# Patient Record
Sex: MALE | Race: BLACK OR AFRICAN AMERICAN | NOT HISPANIC OR LATINO
[De-identification: names, ages, dates, MRNs, and addresses within clinical notes are randomized per-mention and may not be internally consistent; named-entity substitution may affect disease eponyms.]

---

## 2015-11-09 RX ORDER — CINACALCET 30 MG/1
1 TABLET, FILM COATED ORAL
Qty: 0 | Refills: 0 | COMMUNITY
Start: 2015-11-09

## 2015-11-09 RX ORDER — ATORVASTATIN CALCIUM 80 MG/1
1 TABLET, FILM COATED ORAL
Qty: 0 | Refills: 0 | COMMUNITY
Start: 2015-11-09

## 2015-11-09 RX ORDER — SEVELAMER CARBONATE 2400 MG/1
2 POWDER, FOR SUSPENSION ORAL
Qty: 0 | Refills: 0 | COMMUNITY
Start: 2015-11-09

## 2015-11-11 RX ORDER — ASPIRIN/CALCIUM CARB/MAGNESIUM 324 MG
1 TABLET ORAL
Qty: 0 | Refills: 0 | DISCHARGE
Start: 2015-11-11

## 2017-01-04 ENCOUNTER — APPOINTMENT (OUTPATIENT)
Dept: UROLOGY | Facility: CLINIC | Age: 67
End: 2017-01-04

## 2017-01-04 VITALS
HEIGHT: 70 IN | HEART RATE: 99 BPM | WEIGHT: 136 LBS | RESPIRATION RATE: 17 BRPM | SYSTOLIC BLOOD PRESSURE: 177 MMHG | DIASTOLIC BLOOD PRESSURE: 89 MMHG | TEMPERATURE: 97.8 F | BODY MASS INDEX: 19.47 KG/M2

## 2017-01-04 DIAGNOSIS — R33.9 RETENTION OF URINE, UNSPECIFIED: ICD-10-CM

## 2017-01-25 ENCOUNTER — APPOINTMENT (OUTPATIENT)
Dept: UROLOGY | Facility: CLINIC | Age: 67
End: 2017-01-25

## 2017-01-25 ENCOUNTER — OUTPATIENT (OUTPATIENT)
Dept: OUTPATIENT SERVICES | Facility: HOSPITAL | Age: 67
LOS: 1 days | End: 2017-01-25
Payer: MEDICARE

## 2017-01-25 DIAGNOSIS — R35.0 FREQUENCY OF MICTURITION: ICD-10-CM

## 2017-01-25 DIAGNOSIS — N15.1 RENAL AND PERINEPHRIC ABSCESS: Chronic | ICD-10-CM

## 2017-01-25 PROCEDURE — 51797 INTRAABDOMINAL PRESSURE TEST: CPT

## 2017-01-25 PROCEDURE — 51784 ANAL/URINARY MUSCLE STUDY: CPT

## 2017-01-25 PROCEDURE — 51728 CYSTOMETROGRAM W/VP: CPT

## 2017-01-25 PROCEDURE — 51741 ELECTRO-UROFLOWMETRY FIRST: CPT

## 2017-01-26 ENCOUNTER — INPATIENT (INPATIENT)
Facility: HOSPITAL | Age: 67
LOS: 4 days | Discharge: NURSING, REHAB OR LONG-TERM CARE | End: 2017-01-31
Attending: HOSPITALIST | Admitting: HOSPITALIST
Payer: MEDICARE

## 2017-01-26 VITALS
SYSTOLIC BLOOD PRESSURE: 95 MMHG | HEART RATE: 102 BPM | DIASTOLIC BLOOD PRESSURE: 64 MMHG | RESPIRATION RATE: 16 BRPM | TEMPERATURE: 98 F | OXYGEN SATURATION: 99 %

## 2017-01-26 DIAGNOSIS — N15.1 RENAL AND PERINEPHRIC ABSCESS: Chronic | ICD-10-CM

## 2017-01-26 LAB
ALBUMIN SERPL ELPH-MCNC: 3.5 G/DL — SIGNIFICANT CHANGE UP (ref 3.3–5)
ALP SERPL-CCNC: 87 U/L — SIGNIFICANT CHANGE UP (ref 40–120)
ALT FLD-CCNC: 12 U/L — SIGNIFICANT CHANGE UP (ref 4–41)
APPEARANCE UR: SIGNIFICANT CHANGE UP
AST SERPL-CCNC: 16 U/L — SIGNIFICANT CHANGE UP (ref 4–40)
BACTERIA # UR AUTO: SIGNIFICANT CHANGE UP
BASE EXCESS BLDV CALC-SCNC: -2 MMOL/L — SIGNIFICANT CHANGE UP
BASOPHILS # BLD AUTO: 0.03 K/UL — SIGNIFICANT CHANGE UP (ref 0–0.2)
BASOPHILS NFR BLD AUTO: 0.2 % — SIGNIFICANT CHANGE UP (ref 0–2)
BILIRUB SERPL-MCNC: 0.3 MG/DL — SIGNIFICANT CHANGE UP (ref 0.2–1.2)
BILIRUB UR-MCNC: NEGATIVE — SIGNIFICANT CHANGE UP
BLOOD GAS VENOUS - CREATININE: 10.5 MG/DL — HIGH (ref 0.5–1.3)
BLOOD UR QL VISUAL: HIGH
BUN SERPL-MCNC: 74 MG/DL — HIGH (ref 7–23)
CALCIUM SERPL-MCNC: 10.6 MG/DL — HIGH (ref 8.4–10.5)
CHLORIDE BLDV-SCNC: 99 MMOL/L — SIGNIFICANT CHANGE UP (ref 96–108)
CHLORIDE SERPL-SCNC: 97 MMOL/L — LOW (ref 98–107)
CK MB BLD-MCNC: 2.43 NG/ML — SIGNIFICANT CHANGE UP (ref 1–6.6)
CK SERPL-CCNC: 48 U/L — SIGNIFICANT CHANGE UP (ref 30–200)
CO2 SERPL-SCNC: 22 MMOL/L — SIGNIFICANT CHANGE UP (ref 22–31)
COLOR SPEC: YELLOW — SIGNIFICANT CHANGE UP
CREAT SERPL-MCNC: 9.35 MG/DL — HIGH (ref 0.5–1.3)
EOSINOPHIL # BLD AUTO: 0.1 K/UL — SIGNIFICANT CHANGE UP (ref 0–0.5)
EOSINOPHIL NFR BLD AUTO: 0.7 % — SIGNIFICANT CHANGE UP (ref 0–6)
GAS PNL BLDV: 136 MMOL/L — SIGNIFICANT CHANGE UP (ref 136–146)
GLUCOSE BLDV-MCNC: 192 — HIGH (ref 70–99)
GLUCOSE SERPL-MCNC: 197 MG/DL — HIGH (ref 70–99)
GLUCOSE UR-MCNC: NEGATIVE — SIGNIFICANT CHANGE UP
HCO3 BLDV-SCNC: 22 MMOL/L — SIGNIFICANT CHANGE UP (ref 20–27)
HCT VFR BLD CALC: 25 % — LOW (ref 39–50)
HCT VFR BLDV CALC: 25 % — LOW (ref 39–51)
HGB BLD-MCNC: 7.9 G/DL — LOW (ref 13–17)
HGB BLDV-MCNC: 8 G/DL — LOW (ref 13–17)
IMM GRANULOCYTES NFR BLD AUTO: 0.2 % — SIGNIFICANT CHANGE UP (ref 0–1.5)
KETONES UR-MCNC: NEGATIVE — SIGNIFICANT CHANGE UP
LACTATE BLDV-MCNC: 3 MMOL/L — HIGH (ref 0.5–2)
LEUKOCYTE ESTERASE UR-ACNC: HIGH
LYMPHOCYTES # BLD AUTO: 1.74 K/UL — SIGNIFICANT CHANGE UP (ref 1–3.3)
LYMPHOCYTES # BLD AUTO: 12.8 % — LOW (ref 13–44)
MCHC RBC-ENTMCNC: 28.6 PG — SIGNIFICANT CHANGE UP (ref 27–34)
MCHC RBC-ENTMCNC: 31.6 % — LOW (ref 32–36)
MCV RBC AUTO: 90.6 FL — SIGNIFICANT CHANGE UP (ref 80–100)
MONOCYTES # BLD AUTO: 0.59 K/UL — SIGNIFICANT CHANGE UP (ref 0–0.9)
MONOCYTES NFR BLD AUTO: 4.3 % — SIGNIFICANT CHANGE UP (ref 2–14)
NEUTROPHILS # BLD AUTO: 11.15 K/UL — HIGH (ref 1.8–7.4)
NEUTROPHILS NFR BLD AUTO: 81.8 % — HIGH (ref 43–77)
NITRITE UR-MCNC: NEGATIVE — SIGNIFICANT CHANGE UP
PCO2 BLDV: 50 MMHG — SIGNIFICANT CHANGE UP (ref 41–51)
PH BLDV: 7.3 PH — LOW (ref 7.32–7.43)
PH UR: 7 — SIGNIFICANT CHANGE UP (ref 4.6–8)
PLATELET # BLD AUTO: 202 K/UL — SIGNIFICANT CHANGE UP (ref 150–400)
PMV BLD: 11.8 FL — SIGNIFICANT CHANGE UP (ref 7–13)
PO2 BLDV: 24 MMHG — LOW (ref 35–40)
POTASSIUM BLDV-SCNC: 4.2 MMOL/L — SIGNIFICANT CHANGE UP (ref 3.4–4.5)
POTASSIUM SERPL-MCNC: 4.3 MMOL/L — SIGNIFICANT CHANGE UP (ref 3.5–5.3)
POTASSIUM SERPL-SCNC: 4.3 MMOL/L — SIGNIFICANT CHANGE UP (ref 3.5–5.3)
PROT SERPL-MCNC: 6.1 G/DL — SIGNIFICANT CHANGE UP (ref 6–8.3)
PROT UR-MCNC: 150 — HIGH
RBC # BLD: 2.76 M/UL — LOW (ref 4.2–5.8)
RBC # FLD: 14.8 % — HIGH (ref 10.3–14.5)
RBC CASTS # UR COMP ASSIST: SIGNIFICANT CHANGE UP (ref 0–?)
SAO2 % BLDV: 30.1 % — LOW (ref 60–85)
SODIUM SERPL-SCNC: 137 MMOL/L — SIGNIFICANT CHANGE UP (ref 135–145)
SP GR SPEC: 1.01 — SIGNIFICANT CHANGE UP (ref 1–1.03)
TROPONIN T SERPL-MCNC: < 0.06 NG/ML — SIGNIFICANT CHANGE UP (ref 0–0.06)
UROBILINOGEN FLD QL: NORMAL E.U. — SIGNIFICANT CHANGE UP (ref 0.1–0.2)
WBC # BLD: 13.64 K/UL — HIGH (ref 3.8–10.5)
WBC # FLD AUTO: 13.64 K/UL — HIGH (ref 3.8–10.5)
WBC UR QL: >50 — HIGH (ref 0–?)

## 2017-01-26 PROCEDURE — 71010: CPT | Mod: 26

## 2017-01-26 RX ORDER — SODIUM CHLORIDE 9 MG/ML
1000 INJECTION INTRAMUSCULAR; INTRAVENOUS; SUBCUTANEOUS ONCE
Qty: 0 | Refills: 0 | Status: COMPLETED | OUTPATIENT
Start: 2017-01-26 | End: 2017-01-26

## 2017-01-26 RX ORDER — VANCOMYCIN HCL 1 G
1000 VIAL (EA) INTRAVENOUS ONCE
Qty: 0 | Refills: 0 | Status: COMPLETED | OUTPATIENT
Start: 2017-01-26 | End: 2017-01-26

## 2017-01-26 RX ORDER — PIPERACILLIN AND TAZOBACTAM 4; .5 G/20ML; G/20ML
3.38 INJECTION, POWDER, LYOPHILIZED, FOR SOLUTION INTRAVENOUS ONCE
Qty: 0 | Refills: 0 | Status: COMPLETED | OUTPATIENT
Start: 2017-01-26 | End: 2017-01-26

## 2017-01-26 RX ADMIN — SODIUM CHLORIDE 1000 MILLILITER(S): 9 INJECTION INTRAMUSCULAR; INTRAVENOUS; SUBCUTANEOUS at 22:20

## 2017-01-26 RX ADMIN — PIPERACILLIN AND TAZOBACTAM 200 GRAM(S): 4; .5 INJECTION, POWDER, LYOPHILIZED, FOR SOLUTION INTRAVENOUS at 22:54

## 2017-01-26 RX ADMIN — Medication 250 MILLIGRAM(S): at 23:58

## 2017-01-26 NOTE — ED ADULT TRIAGE NOTE - CHIEF COMPLAINT QUOTE
Pt from [place] for episode of hypotension during dialysis.  Pt was unable to be dialyzed.  Pt A&Ox3, reports some generalized abdominal pain. Pt from [place] for episode of hypotension during dialysis.  Pt was unable to be dialyzed.  Pt A&Ox3, reports some generalized abdominal pain.  Pt is FULL CODE. Pt from [place] for episode of hypotension during dialysis.  Pt was unable to be dialyzed.  Pt A&Ox3, reports some generalized abdominal pain.  Pt is FULL CODE.    Addendum:  pt now endorsing dizziness.  BP now.  Pt upgraded. Pt from [place] for episode of hypotension during dialysis.  Pt was unable to be dialyzed.  Pt A&Ox3, reports some generalized abdominal pain.  Pt is FULL CODE.    Addendum:  pt now endorsing dizziness.  BP now.  Pt upgraded, Charge RN notified. Pt from [place] for episode of hypotension during dialysis.  Pt was unable to be dialyzed.  Pt A&Ox3, reports some generalized abdominal pain.  Pt is FULL CODE.    Addendum:  pt now endorsing dizziness.  BP low.  Pt upgraded, Charge RN notified.

## 2017-01-26 NOTE — ED PROVIDER NOTE - PROGRESS NOTE DETAILS
JAGUAR Sneed: Pt signed out to me. Pt initially had improved at sign out. When re-evaluated bp 86/55. Pt had 1.5L IVF plus abtx on stay. He states he feels much better then on arrival. Bedside US with few scattered b lines. Clear lungs. Will c/s micu, 500ml ns, consider pressors. Pt rejected by MICU. BP now 111/55. Will admit to floors.

## 2017-01-26 NOTE — ED ADULT NURSE NOTE - CHIEF COMPLAINT QUOTE
Pt from [place] for episode of hypotension during dialysis.  Pt was unable to be dialyzed.  Pt A&Ox3, reports some generalized abdominal pain.  Pt is FULL CODE.    Addendum:  pt now endorsing dizziness.  BP low.  Pt upgraded, Charge RN notified.

## 2017-01-26 NOTE — ED PROVIDER NOTE - MEDICAL DECISION MAKING DETAILS
66M with hypotension. Concern for sepsis. Do not suspect blood loss, hypovolemia, cardiogenic shock, or obstructive shock. Plan: fluids, labs, vbg, cultures, ua, abx, ekg, admit. 66M with hypotension. Concern for sepsis. Do not suspect blood loss, hypovolemia, cardiogenic shock, or obstructive shock. Plan: fluids, labs, vbg, cultures, ua, abx, ekg, admit.  67 y/o male referred from dialysis for hypotension (predialysis). Basic labs, cardiac enzymes, Abs, cxr, EKG, resuscitate with fluids if needed then pressors. R/o sepsis

## 2017-01-26 NOTE — ED PROVIDER NOTE - PHYSICAL EXAMINATION
Attending Note: 65 y/o male pressents to the ED from dialysis hypotensive feeling weak. PMH ESRD on HD (Tues/Thurs/Sat), MRSA bacteremia on vibramycin for possible vegetation seen on MG, CAD s/p stents, HLD, neurogenic bladder (catheterized twice daily). Sent pre dialysis for hypotension of 70/48. Feels sick, has vague abdominal pain since yesterday and cough with white sputum x 1 month. Pt denies fevers/chills, c/p, sob, vomiting, diarrhea, melena, blood in stool. Nephrologist: Dr Huff [phone]

## 2017-01-26 NOTE — ED PROVIDER NOTE - ATTENDING CONTRIBUTION TO CARE
Attending note: I have discussed with the resident the interview, history, examination, assessment, physical examination and the proposed medical plan. I agree with the conclusions and assumptions and proposed medical plan. I have personally interviewed and examined the patient. I have modified the EMR where indicated and reviewed the past EMR when available.

## 2017-01-26 NOTE — ED PROVIDER NOTE - OBJECTIVE STATEMENT
66M h/o ESRD on HD (Tues/Thurs/Sat), MRSA bacteremia on vibramycin for possible vegetation seen on MG, CAD s/p stents, HLD, neurogenic bladder (catheterized twice daily) sent from dialysis (did not complete HD) for hypotension of 70/48. Pt states he feels sick, has vague abdominal pain since yesterday and cough with white sputum x 1 month. Pt denies fevers/chills, cp, sob, vomiting, diarrhea, melena, blood in stool.  Nephrologist: Dr Huff [phone]

## 2017-01-27 DIAGNOSIS — R11.10 VOMITING, UNSPECIFIED: ICD-10-CM

## 2017-01-27 DIAGNOSIS — I38 ENDOCARDITIS, VALVE UNSPECIFIED: ICD-10-CM

## 2017-01-27 DIAGNOSIS — J98.4 OTHER DISORDERS OF LUNG: ICD-10-CM

## 2017-01-27 DIAGNOSIS — N18.6 END STAGE RENAL DISEASE: ICD-10-CM

## 2017-01-27 DIAGNOSIS — Z95.5 PRESENCE OF CORONARY ANGIOPLASTY IMPLANT AND GRAFT: Chronic | ICD-10-CM

## 2017-01-27 DIAGNOSIS — R10.9 UNSPECIFIED ABDOMINAL PAIN: ICD-10-CM

## 2017-01-27 DIAGNOSIS — I25.10 ATHEROSCLEROTIC HEART DISEASE OF NATIVE CORONARY ARTERY WITHOUT ANGINA PECTORIS: ICD-10-CM

## 2017-01-27 DIAGNOSIS — N31.9 NEUROMUSCULAR DYSFUNCTION OF BLADDER, UNSPECIFIED: ICD-10-CM

## 2017-01-27 DIAGNOSIS — E78.5 HYPERLIPIDEMIA, UNSPECIFIED: ICD-10-CM

## 2017-01-27 DIAGNOSIS — Z41.8 ENCOUNTER FOR OTHER PROCEDURES FOR PURPOSES OTHER THAN REMEDYING HEALTH STATE: ICD-10-CM

## 2017-01-27 DIAGNOSIS — A41.9 SEPSIS, UNSPECIFIED ORGANISM: ICD-10-CM

## 2017-01-27 DIAGNOSIS — I95.9 HYPOTENSION, UNSPECIFIED: ICD-10-CM

## 2017-01-27 LAB
ALBUMIN SERPL ELPH-MCNC: 3.6 G/DL — SIGNIFICANT CHANGE UP (ref 3.3–5)
ALP SERPL-CCNC: 83 U/L — SIGNIFICANT CHANGE UP (ref 40–120)
ALT FLD-CCNC: 9 U/L — SIGNIFICANT CHANGE UP (ref 4–41)
APTT BLD: 38.1 SEC — HIGH (ref 27.5–37.4)
AST SERPL-CCNC: 15 U/L — SIGNIFICANT CHANGE UP (ref 4–40)
BASE EXCESS BLDV CALC-SCNC: -2 MMOL/L — SIGNIFICANT CHANGE UP
BILIRUB SERPL-MCNC: 0.3 MG/DL — SIGNIFICANT CHANGE UP (ref 0.2–1.2)
BLD GP AB SCN SERPL QL: NEGATIVE — SIGNIFICANT CHANGE UP
BLOOD GAS VENOUS - CREATININE: 10.4 MG/DL — HIGH (ref 0.5–1.3)
BUN SERPL-MCNC: 48 MG/DL — HIGH (ref 7–23)
CALCIUM SERPL-MCNC: 9.9 MG/DL — SIGNIFICANT CHANGE UP (ref 8.4–10.5)
CHLORIDE BLDV-SCNC: 101 MMOL/L — SIGNIFICANT CHANGE UP (ref 96–108)
CHLORIDE SERPL-SCNC: 98 MMOL/L — SIGNIFICANT CHANGE UP (ref 98–107)
CO2 SERPL-SCNC: 21 MMOL/L — LOW (ref 22–31)
CREAT SERPL-MCNC: 7.07 MG/DL — HIGH (ref 0.5–1.3)
GAS PNL BLDV: 137 MMOL/L — SIGNIFICANT CHANGE UP (ref 136–146)
GLUCOSE BLDV-MCNC: 142 — HIGH (ref 70–99)
GLUCOSE SERPL-MCNC: 105 MG/DL — HIGH (ref 70–99)
HBV SURFACE AG SER-ACNC: NEGATIVE — SIGNIFICANT CHANGE UP
HCO3 BLDV-SCNC: 22 MMOL/L — SIGNIFICANT CHANGE UP (ref 20–27)
HCT VFR BLD CALC: 19.4 % — CRITICAL LOW (ref 39–50)
HCT VFR BLDV CALC: 22.9 % — LOW (ref 39–51)
HGB BLD-MCNC: 6.1 G/DL — CRITICAL LOW (ref 13–17)
HGB BLDV-MCNC: 7.3 G/DL — LOW (ref 13–17)
LACTATE BLDV-MCNC: 2.8 MMOL/L — HIGH (ref 0.5–2)
MAGNESIUM SERPL-MCNC: 2.2 MG/DL — SIGNIFICANT CHANGE UP (ref 1.6–2.6)
MCHC RBC-ENTMCNC: 28.6 PG — SIGNIFICANT CHANGE UP (ref 27–34)
MCHC RBC-ENTMCNC: 31.4 % — LOW (ref 32–36)
MCV RBC AUTO: 91.1 FL — SIGNIFICANT CHANGE UP (ref 80–100)
PCO2 BLDV: 56 MMHG — HIGH (ref 41–51)
PH BLDV: 7.26 PH — LOW (ref 7.32–7.43)
PHOSPHATE SERPL-MCNC: 4.1 MG/DL — SIGNIFICANT CHANGE UP (ref 2.5–4.5)
PLATELET # BLD AUTO: 122 K/UL — LOW (ref 150–400)
PMV BLD: 11.1 FL — SIGNIFICANT CHANGE UP (ref 7–13)
PO2 BLDV: < 24 MMHG — LOW (ref 35–40)
POTASSIUM BLDV-SCNC: 4.4 MMOL/L — SIGNIFICANT CHANGE UP (ref 3.4–4.5)
POTASSIUM SERPL-MCNC: 3.7 MMOL/L — SIGNIFICANT CHANGE UP (ref 3.5–5.3)
POTASSIUM SERPL-SCNC: 3.7 MMOL/L — SIGNIFICANT CHANGE UP (ref 3.5–5.3)
PROT SERPL-MCNC: 6 G/DL — SIGNIFICANT CHANGE UP (ref 6–8.3)
RBC # BLD: 2.13 M/UL — LOW (ref 4.2–5.8)
RBC # FLD: 15.4 % — HIGH (ref 10.3–14.5)
RH IG SCN BLD-IMP: POSITIVE — SIGNIFICANT CHANGE UP
RH IG SCN BLD-IMP: POSITIVE — SIGNIFICANT CHANGE UP
SAO2 % BLDV: 22.6 % — LOW (ref 60–85)
SODIUM SERPL-SCNC: 138 MMOL/L — SIGNIFICANT CHANGE UP (ref 135–145)
SPECIMEN SOURCE: SIGNIFICANT CHANGE UP
SPECIMEN SOURCE: SIGNIFICANT CHANGE UP
VANCOMYCIN FLD-MCNC: 13.2 UG/ML — SIGNIFICANT CHANGE UP
WBC # BLD: 10.79 K/UL — HIGH (ref 3.8–10.5)
WBC # FLD AUTO: 10.79 K/UL — HIGH (ref 3.8–10.5)

## 2017-01-27 PROCEDURE — 99223 1ST HOSP IP/OBS HIGH 75: CPT | Mod: GC

## 2017-01-27 PROCEDURE — 74177 CT ABD & PELVIS W/CONTRAST: CPT | Mod: 26

## 2017-01-27 RX ORDER — SODIUM CHLORIDE 9 MG/ML
500 INJECTION INTRAMUSCULAR; INTRAVENOUS; SUBCUTANEOUS ONCE
Qty: 0 | Refills: 0 | Status: COMPLETED | OUTPATIENT
Start: 2017-01-27 | End: 2017-01-27

## 2017-01-27 RX ORDER — ATORVASTATIN CALCIUM 80 MG/1
40 TABLET, FILM COATED ORAL AT BEDTIME
Qty: 0 | Refills: 0 | Status: DISCONTINUED | OUTPATIENT
Start: 2017-01-27 | End: 2017-01-31

## 2017-01-27 RX ORDER — SEVELAMER CARBONATE 2400 MG/1
800 POWDER, FOR SUSPENSION ORAL
Qty: 0 | Refills: 0 | Status: DISCONTINUED | OUTPATIENT
Start: 2017-01-27 | End: 2017-01-31

## 2017-01-27 RX ORDER — SENNA PLUS 8.6 MG/1
2 TABLET ORAL AT BEDTIME
Qty: 0 | Refills: 0 | Status: DISCONTINUED | OUTPATIENT
Start: 2017-01-27 | End: 2017-01-31

## 2017-01-27 RX ORDER — ACETAMINOPHEN 500 MG
325 TABLET ORAL EVERY 4 HOURS
Qty: 0 | Refills: 0 | Status: DISCONTINUED | OUTPATIENT
Start: 2017-01-27 | End: 2017-01-31

## 2017-01-27 RX ORDER — TAMSULOSIN HYDROCHLORIDE 0.4 MG/1
0.4 CAPSULE ORAL AT BEDTIME
Qty: 0 | Refills: 0 | Status: DISCONTINUED | OUTPATIENT
Start: 2017-01-27 | End: 2017-01-31

## 2017-01-27 RX ORDER — ONDANSETRON 8 MG/1
4 TABLET, FILM COATED ORAL EVERY 6 HOURS
Qty: 0 | Refills: 0 | Status: DISCONTINUED | OUTPATIENT
Start: 2017-01-27 | End: 2017-01-31

## 2017-01-27 RX ORDER — FOLIC ACID 0.8 MG
1 TABLET ORAL DAILY
Qty: 0 | Refills: 0 | Status: DISCONTINUED | OUTPATIENT
Start: 2017-01-27 | End: 2017-01-31

## 2017-01-27 RX ORDER — CINACALCET 30 MG/1
90 TABLET, FILM COATED ORAL DAILY
Qty: 0 | Refills: 0 | Status: DISCONTINUED | OUTPATIENT
Start: 2017-01-27 | End: 2017-01-31

## 2017-01-27 RX ORDER — MIDODRINE HYDROCHLORIDE 2.5 MG/1
20 TABLET ORAL ONCE
Qty: 0 | Refills: 0 | Status: DISCONTINUED | OUTPATIENT
Start: 2017-01-27 | End: 2017-01-27

## 2017-01-27 RX ORDER — ASPIRIN/CALCIUM CARB/MAGNESIUM 324 MG
81 TABLET ORAL DAILY
Qty: 0 | Refills: 0 | Status: DISCONTINUED | OUTPATIENT
Start: 2017-01-27 | End: 2017-01-31

## 2017-01-27 RX ORDER — MIDODRINE HYDROCHLORIDE 2.5 MG/1
10 TABLET ORAL
Qty: 0 | Refills: 0 | Status: DISCONTINUED | OUTPATIENT
Start: 2017-01-27 | End: 2017-01-31

## 2017-01-27 RX ORDER — CLOPIDOGREL BISULFATE 75 MG/1
75 TABLET, FILM COATED ORAL DAILY
Qty: 0 | Refills: 0 | Status: DISCONTINUED | OUTPATIENT
Start: 2017-01-27 | End: 2017-01-31

## 2017-01-27 RX ORDER — POLYETHYLENE GLYCOL 3350 17 G/17G
17 POWDER, FOR SOLUTION ORAL DAILY
Qty: 0 | Refills: 0 | Status: DISCONTINUED | OUTPATIENT
Start: 2017-01-27 | End: 2017-01-29

## 2017-01-27 RX ORDER — HEPARIN SODIUM 5000 [USP'U]/ML
5000 INJECTION INTRAVENOUS; SUBCUTANEOUS EVERY 8 HOURS
Qty: 0 | Refills: 0 | Status: DISCONTINUED | OUTPATIENT
Start: 2017-01-27 | End: 2017-01-28

## 2017-01-27 RX ORDER — LACTOBACILLUS ACIDOPHILUS 100MM CELL
1 CAPSULE ORAL DAILY
Qty: 0 | Refills: 0 | Status: DISCONTINUED | OUTPATIENT
Start: 2017-01-27 | End: 2017-01-31

## 2017-01-27 RX ORDER — DOCUSATE SODIUM 100 MG
100 CAPSULE ORAL THREE TIMES A DAY
Qty: 0 | Refills: 0 | Status: DISCONTINUED | OUTPATIENT
Start: 2017-01-27 | End: 2017-01-31

## 2017-01-27 RX ORDER — ONDANSETRON 8 MG/1
1 TABLET, FILM COATED ORAL
Qty: 0 | Refills: 0 | COMMUNITY

## 2017-01-27 RX ORDER — IMIPENEM AND CILASTATIN 250; 250 MG/100ML; MG/100ML
250 INJECTION, POWDER, FOR SOLUTION INTRAVENOUS EVERY 12 HOURS
Qty: 0 | Refills: 0 | Status: DISCONTINUED | OUTPATIENT
Start: 2017-01-27 | End: 2017-01-31

## 2017-01-27 RX ADMIN — MIDODRINE HYDROCHLORIDE 10 MILLIGRAM(S): 2.5 TABLET ORAL at 22:31

## 2017-01-27 RX ADMIN — TAMSULOSIN HYDROCHLORIDE 0.4 MILLIGRAM(S): 0.4 CAPSULE ORAL at 22:35

## 2017-01-27 RX ADMIN — SODIUM CHLORIDE 500 MILLILITER(S): 9 INJECTION INTRAMUSCULAR; INTRAVENOUS; SUBCUTANEOUS at 02:22

## 2017-01-27 RX ADMIN — SODIUM CHLORIDE 500 MILLILITER(S): 9 INJECTION INTRAMUSCULAR; INTRAVENOUS; SUBCUTANEOUS at 03:55

## 2017-01-27 RX ADMIN — ATORVASTATIN CALCIUM 40 MILLIGRAM(S): 80 TABLET, FILM COATED ORAL at 22:31

## 2017-01-27 RX ADMIN — IMIPENEM AND CILASTATIN 100 MILLIGRAM(S): 250; 250 INJECTION, POWDER, FOR SOLUTION INTRAVENOUS at 20:04

## 2017-01-27 RX ADMIN — HEPARIN SODIUM 5000 UNIT(S): 5000 INJECTION INTRAVENOUS; SUBCUTANEOUS at 22:31

## 2017-01-27 RX ADMIN — SEVELAMER CARBONATE 800 MILLIGRAM(S): 2400 POWDER, FOR SUSPENSION ORAL at 20:03

## 2017-01-27 NOTE — H&P ADULT. - NEUROLOGICAL COMMENTS
per pt he is wheel chair bound and some motor and sensory function is somewhat improving from before

## 2017-01-27 NOTE — H&P ADULT. - PROBLEM SELECTOR PLAN 3
HD (Tues/Thurs/Sat)  Missed dialysis yesterday, for HD today s/p stenting   continue Aspirin and Plavix

## 2017-01-27 NOTE — H&P ADULT. - HISTORY OF PRESENT ILLNESS
65 yo M w/PMH of ESRD on HD (Tues/Thurs/Sat), MRSA bacteremia on vibramycin for possible vegetation seen on MG, CAD s/p stents, HLD, neurogenic bladder (catherized 2x a day), spinal abscesses (on Ancef for past 9 months), presents to the ED for hypotension of (70/48) and vomiting. Patient recent hospitalized 1/11 for sepsis, discharged [facility] where he was doing well. Yesterday after he was brought to dialysis, he was found to be hypotensive and then began to feel dizzy. He then began to feel nauseous and threw up several times. The vomit was non-bilious and non-bloody.  He also complained of abdominal pain, centered around his umbilicus. Currently 4/10 but was 10/10 when the pain was at its worst. The pain is described as pressure-like and began yesterday. It has improved with abx and fluids in the ED and improves when he coughs. 67 yo M w/PMH of ESRD on HD (Tues/Thurs/Sat), MRSA bacteremia on vibramycin for possible vegetation seen on MG, CAD s/p stents, HLD, neurogenic bladder (catherized 2x a day), spinal abscesses (on Ancef for past 9 months), presents to the ED for hypotension of (70/48) and vomiting. Patient recent hospitalized 1/11 for sepsis, discharged to [facility] where he was doing well. Yesterday after he was brought to dialysis, he was found to be hypotensive and then began to feel dizzy. He lost consciousness for a brief time (about a minute) and was told that he was shaking while he was unconscious. He was a little disoriented when he recovered consciousness but quickly recovered. He denied any tongue biting or incontinence while he was passed out. He then began to feel nauseous and threw up several times. The vomit was non-bilious and non-bloody.    He also complained of abdominal pain, centered around his umbilicus. Currently 4/10 but was 10/10 when the pain was at its worst. The pain is described as pressure-like and began yesterday. It has improved with abx and fluids in the ED and improves when he coughs. 65 yo M w/PMH of ESRD on HD (Tues/Thurs/Sat), CAD s/p stents, HLD, neurogenic bladder (catherized 2x a day), spinal abscesses (on Ancef for past 9 months), recent admission for MRSA bacteremia, now presents to the ED for hypotension of (64/39) and vomiting. Patient recent hospitalized 1/11 for MRSA bacteremia treated with vibramycin for possible vegetation seen on MG, (Dr. Abelino Galan recommended that patient would need LIFE LONG antibiotic therapy). Patient was discharged to [facility] where he had been doing well. Yesterday after he was brought to dialysis, he was found to be hypotensive and then began to feel dizzy and became unresponsive and has NBNB vomiting x 1, but came around with NS bolus and BP improved to 136/67.    He also complained of abdominal pain, centered around his umbilicus. Currently 4/10 but was 10/10 when the pain when he came in. The pain is described as pressure-like and began yesterday. It has improved with abx and fluids in the ED and improves when he coughs. 67 yo M w/PMH of ESRD on HD (Tues/Thurs/Sat), CAD s/p stents, HLD, neurogenic bladder (catherized 2x a day) 2/2 paraplegia due to spinal abscess (has been on Ancef since), recent admission for MRSA bacteremia, now presents to the ED for hypotension of (64/39) and vomiting. Patient recent hospitalized 1/11 for MRSA bacteremia treated with vibramycin for possible vegetation seen on MG, (Dr. Abelino Galan recommended that patient would need LIFE LONG antibiotic therapy). Patient was discharged to [facility] where he had been doing well. Yesterday after he was brought to dialysis, he was found to be hypotensive and then began to feel dizzy and became unresponsive and has NBNB vomiting x 1, but came around with NS bolus and BP improved to 136/67.    He also complained of abdominal pain, centered around his umbilicus. Currently 4/10 but was 10/10 when the pain when he came in. The pain is described as pressure-like and began yesterday. It has improved with abx and fluids in the ED and improves when he coughs. 65 yo M w/PMH of ESRD on HD (Tues/Thurs/Sat), CAD s/p stents, HLD, hyperparathyroidism, neurogenic bladder (caths 2x a day) 2/2 paraplegia due to spinal abscess (has been on Ancef since), recent admission for MSSA bacteremia, now presents to the ED for hypotension of (64/39) and vomiting. Patient recent hospitalized 1/11 for pneumonia. Previously hospitalized 11/15 with MSSA bacteremia treated with vibramycin for possible vegetation seen on MG, (Dr. Abelino Galan recommended that patient would need LIFE LONG antibiotic therapy). After recent admission for pneumonia patient was discharged to [facility] where he had been doing well. Yesterday after he was brought to dialysis, he was found to be hypotensive and then began to feel dizzy and became unresponsive and has NBNB vomiting x 1, but came around with NS bolus and BP improved to 136/67.    He also complained of abdominal pain, centered around his umbilicus. Currently 4/10 but was 10/10 when the pain when he came in. The pain is described as pressure-like and began yesterday. It has improved with abx and fluids in the ED and improves when he coughs. 67 yo M w/PMH of ESRD on HD (Tues/Thurs/Sat), CAD s/p stents, HLD, hyperparathyroidism, neurogenic bladder (caths 2x a day)  paraplegia secondary to spinal infection in the past, currently on vibramycin and ampicillin per [place] records, recent admission for MSSA bacteremia, now presents to the ED for hypotension of (64/39) vomiting an unresponsiveness. Patient recent hospitalized 1/11 for pneumonia. Previously hospitalized 11/15 with MSSA bacteremia treated with vibramycin for possible vegetation seen on MG, (Dr. Abelino Galan recommended that patient would need LIFE LONG antibiotic therapy). After recent admission for pneumonia patient [facility] had [facility] doing well. Yesterday after he was brought to dialysis, he was found to be hypotensive and then began to feel dizzy and became unresponsive and has NBNB vomiting x 1, but became responsive after receiving  NS bolus and BP improved to 136/67.    He also complained of abdominal pain, centered around his umbilicus. Currently 4/10 but was 10/10 when the pain when he came in. The pain is described as pressure-like and began yesterday. It has improved with abx and fluids in the ED. 65 yo M w/PMH of ESRD on HD (Tues/Thurs/Sat), CAD s/p stents, HLD, hyperparathyroidism, neurogenic bladder (caths 2x a day)  paraplegia secondary to spinal infection in the past, wheel chair bound, currently on vibramycin and ampicillin per [place] records, recent admission for MSSA bacteremia, now presents to the ED for hypotension of (64/39) vomiting an unresponsiveness. Patient recent hospitalized 1/11 for pneumonia. Previously hospitalized 11/15 with MSSA bacteremia treated with vibramycin for possible vegetation seen on MG, (Dr. Abelino Galan recommended that patient would need LIFE LONG antibiotic therapy). After recent admission for pneumonia patient was discharged to [place] [facility] where he had been doing well. Yesterday after he was brought to dialysis, he was found to be hypotensive and then began to feel dizzy and became unresponsive and has NBNB vomiting x 1, but became responsive after receiving  NS bolus and BP improved to 136/67.    He also complained of abdominal pain, centered around his umbilicus. Currently 4/10 but was 10/10 when the pain when he came in. The pain is described as pressure-like and began yesterday. It has improved with abx and fluids in the ED.

## 2017-01-27 NOTE — H&P ADULT. - PROBLEM SELECTOR PLAN 2
Resolved  Continue Zofran prn HD (Tues/Thurs/Sat), renal consult  Missed dialysis yesterday, for HD today

## 2017-01-27 NOTE — H&P ADULT. - NEUROLOGICAL
negative Alert & oriented; no sensory, motor or coordination deficits, normal reflexes details… detailed exam

## 2017-01-27 NOTE — H&P ADULT. - PROBLEM SELECTOR PLAN 7
ID consulted for abx recs ID consulted   -start Vancomycin - dose based on level 15-20  -start Imipenem q12h   -MRI T-spine CT Ab/Pelvis - showed constipation and possible stercoral colitis  Tylenol prn pain, bowel regimen

## 2017-01-27 NOTE — PATIENT PROFILE ADULT. - VISION (WITH CORRECTIVE LENSES IF THE PATIENT USUALLY WEARS THEM):
Normal vision: sees adequately in most situations; can see medication labels, newsprint glasses for reading/Partially impaired: cannot see medication labels or newsprint, but can see obstacles in path, and the surrounding layout; can count fingers at arm's length

## 2017-01-27 NOTE — H&P ADULT. - PMH
CAD in native artery    Cavitary lung disease    ESRD (end stage renal disease)    Hypertension CAD in native artery    Cavitary lung disease    ESRD (end stage renal disease)    HLD (hyperlipidemia)    Hypertension    Neurogenic bladder    Spinal abscess CAD in native artery    Cavitary lung disease    ESRD (end stage renal disease)    HLD (hyperlipidemia)    Hypertension    Neurogenic bladder    NSTEMI (non-ST elevated myocardial infarction)    Spinal abscess

## 2017-01-27 NOTE — H&P ADULT. - FAMILY HISTORY
Mother  Still living? Unknown  Family history of breast cancer, Age at diagnosis: Age Unknown     Sibling  Still living? Unknown  Family history of breast cancer, Age at diagnosis: Age Unknown

## 2017-01-27 NOTE — H&P ADULT. - PROBLEM SELECTOR PLAN 8
CT Ab/Pelvis - showed constipation and possible stercoral colitis  Tylenol prn pain improve score 4 (h/o paraplegia sec to spinal infection, age >60, and immobilization) will place on dvt ppx.

## 2017-01-27 NOTE — H&P ADULT. - ASSESSMENT
65 yo M w/PMH of ESRD on HD (Tues/Thurs/Sat), CAD s/p stents, HLD, neurogenic bladder (catherized 2x a day), spinal abscesses (on Ancef for past 9 months), recent admission MRSA bacteremia on vibramycin for possible vegetation seen on MG, now presents to the ED for hypotension of (70/48) and vomiting. 67 yo M w/PMH of ESRD on HD (Tues/Thurs/Sat), CAD s/p stents, HLD, neurogenic bladder (catherized 2x a day), spinal abscesses (on Ancef for past 9 months), recent admission for MRSA bacteremia (treated with vibramycin, ongoing), now presents to the ED for hypotension of (64/39) and vomiting. 65 yo M w/PMH of ESRD on HD (Tues/Thurs/Sat), CAD s/p stents, HLD, neurogenic bladder (catherized 2x a day), spinal abscesses (on Ancef), MSSA bacteremia (treated with vibramycin, ongoing), now presents to the ED for hypotension of (64/39) and vomiting. 67 yo M w/PMH of ESRD on HD (Tues/Thurs/Sat), CAD s/p stents, HLD, hyperparathyroidism, neurogenic bladder (caths 2x a day)  paraplegia secondary to spinal infection in the past, currently on vibramycin and ampicillin per [place] records, recent admission for MSSA bacteremia, ? endocarditis, now presents to the ED for hypotension, vomiting and ams possible sepsis.

## 2017-01-27 NOTE — H&P ADULT. - PROBLEM SELECTOR PLAN 6
continue statin ID consulted   -start Vancomycin - dose based on level 15-20  -start Imipenem q12h   -MRI T-spine

## 2017-01-27 NOTE — H&P ADULT. - PROBLEM SELECTOR PLAN 1
Improved s/p fluid bolus and Midodrine Possible sepsis, BP Improved s/p fluid bolus will evaluate for a infectious eitology as well given recent spinal infection, MSSA bacteremia and ? endocarditits, ID consulted, cont vanco per level, to keep trough bet 15-20, Imipenem, MRI of T spine, possible MG per ID recs.

## 2017-01-27 NOTE — H&P ADULT. - ATTENDING COMMENTS
67 yo M w/PMH of ESRD on HD (Tues/Thurs/Sat), CAD s/p stents, HLD, hyperparathyroidism, neurogenic bladder (caths 2x a day)  paraplegia secondary to spinal infection in the past, currently on vibramycin and ampicillin per [place] records, recent admission for MSSA bacteremia, now presents to the ED for hypotension of (64/39) vomiting an unresponsiveness possible sepsis.  Exam: as above  Plan: ID consulted already, cont vanco, imipenem, MRI of T spine, possible MG, cultures, cont dialysis per schedule, renal consulted already, cont home meds for cad, hld, dvt ppx with hsq. Plan discussed with HS. 67 yo M w/PMH of ESRD on HD (Tues/Thurs/Sat), CAD s/p stents, HLD, hyperparathyroidism, neurogenic bladder (caths 2x a day)  paraplegia secondary to spinal infection in the past,wheel chair bound, currently on vibramycin and ampicillin per [place] records, recent admission for MSSA bacteremia, now presents to the ED for hypotension of (64/39) vomiting an unresponsiveness possible sepsis.  Exam: as above  Plan: ID consulted already, cont vanco, imipenem, MRI of T spine, possible MG, cultures, cont dialysis per schedule, renal consulted already, cont home meds for cad, hld, dvt ppx with hsq. Bowel regimen. Plan discussed with HS.

## 2017-01-27 NOTE — H&P ADULT. - CARDIOVASCULAR
negative Regular rate & rhythm, normal S1, S2; no murmurs, gallops or rubs; no S3, S4 details… detailed exam

## 2017-01-28 LAB
BASOPHILS # BLD AUTO: 0.04 K/UL — SIGNIFICANT CHANGE UP (ref 0–0.2)
BASOPHILS NFR BLD AUTO: 0.4 % — SIGNIFICANT CHANGE UP (ref 0–2)
EOSINOPHIL # BLD AUTO: 0.29 K/UL — SIGNIFICANT CHANGE UP (ref 0–0.5)
EOSINOPHIL NFR BLD AUTO: 3 % — SIGNIFICANT CHANGE UP (ref 0–6)
HAPTOGLOB SERPL-MCNC: 154 MG/DL — SIGNIFICANT CHANGE UP (ref 34–200)
HBV CORE AB SER-ACNC: NONREACTIVE — SIGNIFICANT CHANGE UP
HBV SURFACE AB SER-ACNC: 20 MLU/ML — SIGNIFICANT CHANGE UP
HCT VFR BLD CALC: 20.8 % — CRITICAL LOW (ref 39–50)
HCT VFR BLD CALC: 20.8 % — CRITICAL LOW (ref 39–50)
HCT VFR BLD CALC: 22.2 % — LOW (ref 39–50)
HCT VFR BLD CALC: 22.8 % — LOW (ref 39–50)
HCV AB S/CO SERPL IA: 0.18 S/CO — SIGNIFICANT CHANGE UP
HCV AB SERPL-IMP: SIGNIFICANT CHANGE UP
HGB BLD-MCNC: 6.8 G/DL — CRITICAL LOW (ref 13–17)
HGB BLD-MCNC: 6.8 G/DL — CRITICAL LOW (ref 13–17)
HGB BLD-MCNC: 7.5 G/DL — LOW (ref 13–17)
HGB BLD-MCNC: 7.6 G/DL — LOW (ref 13–17)
IMM GRANULOCYTES NFR BLD AUTO: 0.1 % — SIGNIFICANT CHANGE UP (ref 0–1.5)
LDH SERPL L TO P-CCNC: 128 U/L — LOW (ref 135–225)
LYMPHOCYTES # BLD AUTO: 1.49 K/UL — SIGNIFICANT CHANGE UP (ref 1–3.3)
LYMPHOCYTES # BLD AUTO: 15.5 % — SIGNIFICANT CHANGE UP (ref 13–44)
MCHC RBC-ENTMCNC: 29.4 PG — SIGNIFICANT CHANGE UP (ref 27–34)
MCHC RBC-ENTMCNC: 29.4 PG — SIGNIFICANT CHANGE UP (ref 27–34)
MCHC RBC-ENTMCNC: 29.6 PG — SIGNIFICANT CHANGE UP (ref 27–34)
MCHC RBC-ENTMCNC: 30.1 PG — SIGNIFICANT CHANGE UP (ref 27–34)
MCHC RBC-ENTMCNC: 32.7 % — SIGNIFICANT CHANGE UP (ref 32–36)
MCHC RBC-ENTMCNC: 32.7 % — SIGNIFICANT CHANGE UP (ref 32–36)
MCHC RBC-ENTMCNC: 33.3 % — SIGNIFICANT CHANGE UP (ref 32–36)
MCHC RBC-ENTMCNC: 33.8 % — SIGNIFICANT CHANGE UP (ref 32–36)
MCV RBC AUTO: 88.7 FL — SIGNIFICANT CHANGE UP (ref 80–100)
MCV RBC AUTO: 89.2 FL — SIGNIFICANT CHANGE UP (ref 80–100)
MCV RBC AUTO: 90 FL — SIGNIFICANT CHANGE UP (ref 80–100)
MCV RBC AUTO: 90 FL — SIGNIFICANT CHANGE UP (ref 80–100)
MONOCYTES # BLD AUTO: 0.73 K/UL — SIGNIFICANT CHANGE UP (ref 0–0.9)
MONOCYTES NFR BLD AUTO: 7.6 % — SIGNIFICANT CHANGE UP (ref 2–14)
NEUTROPHILS # BLD AUTO: 7.05 K/UL — SIGNIFICANT CHANGE UP (ref 1.8–7.4)
NEUTROPHILS NFR BLD AUTO: 73.4 % — SIGNIFICANT CHANGE UP (ref 43–77)
OB PNL STL: NEGATIVE — SIGNIFICANT CHANGE UP
PLATELET # BLD AUTO: 110 K/UL — LOW (ref 150–400)
PLATELET # BLD AUTO: 111 K/UL — LOW (ref 150–400)
PLATELET # BLD AUTO: 116 K/UL — LOW (ref 150–400)
PLATELET # BLD AUTO: 116 K/UL — LOW (ref 150–400)
PMV BLD: 11 FL — SIGNIFICANT CHANGE UP (ref 7–13)
PMV BLD: 11.2 FL — SIGNIFICANT CHANGE UP (ref 7–13)
PMV BLD: 11.3 FL — SIGNIFICANT CHANGE UP (ref 7–13)
PMV BLD: 11.3 FL — SIGNIFICANT CHANGE UP (ref 7–13)
RBC # BLD: 2.31 M/UL — LOW (ref 4.2–5.8)
RBC # BLD: 2.31 M/UL — LOW (ref 4.2–5.8)
RBC # BLD: 2.49 M/UL — LOW (ref 4.2–5.8)
RBC # BLD: 2.57 M/UL — LOW (ref 4.2–5.8)
RBC # FLD: 15.2 % — HIGH (ref 10.3–14.5)
RBC # FLD: 15.2 % — HIGH (ref 10.3–14.5)
RBC # FLD: 15.3 % — HIGH (ref 10.3–14.5)
RBC # FLD: 15.7 % — HIGH (ref 10.3–14.5)
RETICS #: 53.8 10X3/UL — SIGNIFICANT CHANGE UP (ref 17–73)
RETICS/RBC NFR: 2.4 % — SIGNIFICANT CHANGE UP (ref 0.5–2.5)
SPECIMEN SOURCE: SIGNIFICANT CHANGE UP
WBC # BLD: 9.1 K/UL — SIGNIFICANT CHANGE UP (ref 3.8–10.5)
WBC # BLD: 9.34 K/UL — SIGNIFICANT CHANGE UP (ref 3.8–10.5)
WBC # BLD: 9.34 K/UL — SIGNIFICANT CHANGE UP (ref 3.8–10.5)
WBC # BLD: 9.85 K/UL — SIGNIFICANT CHANGE UP (ref 3.8–10.5)
WBC # FLD AUTO: 9.1 K/UL — SIGNIFICANT CHANGE UP (ref 3.8–10.5)
WBC # FLD AUTO: 9.34 K/UL — SIGNIFICANT CHANGE UP (ref 3.8–10.5)
WBC # FLD AUTO: 9.34 K/UL — SIGNIFICANT CHANGE UP (ref 3.8–10.5)
WBC # FLD AUTO: 9.85 K/UL — SIGNIFICANT CHANGE UP (ref 3.8–10.5)

## 2017-01-28 PROCEDURE — 99233 SBSQ HOSP IP/OBS HIGH 50: CPT | Mod: GC

## 2017-01-28 PROCEDURE — 99232 SBSQ HOSP IP/OBS MODERATE 35: CPT | Mod: GC

## 2017-01-28 RX ORDER — VANCOMYCIN HCL 1 G
1000 VIAL (EA) INTRAVENOUS ONCE
Qty: 0 | Refills: 0 | Status: COMPLETED | OUTPATIENT
Start: 2017-01-28 | End: 2017-01-28

## 2017-01-28 RX ORDER — SIMETHICONE 80 MG/1
80 TABLET, CHEWABLE ORAL ONCE
Qty: 0 | Refills: 0 | Status: COMPLETED | OUTPATIENT
Start: 2017-01-28 | End: 2017-01-28

## 2017-01-28 RX ORDER — VANCOMYCIN HCL 1 G
1000 VIAL (EA) INTRAVENOUS ONCE
Qty: 0 | Refills: 0 | Status: DISCONTINUED | OUTPATIENT
Start: 2017-01-28 | End: 2017-01-28

## 2017-01-28 RX ADMIN — Medication 81 MILLIGRAM(S): at 10:55

## 2017-01-28 RX ADMIN — SEVELAMER CARBONATE 800 MILLIGRAM(S): 2400 POWDER, FOR SUSPENSION ORAL at 10:53

## 2017-01-28 RX ADMIN — SIMETHICONE 80 MILLIGRAM(S): 80 TABLET, CHEWABLE ORAL at 15:06

## 2017-01-28 RX ADMIN — SEVELAMER CARBONATE 800 MILLIGRAM(S): 2400 POWDER, FOR SUSPENSION ORAL at 18:00

## 2017-01-28 RX ADMIN — ONDANSETRON 4 MILLIGRAM(S): 8 TABLET, FILM COATED ORAL at 13:57

## 2017-01-28 RX ADMIN — CINACALCET 90 MILLIGRAM(S): 30 TABLET, FILM COATED ORAL at 10:53

## 2017-01-28 RX ADMIN — Medication 1 MILLIGRAM(S): at 10:54

## 2017-01-28 RX ADMIN — CLOPIDOGREL BISULFATE 75 MILLIGRAM(S): 75 TABLET, FILM COATED ORAL at 10:54

## 2017-01-28 RX ADMIN — IMIPENEM AND CILASTATIN 100 MILLIGRAM(S): 250; 250 INJECTION, POWDER, FOR SOLUTION INTRAVENOUS at 10:52

## 2017-01-28 RX ADMIN — Medication 1 TABLET(S): at 10:54

## 2017-01-28 RX ADMIN — Medication 250 MILLIGRAM(S): at 02:23

## 2017-01-29 LAB
ERYTHROCYTE [SEDIMENTATION RATE] IN BLOOD: 21 MM/HR — HIGH (ref 1–15)
FERRITIN SERPL-MCNC: 1164 NG/ML — HIGH (ref 30–400)
FOLATE SERPL-MCNC: > 20 NG/ML — HIGH (ref 4.7–20)
HCT VFR BLD CALC: 22.4 % — LOW (ref 39–50)
HGB BLD-MCNC: 7.3 G/DL — LOW (ref 13–17)
IRON SATN MFR SERPL: 133 UG/DL — LOW (ref 155–535)
IRON SATN MFR SERPL: 29 UG/DL — LOW (ref 45–165)
MCHC RBC-ENTMCNC: 29.7 PG — SIGNIFICANT CHANGE UP (ref 27–34)
MCHC RBC-ENTMCNC: 32.6 % — SIGNIFICANT CHANGE UP (ref 32–36)
MCV RBC AUTO: 91.1 FL — SIGNIFICANT CHANGE UP (ref 80–100)
OB PNL STL: NEGATIVE — SIGNIFICANT CHANGE UP
PLATELET # BLD AUTO: 111 K/UL — LOW (ref 150–400)
PMV BLD: 11 FL — SIGNIFICANT CHANGE UP (ref 7–13)
RBC # BLD: 2.46 M/UL — LOW (ref 4.2–5.8)
RBC # FLD: 15.9 % — HIGH (ref 10.3–14.5)
UIBC SERPL-MCNC: 104 UG/DL — LOW (ref 110–370)
VANCOMYCIN FLD-MCNC: 24.5 UG/ML — SIGNIFICANT CHANGE UP
VIT B12 SERPL-MCNC: 1560 PG/ML — HIGH (ref 200–900)
WBC # BLD: 8.64 K/UL — SIGNIFICANT CHANGE UP (ref 3.8–10.5)
WBC # FLD AUTO: 8.64 K/UL — SIGNIFICANT CHANGE UP (ref 3.8–10.5)

## 2017-01-29 PROCEDURE — 99233 SBSQ HOSP IP/OBS HIGH 50: CPT

## 2017-01-29 PROCEDURE — 99222 1ST HOSP IP/OBS MODERATE 55: CPT | Mod: GC

## 2017-01-29 RX ORDER — POLYETHYLENE GLYCOL 3350 17 G/17G
34 POWDER, FOR SOLUTION ORAL DAILY
Qty: 0 | Refills: 0 | Status: DISCONTINUED | OUTPATIENT
Start: 2017-01-29 | End: 2017-01-31

## 2017-01-29 RX ORDER — SIMETHICONE 80 MG/1
80 TABLET, CHEWABLE ORAL THREE TIMES A DAY
Qty: 0 | Refills: 0 | Status: DISCONTINUED | OUTPATIENT
Start: 2017-01-29 | End: 2017-01-31

## 2017-01-29 RX ADMIN — Medication 1 MILLIGRAM(S): at 13:02

## 2017-01-29 RX ADMIN — Medication 81 MILLIGRAM(S): at 13:02

## 2017-01-29 RX ADMIN — TAMSULOSIN HYDROCHLORIDE 0.4 MILLIGRAM(S): 0.4 CAPSULE ORAL at 21:53

## 2017-01-29 RX ADMIN — SEVELAMER CARBONATE 800 MILLIGRAM(S): 2400 POWDER, FOR SUSPENSION ORAL at 09:40

## 2017-01-29 RX ADMIN — Medication 5 MILLIGRAM(S): at 13:02

## 2017-01-29 RX ADMIN — IMIPENEM AND CILASTATIN 100 MILLIGRAM(S): 250; 250 INJECTION, POWDER, FOR SOLUTION INTRAVENOUS at 00:08

## 2017-01-29 RX ADMIN — CLOPIDOGREL BISULFATE 75 MILLIGRAM(S): 75 TABLET, FILM COATED ORAL at 13:02

## 2017-01-29 RX ADMIN — CINACALCET 90 MILLIGRAM(S): 30 TABLET, FILM COATED ORAL at 13:02

## 2017-01-29 RX ADMIN — SIMETHICONE 80 MILLIGRAM(S): 80 TABLET, CHEWABLE ORAL at 11:48

## 2017-01-29 RX ADMIN — IMIPENEM AND CILASTATIN 100 MILLIGRAM(S): 250; 250 INJECTION, POWDER, FOR SOLUTION INTRAVENOUS at 09:41

## 2017-01-29 RX ADMIN — SIMETHICONE 80 MILLIGRAM(S): 80 TABLET, CHEWABLE ORAL at 18:24

## 2017-01-29 RX ADMIN — ATORVASTATIN CALCIUM 40 MILLIGRAM(S): 80 TABLET, FILM COATED ORAL at 00:09

## 2017-01-29 RX ADMIN — IMIPENEM AND CILASTATIN 100 MILLIGRAM(S): 250; 250 INJECTION, POWDER, FOR SOLUTION INTRAVENOUS at 21:52

## 2017-01-29 RX ADMIN — TAMSULOSIN HYDROCHLORIDE 0.4 MILLIGRAM(S): 0.4 CAPSULE ORAL at 00:09

## 2017-01-29 RX ADMIN — ATORVASTATIN CALCIUM 40 MILLIGRAM(S): 80 TABLET, FILM COATED ORAL at 21:52

## 2017-01-29 RX ADMIN — Medication 100 MILLIGRAM(S): at 21:52

## 2017-01-29 RX ADMIN — SENNA PLUS 2 TABLET(S): 8.6 TABLET ORAL at 21:52

## 2017-01-29 RX ADMIN — Medication 1 TABLET(S): at 13:02

## 2017-01-29 RX ADMIN — SEVELAMER CARBONATE 800 MILLIGRAM(S): 2400 POWDER, FOR SUSPENSION ORAL at 13:02

## 2017-01-29 RX ADMIN — ONDANSETRON 4 MILLIGRAM(S): 8 TABLET, FILM COATED ORAL at 19:53

## 2017-01-29 RX ADMIN — SEVELAMER CARBONATE 800 MILLIGRAM(S): 2400 POWDER, FOR SUSPENSION ORAL at 17:52

## 2017-01-30 ENCOUNTER — TRANSCRIPTION ENCOUNTER (OUTPATIENT)
Age: 67
End: 2017-01-30

## 2017-01-30 DIAGNOSIS — N31.9 NEUROMUSCULAR DYSFUNCTION OF BLADDER, UNSPECIFIED: ICD-10-CM

## 2017-01-30 LAB
-  AMIKACIN: SIGNIFICANT CHANGE UP
-  AMPICILLIN/SULBACTAM: SIGNIFICANT CHANGE UP
-  AMPICILLIN: SIGNIFICANT CHANGE UP
-  AZTREONAM: SIGNIFICANT CHANGE UP
-  CEFAZOLIN: SIGNIFICANT CHANGE UP
-  CEFEPIME: SIGNIFICANT CHANGE UP
-  CEFOXITIN: SIGNIFICANT CHANGE UP
-  CEFTAZIDIME: SIGNIFICANT CHANGE UP
-  CEFTRIAXONE: SIGNIFICANT CHANGE UP
-  CIPROFLOXACIN: SIGNIFICANT CHANGE UP
-  ERTAPENEM: SIGNIFICANT CHANGE UP
-  GENTAMICIN: SIGNIFICANT CHANGE UP
-  LEVOFLOXACIN: SIGNIFICANT CHANGE UP
-  MEROPENEM: SIGNIFICANT CHANGE UP
-  NITROFURANTOIN: SIGNIFICANT CHANGE UP
-  PIPERACILLIN/TAZOBACTAM: SIGNIFICANT CHANGE UP
-  TOBRAMYCIN: SIGNIFICANT CHANGE UP
-  TRIMETHOPRIM/SULFAMETHOXAZOLE: SIGNIFICANT CHANGE UP
ALBUMIN SERPL ELPH-MCNC: 3.3 G/DL — SIGNIFICANT CHANGE UP (ref 3.3–5)
ALP SERPL-CCNC: 78 U/L — SIGNIFICANT CHANGE UP (ref 40–120)
ALT FLD-CCNC: 13 U/L — SIGNIFICANT CHANGE UP (ref 4–41)
AST SERPL-CCNC: 22 U/L — SIGNIFICANT CHANGE UP (ref 4–40)
BACTERIA UR CULT: SIGNIFICANT CHANGE UP
BILIRUB SERPL-MCNC: 0.7 MG/DL — SIGNIFICANT CHANGE UP (ref 0.2–1.2)
BUN SERPL-MCNC: 49 MG/DL — HIGH (ref 7–23)
CALCIUM SERPL-MCNC: 9.8 MG/DL — SIGNIFICANT CHANGE UP (ref 8.4–10.5)
CHLORIDE SERPL-SCNC: 104 MMOL/L — SIGNIFICANT CHANGE UP (ref 98–107)
CO2 SERPL-SCNC: 20 MMOL/L — LOW (ref 22–31)
CREAT SERPL-MCNC: 9.67 MG/DL — HIGH (ref 0.5–1.3)
GLUCOSE SERPL-MCNC: 71 MG/DL — SIGNIFICANT CHANGE UP (ref 70–99)
HCT VFR BLD CALC: 25.1 % — LOW (ref 39–50)
HGB BLD-MCNC: 7.8 G/DL — LOW (ref 13–17)
MAGNESIUM SERPL-MCNC: 2.3 MG/DL — SIGNIFICANT CHANGE UP (ref 1.6–2.6)
MCHC RBC-ENTMCNC: 28.7 PG — SIGNIFICANT CHANGE UP (ref 27–34)
MCHC RBC-ENTMCNC: 31.1 % — LOW (ref 32–36)
MCV RBC AUTO: 92.3 FL — SIGNIFICANT CHANGE UP (ref 80–100)
METHOD TYPE: SIGNIFICANT CHANGE UP
ORGANISM # SPEC MICROSCOPIC CNT: SIGNIFICANT CHANGE UP
ORGANISM # SPEC MICROSCOPIC CNT: SIGNIFICANT CHANGE UP
PHOSPHATE SERPL-MCNC: 4.3 MG/DL — SIGNIFICANT CHANGE UP (ref 2.5–4.5)
PLATELET # BLD AUTO: 116 K/UL — LOW (ref 150–400)
PMV BLD: 11 FL — SIGNIFICANT CHANGE UP (ref 7–13)
POTASSIUM SERPL-MCNC: 4.3 MMOL/L — SIGNIFICANT CHANGE UP (ref 3.5–5.3)
POTASSIUM SERPL-SCNC: 4.3 MMOL/L — SIGNIFICANT CHANGE UP (ref 3.5–5.3)
PROT SERPL-MCNC: 6 G/DL — SIGNIFICANT CHANGE UP (ref 6–8.3)
RBC # BLD: 2.72 M/UL — LOW (ref 4.2–5.8)
RBC # FLD: 15.5 % — HIGH (ref 10.3–14.5)
SODIUM SERPL-SCNC: 145 MMOL/L — SIGNIFICANT CHANGE UP (ref 135–145)
VANCOMYCIN FLD-MCNC: 22.3 UG/ML — SIGNIFICANT CHANGE UP
WBC # BLD: 8.64 K/UL — SIGNIFICANT CHANGE UP (ref 3.8–10.5)
WBC # FLD AUTO: 8.64 K/UL — SIGNIFICANT CHANGE UP (ref 3.8–10.5)

## 2017-01-30 PROCEDURE — 99233 SBSQ HOSP IP/OBS HIGH 50: CPT | Mod: GC

## 2017-01-30 PROCEDURE — 99232 SBSQ HOSP IP/OBS MODERATE 35: CPT

## 2017-01-30 RX ORDER — PANTOPRAZOLE SODIUM 20 MG/1
40 TABLET, DELAYED RELEASE ORAL
Qty: 0 | Refills: 0 | Status: DISCONTINUED | OUTPATIENT
Start: 2017-01-30 | End: 2017-01-31

## 2017-01-30 RX ORDER — ONDANSETRON 8 MG/1
4 TABLET, FILM COATED ORAL
Qty: 0 | Refills: 0 | COMMUNITY

## 2017-01-30 RX ADMIN — IMIPENEM AND CILASTATIN 100 MILLIGRAM(S): 250; 250 INJECTION, POWDER, FOR SOLUTION INTRAVENOUS at 09:00

## 2017-01-30 RX ADMIN — CLOPIDOGREL BISULFATE 75 MILLIGRAM(S): 75 TABLET, FILM COATED ORAL at 12:56

## 2017-01-30 RX ADMIN — Medication 1 TABLET(S): at 12:55

## 2017-01-30 RX ADMIN — SEVELAMER CARBONATE 800 MILLIGRAM(S): 2400 POWDER, FOR SUSPENSION ORAL at 12:55

## 2017-01-30 RX ADMIN — Medication 81 MILLIGRAM(S): at 12:55

## 2017-01-30 RX ADMIN — SEVELAMER CARBONATE 800 MILLIGRAM(S): 2400 POWDER, FOR SUSPENSION ORAL at 17:55

## 2017-01-30 RX ADMIN — ATORVASTATIN CALCIUM 40 MILLIGRAM(S): 80 TABLET, FILM COATED ORAL at 21:24

## 2017-01-30 RX ADMIN — Medication 1 MILLIGRAM(S): at 12:55

## 2017-01-30 RX ADMIN — Medication 325 MILLIGRAM(S): at 09:29

## 2017-01-30 RX ADMIN — CINACALCET 90 MILLIGRAM(S): 30 TABLET, FILM COATED ORAL at 12:55

## 2017-01-30 RX ADMIN — Medication 100 MILLIGRAM(S): at 12:55

## 2017-01-30 RX ADMIN — SEVELAMER CARBONATE 800 MILLIGRAM(S): 2400 POWDER, FOR SUSPENSION ORAL at 08:59

## 2017-01-30 RX ADMIN — TAMSULOSIN HYDROCHLORIDE 0.4 MILLIGRAM(S): 0.4 CAPSULE ORAL at 21:24

## 2017-01-30 RX ADMIN — Medication 100 MILLIGRAM(S): at 05:05

## 2017-01-30 RX ADMIN — IMIPENEM AND CILASTATIN 100 MILLIGRAM(S): 250; 250 INJECTION, POWDER, FOR SOLUTION INTRAVENOUS at 21:24

## 2017-01-30 RX ADMIN — Medication 325 MILLIGRAM(S): at 10:00

## 2017-01-30 RX ADMIN — Medication 5 MILLIGRAM(S): at 12:56

## 2017-01-30 NOTE — DISCHARGE NOTE ADULT - CARE PLAN
Principal Discharge DX:	Hypotension  Goal:	resolution  Instructions for follow-up, activity and diet:	Your episode of low blood pressure resolved with fluids in the hospital and blood pressure remained stable.  Secondary Diagnosis:	Vomiting  Secondary Diagnosis:	Anemia  Instructions for follow-up, activity and diet:	Due to chronic disease. You received 2 units of blood in the hospital with improvement of Hg blood level. Follow up with your primary care doctor.  Secondary Diagnosis:	ESRD (end stage renal disease)  Instructions for follow-up, activity and diet:	Continue dialysis and home meds.  Secondary Diagnosis:	Hypertension  Instructions for follow-up, activity and diet:	Continue home meds.  Secondary Diagnosis:	HLD (hyperlipidemia)  Instructions for follow-up, activity and diet:	Continue home meds.  Secondary Diagnosis:	Endocarditis  Instructions for follow-up, activity and diet:	MG done 1/31 showed _____________. Principal Discharge DX:	Hypotension  Goal:	resolution  Instructions for follow-up, activity and diet:	Your episode of low blood pressure resolved with fluids in the hospital and blood pressure remained stable.  Secondary Diagnosis:	Vomiting  Secondary Diagnosis:	Anemia  Instructions for follow-up, activity and diet:	Due to chronic disease. You received 2 units of blood in the hospital with improvement of Hg blood level. Follow up with your primary care doctor.  Secondary Diagnosis:	ESRD (end stage renal disease)  Instructions for follow-up, activity and diet:	Continue dialysis and home meds.  Secondary Diagnosis:	HLD (hyperlipidemia)  Instructions for follow-up, activity and diet:	Continue home meds.  Secondary Diagnosis:	Endocarditis  Instructions for follow-up, activity and diet:	MG done 1/31 showed _____________.  Secondary Diagnosis:	Spinal abscess  Instructions for follow-up, activity and diet:	MRI of thorasic spine was done on 1/31/17, which showed resolution of abscess. Follow up with your primary care doctor. Principal Discharge DX:	Hypotension  Goal:	resolution  Instructions for follow-up, activity and diet:	Your episode of low blood pressure resolved with fluids and blood trasfusions in the hospital and blood pressure remained stable.    Follow up with your primary care doctor within a week of leaving the hospital.  Secondary Diagnosis:	Vomiting  Instructions for follow-up, activity and diet:	Vomiting resolved while in the hospital. Take Zofran as needed for nausea and vomiting.  Secondary Diagnosis:	Anemia  Instructions for follow-up, activity and diet:	Due to chronic disease (ESRD). You received 2 units of blood in the hospital with improvement of Hg blood level. Follow up with your primary care doctor.  Secondary Diagnosis:	ESRD (end stage renal disease)  Instructions for follow-up, activity and diet:	Continue dialysis and home meds.  Secondary Diagnosis:	HLD (hyperlipidemia)  Instructions for follow-up, activity and diet:	Continue home meds.  Secondary Diagnosis:	Endocarditis  Instructions for follow-up, activity and diet:	MG done 1/31 showed no vegetations on heart valve making endocarditis unlikely. You were seen by the Infectious Disease specialists here who recommended that you no longer need to take any antibiotics at home.  Secondary Diagnosis:	Spinal abscess  Instructions for follow-up, activity and diet:	MRI of thorasic spine was done on 1/31/17, which showed resolution of abscess. Continue rehab exercises. Follow up with your neurologist and primary care doctor. Principal Discharge DX:	Hypotension  Goal:	resolution  Instructions for follow-up, activity and diet:	Your episode of low blood pressure resolved with fluids and blood trasfusions in the hospital and blood pressure remained stable.    Follow up with your primary care doctor within a week of leaving the hospital.  Secondary Diagnosis:	Vomiting  Instructions for follow-up, activity and diet:	Vomiting resolved while in the hospital. Take Zofran as needed for nausea and vomiting.  Secondary Diagnosis:	Anemia  Instructions for follow-up, activity and diet:	Due to chronic disease (ESRD). You received 2 units of blood in the hospital with improvement of your hemoglobin blood level. No sources of bleeding were identified, and your blood counts remained stable throughout your hospitalization. Please follow up with your primary care doctor and your nephrologist for a follow up of your blood counts and management of your kidney disease that is likely contributing to your anemia.  Secondary Diagnosis:	ESRD (end stage renal disease)  Instructions for follow-up, activity and diet:	Continue dialysis and home meds.  Please follow up with your nephrologist, Dr. Huff, after discharge from the hospital and to resume your dialysis schedule on [id number].  Secondary Diagnosis:	HLD (hyperlipidemia)  Instructions for follow-up, activity and diet:	Continue home meds and maintain a low cholesterol diet.  Secondary Diagnosis:	Endocarditis  Instructions for follow-up, activity and diet:	A transesophageal echocardiogram (MG) was done 1/31 showed no vegetations on heart valve making endocarditis unlikely. You were seen by the Infectious Disease specialists here who recommended that you no longer need to take any antibiotics at home. Please follow up with your primary care doctor after discharge to monitor you for evidence of further infections.  If you begin having fevers, chills, or weight loss, please seek medical attention.  Secondary Diagnosis:	Spinal abscess  Instructions for follow-up, activity and diet:	MRI of thoracic spine was done on 1/31/17, which showed resolution of your previous abscess. Continue rehab exercises. Follow up with your neurologist and primary care doctor. Principal Discharge DX:	Hypotension  Goal:	resolution  Instructions for follow-up, activity and diet:	Your episode of low blood pressure resolved with fluids and blood trasfusions in the hospital and blood pressure remained stable.    Follow up with your primary care doctor within a week of leaving the hospital.  Secondary Diagnosis:	Vomiting  Instructions for follow-up, activity and diet:	Vomiting resolved while in the hospital. Take Zofran as needed for nausea and vomiting.  Secondary Diagnosis:	Anemia  Instructions for follow-up, activity and diet:	Due to chronic disease (ESRD). You received 2 units of blood in the hospital with improvement of your hemoglobin blood level. No sources of bleeding were identified, and your blood counts remained stable throughout your hospitalization. Please follow up with your primary care doctor and your nephrologist for a follow up of your blood counts and management of your kidney disease that is likely contributing to your anemia.  Secondary Diagnosis:	ESRD (end stage renal disease)  Instructions for follow-up, activity and diet:	Continue dialysis and home meds.  Please follow up with your nephrologist, Dr. Huff, after discharge from the hospital and to resume your dialysis schedule on [id number].  Secondary Diagnosis:	HLD (hyperlipidemia)  Instructions for follow-up, activity and diet:	Continue home meds and maintain a low cholesterol diet.  Secondary Diagnosis:	Endocarditis  Instructions for follow-up, activity and diet:	A transesophageal echocardiogram (MG) was done 1/31 showed no vegetations on heart valve making endocarditis unlikely. You were seen by the Infectious Disease specialists here who recommended that you no longer need to take any antibiotics at home. Please follow up with your primary care doctor after discharge to monitor you for evidence of further infections.  If you begin having fevers, chills, or weight loss, please seek medical attention.  Secondary Diagnosis:	Spinal abscess  Instructions for follow-up, activity and diet:	MRI of thoracic spine was done on 1/31/17, which showed resolution of your previous abscess. Continue rehab exercises. Follow up with your neurologist and primary care doctor. Principal Discharge DX:	Hypotension  Goal:	resolution  Instructions for follow-up, activity and diet:	Your episode of low blood pressure resolved with fluids and blood trasfusions in the hospital and blood pressure remained stable.    Follow up with your primary care doctor within a week of leaving the hospital.  Secondary Diagnosis:	Vomiting  Instructions for follow-up, activity and diet:	Vomiting resolved while in the hospital. Take Zofran as needed for nausea and vomiting.  Secondary Diagnosis:	Anemia  Instructions for follow-up, activity and diet:	Due to chronic disease (ESRD). You received 2 units of blood in the hospital with improvement of your hemoglobin blood level. No sources of bleeding were identified, and your blood counts remained stable throughout your hospitalization. Please follow up with your primary care doctor and your nephrologist for a follow up of your blood counts and management of your kidney disease that is likely contributing to your anemia.  Secondary Diagnosis:	ESRD (end stage renal disease)  Instructions for follow-up, activity and diet:	Continue dialysis and home meds.  Please follow up with your nephrologist, Dr. Huff, after discharge from the hospital and to resume your dialysis schedule on [facility]jj[facility].  Secondary Diagnosis:	HLD (hyperlipidemia)  Instructions for follow-up, activity and diet:	Continue home meds and maintain a low cholesterol diet.  Secondary Diagnosis:	Endocarditis  Instructions for follow-up, activity and diet:	A transesophageal echocardiogram (MG) was done 1/31 showed no vegetations on heart valve making endocarditis unlikely. You were seen by the Infectious Disease specialists here who recommended that you no longer need to take any antibiotics at home. Please follow up with your primary care doctor after discharge to monitor you for evidence of further infections.  If you begin having fevers, chills, or weight loss, please seek medical attention.  Secondary Diagnosis:	Spinal abscess  Instructions for follow-up, activity and diet:	MRI of thoracic spine was done on 1/31/17, which showed resolution of your previous abscess. Continue rehab exercises. Follow up with your neurologist and primary care doctor. Principal Discharge DX:	Hypotension  Goal:	resolution  Instructions for follow-up, activity and diet:	Your episode of low blood pressure resolved with fluids and blood trasfusions in the hospital and blood pressure remained stable.    Follow up with your primary care doctor within a week of leaving the hospital.  Secondary Diagnosis:	Vomiting  Instructions for follow-up, activity and diet:	Vomiting resolved while in the hospital. Take Zofran as needed for nausea and vomiting.  Secondary Diagnosis:	Anemia  Instructions for follow-up, activity and diet:	Due to chronic disease (ESRD). You received 2 units of blood in the hospital with improvement of your hemoglobin blood level. No sources of bleeding were identified, and your blood counts remained stable throughout your hospitalization. Please follow up with your primary care doctor and your nephrologist for a follow up of your blood counts and management of your kidney disease that is likely contributing to your anemia.  Secondary Diagnosis:	ESRD (end stage renal disease)  Instructions for follow-up, activity and diet:	Continue dialysis and home meds.  Please follow up with your nephrologist, Dr. Huff, after discharge from the hospital and to resume your dialysis schedule on [id number]-Xprrft[facility].  Secondary Diagnosis:	HLD (hyperlipidemia)  Instructions for follow-up, activity and diet:	Continue home meds and maintain a low cholesterol diet.  Secondary Diagnosis:	Endocarditis  Instructions for follow-up, activity and diet:	A transesophageal echocardiogram (MG) was done 1/31 showed no vegetations on heart valve making endocarditis unlikely. You were seen by the Infectious Disease specialists here who recommended that you no longer need to take any antibiotics at home. Please follow up with your primary care doctor after discharge to monitor you for evidence of further infections.  If you begin having fevers, chills, or weight loss, please seek medical attention.  Secondary Diagnosis:	Spinal abscess  Instructions for follow-up, activity and diet:	MRI of thoracic spine was done on 1/31/17, which showed resolution of your previous abscess. Continue rehab exercises. Follow up with your neurologist and primary care doctor.

## 2017-01-30 NOTE — DISCHARGE NOTE ADULT - PROVIDER TOKENS
TOKEN:'[id number]',FREE:[LAST:[[facility]],FIRST:[[facility]],PHONE:[(   )    -],FAX:[(   )    -]] FREE:[LAST:[[facility]],FIRST:[[facility]],PHONE:[(   )    -],FAX:[(   )    -]],TOKEN:'[id number]'

## 2017-01-30 NOTE — DISCHARGE NOTE ADULT - MEDICATION SUMMARY - MEDICATIONS TO TAKE
I will START or STAY ON the medications listed below when I get home from the hospital:    aspirin 81 mg oral tablet  -- 1 tab(s) by mouth once a day  -- Indication: For S/P primary angioplasty with coronary stent    Tylenol 325 mg oral tablet  -- 2 tab(s) by mouth every 6 hours, As Needed for pain  -- Indication: For Pain as needed     tamsulosin 0.4 mg oral capsule  -- 1 cap(s) by mouth once a day  -- Indication: For Neurogenic bladder    ondansetron 4 mg oral tablet  -- 1 tab(s) by mouth every 6 hours, As needed, Nausea and/or Vomiting  -- Indication: For Nausea     atorvastatin 40 mg oral tablet  -- 1 tab(s) by mouth once a day (at bedtime)  -- Indication: For HLD (hyperlipidemia)    clopidogrel 75 mg oral tablet  -- 1 tab(s) by mouth once a day  -- Indication: For S/P primary angioplasty with coronary stent    midodrine 2.5 mg oral tablet  -- 1 tab(s) by mouth 3 times a week before hemodialysis, Tuesday, Thursday, Saturday  -- Indication: For Hypotension    Sensipar 90 mg oral tablet  -- 1 tab(s) by mouth once a day  -- Indication: For ESRD (end stage renal disease)    Renvela 800 mg oral tablet  -- 2 tab(s) by mouth 3 times a day (with meals)  -- Indication: For ESRD (end stage renal disease)    Acidophilus oral tablet  -- 1 tab(s) by mouth once a day  -- Indication: For Home supplement    Tussin DM 10 mg-100 mg/5 mL oral liquid  -- 10 milliliter(s) by mouth every 8 hours  -- Indication: For Home med     folic acid 1 mg oral tablet  -- 1 tab(s) by mouth once a day  -- Indication: For ESRD (end stage renal disease)

## 2017-01-30 NOTE — DISCHARGE NOTE ADULT - CARE PROVIDER_API CALL
Aldo Wheeler), Internal Medicine; Nephrology  [address] [place] [address]  Phone: [phone]  Fax: [phone]    [facility], [facility]  Phone: (   )    -  Fax: (   )    - [facility]  Phone: (   )    -  Fax: (   )    -    Heath Huff), Internal Medicine; Nephrology  [address] [address]  Phone: [phone]  Fax: [phone]

## 2017-01-30 NOTE — DISCHARGE NOTE ADULT - ADDITIONAL INSTRUCTIONS
improved 1) Please follow up with your primary care doctor at [facility] after discharge  2) Please follow up with your nephrologist, Dr. Huff, after discharge from the hospital

## 2017-01-30 NOTE — DISCHARGE NOTE ADULT - SECONDARY DIAGNOSIS.
Vomiting Anemia ESRD (end stage renal disease) Hypertension HLD (hyperlipidemia) Endocarditis Spinal abscess

## 2017-01-30 NOTE — DISCHARGE NOTE ADULT - HOSPITAL COURSE
67 yo M w/PMH of ESRD on HD (Tues/Thurs/Sat), CAD s/p stents, HLD, hyperparathyroidism, neurogenic bladder (caths 2x a day)  paraplegia secondary to spinal infection in the past, wheel chair bound, currently on vibramycin and ampicillin per [place] records, recent admission for MSSA bacteremia, now presents to the ED for hypotension of (64/39) vomiting an unresponsiveness. Patient recent hospitalized 1/11 for pneumonia. Previously hospitalized 11/15 with MSSA bacteremia treated with vibramycin for possible vegetation seen on MG, (Dr. Abelino Galan recommended that patient would need LIFE LONG antibiotic therapy). After recent admission for pneumonia patient was discharged to [place] [facility]hab where he had been doing well. Yesterday after he was brought to dialysis, he was found to be hypotensive and then began to feel dizzy and became unresponsive and has NBNB vomiting x 1, but became responsive after receiving  NS bolus and BP improved to 136/67.    He also complained of abdominal pain, centered around his umbilicus. Currently 4/10 but was 10/10 when the pain when he came in. The pain is described as pressure-like and began yesterday. It has improved with abx and fluids in the ED. 65 yo M w/PMH of ESRD on HD (Tues/Thurs/Sat), CAD s/p stents, HLD, hyperparathyroidism, neurogenic bladder (caths 2x a day)  paraplegia secondary to spinal infection in the past, wheel chair bound, currently on vibramycin and ampicillin per [place] records, recent admission for MSSA bacteremia, now presents to the ED for hypotension of (64/39) vomiting an unresponsiveness. Patient recent hospitalized 1/11 for pneumonia. Previously hospitalized 11/15 with MSSA bacteremia treated with vibramycin for possible vegetation seen on MG, (Dr. Abelino Galan recommended that patient would need LIFE LONG antibiotic therapy). After recent admission for pneumonia patient was discharged to [facility] where he had been doing well. Yesterday after he was brought to dialysis, he was found to be hypotensive and then began to feel dizzy and became unresponsive and has NBNB vomiting x 1, but became responsive after receiving  NS bolus and BP improved to 136/67.    He also complained of abdominal pain, centered around his umbilicus. Currently 4/10 but was 10/10 when the pain when he came in. The pain is described as pressure-like and began yesterday. It has improved with abx and fluids in the ED.  After fluids pt found to be anemic to Hg 6.1, received 2 units of pRBCs, Hg improved to 7.6 and remained stable. No signs of bleeding found. Fecal occult blood was negative on 2 separate testings. Due to concern for sepsis ID recommended course of Imipenem and Vancomycin. Patient was afebrile. Patient received HD on 1/27,1/28 and 1/31????.  MG showed _________. MRI spine showed ___________. Given no clear sign of infection antibiotics were discontinued. Due to patient's stable Hg and clinical status he was cleared for discharge to return to rehab. 65 yo M w/PMH of ESRD on HD (Tues/Thurs/Sat), CAD s/p stents, HLD, hyperparathyroidism, neurogenic bladder (caths 2x a day)  paraplegia secondary to spinal infection in the past, wheel chair bound, currently on vibramycin and ampicillin per [place] records, recent admission for MSSA bacteremia, now presents to the ED for hypotension of (64/39) vomiting an unresponsiveness. Patient recent hospitalized 1/11 for pneumonia. Previously hospitalized 11/15 with MSSA bacteremia treated with vibramycin for possible vegetation seen on MG, (Dr. Ableino Galan recommended that patient would need LIFE LONG antibiotic therapy). After recent admission for pneumonia patient was discharged to [facility] where he had been doing well. After he was brought to dialysis, he was found to be hypotensive and then began to feel dizzy and became unresponsive and has NBNB vomiting x 1, but became responsive after receiving  NS bolus and BP improved to 136/67.    He also complained of abdominal pain, centered around his umbilicus. Currently 4/10 but was 10/10 when the pain when he came in. The pain is described as pressure-like and began yesterday. It has improved with abx and fluids in the ED.  After fluids pt found to be anemic to Hg 6.1, received 2 units of pRBCs, Hg improved to 7.6 and remained stable. No signs of acute bleeding found. Fecal occult blood was negative on 2 separate testings. Due to concern for sepsis ID was consulted and recommended course of Imipenem and Vancomycin. Patient was afebrile. Patient received HD on 1/27,1/28 and 1/31.  MG showed _________. MRI thorasic spine showed resolution of abscess. Given no clear sign of infection antibiotics were discontinued. Due to patient's stable Hg and clinical status he was cleared for discharge to return to rehab. 65 yo M w/PMH of ESRD on HD (Tues/Thurs/Sat), CAD s/p stents, HLD, hyperparathyroidism, neurogenic bladder (caths 2x a day)  paraplegia secondary to spinal infection in the past, wheel chair bound, currently on vibramycin and ampicillin per [place] records, recent admission for MSSA bacteremia, now presents to the ED for hypotension of (64/39) vomiting an unresponsiveness. Patient recent hospitalized 1/11 for pneumonia. Previously hospitalized 11/15 with MSSA bacteremia treated with vibramycin for possible vegetation seen on MG, (Dr. Abelino Galan recommended that patient would need LIFE LONG antibiotic therapy). After recent admission for pneumonia patient was discharged to [facility] where he had been doing well. After he was brought to dialysis, he was found to be hypotensive and then began to feel dizzy and became unresponsive and has NBNB vomiting x 1, but became responsive after receiving  NS bolus and BP improved to 136/67.    He also complained of abdominal pain, centered around his umbilicus. Currently 4/10 but was 10/10 when the pain when he came in. The pain is described as pressure-like and began yesterday. It has improved with abx and fluids in the ED.  After fluids pt found to be anemic to Hg 6.1, received 2 units of pRBCs, Hg improved to 7.6 and remained stable. No signs of acute bleeding found. Fecal occult blood was negative on 2 separate testings. Due to concern for sepsis ID was consulted and recommended course of Imipenem and Vancomycin. Patient was afebrile. Patient received HD on 1/27,1/28 and 1/31.  MG showed no signs of vegetations. MRI thorasic spine showed resolution of abscess. Given no clear sign of infection antibiotics were discontinued. ID recommended that no prophylactic antibiotics were needed as there was no sign of endocarditis on MG. Due to patient's stable Hg and clinical status he was cleared for discharge to return to rehab. 67 yo M w/PMH of ESRD on HD (Tues/Thurs/Sat), CAD s/p stents, HLD, hyperparathyroidism, neurogenic bladder (caths 2x a day)  paraplegia secondary to spinal infection in the past, wheel chair bound, currently on vibramycin and ampicillin per [place] records, recent admission for MSSA bacteremia, now presents to the ED for hypotension of (64/39) vomiting an unresponsiveness. Patient recent hospitalized 1/11 for pneumonia. Previously hospitalized 11/15 with MSSA bacteremia treated with vibramycin for possible vegetation seen on MG, (Dr. Abelino Galan recommended that patient would need LIFE LONG antibiotic therapy). After recent admission for pneumonia patient was discharged to [place] rehab where he had been doing well. After he was brought to dialysis, he was found to be hypotensive and then began to feel dizzy and became unresponsive and has NBNB vomiting x 1, but became responsive after receiving  NS bolus and BP improved to 136/67.    He also complained of abdominal pain, centered around his umbilicus. Currently 4/10 but was 10/10 when the pain when he came in. The pain is described as pressure-like and began yesterday. It has improved with abx and fluids in the ED.  After fluids pt found to be anemic to Hg 6.1, received 2 units of pRBCs, Hg improved to 7.6 and remained stable. No signs of acute bleeding found. Fecal occult blood was negative on 2 separate testings. Due to concern for sepsis ID was consulted and recommended course of Imipenem and Vancomycin. Patient was afebrile. Patient received HD on 1/27,1/28 and 1/31.  Given previous hx of possible endocarditis on Nov 2015, MG was repeated, but showed no signs of vegetations. MRI thoracic spine showed resolution of abscess. Given no clear sign of infection, antibiotics were discontinued per ID. ID recommended that no prophylactic antibiotics were needed as there was no sign of endocarditis on MG and negative blood cultures during admission. Due to patient's stable Hg and clinical status he was cleared for discharge to return to rehab. Plan was discussed with family, agreeable with plan, to resume care with PMD at [place] and resume regular T-Th-Sat HD schedule. 65 yo M w/PMH of ESRD on HD (Tues/Thurs/Sat), CAD s/p stents, HLD, hyperparathyroidism, neurogenic bladder (caths 2x a day)  paraplegia secondary to spinal infection in the past, wheel chair bound, currently on vibramycin and ampicillin per [place] records, recent admission for MSSA bacteremia, now presents to the ED for hypotension of (64/39) vomiting an unresponsiveness. Patient recent hospitalized 1/11 for pneumonia. Previously hospitalized 11/15 with MSSA bacteremia treated with vibramycin for possible vegetation seen on MG, (Dr. Abelino Galan recommended that patient would need LIFE LONG antibiotic therapy). After recent admission for pneumonia patient was discharged to [place] [facility]hab where he had been doing well. After he was brought to dialysis, he was found to be hypotensive and then began to feel dizzy and became unresponsive and has NBNB vomiting x 1, but became responsive after receiving  NS bolus and BP improved to 136/67.    He also complained of abdominal pain, centered around his umbilicus,  10/10 when the pain when he came in, improved after. The pain is described as pressure-like and began yesterday. It has improved with abx and fluids in the ED.  After fluids pt found to be anemic to Hg 6.1, received 2 units of pRBCs, Hg improved to 7.6 and remained stable. No signs of acute bleeding found. Fecal occult blood was negative on 2 separate testings. Due to concern for sepsis ID was consulted and recommended course of Imipenem and Vancomycin. Patient was afebrile. Patient received HD on 1/27,1/28 and 1/31.  Given previous hx of possible endocarditis on Nov 2015, MG was repeated, but showed no signs of vegetations. MRI thoracic spine showed resolution of abscess. Given no clear sign of infection, antibiotics were discontinued per ID. ID recommended that no prophylactic antibiotics were needed as there was no sign of endocarditis on MG and negative blood cultures during admission. Due to patient's stable Hg and clinical status he was cleared for discharge to return to rehab. Plan was discussed with family, agreeable with plan, to resume care with PMD at [place] and resume regular T-Th-Sat HD schedule.

## 2017-01-30 NOTE — DISCHARGE NOTE ADULT - CARE PROVIDERS DIRECT ADDRESSES
,DirectAddress_Unknown,DirectAddress_Unknown,[email or web address]

## 2017-01-30 NOTE — DISCHARGE NOTE ADULT - PLAN OF CARE
resolution Your episode of low blood pressure resolved with fluids in the hospital and blood pressure remained stable. Due to chronic disease. You received 2 units of blood in the hospital with improvement of Hg blood level. Follow up with your primary care doctor. Continue dialysis and home meds. Continue home meds. MG done 1/31 showed _____________. MRI of thorasic spine was done on 1/31/17, which showed resolution of abscess. Follow up with your primary care doctor. Vomiting resolved while in the hospital. Take Zofran as needed for nausea and vomiting. Your episode of low blood pressure resolved with fluids and blood trasfusions in the hospital and blood pressure remained stable.    Follow up with your primary care doctor within a week of leaving the hospital. Due to chronic disease (ESRD). You received 2 units of blood in the hospital with improvement of Hg blood level. Follow up with your primary care doctor. MG done 1/31 showed no vegetations on heart valve making endocarditis unlikely. You were seen by the Infectious Disease specialists here who recommended that you no longer need to take any antibiotics at home. MRI of thorasic spine was done on 1/31/17, which showed resolution of abscess. Continue rehab exercises. Follow up with your neurologist and primary care doctor. Continue dialysis and home meds.  Please follow up with your nephrologist, Dr. Huff, after discharge from the hospital and to resume your dialysis schedule on [id number]. Continue home meds and maintain a low cholesterol diet. Due to chronic disease (ESRD). You received 2 units of blood in the hospital with improvement of your hemoglobin blood level. No sources of bleeding were identified, and your blood counts remained stable throughout your hospitalization. Please follow up with your primary care doctor and your nephrologist for a follow up of your blood counts and management of your kidney disease that is likely contributing to your anemia. A transesophageal echocardiogram (MG) was done 1/31 showed no vegetations on heart valve making endocarditis unlikely. You were seen by the Infectious Disease specialists here who recommended that you no longer need to take any antibiotics at home. Please follow up with your primary care doctor after discharge to monitor you for evidence of further infections.  If you begin having fevers, chills, or weight loss, please seek medical attention. MRI of thoracic spine was done on 1/31/17, which showed resolution of your previous abscess. Continue rehab exercises. Follow up with your neurologist and primary care doctor.

## 2017-01-30 NOTE — DISCHARGE NOTE ADULT - PATIENT PORTAL LINK FT
“You can access the FollowHealth Patient Portal, offered by [facility], by registering with the following website: [email or web address]”

## 2017-01-31 VITALS
DIASTOLIC BLOOD PRESSURE: 90 MMHG | HEART RATE: 87 BPM | TEMPERATURE: 98 F | RESPIRATION RATE: 18 BRPM | SYSTOLIC BLOOD PRESSURE: 146 MMHG | OXYGEN SATURATION: 100 %

## 2017-01-31 LAB
BACTERIA BLD CULT: SIGNIFICANT CHANGE UP
BACTERIA BLD CULT: SIGNIFICANT CHANGE UP
BUN SERPL-MCNC: 58 MG/DL — HIGH (ref 7–23)
CALCIUM SERPL-MCNC: 10 MG/DL — SIGNIFICANT CHANGE UP (ref 8.4–10.5)
CHLORIDE SERPL-SCNC: 99 MMOL/L — SIGNIFICANT CHANGE UP (ref 98–107)
CO2 SERPL-SCNC: 17 MMOL/L — LOW (ref 22–31)
CREAT SERPL-MCNC: 10.86 MG/DL — HIGH (ref 0.5–1.3)
GLUCOSE SERPL-MCNC: 74 MG/DL — SIGNIFICANT CHANGE UP (ref 70–99)
HCT VFR BLD CALC: 29.7 % — LOW (ref 39–50)
HGB BLD-MCNC: 9.4 G/DL — LOW (ref 13–17)
MAGNESIUM SERPL-MCNC: 2.3 MG/DL — SIGNIFICANT CHANGE UP (ref 1.6–2.6)
MCHC RBC-ENTMCNC: 29.1 PG — SIGNIFICANT CHANGE UP (ref 27–34)
MCHC RBC-ENTMCNC: 31.6 % — LOW (ref 32–36)
MCV RBC AUTO: 92 FL — SIGNIFICANT CHANGE UP (ref 80–100)
PHOSPHATE SERPL-MCNC: 4.2 MG/DL — SIGNIFICANT CHANGE UP (ref 2.5–4.5)
PLATELET # BLD AUTO: 123 K/UL — LOW (ref 150–400)
PMV BLD: 10.4 FL — SIGNIFICANT CHANGE UP (ref 7–13)
POTASSIUM SERPL-MCNC: 4.2 MMOL/L — SIGNIFICANT CHANGE UP (ref 3.5–5.3)
POTASSIUM SERPL-SCNC: 4.2 MMOL/L — SIGNIFICANT CHANGE UP (ref 3.5–5.3)
RBC # BLD: 3.23 M/UL — LOW (ref 4.2–5.8)
RBC # FLD: 15 % — HIGH (ref 10.3–14.5)
SODIUM SERPL-SCNC: 142 MMOL/L — SIGNIFICANT CHANGE UP (ref 135–145)
WBC # BLD: 7.6 K/UL — SIGNIFICANT CHANGE UP (ref 3.8–10.5)
WBC # FLD AUTO: 7.6 K/UL — SIGNIFICANT CHANGE UP (ref 3.8–10.5)

## 2017-01-31 PROCEDURE — 72146 MRI CHEST SPINE W/O DYE: CPT | Mod: 26

## 2017-01-31 PROCEDURE — 93320 DOPPLER ECHO COMPLETE: CPT | Mod: 26,GC

## 2017-01-31 PROCEDURE — 93312 ECHO TRANSESOPHAGEAL: CPT | Mod: 26

## 2017-01-31 PROCEDURE — 99232 SBSQ HOSP IP/OBS MODERATE 35: CPT

## 2017-01-31 PROCEDURE — 99239 HOSP IP/OBS DSCHRG MGMT >30: CPT

## 2017-01-31 PROCEDURE — 93325 DOPPLER ECHO COLOR FLOW MAPG: CPT | Mod: 26,GC

## 2017-01-31 PROCEDURE — 76376 3D RENDER W/INTRP POSTPROCES: CPT | Mod: 26

## 2017-01-31 RX ORDER — ERYTHROPOIETIN 10000 [IU]/ML
10000 INJECTION, SOLUTION INTRAVENOUS; SUBCUTANEOUS
Qty: 0 | Refills: 0 | Status: DISCONTINUED | OUTPATIENT
Start: 2017-01-31 | End: 2017-01-31

## 2017-01-31 RX ORDER — ONDANSETRON 8 MG/1
1 TABLET, FILM COATED ORAL
Qty: 0 | Refills: 0 | COMMUNITY
Start: 2017-01-31

## 2017-01-31 RX ADMIN — Medication 1 MILLIGRAM(S): at 19:52

## 2017-01-31 RX ADMIN — CINACALCET 90 MILLIGRAM(S): 30 TABLET, FILM COATED ORAL at 19:51

## 2017-01-31 RX ADMIN — Medication 81 MILLIGRAM(S): at 19:52

## 2017-01-31 RX ADMIN — ERYTHROPOIETIN 10000 UNIT(S): 10000 INJECTION, SOLUTION INTRAVENOUS; SUBCUTANEOUS at 17:01

## 2017-01-31 RX ADMIN — CLOPIDOGREL BISULFATE 75 MILLIGRAM(S): 75 TABLET, FILM COATED ORAL at 19:52

## 2017-01-31 RX ADMIN — Medication 1 TABLET(S): at 19:52

## 2017-01-31 RX ADMIN — PANTOPRAZOLE SODIUM 40 MILLIGRAM(S): 20 TABLET, DELAYED RELEASE ORAL at 06:55

## 2017-01-31 RX ADMIN — IMIPENEM AND CILASTATIN 100 MILLIGRAM(S): 250; 250 INJECTION, POWDER, FOR SOLUTION INTRAVENOUS at 09:56

## 2017-01-31 RX ADMIN — SEVELAMER CARBONATE 800 MILLIGRAM(S): 2400 POWDER, FOR SUSPENSION ORAL at 19:51

## 2017-03-22 ENCOUNTER — APPOINTMENT (OUTPATIENT)
Dept: UROLOGY | Facility: CLINIC | Age: 67
End: 2017-03-22

## 2017-09-13 PROBLEM — I21.4 NON-ST ELEVATION (NSTEMI) MYOCARDIAL INFARCTION: Chronic | Status: ACTIVE | Noted: 2017-01-27

## 2017-09-13 PROBLEM — M46.20 OSTEOMYELITIS OF VERTEBRA, SITE UNSPECIFIED: Chronic | Status: ACTIVE | Noted: 2017-01-27

## 2017-09-13 PROBLEM — N31.9 NEUROMUSCULAR DYSFUNCTION OF BLADDER, UNSPECIFIED: Chronic | Status: ACTIVE | Noted: 2017-01-27

## 2017-09-13 PROBLEM — E78.5 HYPERLIPIDEMIA, UNSPECIFIED: Chronic | Status: ACTIVE | Noted: 2017-01-27

## 2017-09-29 ENCOUNTER — OUTPATIENT (OUTPATIENT)
Dept: OUTPATIENT SERVICES | Facility: HOSPITAL | Age: 67
LOS: 1 days | Discharge: HOME | End: 2017-09-29

## 2017-09-29 VITALS
DIASTOLIC BLOOD PRESSURE: 76 MMHG | RESPIRATION RATE: 16 BRPM | WEIGHT: 138.01 LBS | HEIGHT: 66 IN | HEART RATE: 92 BPM | OXYGEN SATURATION: 99 % | SYSTOLIC BLOOD PRESSURE: 140 MMHG | TEMPERATURE: 98 F

## 2017-09-29 VITALS
TEMPERATURE: 97 F | DIASTOLIC BLOOD PRESSURE: 73 MMHG | OXYGEN SATURATION: 96 % | RESPIRATION RATE: 16 BRPM | SYSTOLIC BLOOD PRESSURE: 124 MMHG | HEART RATE: 84 BPM

## 2017-09-29 DIAGNOSIS — N15.1 RENAL AND PERINEPHRIC ABSCESS: Chronic | ICD-10-CM

## 2017-09-29 DIAGNOSIS — Z95.5 PRESENCE OF CORONARY ANGIOPLASTY IMPLANT AND GRAFT: Chronic | ICD-10-CM

## 2017-09-29 DIAGNOSIS — N18.6 END STAGE RENAL DISEASE: ICD-10-CM

## 2017-09-29 LAB — POTASSIUM BLDV-SCNC: 4.3 MMOL/L — SIGNIFICANT CHANGE UP (ref 3.4–4.5)

## 2017-09-29 RX ORDER — SODIUM CHLORIDE 9 MG/ML
1000 INJECTION INTRAMUSCULAR; INTRAVENOUS; SUBCUTANEOUS
Qty: 0 | Refills: 0 | Status: DISCONTINUED | OUTPATIENT
Start: 2017-09-29 | End: 2017-10-14

## 2017-09-29 NOTE — ASU DISCHARGE PLAN (ADULT/PEDIATRIC). - NOTIFY
Numbness, tingling/Swelling that continues/Numbness, color, or temperature change to extremity/Pain not relieved by Medications/Bleeding that does not stop/Fever greater than 101 Unable to Urinate/Numbness, tingling/Inability to Tolerate Liquids or Foods/Persistent Nausea and Vomiting/Swelling that continues/Bleeding that does not stop/Numbness, color, or temperature change to extremity/Pain not relieved by Medications/Fever greater than 101

## 2017-09-29 NOTE — H&P PST ADULT - PMH
CAD in native artery    Cavitary lung disease    ESRD (end stage renal disease)    HLD (hyperlipidemia)    Hypertension    Neurogenic bladder    NSTEMI (non-ST elevated myocardial infarction)    Spinal abscess

## 2017-09-29 NOTE — ASU DISCHARGE PLAN (ADULT/PEDIATRIC). - MEDICATION SUMMARY - MEDICATIONS TO TAKE
I will START or STAY ON the medications listed below when I get home from the hospital:    aspirin 81 mg oral tablet  -- 1 tab(s) by mouth once a day  -- Indication: For Cardiac ischemia prophylaxis    Tylenol 325 mg oral tablet  -- 2 tab(s) by mouth every 6 hours, As Needed for pain  -- Indication: For Pain    tamsulosin 0.4 mg oral capsule  -- 1 cap(s) by mouth once a day  -- Indication: For BPH    ondansetron 4 mg oral tablet  -- 1 tab(s) by mouth every 6 hours, As needed, Nausea and/or Vomiting  -- Indication: For Antiemetic    atorvastatin 40 mg oral tablet  -- 1 tab(s) by mouth once a day (at bedtime)  -- Indication: For Hyperlipidemia    clopidogrel 75 mg oral tablet  -- 1 tab(s) by mouth once a day  -- Indication: For Antiplatelet    Sensipar 90 mg oral tablet  -- 1 tab(s) by mouth once a day  -- Indication: For home medication    midodrine 2.5 mg oral tablet  -- 1 tab(s) by mouth 3 times a week before hemodialysis, Tuesday, Thursday, Saturday  -- Indication: For Blood pressure support prior to HD    Renvela 800 mg oral tablet  -- 2 tab(s) by mouth 3 times a day (with meals)  -- Indication: For Phosphate binder; ESRD    Acidophilus oral tablet  -- 1 tab(s) by mouth once a day  -- Indication: For Nutritional supplement    Tussin DM 10 mg-100 mg/5 mL oral liquid  -- 10 milliliter(s) by mouth every 8 hours  -- Indication: For Antitussive    folic acid 1 mg oral tablet  -- 1 tab(s) by mouth once a day  -- Indication: For Nutritional support

## 2017-09-29 NOTE — BRIEF OPERATIVE NOTE - PROCEDURE
<<-----Click on this checkbox to enter Procedure Arteriovenous anastomosis  09/29/2017    Active  [id number]

## 2017-09-29 NOTE — BRIEF OPERATIVE NOTE - OPERATION/FINDINGS
Brachiobasilic AVF with 4mm anastomosis. 3mm balloon angioplasty of basilic vein. Clips placed at anastomosis. Left brachiobasilic AVF with 4mm anastomosis. 3mm balloon angioplasty of basilic vein. Clips placed at anastomosis.

## 2017-09-30 ENCOUNTER — TRANSCRIPTION ENCOUNTER (OUTPATIENT)
Age: 67
End: 2017-09-30

## 2017-12-07 NOTE — ASU PATIENT PROFILE, ADULT - ABILITY TO HEAR (WITH HEARING AID OR HEARING APPLIANCE IF NORMALLY USED):
Mildly to Moderately Impaired: difficulty hearing in some environments or speaker may need to increase volume or speak distinctly/reading  glass

## 2017-12-08 ENCOUNTER — TRANSCRIPTION ENCOUNTER (OUTPATIENT)
Age: 67
End: 2017-12-08

## 2017-12-08 ENCOUNTER — OUTPATIENT (OUTPATIENT)
Dept: OUTPATIENT SERVICES | Facility: HOSPITAL | Age: 67
LOS: 1 days | Discharge: HOME | End: 2017-12-08

## 2017-12-08 VITALS
OXYGEN SATURATION: 92 % | DIASTOLIC BLOOD PRESSURE: 68 MMHG | WEIGHT: 143.08 LBS | HEIGHT: 66 IN | SYSTOLIC BLOOD PRESSURE: 127 MMHG | TEMPERATURE: 97 F | RESPIRATION RATE: 16 BRPM

## 2017-12-08 VITALS — OXYGEN SATURATION: 98 % | HEART RATE: 95 BPM | TEMPERATURE: 98 F | RESPIRATION RATE: 16 BRPM

## 2017-12-08 DIAGNOSIS — Z95.5 PRESENCE OF CORONARY ANGIOPLASTY IMPLANT AND GRAFT: Chronic | ICD-10-CM

## 2017-12-08 DIAGNOSIS — N15.1 RENAL AND PERINEPHRIC ABSCESS: Chronic | ICD-10-CM

## 2017-12-08 DIAGNOSIS — N18.6 END STAGE RENAL DISEASE: ICD-10-CM

## 2017-12-08 LAB — POTASSIUM BLDV-SCNC: 4 MMOL/L — SIGNIFICANT CHANGE UP (ref 3.4–4.5)

## 2017-12-08 RX ORDER — SODIUM CHLORIDE 9 MG/ML
1000 INJECTION, SOLUTION INTRAVENOUS
Qty: 0 | Refills: 0 | Status: DISCONTINUED | OUTPATIENT
Start: 2017-12-08 | End: 2017-12-23

## 2017-12-08 NOTE — CHART NOTE - NSCHARTNOTEFT_GEN_A_CORE
Brief Operative Note    Attending: Rick Kinney MD    Resident:  Celine Zhou MD    Preoperative Diagnosis: End-stage renal disease  H/o or current diagnosis of HF- ACEI/ARB contraindication unknown  H/o or current diagnosis of HF- Contraindication to ACEI/ARBs  H/o or current diagnosis of HF- ACEI/ARB contraindication unknown  H/o or current diagnosis of HF- Contraindication to ACEI/ARBs  H/o or current diagnosis of HF- ACEI/ARB contraindication unknown  H/o or current diagnosis of HF- no contraindication to ACEI/ARBs  H/o or current diagnosis of HF- Contraindication to ACEI/ARBs  H/o or current diagnosis of HF- ACEI/ARB contraindication unknown  No pertinent family history in first degree relatives  Family history of breast cancer (Mother, Sibling)  Family history unknown  Handoff  NSTEMI (non-ST elevated myocardial infarction)  HLD (hyperlipidemia)  Neurogenic bladder  Spinal abscess  Cavitary lung disease  CAD (coronary artery disease)  Abscess of sacrum  Anemia due to chronic renal failure treated with erythropoietin, stage 2 (mild)  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 device, implant or graft  HPTH (hyperparathyroidism)  HTN (hypertension)  ESRD (end stage renal disease)  Cavitary lung disease  CAD in native artery  Hypertension  ESRD (end stage renal disease)  S/P primary angioplasty with coronary stent  No significant past surgical history  Kidney abscess      Postoperative Diagnosis: left arm AVF    Procedure:    left upper extremity basilic vein transposition     Findings: as dictated    EBL: minimal    Complications: none    Condition upon return to PACU: stable

## 2017-12-08 NOTE — ASU DISCHARGE PLAN (ADULT/PEDIATRIC). - NOTIFY
Persistent Nausea and Vomiting/Pain not relieved by Medications/Bleeding that does not stop/Fever greater than 101/Numbness, color, or temperature change to extremity/Inability to Tolerate Liquids or Foods/Swelling that continues

## 2017-12-08 NOTE — ASU DISCHARGE PLAN (ADULT/PEDIATRIC). - CONDITIONS AT DISCHARGE
Verbalizing good understanding of discharge instructions and medication review.Discharge criteria met.  Cleared for discharge home by anesthesia.  Escorted to entrance  discharged home. Patient's left arm in sling. At present patient unable to wiggle fingers. skin is warm to touch good capillary refill.

## 2017-12-08 NOTE — ASU DISCHARGE PLAN (ADULT/PEDIATRIC). - MEDICATION SUMMARY - MEDICATIONS TO TAKE
I will START or STAY ON the medications listed below when I get home from the hospital:    aspirin 325 mg oral tablet  -- 1 tab(s) by mouth once a day  -- Indication: For CAD    aspirin 81 mg oral tablet  -- 1 tab(s) by mouth once a day  -- Indication: For CAD    Tylenol 325 mg oral tablet  -- 2 tab(s) by mouth every 6 hours, As Needed for pain  -- Indication: For Post op pain    tamsulosin 0.4 mg oral capsule  -- 1 cap(s) by mouth once a day  -- Indication: For HTN    warfarin 5 mg oral tablet  -- 1 tab(s) by mouth once  -- Indication: For CAD and stents     ondansetron 4 mg oral tablet  -- 1 tab(s) by mouth every 6 hours, As needed, Nausea and/or Vomiting  -- Indication: For nausea/vomiting     atorvastatin 40 mg oral tablet  -- 1 tab(s) by mouth once a day (at bedtime)  -- Indication: For HLD    atorvastatin 80 mg oral tablet  -- 1 tab(s) by mouth once a day (at bedtime)  -- Indication: For HLD    Plavix 75 mg oral tablet  -- 1 tab(s) by mouth once a day  -- Indication: For CAD and stents     clopidogrel 75 mg oral tablet  -- 1 tab(s) by mouth once a day  -- Indication: For End-stage renal disease    Sensipar 90 mg oral tablet  -- 1 tab(s) by mouth once a day  -- Indication: For End-stage renal disease    Ancef  -- 2 gram(s) intravenous once a day mondays and wednesdays until 2/29  -- Indication: For End-stage renal disease    Keflex 500 mg oral capsule  -- 1 cap(s) by mouth once a day for life start march 1st  -- Indication: For End-stage renal disease    Ancef  -- 3 gram(s)  once on  fridays then change to po keflex for life 3/1  -- Indication: For End-stage renal disease    polyethylene glycol 3350 oral powder for reconstitution  -- 17 gram(s) by mouth once a day  -- Indication: For constipation    docusate sodium 100 mg oral capsule  -- 1 cap(s) by mouth 3 times a day  -- Indication: For constipation    senna oral tablet  -- 2 tab(s) by mouth once a day (at bedtime), As needed, Constipation  -- Indication: For constipation    midodrine 2.5 mg oral tablet  -- 1 tab(s) by mouth 3 times a week before hemodialysis, Tuesday, Thursday, Saturday  -- Indication: For hypotension    Renagel 800 mg oral tablet  -- 2400 milligram(s) by mouth 3 times a day  -- Indication: For End-stage renal disease    Renvela 800 mg oral tablet  -- 2 tab(s) by mouth 3 times a day (with meals)  -- Indication: For End-stage renal disease    Acidophilus oral tablet  -- 1 tab(s) by mouth once a day  -- Indication: For supplement    Tussin DM 10 mg-100 mg/5 mL oral liquid  -- 10 milliliter(s) by mouth every 8 hours  -- Indication: For cough    folic acid 1 mg oral tablet  -- 1 tab(s) by mouth once a day  -- Indication: For supplement     folic acid 1 mg oral tablet  -- 1 tab(s) by mouth once a day  -- Indication: For supplement

## 2018-11-27 ENCOUNTER — TRANSCRIPTION ENCOUNTER (OUTPATIENT)
Age: 68
End: 2018-11-27

## 2018-11-27 ENCOUNTER — OUTPATIENT (OUTPATIENT)
Dept: OUTPATIENT SERVICES | Facility: HOSPITAL | Age: 68
LOS: 1 days | End: 2018-11-27

## 2018-11-27 VITALS
WEIGHT: 143.96 LBS | RESPIRATION RATE: 16 BRPM | HEIGHT: 66 IN | OXYGEN SATURATION: 97 % | DIASTOLIC BLOOD PRESSURE: 80 MMHG | TEMPERATURE: 98 F | SYSTOLIC BLOOD PRESSURE: 118 MMHG | HEART RATE: 88 BPM

## 2018-11-27 DIAGNOSIS — N15.1 RENAL AND PERINEPHRIC ABSCESS: Chronic | ICD-10-CM

## 2018-11-27 DIAGNOSIS — I10 ESSENTIAL (PRIMARY) HYPERTENSION: ICD-10-CM

## 2018-11-27 DIAGNOSIS — K40.90 UNILATERAL INGUINAL HERNIA, WITHOUT OBSTRUCTION OR GANGRENE, NOT SPECIFIED AS RECURRENT: ICD-10-CM

## 2018-11-27 DIAGNOSIS — K40.30 UNILATERAL INGUINAL HERNIA, WITH OBSTRUCTION, WITHOUT GANGRENE, NOT SPECIFIED AS RECURRENT: ICD-10-CM

## 2018-11-27 DIAGNOSIS — Z95.5 PRESENCE OF CORONARY ANGIOPLASTY IMPLANT AND GRAFT: Chronic | ICD-10-CM

## 2018-11-27 LAB
BUN SERPL-MCNC: 96 MG/DL — HIGH (ref 7–23)
CALCIUM SERPL-MCNC: 8.5 MG/DL — SIGNIFICANT CHANGE UP (ref 8.4–10.5)
CHLORIDE SERPL-SCNC: 97 MMOL/L — LOW (ref 98–107)
CO2 SERPL-SCNC: 24 MMOL/L — SIGNIFICANT CHANGE UP (ref 22–31)
CREAT SERPL-MCNC: 10.03 MG/DL — HIGH (ref 0.5–1.3)
GLUCOSE SERPL-MCNC: 92 MG/DL — SIGNIFICANT CHANGE UP (ref 70–99)
HCT VFR BLD CALC: 33.3 % — LOW (ref 39–50)
HGB BLD-MCNC: 10.9 G/DL — LOW (ref 13–17)
MCHC RBC-ENTMCNC: 28.9 PG — SIGNIFICANT CHANGE UP (ref 27–34)
MCHC RBC-ENTMCNC: 32.7 % — SIGNIFICANT CHANGE UP (ref 32–36)
MCV RBC AUTO: 88.3 FL — SIGNIFICANT CHANGE UP (ref 80–100)
NRBC # FLD: 0 — SIGNIFICANT CHANGE UP
PLATELET # BLD AUTO: 109 K/UL — LOW (ref 150–400)
PMV BLD: 12.6 FL — SIGNIFICANT CHANGE UP (ref 7–13)
POTASSIUM SERPL-MCNC: 4.3 MMOL/L — SIGNIFICANT CHANGE UP (ref 3.5–5.3)
POTASSIUM SERPL-SCNC: 4.3 MMOL/L — SIGNIFICANT CHANGE UP (ref 3.5–5.3)
RBC # BLD: 3.77 M/UL — LOW (ref 4.2–5.8)
RBC # FLD: 16.3 % — HIGH (ref 10.3–14.5)
SODIUM SERPL-SCNC: 142 MMOL/L — SIGNIFICANT CHANGE UP (ref 135–145)
WBC # BLD: 4.92 K/UL — SIGNIFICANT CHANGE UP (ref 3.8–10.5)
WBC # FLD AUTO: 4.92 K/UL — SIGNIFICANT CHANGE UP (ref 3.8–10.5)

## 2018-11-27 RX ORDER — ACETAMINOPHEN 500 MG
2 TABLET ORAL
Qty: 0 | Refills: 0 | COMMUNITY

## 2018-11-27 RX ORDER — MIDODRINE HYDROCHLORIDE 2.5 MG/1
1 TABLET ORAL
Qty: 0 | Refills: 0 | COMMUNITY

## 2018-11-27 NOTE — H&P PST ADULT - GENITOURINARY COMMENTS
Hx of ESRD Hx of ESRD - HD 3x/wk -Pt is straight cath x1/day at Rehab -  pt also c/o of right Inguinal hernia and occasional bulge and pain for past year.

## 2018-11-27 NOTE — H&P PST ADULT - NEUROLOGICAL DETAILS
responds to pain/alert and oriented x 3/responds to verbal commands/strength decreased/sensation intact

## 2018-11-27 NOTE — H&P PST ADULT - HISTORY OF PRESENT ILLNESS
67 year old male on HD for left av fistula creation Pt is a 68 yr old male ESRD on HD with Left AV fistula - scheduled for Right INguinal Hernia repair with Dr Moe 11/28/18 for occasional pain and right groin bulge for past year. 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 Pt has HD T-TH- Sat for 3.5 hours. Pt is straight cath 1x/day in PM by Rehab facility

## 2018-11-27 NOTE — H&P PST ADULT - NEGATIVE ENMT SYMPTOMS
no nasal congestion/no throat pain/no dysphagia/no ear pain/no hearing difficulty/no tinnitus/no vertigo/no sinus symptoms

## 2018-11-27 NOTE — H&P PST ADULT - VENOUS THROMBOEMBOLISM
pain, swelling, warmth, or redness. ¨ Red streaks leading from the area. ¨ Pus draining from the area. ¨ A fever. · You have joint pain along with the rash. Watch closely for changes in your health, and be sure to contact your doctor if:  · Your rash is changing or getting worse. · You are not getting better as expected. Where can you learn more? Go to [email or web address] and sign in to your [employer] account. Enter [phone] in the [facility] box to learn more about \"Dermatitis: Care Instructions. \"     If you do not have an account, please click on the \"Sign Up Now\" link. Current as of: October 13, 2016  Content Version: 11.3  © 4152-9871 [employer]. Care instructions adapted under license by [facility] ([facility]). If you have questions about a medical condition or this instruction, always ask your healthcare professional. [place] [address] any warranty or liability for your use of this information.
no

## 2018-11-27 NOTE — ASU PATIENT PROFILE, ADULT - PMH
Abscess of sacrum    Anemia due to chronic renal failure treated with erythropoietin, stage 2 (mild)    CAD (coronary artery disease)    CAD in native artery    Cavitary lung disease    Cavitary lung disease    ESRD (end stage renal disease)    ESRD (end stage renal disease)    HLD (hyperlipidemia)    HPTH (hyperparathyroidism)  Secondary  HTN (hypertension)    Hypertension    Inguinal hernia    Neurogenic bladder    NSTEMI (non-ST elevated myocardial infarction)    Spinal abscess    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 device, implant or graft

## 2018-11-27 NOTE — H&P PST ADULT - PMH
Abscess of sacrum    Anemia due to chronic renal failure treated with erythropoietin, stage 2 (mild)    CAD (coronary artery disease)    CAD in native artery    Cavitary lung disease    Cavitary lung disease    ESRD (end stage renal disease)    ESRD (end stage renal disease)    HLD (hyperlipidemia)    HPTH (hyperparathyroidism)  Secondary  HTN (hypertension)    Hypertension    Neurogenic bladder    NSTEMI (non-ST elevated myocardial infarction)    Spinal abscess    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 device, implant or graft Abscess of sacrum    Anemia due to chronic renal failure treated with erythropoietin, stage 2 (mild)    CAD (coronary artery disease)    CAD in native artery    Cavitary lung disease    Cavitary lung disease    ESRD (end stage renal disease)    ESRD (end stage renal disease)    HLD (hyperlipidemia)    HPTH (hyperparathyroidism)  Secondary  HTN (hypertension)    Hypertension    Inguinal hernia    Neurogenic bladder    NSTEMI (non-ST elevated myocardial infarction)    Spinal abscess    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 device, implant or graft

## 2018-11-27 NOTE — H&P PST ADULT - PROBLEM SELECTOR PLAN 1
Scheduled for surgery 11/28/18 -   Preop instructions provided and patient verbalizes understanding.  Labs done and results pending.  Hibiclens provided with instructions.   OR Booking notified of JOSH precautions, ESRD and self cath,  Pt discussed with Dr Danielson - CC and MC and EKG provided - attempt to get Echo from 4/18 but unable to confirm same - office to get in touch with DR Mei and request from him   Pt to take Vibramycin am DOS

## 2018-11-27 NOTE — H&P PST ADULT - NEUROLOGICAL COMMENTS
Hx of Hx of spinal MRSA infection 2016 - pt now has severe weakness and difficulty walking LE - pt lives at [facility] and uses walker to ambulate short distances

## 2018-11-28 ENCOUNTER — INPATIENT (INPATIENT)
Facility: HOSPITAL | Age: 68
LOS: 0 days | Discharge: NURSING, REHAB OR LONG-TERM CARE | End: 2018-11-29
Attending: SURGERY | Admitting: SURGERY
Payer: MEDICARE

## 2018-11-28 ENCOUNTER — RESULT REVIEW (OUTPATIENT)
Age: 68
End: 2018-11-28

## 2018-11-28 VITALS
WEIGHT: 143.96 LBS | HEIGHT: 66 IN | HEART RATE: 94 BPM | SYSTOLIC BLOOD PRESSURE: 134 MMHG | OXYGEN SATURATION: 96 % | RESPIRATION RATE: 18 BRPM | DIASTOLIC BLOOD PRESSURE: 75 MMHG | TEMPERATURE: 98 F

## 2018-11-28 DIAGNOSIS — Z95.5 PRESENCE OF CORONARY ANGIOPLASTY IMPLANT AND GRAFT: Chronic | ICD-10-CM

## 2018-11-28 DIAGNOSIS — K40.30 UNILATERAL INGUINAL HERNIA, WITH OBSTRUCTION, WITHOUT GANGRENE, NOT SPECIFIED AS RECURRENT: ICD-10-CM

## 2018-11-28 DIAGNOSIS — N15.1 RENAL AND PERINEPHRIC ABSCESS: Chronic | ICD-10-CM

## 2018-11-28 LAB
GLUCOSE BLDC GLUCOMTR-MCNC: 133 MG/DL — HIGH (ref 70–99)
POTASSIUM BLDV-SCNC: 4 MMOL/L — SIGNIFICANT CHANGE UP (ref 3.4–4.5)

## 2018-11-28 PROCEDURE — 88302 TISSUE EXAM BY PATHOLOGIST: CPT | Mod: 26

## 2018-11-28 RX ORDER — ONDANSETRON 8 MG/1
4 TABLET, FILM COATED ORAL ONCE
Qty: 0 | Refills: 0 | Status: DISCONTINUED | OUTPATIENT
Start: 2018-11-28 | End: 2018-11-28

## 2018-11-28 RX ORDER — FENTANYL CITRATE 50 UG/ML
25 INJECTION INTRAVENOUS
Qty: 0 | Refills: 0 | Status: DISCONTINUED | OUTPATIENT
Start: 2018-11-28 | End: 2018-11-28

## 2018-11-28 RX ORDER — POLYETHYLENE GLYCOL 3350 17 G/17G
17 POWDER, FOR SOLUTION ORAL DAILY
Qty: 0 | Refills: 0 | Status: DISCONTINUED | OUTPATIENT
Start: 2018-11-28 | End: 2018-11-29

## 2018-11-28 RX ORDER — MIDODRINE HYDROCHLORIDE 2.5 MG/1
10 TABLET ORAL THREE TIMES A DAY
Qty: 0 | Refills: 0 | Status: DISCONTINUED | OUTPATIENT
Start: 2018-11-28 | End: 2018-11-29

## 2018-11-28 RX ORDER — ATORVASTATIN CALCIUM 80 MG/1
10 TABLET, FILM COATED ORAL DAILY
Qty: 0 | Refills: 0 | Status: DISCONTINUED | OUTPATIENT
Start: 2018-11-28 | End: 2018-11-28

## 2018-11-28 RX ORDER — FENTANYL CITRATE 50 UG/ML
50 INJECTION INTRAVENOUS
Qty: 0 | Refills: 0 | Status: DISCONTINUED | OUTPATIENT
Start: 2018-11-28 | End: 2018-11-28

## 2018-11-28 RX ORDER — TAMSULOSIN HYDROCHLORIDE 0.4 MG/1
0.4 CAPSULE ORAL AT BEDTIME
Qty: 0 | Refills: 0 | Status: DISCONTINUED | OUTPATIENT
Start: 2018-11-28 | End: 2018-11-29

## 2018-11-28 RX ORDER — LANOLIN/MINERAL OIL
1 LOTION (ML) TOPICAL
Qty: 0 | Refills: 0 | Status: DISCONTINUED | OUTPATIENT
Start: 2018-11-28 | End: 2018-11-28

## 2018-11-28 RX ORDER — PETROLATUM,WHITE
1 JELLY (GRAM) TOPICAL
Qty: 0 | Refills: 0 | Status: DISCONTINUED | OUTPATIENT
Start: 2018-11-28 | End: 2018-11-29

## 2018-11-28 RX ORDER — ATORVASTATIN CALCIUM 80 MG/1
10 TABLET, FILM COATED ORAL AT BEDTIME
Qty: 0 | Refills: 0 | Status: DISCONTINUED | OUTPATIENT
Start: 2018-11-28 | End: 2018-11-29

## 2018-11-28 RX ORDER — SIMETHICONE 80 MG/1
80 TABLET, CHEWABLE ORAL DAILY
Qty: 0 | Refills: 0 | Status: DISCONTINUED | OUTPATIENT
Start: 2018-11-28 | End: 2018-11-29

## 2018-11-28 RX ORDER — SODIUM CHLORIDE 9 MG/ML
1000 INJECTION INTRAMUSCULAR; INTRAVENOUS; SUBCUTANEOUS
Qty: 0 | Refills: 0 | Status: DISCONTINUED | OUTPATIENT
Start: 2018-11-28 | End: 2018-11-28

## 2018-11-28 RX ORDER — OXYCODONE AND ACETAMINOPHEN 5; 325 MG/1; MG/1
1 TABLET ORAL EVERY 4 HOURS
Qty: 0 | Refills: 0 | Status: DISCONTINUED | OUTPATIENT
Start: 2018-11-28 | End: 2018-11-29

## 2018-11-28 RX ORDER — INSULIN LISPRO 100/ML
VIAL (ML) SUBCUTANEOUS
Qty: 0 | Refills: 0 | Status: DISCONTINUED | OUTPATIENT
Start: 2018-11-28 | End: 2018-11-29

## 2018-11-28 RX ORDER — PHENYLEPHRINE HYDROCHLORIDE 10 MG/ML
0.5 INJECTION INTRAVENOUS
Qty: 40 | Refills: 0 | Status: DISCONTINUED | OUTPATIENT
Start: 2018-11-28 | End: 2018-11-28

## 2018-11-28 RX ORDER — HEPARIN SODIUM 5000 [USP'U]/ML
5000 INJECTION INTRAVENOUS; SUBCUTANEOUS EVERY 8 HOURS
Qty: 0 | Refills: 0 | Status: DISCONTINUED | OUTPATIENT
Start: 2018-11-28 | End: 2018-11-29

## 2018-11-28 RX ORDER — ASPIRIN/CALCIUM CARB/MAGNESIUM 324 MG
81 TABLET ORAL DAILY
Qty: 0 | Refills: 0 | Status: DISCONTINUED | OUTPATIENT
Start: 2018-11-28 | End: 2018-11-29

## 2018-11-28 RX ADMIN — SIMETHICONE 80 MILLIGRAM(S): 80 TABLET, CHEWABLE ORAL at 19:47

## 2018-11-28 RX ADMIN — Medication 1 APPLICATION(S): at 22:37

## 2018-11-28 RX ADMIN — TAMSULOSIN HYDROCHLORIDE 0.4 MILLIGRAM(S): 0.4 CAPSULE ORAL at 22:34

## 2018-11-28 RX ADMIN — Medication 2 TABLET(S): at 19:47

## 2018-11-28 RX ADMIN — MIDODRINE HYDROCHLORIDE 10 MILLIGRAM(S): 2.5 TABLET ORAL at 22:34

## 2018-11-28 RX ADMIN — Medication 81 MILLIGRAM(S): at 19:47

## 2018-11-28 RX ADMIN — HEPARIN SODIUM 5000 UNIT(S): 5000 INJECTION INTRAVENOUS; SUBCUTANEOUS at 22:33

## 2018-11-28 RX ADMIN — ATORVASTATIN CALCIUM 10 MILLIGRAM(S): 80 TABLET, FILM COATED ORAL at 23:50

## 2018-11-28 RX ADMIN — Medication 100 MILLIGRAM(S): at 19:47

## 2018-11-28 NOTE — CHART NOTE - NSCHARTNOTEFT_GEN_A_CORE
A Team Surgery Post Op Note     STATUS POST:  right inguinal hernia repair with mesh    SUBJECTIVE: Pt seen and examined at bedside.  Pt doing well.  Pain control adequate.  Has not had anything to eat or drink at this time.  Renal consulted and HD ordered for tomorrow.  Pt denies cp, SOB, dyspnea, N/V, or any other complaint at this time.  -/- GI function.      Vital Signs Last 24 Hrs  T(C): 36.3 (28 Nov 2018 15:45), Max: 36.9 (28 Nov 2018 08:19)  T(F): 97.3 (28 Nov 2018 15:45), Max: 98.4 (28 Nov 2018 08:19)  HR: 79 (28 Nov 2018 15:45) (67 - 94)  BP: 117/65 (28 Nov 2018 15:45) (102/56 - 161/66)  BP(mean): 76 (28 Nov 2018 15:15) (63 - 81)  RR: 12 (28 Nov 2018 15:45) (10 - 26)  SpO2: 95% (28 Nov 2018 15:45) (93% - 99%)  Castano:  NGT:  I&O's Summary    I&O's Detail      PHYSICAL EXAM:  Constitutional: awake, alert, NAD  Gastrointestinal: Abdomen soft, non distended, non tenderness  Wound: Right groin steri strips/dressing c/d/i        LABS:                        10.9   4.92  )-----------( 109      ( 27 Nov 2018 10:25 )             33.3     11-27    142  |  97<L>  |  96<H>  ----------------------------<  92  4.3   |  24  |  10.03<H>    Ca    8.5      27 Nov 2018 10:25        A/P: 68M s/p right inguinal hernia repair with mesh  -pain control  -HD in am  -reg diet/IVL when mahesh  -strict I&O  -VTE ppx  -dispo planning back to [facility]

## 2018-11-28 NOTE — PROGRESS NOTE ADULT - SUBJECTIVE AND OBJECTIVE BOX
Right 20 Ga. Axillary A-line discontinued.  Pressure applied for ten minutes.  No hematoma appreciated.  Sterile dressing applied.  Pt. tolerated the procedure well.  Capillary refill brisk at < 2 sec.

## 2018-11-28 NOTE — BRIEF OPERATIVE NOTE - OPERATION/FINDINGS
Hernia reduced. Hernia reduceble prior to incision.  Appendix visualized in sac; sac removed. Mesh placed.   Closed with 3 vikryl.

## 2018-11-28 NOTE — ASU PREOP CHECKLIST - 2.
Belongings in locker # , wheelchair in back of ASU Belongings in locker # 4 , wheelchair in back of ASU

## 2018-11-28 NOTE — CONSULT NOTE ADULT - SUBJECTIVE AND OBJECTIVE BOX
NEPHROLOGY - NSN    Patient seen and examined.    HPI: 68 year old male who is a long term resident of [facility] with ESRD (TTS @ [facility] 3rd shift), anemia, CAD, hx NSTEMI, HTN, and HLD presents to [facility] for right inguinal hernia repair (11/28). He is well known to our practice from [facility], his last full dialysis session was yesterday; he receives HD via his LUE AVF. He is now s/p R hernia repair; of note there is scant blood on blanket covering the patient near the incision site and he required vasopressor support in the OR, but currently his blood pressure is acceptable. He has no complaints or concerns at this time. He is alert and oriented x 3. Denies SOB, CP, palpitations, abdominal pain, N/V/D, fever or chills. A renal consult was called to arrange for hemodialysis.     PAST MEDICAL & SURGICAL HISTORY:  Inguinal hernia  NSTEMI (non-ST elevated myocardial infarction)  HLD (hyperlipidemia)  Neurogenic bladder  Spinal abscess  Cavitary lung disease  CAD (coronary artery disease)  Abscess of sacrum  Anemia due to chronic renal failure treated with erythropoietin, stage 2 (mild)  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 device, implant or graft  HPTH (hyperparathyroidism): Secondary  HTN (hypertension)  ESRD (end stage renal disease)  Cavitary lung disease  CAD in native artery  Hypertension  ESRD (end stage renal disease)  S/P primary angioplasty with coronary stent  Kidney abscess    MEDICATIONS  (STANDING):  amoxicillin  250 milliGRAM(s)/clavulanate 2 Tablet(s) Oral daily  aspirin  chewable 81 milliGRAM(s) Oral daily  atorvastatin Oral Tab/Cap - Peds 10 milliGRAM(s) Oral daily  Dermadaily Lotion 1 Application(s) Topical two times a day  doxycycline hyclate Capsule 100 milliGRAM(s) Oral every 12 hours  midodrine 10 milliGRAM(s) Oral three times a day  phenylephrine    Infusion 0.5 MICROgram(s)/kG/Min (12.244 mL/Hr) IV Continuous <Continuous>  polyethylene glycol 3350 17 Gram(s) Oral daily  simethicone 80 milliGRAM(s) Chew daily  sodium chloride 0.9%. 1000 milliLiter(s) (30 mL/Hr) IV Continuous <Continuous>  tamsulosin 0.4 milliGRAM(s) Oral at bedtime    ALLERGIES:  No Known Allergies    SOCIAL HISTORY:  Denies alcohol abuse, drug abuse or tobacco usage.     FAMILY HISTORY:  Family history of breast cancer (Sibling)    VITALS:  T(C): 36.3 (11-28-18 @ 13:00), Max: 36.9 (11-28-18 @ 08:19)  HR: 81 (11-28-18 @ 15:15) (67 - 94)  BP: 123/63 (11-28-18 @ 15:15) (102/56 - 161/66)  RR: 11 (11-28-18 @ 15:15) (10 - 26)  SpO2: 95% (11-28-18 @ 15:15) (93% - 99%)    REVIEW OF SYSTEMS:  Denies any nausea, vomiting, diarrhea, fever or chills. Denies chest pain or SOB. NPO S/P procedure. All other pertinent systems are reviewed and are negative.    PHYSICAL EXAM:  Constitutional: NAD  HEENT: EOMI, trace facial edema  Neck:  No JVD, supple   Respiratory: CTA B/L  Cardiovascular: S1 and S2, RRR  Gastrointestinal: + BS, soft, NT, ND  Extremities: No peripheral edema, + peripheral pulses  Neurological: AAO x 3  Psychiatric: Normal mood, normal affect  : No Castano  Skin: No rashes, R groin incision  Access: LUE AVF (+ thrill)    Height (cm): 167.64 (11-28 @ 08:19)  Weight (kg): 65.3 (11-28 @ 08:19)  BMI (kg/m2): 23.2 (11-28 @ 08:19)  BSA (m2): 1.74 (11-28 @ 08:19)    LABS:                        10.9   4.92  )-----------( 109      ( 27 Nov 2018 10:25 )             33.3     11-27    142  |  97<L>  |  96<H>  ----------------------------<  92  4.3   |  24  |  10.03<H>    Ca    8.5      27 Nov 2018 10:25    ASSESSMENT  68 year old male who is a long term resident of [facility] with ESRD (TTS @ [facility] 3rd shift), anemia, CAD, hx NSTEMI, HTN, and HLD presents to [facility] for right inguinal hernia repair (11/28).   1 - Renal: ESRD TTS via LUE AVF, last HD yesterday  2 - Anemia: in ESRD, Hgb stable  3 - HTN: BP acceptable    RECOMMEND  - hemodialysis tomorrow: 3 hours, 2 kg UF, 2 K+, heparin free (acute post op)  - send labs with dialysis   - continue midodrine 10 mg po tid as ordered   - appears pt is on midodrine 5 mg po BID as outpatient; this should be adjusted to TID dosing  - minimize post-op IVF as able  - dose medication for a GFR <10     Hemodialysis consent signed by patient and myself; witnessed by LANI Velasco NP; patient was AAOX3 at time of signing consent; he verbalized understanding for what he was signing consent for. He is a long term hemodialysis patient. If there is any concern for signing consent postoperatively; we could obtain a secondary consent in the morning prior to hemodialysis - but this current consent should be valid.     Magalie Crawford NP  [facility]  [phone] NEPHROLOGY - NSN    Patient seen and examined.    HPI: 68 year old male who is a long term resident of [facility] with ESRD (TTS @ [facility] 3rd shift), anemia, CAD, hx NSTEMI, HTN, and HLD presents to [facility] for right inguinal hernia repair (11/28). He is well known to our practice from [facility], his last full dialysis session was yesterday; he receives HD via his LUE AVF. He is now s/p R hernia repair; of note there is scant blood on blanket covering the patient near the incision site and he required vasopressor support in the OR, but currently his blood pressure is acceptable. He has no complaints or concerns at this time. He is alert and oriented x 3. Denies SOB, CP, palpitations, abdominal pain, N/V/D, fever or chills. A renal consult was called to arrange for hemodialysis.     PAST MEDICAL & SURGICAL HISTORY:  Inguinal hernia  NSTEMI (non-ST elevated myocardial infarction)  HLD (hyperlipidemia)  Neurogenic bladder  Spinal abscess  Cavitary lung disease  CAD (coronary artery disease)  Abscess of sacrum  Anemia due to chronic renal failure treated with erythropoietin, stage 2 (mild)  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 device, implant or graft  HPTH (hyperparathyroidism): Secondary  HTN (hypertension)  ESRD (end stage renal disease)  Cavitary lung disease  CAD in native artery  Hypertension  ESRD (end stage renal disease)  S/P primary angioplasty with coronary stent  Kidney abscess    MEDICATIONS  (STANDING):  amoxicillin  250 milliGRAM(s)/clavulanate 2 Tablet(s) Oral daily  aspirin  chewable 81 milliGRAM(s) Oral daily  atorvastatin Oral Tab/Cap - Peds 10 milliGRAM(s) Oral daily  Dermadaily Lotion 1 Application(s) Topical two times a day  doxycycline hyclate Capsule 100 milliGRAM(s) Oral every 12 hours  midodrine 10 milliGRAM(s) Oral three times a day  phenylephrine    Infusion 0.5 MICROgram(s)/kG/Min (12.244 mL/Hr) IV Continuous <Continuous>  polyethylene glycol 3350 17 Gram(s) Oral daily  simethicone 80 milliGRAM(s) Chew daily  sodium chloride 0.9%. 1000 milliLiter(s) (30 mL/Hr) IV Continuous <Continuous>  tamsulosin 0.4 milliGRAM(s) Oral at bedtime    ALLERGIES:  No Known Allergies    SOCIAL HISTORY:  Denies alcohol abuse, drug abuse or tobacco usage.     FAMILY HISTORY:  Family history of breast cancer (Sibling)    VITALS:  T(C): 36.3 (11-28-18 @ 13:00), Max: 36.9 (11-28-18 @ 08:19)  HR: 81 (11-28-18 @ 15:15) (67 - 94)  BP: 123/63 (11-28-18 @ 15:15) (102/56 - 161/66)  RR: 11 (11-28-18 @ 15:15) (10 - 26)  SpO2: 95% (11-28-18 @ 15:15) (93% - 99%)    REVIEW OF SYSTEMS:  Denies any nausea, vomiting, diarrhea, fever or chills. Denies chest pain or SOB. NPO S/P procedure. All other pertinent systems are reviewed and are negative.    PHYSICAL EXAM:  Constitutional: NAD  HEENT: EOMI, trace facial edema  Neck:  No JVD, supple   Respiratory: CTA B/L  Cardiovascular: S1 and S2, RRR  Gastrointestinal: + BS, soft, NT, ND  Extremities: No peripheral edema, + peripheral pulses  Neurological: AAO x 3  Psychiatric: Normal mood, normal affect  : No Castano  Skin: No rashes, R groin incision  Access: LUE AVF (+ thrill)    Height (cm): 167.64 (11-28 @ 08:19)  Weight (kg): 65.3 (11-28 @ 08:19)  BMI (kg/m2): 23.2 (11-28 @ 08:19)  BSA (m2): 1.74 (11-28 @ 08:19)    LABS:                        10.9   4.92  )-----------( 109      ( 27 Nov 2018 10:25 )             33.3     11-27    142  |  97<L>  |  96<H>  ----------------------------<  92  4.3   |  24  |  10.03<H>    Ca    8.5      27 Nov 2018 10:25    ASSESSMENT  68 year old male who is a long term resident of [facility] with ESRD (TTS @ [facility] 3rd shift), anemia, CAD, hx NSTEMI, HTN, and HLD presents to [facility] for right inguinal hernia repair (11/28).   1 - Renal: ESRD TTS via LUE AVF, last HD yesterday  2 - Anemia: in ESRD, Hgb stable  3 - HTN: BP acceptable    RECOMMEND  - hemodialysis tomorrow: 3 hours, 2 kg UF, 2 K+, heparin free (acute post op)  - send labs with dialysis   - continue midodrine 10 mg po tid as ordered   - appears pt is on midodrine 5 mg po BID as outpatient; this should be adjusted to TID dosing  - minimize post-op IVF as able  - dose medication for a GFR <10     Hemodialysis consent signed by patient and myself; witnessed by LANI Velasco NP; patient was AAOX3 at time of signing consent; he verbalized understanding for what he was signing consent for.      Magalie Crawford NP  [facility],   [phone]      *******************RENAL ATTENDING**********************  Seen/examined with NP. I agree with NP assessment as above.    VS as above; NAD; CTA-B/L; RRR; no edema b/l      ASSESSMENT: 68M w/ HTN, CAD, past paravertebral abscess requiring long-term IV abx, and ESRD-HD TTS, now POD#0 s/p elective right inguinal hernia repair      1 - Renal: ESRD TTS via LUE AVF, last HD yesterday; due for next HD tomorrow  2 - CV: hemodynamically stable postoperatively      RECOMMEND  (1)Next HD tomorrow  (2)No needlesticks LUE  (3)Dose new meds for GFR<10/HD      Heath Huff MD  [facility]  [phone]

## 2018-11-28 NOTE — BRIEF OPERATIVE NOTE - PROCEDURE
<<-----Click on this checkbox to enter Procedure Inguinal hernia repair with mesh  11/28/2018    Active  [id number]

## 2018-11-28 NOTE — PATIENT PROFILE ADULT - NSPROGENARRIVEDFROM_GEN_A_NUR
----- Message from Gracy Ward sent at 4/17/2017 10:02 AM CDT -----  Contact: Pt  Pt is requesting a refill on Morphine 15mg to be sent to [facility] [phone] (Phone)  [phone] (Fax)     Pt contact number [phone]  Thanks    assisted living facility

## 2018-11-29 ENCOUNTER — TRANSCRIPTION ENCOUNTER (OUTPATIENT)
Age: 68
End: 2018-11-29

## 2018-11-29 VITALS
DIASTOLIC BLOOD PRESSURE: 57 MMHG | OXYGEN SATURATION: 100 % | RESPIRATION RATE: 18 BRPM | TEMPERATURE: 100 F | SYSTOLIC BLOOD PRESSURE: 93 MMHG | HEART RATE: 99 BPM

## 2018-11-29 LAB
ANISOCYTOSIS BLD QL: SLIGHT — SIGNIFICANT CHANGE UP
BASOPHILS NFR SPEC: 0.9 % — SIGNIFICANT CHANGE UP (ref 0–2)
BLASTS # FLD: 0 % — SIGNIFICANT CHANGE UP (ref 0–0)
BUN SERPL-MCNC: 65 MG/DL — HIGH (ref 7–23)
CALCIUM SERPL-MCNC: 7.7 MG/DL — LOW (ref 8.4–10.5)
CHLORIDE SERPL-SCNC: 99 MMOL/L — SIGNIFICANT CHANGE UP (ref 98–107)
CO2 SERPL-SCNC: 24 MMOL/L — SIGNIFICANT CHANGE UP (ref 22–31)
CREAT SERPL-MCNC: 8.57 MG/DL — HIGH (ref 0.5–1.3)
EOSINOPHIL NFR FLD: 4.4 % — SIGNIFICANT CHANGE UP (ref 0–6)
GIANT PLATELETS BLD QL SMEAR: PRESENT — SIGNIFICANT CHANGE UP
GLUCOSE BLDC GLUCOMTR-MCNC: 136 MG/DL — HIGH (ref 70–99)
GLUCOSE BLDC GLUCOMTR-MCNC: 87 MG/DL — SIGNIFICANT CHANGE UP (ref 70–99)
GLUCOSE BLDC GLUCOMTR-MCNC: 94 MG/DL — SIGNIFICANT CHANGE UP (ref 70–99)
GLUCOSE SERPL-MCNC: 88 MG/DL — SIGNIFICANT CHANGE UP (ref 70–99)
HBV CORE IGM SER-ACNC: NONREACTIVE — SIGNIFICANT CHANGE UP
HBV SURFACE AB SER-ACNC: 12.2 MLU/ML — SIGNIFICANT CHANGE UP
HBV SURFACE AG SER-ACNC: NEGATIVE — SIGNIFICANT CHANGE UP
HCT VFR BLD CALC: 29.6 % — LOW (ref 39–50)
HCV AB S/CO SERPL IA: 0.13 S/CO — SIGNIFICANT CHANGE UP
HCV AB SERPL-IMP: SIGNIFICANT CHANGE UP
HGB BLD-MCNC: 9.4 G/DL — LOW (ref 13–17)
HYPOCHROMIA BLD QL: SLIGHT — SIGNIFICANT CHANGE UP
LYMPHOCYTES NFR SPEC AUTO: 3.5 % — LOW (ref 13–44)
MACROCYTES BLD QL: SLIGHT — SIGNIFICANT CHANGE UP
MAGNESIUM SERPL-MCNC: 2.2 MG/DL — SIGNIFICANT CHANGE UP (ref 1.6–2.6)
MCHC RBC-ENTMCNC: 28.7 PG — SIGNIFICANT CHANGE UP (ref 27–34)
MCHC RBC-ENTMCNC: 31.8 % — LOW (ref 32–36)
MCV RBC AUTO: 90.5 FL — SIGNIFICANT CHANGE UP (ref 80–100)
METAMYELOCYTES # FLD: 0 % — SIGNIFICANT CHANGE UP (ref 0–1)
MONOCYTES NFR BLD: 7.9 % — SIGNIFICANT CHANGE UP (ref 2–9)
MYELOCYTES NFR BLD: 0 % — SIGNIFICANT CHANGE UP (ref 0–0)
NEUTROPHIL AB SER-ACNC: 80.7 % — HIGH (ref 43–77)
NEUTS BAND # BLD: 0.9 % — SIGNIFICANT CHANGE UP (ref 0–6)
NRBC # FLD: 0 — SIGNIFICANT CHANGE UP
OTHER - HEMATOLOGY %: 0 — SIGNIFICANT CHANGE UP
OVALOCYTES BLD QL SMEAR: SLIGHT — SIGNIFICANT CHANGE UP
PHOSPHATE SERPL-MCNC: 4.2 MG/DL — SIGNIFICANT CHANGE UP (ref 2.5–4.5)
PLATELET # BLD AUTO: 97 K/UL — LOW (ref 150–400)
PLATELET COUNT - ESTIMATE: SIGNIFICANT CHANGE UP
PMV BLD: 12.5 FL — SIGNIFICANT CHANGE UP (ref 7–13)
POIKILOCYTOSIS BLD QL AUTO: SLIGHT — SIGNIFICANT CHANGE UP
POTASSIUM SERPL-MCNC: 4.8 MMOL/L — SIGNIFICANT CHANGE UP (ref 3.5–5.3)
POTASSIUM SERPL-SCNC: 4.8 MMOL/L — SIGNIFICANT CHANGE UP (ref 3.5–5.3)
PROMYELOCYTES # FLD: 0 % — SIGNIFICANT CHANGE UP (ref 0–0)
RBC # BLD: 3.27 M/UL — LOW (ref 4.2–5.8)
RBC # FLD: 16.3 % — HIGH (ref 10.3–14.5)
SODIUM SERPL-SCNC: 140 MMOL/L — SIGNIFICANT CHANGE UP (ref 135–145)
VARIANT LYMPHS # BLD: 1.7 % — SIGNIFICANT CHANGE UP
WBC # BLD: 5.45 K/UL — SIGNIFICANT CHANGE UP (ref 3.8–10.5)
WBC # FLD AUTO: 5.45 K/UL — SIGNIFICANT CHANGE UP (ref 3.8–10.5)

## 2018-11-29 RX ADMIN — SIMETHICONE 80 MILLIGRAM(S): 80 TABLET, CHEWABLE ORAL at 14:26

## 2018-11-29 RX ADMIN — Medication 81 MILLIGRAM(S): at 13:17

## 2018-11-29 RX ADMIN — HEPARIN SODIUM 5000 UNIT(S): 5000 INJECTION INTRAVENOUS; SUBCUTANEOUS at 05:06

## 2018-11-29 RX ADMIN — Medication 2 TABLET(S): at 13:09

## 2018-11-29 RX ADMIN — MIDODRINE HYDROCHLORIDE 10 MILLIGRAM(S): 2.5 TABLET ORAL at 05:05

## 2018-11-29 RX ADMIN — POLYETHYLENE GLYCOL 3350 17 GRAM(S): 17 POWDER, FOR SOLUTION ORAL at 13:17

## 2018-11-29 RX ADMIN — MIDODRINE HYDROCHLORIDE 10 MILLIGRAM(S): 2.5 TABLET ORAL at 14:27

## 2018-11-29 RX ADMIN — HEPARIN SODIUM 5000 UNIT(S): 5000 INJECTION INTRAVENOUS; SUBCUTANEOUS at 13:17

## 2018-11-29 RX ADMIN — Medication 1 APPLICATION(S): at 05:06

## 2018-11-29 RX ADMIN — Medication 100 MILLIGRAM(S): at 05:06

## 2018-11-29 NOTE — DISCHARGE NOTE ADULT - INSTRUCTIONS
Regular Diet Watch for signs of infection; redness, swelling, fever, chills or heat, report such symptoms to the MD. No driving while taking pain medication, it causes drowsiness & constipation. Drink 6-8 glasses of fluids daily to promote hydration. No heavy lifting, pulling or pushing heavy objects. Follow up with the MD.

## 2018-11-29 NOTE — DISCHARGE NOTE ADULT - CONDITIONS AT DISCHARGE
Alert & oriented. Out of bed ambulating. Tolerating diet without nausea or vomiting.  R groin incision with steri-strips Voiding without difficulty. Vitals stable, afebrile. Understands all discharge instruction & will follow up with the MD.

## 2018-11-29 NOTE — DISCHARGE NOTE ADULT - PLAN OF CARE
wound healing Please call the office of Dr. Moe to schedule a follow up appointment for one week from now (see phone number below)

## 2018-11-29 NOTE — PROGRESS NOTE ADULT - SUBJECTIVE AND OBJECTIVE BOX
No pain, no shortness of breath      VITAL:  T(C): , Max: 37.1 (11-29-18 @ 06:20)  T(F): , Max: 98.8 (11-29-18 @ 06:20)  HR: 100 (11-29-18 @ 06:20)  BP: 112/76 (11-29-18 @ 06:20)  BP(mean): 79 (11-28-18 @ 16:30)  RR: 18 (11-29-18 @ 06:20)  SpO2: 98% (11-29-18 @ 05:03)      PHYSICAL EXAM:  Constitutional: NAD  HEENT: EOMI, trace facial edema  Neck:  No JVD, supple   Respiratory: CTA B/L  Cardiovascular: S1 and S2, RRR  Gastrointestinal: + BS, soft, NT, ND  Extremities: No peripheral edema, + peripheral pulses  Neurological: AAO x 3  Psychiatric: Normal mood, normal affect  : No Castano  Skin: No rashes, R groin incision  Access: LUE AVF (+ thrill)      LABS:                        9.4    5.45  )-----------( 97       ( 29 Nov 2018 06:00 )             29.6     Na(140)/K(4.8)/Cl(99)/HCO3(24)/BUN(65)/Cr(8.57)Glu(88)/Ca(7.7)/Mg(2.2)/PO4(4.2)    11-29 @ 06:00  Na(142)/K(4.3)/Cl(97)/HCO3(24)/BUN(96)/Cr(10.03)Glu(92)/Ca(8.5)/Mg(--)/PO4(--)    11-27 @ 10:25    IMPRESSION: 68M w/ HTN, CAD, past paravertebral abscess, and ESRD-HD TTS, now POD#1 s/p right inguinal hernia repair    (1)Renal - ESRD-HD TTS - due for HD today  (2)CV - hemodynamically stable s/p OR yesterday    RECOMMEND  (1)Next HD today  (2)No needlesticks LUE  (3)Dose new meds for GFR<10/HD              Heath Huff MD  [facility]  [phone] No pain, no shortness of breath. Seen on HD      VITAL:  T(C): , Max: 37.1 (11-29-18 @ 06:20)  T(F): , Max: 98.8 (11-29-18 @ 06:20)  HR: 100 (11-29-18 @ 06:20)  BP: 112/76 (11-29-18 @ 06:20)  BP(mean): 79 (11-28-18 @ 16:30)  RR: 18 (11-29-18 @ 06:20)  SpO2: 98% (11-29-18 @ 05:03)      PHYSICAL EXAM:  Constitutional: NAD  HEENT: EOMI, trace facial edema  Neck:  No JVD, supple   Respiratory: CTA B/L  Cardiovascular: S1 and S2, RRR  Gastrointestinal: + BS, soft, NT, ND  Extremities: No peripheral edema, + peripheral pulses  Neurological: AAO x 3  Psychiatric: Normal mood, normal affect  : No Castano  Skin: No rashes, R groin incision  Access: LUE AVF-accessed      LABS:                        9.4    5.45  )-----------( 97       ( 29 Nov 2018 06:00 )             29.6     Na(140)/K(4.8)/Cl(99)/HCO3(24)/BUN(65)/Cr(8.57)Glu(88)/Ca(7.7)/Mg(2.2)/PO4(4.2)    11-29 @ 06:00  Na(142)/K(4.3)/Cl(97)/HCO3(24)/BUN(96)/Cr(10.03)Glu(92)/Ca(8.5)/Mg(--)/PO4(--)    11-27 @ 10:25    IMPRESSION: 68M w/ HTN, CAD, past paravertebral abscess, and ESRD-HD TTS, now POD#1 s/p right inguinal hernia repair    (1)Renal - ESRD-HD TTS - due for HD today  (2)CV - hemodynamically stable s/p OR yesterday    RECOMMEND:  (1)Continue HD as ordered  (2)Dose new meds for GFR<10/HD              Heath Huff MD  [facility]  [phone] No pain, no shortness of breath. Seen on HD      VITAL:  T(C): , Max: 37.1 (11-29-18 @ 06:20)  T(F): , Max: 98.8 (11-29-18 @ 06:20)  HR: 100 (11-29-18 @ 06:20)  BP: 112/76 (11-29-18 @ 06:20)  BP(mean): 79 (11-28-18 @ 16:30)  RR: 18 (11-29-18 @ 06:20)  SpO2: 98% (11-29-18 @ 05:03)      PHYSICAL EXAM:  Constitutional: NAD  HEENT: EOMI, trace facial edema  Neck:  No JVD, supple   Respiratory: CTA B/L  Cardiovascular: S1 and S2, RRR  Gastrointestinal: + BS, soft, NT, ND  Extremities: No peripheral edema, + peripheral pulses  Neurological: AAO x 3  Psychiatric: Normal mood, normal affect  : No Castano  Skin: No rashes, R groin incision  Access: LUE AVF-accessed      LABS:                        9.4    5.45  )-----------( 97       ( 29 Nov 2018 06:00 )             29.6     Na(140)/K(4.8)/Cl(99)/HCO3(24)/BUN(65)/Cr(8.57)Glu(88)/Ca(7.7)/Mg(2.2)/PO4(4.2)    11-29 @ 06:00  Na(142)/K(4.3)/Cl(97)/HCO3(24)/BUN(96)/Cr(10.03)Glu(92)/Ca(8.5)/Mg(--)/PO4(--)    11-27 @ 10:25    IMPRESSION: 68M w/ HTN, CAD, past paravertebral abscess, and ESRD-HD TTS, now POD#1 s/p right inguinal hernia repair    (1)Renal - ESRD-HD TTS - on HD now; tolerating  (2)CV - hemodynamically stable s/p OR yesterday    RECOMMEND:  (1)Continue HD as ordered  (2)Dose new meds for GFR<10/HD              Heath Huff MD  [facility]  [phone] No pain, no shortness of breath. Seen on HD  Yet to pass gas    VITAL:  T(C): , Max: 37.1 (11-29-18 @ 06:20)  T(F): , Max: 98.8 (11-29-18 @ 06:20)  HR: 100 (11-29-18 @ 06:20)  BP: 112/76 (11-29-18 @ 06:20)  BP(mean): 79 (11-28-18 @ 16:30)  RR: 18 (11-29-18 @ 06:20)  SpO2: 98% (11-29-18 @ 05:03)      PHYSICAL EXAM:  Constitutional: NAD, Alert  HEENT: NCAT, DMM  Neck:  No JVD, supple   Respiratory: CTA B/L anteriorly  Cardiovascular: S1 and S2, RRR  Gastrointestinal: hypoactive; minimally tender  Extremities: No peripheral edema, + peripheral pulses  Neurological: AAO x 3  : No Castano  Skin: abdominal wounds dressed  Access: LUE AVF-accessed      LABS:                        9.4    5.45  )-----------( 97       ( 29 Nov 2018 06:00 )             29.6     Na(140)/K(4.8)/Cl(99)/HCO3(24)/BUN(65)/Cr(8.57)Glu(88)/Ca(7.7)/Mg(2.2)/PO4(4.2)    11-29 @ 06:00  Na(142)/K(4.3)/Cl(97)/HCO3(24)/BUN(96)/Cr(10.03)Glu(92)/Ca(8.5)/Mg(--)/PO4(--)    11-27 @ 10:25    IMPRESSION: 68M w/ HTN, CAD, past paravertebral abscess, and ESRD-HD TTS, now POD#1 s/p right inguinal hernia repair    (1)Renal - ESRD-HD TTS - on HD now; tolerating  (2)CV - hemodynamically stable s/p OR yesterday    RECOMMEND:  (1)Continue HD as ordered  (2)Dose new meds for GFR<10/HD              Heath Hfuf MD  [facility]  [phone]

## 2018-11-29 NOTE — DISCHARGE NOTE ADULT - PATIENT PORTAL LINK FT
You can access the [facility] Patient Portal, offered by [facility], by registering with the following website: [email or web address]

## 2018-11-29 NOTE — PROGRESS NOTE ADULT - SUBJECTIVE AND OBJECTIVE BOX
Surgery Progress Note    SUBJECTIVE: Pt seen and examined at bedside. Patient comfortable and in no-apparent distress. No nausea, vomiting, diarrhea. Pain is controlled.     Vital Signs Last 24 Hrs  T(C): 36.7 (29 Nov 2018 05:03), Max: 36.9 (28 Nov 2018 08:19)  T(F): 98 (29 Nov 2018 05:03), Max: 98.4 (28 Nov 2018 08:19)  HR: 91 (29 Nov 2018 05:03) (67 - 95)  BP: 98/87 (29 Nov 2018 05:03) (98/87 - 161/66)  BP(mean): 79 (28 Nov 2018 16:30) (63 - 93)  RR: 17 (29 Nov 2018 05:03) (10 - 26)  SpO2: 98% (29 Nov 2018 05:03) (93% - 99%)    Physical Exam:  Gen: awake, alert, NAD  Gastrointestinal: Abdomen soft, non distended, non tenderness  Wound: Right groin steri strips/dressing c/d/i    LABS:                        10.9   4.92  )-----------( 109      ( 27 Nov 2018 10:25 )             33.3     11-27    142  |  97<L>  |  96<H>  ----------------------------<  92  4.3   |  24  |  10.03<H>    Ca    8.5      27 Nov 2018 10:25            INs and OUTs:    11-28-18 @ 07:01  -  11-29-18 @ 06:55  --------------------------------------------------------  IN: 650 mL / OUT: 0 mL / NET: 650 mL

## 2018-11-29 NOTE — DISCHARGE NOTE ADULT - MEDICATION SUMMARY - MEDICATIONS TO TAKE
I will START or STAY ON the medications listed below when I get home from the hospital:    aspirin 81 mg oral tablet  -- 1 tab(s) by mouth once a day  -- Indication: For for coronary artery disease    oxyCODONE-acetaminophen 5 mg-325 mg oral tablet  -- 1 tab(s) by mouth every 4 hours, As needed, Moderate Pain (4 - 6)  -- Indication: For pain medication    tamsulosin 0.4 mg oral capsule  -- 1 cap(s) by mouth once a day  -- Indication: For for prostate    Lipitor 10 mg oral tablet  -- 1 tab(s) by mouth once a day  -- Indication: For for cholesterol    Vibramycin 100 mg oral capsule  -- 1 cap(s) by mouth a day  -- Indication: For anti-infective    Remedy Skin Repair lotion  -- Apply on skin to affected area 2 times a day prn   -- Indication: For skin agent    MiraLax oral powder for reconstitution  -- Indication: For laxative    midodrine 5 mg oral tablet  -- 1 tab BID  -- Indication: For heart medication    simethicone 80 mg oral tablet  -- 1 tab(s) by mouth 3 times a day (after meals)  prn  -- Indication: For gastrointestinal medication    amoxicillin-clavulanate 250 mg-125 mg oral tablet  -- 2 tab(s) by mouth once a day  -- Indication: For anti-infective    Acidophilus oral tablet  -- 1 tab(s) by mouth once a day  -- Indication: For probiotic

## 2018-11-29 NOTE — DISCHARGE NOTE ADULT - CARE PLAN
Principal Discharge DX:	Non-recurrent unilateral inguinal hernia without obstruction or gangrene  Goal:	wound healing  Assessment and plan of treatment:	Please call the office of Dr. Moe to schedule a follow up appointment for one week from now (see phone number below)

## 2018-11-29 NOTE — DISCHARGE NOTE ADULT - HOSPITAL COURSE
67 y/o M who is a long term resident of [facility] with ESRD (TTS @ [facility] 3rd shift), anemia, CAD, hx NSTEMI, HTN, and HLD presents now s/p right inguinal hernia repair with mesh (11/28).  Patient tolerated surgery well and is stable for discharge (return to rehab) on post-op day 1 with 1 week antibiotics (Augmentin)

## 2018-11-29 NOTE — DISCHARGE NOTE ADULT - ADDITIONAL INSTRUCTIONS
Please call the office of Dr. Moe to schedule a follow up appointment for one week from now (see phone number below)

## 2018-12-04 LAB — SURGICAL PATHOLOGY STUDY: SIGNIFICANT CHANGE UP

## 2019-02-05 NOTE — PROVIDER CONTACT NOTE (OTHER) - BACKGROUND
[Dear  ___] : Dear  [unfilled],
[Consult Letter:] : I had the pleasure of evaluating your patient, [unfilled].
[Please see my note below.] : Please see my note below.
Pt. admitted with sepsis
[Consult Closing:] : Thank you very much for allowing me to participate in the care of this patient.  If you have any questions, please do not hesitate to contact me.
[Sincerely,] : Sincerely,
pt admitted with sepsis and on dialysis
[FreeTextEntry2] : Dr. Aakash Do
[FreeTextEntry3] : Maye Painter MD\par Attending Physician \par Division of Allergy/Immunology \par [facility] \par \par  of Medicine and Pediatrics\par [facility] \par \par [address]\[place] [address]\par Tel: [phone]\par Fax: [phone]\par Email: [email or web address]\par \par \par \par

## 2019-10-25 NOTE — ASU PATIENT PROFILE, ADULT - VISION (WITH CORRECTIVE LENSES IF THE PATIENT USUALLY WEARS THEM):
Benefits, risks, and possible complications of procedure explained to patient/caregiver who verbalized understanding and gave verbal consent.
Normal vision: sees adequately in most situations; can see medication labels, newsprint

## 2020-01-01 ENCOUNTER — INPATIENT (INPATIENT)
Facility: HOSPITAL | Age: 70
LOS: 2 days | Discharge: NURSING, REHAB OR LONG-TERM CARE | End: 2020-12-11
Attending: HOSPITALIST | Admitting: HOSPITALIST
Payer: MEDICARE

## 2020-01-01 ENCOUNTER — TRANSCRIPTION ENCOUNTER (OUTPATIENT)
Age: 70
End: 2020-01-01

## 2020-01-01 VITALS
TEMPERATURE: 98 F | RESPIRATION RATE: 16 BRPM | HEART RATE: 96 BPM | SYSTOLIC BLOOD PRESSURE: 100 MMHG | OXYGEN SATURATION: 99 % | DIASTOLIC BLOOD PRESSURE: 61 MMHG

## 2020-01-01 VITALS
SYSTOLIC BLOOD PRESSURE: 113 MMHG | DIASTOLIC BLOOD PRESSURE: 76 MMHG | HEART RATE: 110 BPM | TEMPERATURE: 99 F | HEIGHT: 66 IN | OXYGEN SATURATION: 100 % | RESPIRATION RATE: 18 BRPM

## 2020-01-01 DIAGNOSIS — Z29.9 ENCOUNTER FOR PROPHYLACTIC MEASURES, UNSPECIFIED: ICD-10-CM

## 2020-01-01 DIAGNOSIS — N18.6 END STAGE RENAL DISEASE: ICD-10-CM

## 2020-01-01 DIAGNOSIS — N39.0 URINARY TRACT INFECTION, SITE NOT SPECIFIED: ICD-10-CM

## 2020-01-01 DIAGNOSIS — A41.9 SEPSIS, UNSPECIFIED ORGANISM: ICD-10-CM

## 2020-01-01 DIAGNOSIS — Z95.5 PRESENCE OF CORONARY ANGIOPLASTY IMPLANT AND GRAFT: Chronic | ICD-10-CM

## 2020-01-01 DIAGNOSIS — I25.10 ATHEROSCLEROTIC HEART DISEASE OF NATIVE CORONARY ARTERY WITHOUT ANGINA PECTORIS: ICD-10-CM

## 2020-01-01 DIAGNOSIS — M46.28 OSTEOMYELITIS OF VERTEBRA, SACRAL AND SACROCOCCYGEAL REGION: ICD-10-CM

## 2020-01-01 DIAGNOSIS — Z02.9 ENCOUNTER FOR ADMINISTRATIVE EXAMINATIONS, UNSPECIFIED: ICD-10-CM

## 2020-01-01 DIAGNOSIS — E78.5 HYPERLIPIDEMIA, UNSPECIFIED: ICD-10-CM

## 2020-01-01 DIAGNOSIS — N31.9 NEUROMUSCULAR DYSFUNCTION OF BLADDER, UNSPECIFIED: ICD-10-CM

## 2020-01-01 DIAGNOSIS — N15.1 RENAL AND PERINEPHRIC ABSCESS: Chronic | ICD-10-CM

## 2020-01-01 LAB
% ALBUMIN: 50 % — SIGNIFICANT CHANGE UP
% ALPHA 1: 3.6 % — SIGNIFICANT CHANGE UP
% ALPHA 2: 13.1 % — SIGNIFICANT CHANGE UP
% BETA: 12.5 % — SIGNIFICANT CHANGE UP
% GAMMA: 20.8 % — SIGNIFICANT CHANGE UP
24R-OH-CALCIDIOL SERPL-MCNC: 53.2 NG/ML — SIGNIFICANT CHANGE UP (ref 30–80)
ALBUMIN SERPL ELPH-MCNC: 3.2 G/DL — SIGNIFICANT CHANGE UP
ALBUMIN SERPL ELPH-MCNC: 3.5 G/DL — SIGNIFICANT CHANGE UP (ref 3.3–5)
ALBUMIN SERPL ELPH-MCNC: 4.2 G/DL — SIGNIFICANT CHANGE UP (ref 3.3–5)
ALBUMIN/GLOB SERPL ELPH: 1 RATIO — SIGNIFICANT CHANGE UP
ALP SERPL-CCNC: 151 U/L — HIGH (ref 40–120)
ALP SERPL-CCNC: 168 U/L — HIGH (ref 40–120)
ALPHA1 GLOB SERPL ELPH-MCNC: 0.2 G/DL — SIGNIFICANT CHANGE UP
ALPHA2 GLOB SERPL ELPH-MCNC: 0.8 G/DL — SIGNIFICANT CHANGE UP
ALT FLD-CCNC: 34 U/L — SIGNIFICANT CHANGE UP (ref 4–41)
ALT FLD-CCNC: 37 U/L — SIGNIFICANT CHANGE UP (ref 4–41)
ANION GAP SERPL CALC-SCNC: 15 MMOL/L — HIGH (ref 7–14)
ANION GAP SERPL CALC-SCNC: 15 MMOL/L — HIGH (ref 7–14)
ANION GAP SERPL CALC-SCNC: 17 MMOL/L — HIGH (ref 7–14)
ANION GAP SERPL CALC-SCNC: 19 MMOL/L — HIGH (ref 7–14)
APPEARANCE UR: ABNORMAL
APTT BLD: 38.9 SEC — HIGH (ref 27–36.3)
AST SERPL-CCNC: 32 U/L — SIGNIFICANT CHANGE UP (ref 4–40)
AST SERPL-CCNC: 36 U/L — SIGNIFICANT CHANGE UP (ref 4–40)
B PERT DNA SPEC QL NAA+PROBE: SIGNIFICANT CHANGE UP
B-GLOBULIN SERPL ELPH-MCNC: 0.8 G/DL — SIGNIFICANT CHANGE UP
BASOPHILS # BLD AUTO: 0.04 K/UL — SIGNIFICANT CHANGE UP (ref 0–0.2)
BASOPHILS # BLD AUTO: 0.04 K/UL — SIGNIFICANT CHANGE UP (ref 0–0.2)
BASOPHILS NFR BLD AUTO: 0.6 % — SIGNIFICANT CHANGE UP (ref 0–2)
BASOPHILS NFR BLD AUTO: 1 % — SIGNIFICANT CHANGE UP (ref 0–2)
BILIRUB SERPL-MCNC: 0.8 MG/DL — SIGNIFICANT CHANGE UP (ref 0.2–1.2)
BILIRUB SERPL-MCNC: 0.9 MG/DL — SIGNIFICANT CHANGE UP (ref 0.2–1.2)
BILIRUB UR-MCNC: NEGATIVE — SIGNIFICANT CHANGE UP
BLOOD GAS VENOUS COMPREHENSIVE RESULT: SIGNIFICANT CHANGE UP
BUN SERPL-MCNC: 60 MG/DL — HIGH (ref 7–23)
BUN SERPL-MCNC: 64 MG/DL — HIGH (ref 7–23)
BUN SERPL-MCNC: 86 MG/DL — HIGH (ref 7–23)
BUN SERPL-MCNC: 95 MG/DL — HIGH (ref 7–23)
C PNEUM DNA SPEC QL NAA+PROBE: SIGNIFICANT CHANGE UP
CALCIUM SERPL-MCNC: 10.2 MG/DL — SIGNIFICANT CHANGE UP (ref 8.4–10.5)
CALCIUM SERPL-MCNC: 11.2 MG/DL — HIGH (ref 8.4–10.5)
CALCIUM SERPL-MCNC: 12.6 MG/DL — HIGH (ref 8.4–10.5)
CALCIUM SERPL-MCNC: 9.3 MG/DL — SIGNIFICANT CHANGE UP (ref 8.4–10.5)
CHLORIDE SERPL-SCNC: 96 MMOL/L — LOW (ref 98–107)
CHLORIDE SERPL-SCNC: 96 MMOL/L — LOW (ref 98–107)
CHLORIDE SERPL-SCNC: 97 MMOL/L — LOW (ref 98–107)
CHLORIDE SERPL-SCNC: 99 MMOL/L — SIGNIFICANT CHANGE UP (ref 98–107)
CO2 SERPL-SCNC: 25 MMOL/L — SIGNIFICANT CHANGE UP (ref 22–31)
CO2 SERPL-SCNC: 27 MMOL/L — SIGNIFICANT CHANGE UP (ref 22–31)
CO2 SERPL-SCNC: 29 MMOL/L — SIGNIFICANT CHANGE UP (ref 22–31)
CO2 SERPL-SCNC: 30 MMOL/L — SIGNIFICANT CHANGE UP (ref 22–31)
COLOR SPEC: ABNORMAL
CREAT SERPL-MCNC: 6.13 MG/DL — HIGH (ref 0.5–1.3)
CREAT SERPL-MCNC: 6.23 MG/DL — HIGH (ref 0.5–1.3)
CREAT SERPL-MCNC: 7.18 MG/DL — HIGH (ref 0.5–1.3)
CREAT SERPL-MCNC: 8.16 MG/DL — HIGH (ref 0.5–1.3)
CULTURE RESULTS: SIGNIFICANT CHANGE UP
DIFF PNL FLD: ABNORMAL
EOSINOPHIL # BLD AUTO: 0.02 K/UL — SIGNIFICANT CHANGE UP (ref 0–0.5)
EOSINOPHIL # BLD AUTO: 0.16 K/UL — SIGNIFICANT CHANGE UP (ref 0–0.5)
EOSINOPHIL NFR BLD AUTO: 0.3 % — SIGNIFICANT CHANGE UP (ref 0–6)
EOSINOPHIL NFR BLD AUTO: 4.1 % — SIGNIFICANT CHANGE UP (ref 0–6)
FLUAV H1 2009 PAND RNA SPEC QL NAA+PROBE: SIGNIFICANT CHANGE UP
FLUAV H1 RNA SPEC QL NAA+PROBE: SIGNIFICANT CHANGE UP
FLUAV H3 RNA SPEC QL NAA+PROBE: SIGNIFICANT CHANGE UP
FLUAV SUBTYP SPEC NAA+PROBE: SIGNIFICANT CHANGE UP
FLUBV RNA SPEC QL NAA+PROBE: SIGNIFICANT CHANGE UP
GAMMA GLOBULIN: 1.3 G/DL — SIGNIFICANT CHANGE UP
GLUCOSE SERPL-MCNC: 121 MG/DL — HIGH (ref 70–99)
GLUCOSE SERPL-MCNC: 82 MG/DL — SIGNIFICANT CHANGE UP (ref 70–99)
GLUCOSE SERPL-MCNC: 83 MG/DL — SIGNIFICANT CHANGE UP (ref 70–99)
GLUCOSE SERPL-MCNC: 85 MG/DL — SIGNIFICANT CHANGE UP (ref 70–99)
GLUCOSE UR QL: NEGATIVE — SIGNIFICANT CHANGE UP
HADV DNA SPEC QL NAA+PROBE: SIGNIFICANT CHANGE UP
HBV CORE AB SER-ACNC: SIGNIFICANT CHANGE UP
HBV SURFACE AB SER-ACNC: 33.1 MIU/ML — SIGNIFICANT CHANGE UP
HBV SURFACE AG SER-ACNC: SIGNIFICANT CHANGE UP
HCOV PNL SPEC NAA+PROBE: SIGNIFICANT CHANGE UP
HCT VFR BLD CALC: 35.6 % — LOW (ref 39–50)
HCT VFR BLD CALC: 38.1 % — LOW (ref 39–50)
HCT VFR BLD CALC: 38.7 % — LOW (ref 39–50)
HCT VFR BLD CALC: 44.5 % — SIGNIFICANT CHANGE UP (ref 39–50)
HGB BLD-MCNC: 11.1 G/DL — LOW (ref 13–17)
HGB BLD-MCNC: 11.8 G/DL — LOW (ref 13–17)
HGB BLD-MCNC: 12.3 G/DL — LOW (ref 13–17)
HGB BLD-MCNC: 14.1 G/DL — SIGNIFICANT CHANGE UP (ref 13–17)
HMPV RNA SPEC QL NAA+PROBE: SIGNIFICANT CHANGE UP
HPIV1 RNA SPEC QL NAA+PROBE: SIGNIFICANT CHANGE UP
HPIV2 RNA SPEC QL NAA+PROBE: SIGNIFICANT CHANGE UP
HPIV3 RNA SPEC QL NAA+PROBE: SIGNIFICANT CHANGE UP
HPIV4 RNA SPEC QL NAA+PROBE: SIGNIFICANT CHANGE UP
IANC: 2.62 K/UL — SIGNIFICANT CHANGE UP (ref 1.5–8.5)
IANC: 4.93 K/UL — SIGNIFICANT CHANGE UP (ref 1.5–8.5)
IMM GRANULOCYTES NFR BLD AUTO: 0.3 % — SIGNIFICANT CHANGE UP (ref 0–1.5)
IMM GRANULOCYTES NFR BLD AUTO: 0.3 % — SIGNIFICANT CHANGE UP (ref 0–1.5)
INR BLD: 1.38 RATIO — HIGH (ref 0.88–1.17)
INTERPRETATION SERPL IFE-IMP: SIGNIFICANT CHANGE UP
KETONES UR-MCNC: NEGATIVE — SIGNIFICANT CHANGE UP
LEUKOCYTE ESTERASE UR-ACNC: ABNORMAL
LYMPHOCYTES # BLD AUTO: 0.73 K/UL — LOW (ref 1–3.3)
LYMPHOCYTES # BLD AUTO: 1.21 K/UL — SIGNIFICANT CHANGE UP (ref 1–3.3)
LYMPHOCYTES # BLD AUTO: 17.6 % — SIGNIFICANT CHANGE UP (ref 13–44)
LYMPHOCYTES # BLD AUTO: 18.9 % — SIGNIFICANT CHANGE UP (ref 13–44)
MAGNESIUM SERPL-MCNC: 2.2 MG/DL — SIGNIFICANT CHANGE UP (ref 1.6–2.6)
MAGNESIUM SERPL-MCNC: 2.3 MG/DL — SIGNIFICANT CHANGE UP (ref 1.6–2.6)
MAGNESIUM SERPL-MCNC: 2.4 MG/DL — SIGNIFICANT CHANGE UP (ref 1.6–2.6)
MCHC RBC-ENTMCNC: 27.5 PG — SIGNIFICANT CHANGE UP (ref 27–34)
MCHC RBC-ENTMCNC: 27.9 PG — SIGNIFICANT CHANGE UP (ref 27–34)
MCHC RBC-ENTMCNC: 27.9 PG — SIGNIFICANT CHANGE UP (ref 27–34)
MCHC RBC-ENTMCNC: 28.3 PG — SIGNIFICANT CHANGE UP (ref 27–34)
MCHC RBC-ENTMCNC: 31 GM/DL — LOW (ref 32–36)
MCHC RBC-ENTMCNC: 31.2 GM/DL — LOW (ref 32–36)
MCHC RBC-ENTMCNC: 31.7 GM/DL — LOW (ref 32–36)
MCHC RBC-ENTMCNC: 31.8 GM/DL — LOW (ref 32–36)
MCV RBC AUTO: 87.8 FL — SIGNIFICANT CHANGE UP (ref 80–100)
MCV RBC AUTO: 88.1 FL — SIGNIFICANT CHANGE UP (ref 80–100)
MCV RBC AUTO: 89.4 FL — SIGNIFICANT CHANGE UP (ref 80–100)
MCV RBC AUTO: 90.1 FL — SIGNIFICANT CHANGE UP (ref 80–100)
MONOCYTES # BLD AUTO: 0.3 K/UL — SIGNIFICANT CHANGE UP (ref 0–0.9)
MONOCYTES # BLD AUTO: 0.67 K/UL — SIGNIFICANT CHANGE UP (ref 0–0.9)
MONOCYTES NFR BLD AUTO: 7.8 % — SIGNIFICANT CHANGE UP (ref 2–14)
MONOCYTES NFR BLD AUTO: 9.7 % — SIGNIFICANT CHANGE UP (ref 2–14)
MRSA PCR RESULT.: DETECTED
NEUTROPHILS # BLD AUTO: 2.62 K/UL — SIGNIFICANT CHANGE UP (ref 1.8–7.4)
NEUTROPHILS # BLD AUTO: 4.93 K/UL — SIGNIFICANT CHANGE UP (ref 1.8–7.4)
NEUTROPHILS NFR BLD AUTO: 67.9 % — SIGNIFICANT CHANGE UP (ref 43–77)
NEUTROPHILS NFR BLD AUTO: 71.5 % — SIGNIFICANT CHANGE UP (ref 43–77)
NITRITE UR-MCNC: NEGATIVE — SIGNIFICANT CHANGE UP
NRBC # BLD: 0 /100 WBCS — SIGNIFICANT CHANGE UP
NRBC # FLD: 0 K/UL — SIGNIFICANT CHANGE UP
PH UR: 8 — SIGNIFICANT CHANGE UP (ref 5–8)
PHOSPHATE SERPL-MCNC: 4.4 MG/DL — SIGNIFICANT CHANGE UP (ref 2.5–4.5)
PHOSPHATE SERPL-MCNC: 5.5 MG/DL — HIGH (ref 2.5–4.5)
PHOSPHATE SERPL-MCNC: 5.9 MG/DL — HIGH (ref 2.5–4.5)
PLATELET # BLD AUTO: 104 K/UL — LOW (ref 150–400)
PLATELET # BLD AUTO: 154 K/UL — SIGNIFICANT CHANGE UP (ref 150–400)
PLATELET # BLD AUTO: 89 K/UL — LOW (ref 150–400)
PLATELET # BLD AUTO: 91 K/UL — LOW (ref 150–400)
POTASSIUM SERPL-MCNC: 4 MMOL/L — SIGNIFICANT CHANGE UP (ref 3.5–5.3)
POTASSIUM SERPL-MCNC: 4.5 MMOL/L — SIGNIFICANT CHANGE UP (ref 3.5–5.3)
POTASSIUM SERPL-MCNC: 4.5 MMOL/L — SIGNIFICANT CHANGE UP (ref 3.5–5.3)
POTASSIUM SERPL-MCNC: 5.7 MMOL/L — HIGH (ref 3.5–5.3)
POTASSIUM SERPL-SCNC: 4 MMOL/L — SIGNIFICANT CHANGE UP (ref 3.5–5.3)
POTASSIUM SERPL-SCNC: 4.5 MMOL/L — SIGNIFICANT CHANGE UP (ref 3.5–5.3)
POTASSIUM SERPL-SCNC: 4.5 MMOL/L — SIGNIFICANT CHANGE UP (ref 3.5–5.3)
POTASSIUM SERPL-SCNC: 5.7 MMOL/L — HIGH (ref 3.5–5.3)
PROT PATTERN SERPL ELPH-IMP: SIGNIFICANT CHANGE UP
PROT PATTERN SERPL ELPH-IMP: SIGNIFICANT CHANGE UP
PROT SERPL-MCNC: 6.3 G/DL — SIGNIFICANT CHANGE UP
PROT SERPL-MCNC: 6.3 G/DL — SIGNIFICANT CHANGE UP (ref 6–8.3)
PROT SERPL-MCNC: 6.8 G/DL — SIGNIFICANT CHANGE UP (ref 6–8.3)
PROT SERPL-MCNC: 7.9 G/DL — SIGNIFICANT CHANGE UP (ref 6–8.3)
PROT UR-MCNC: ABNORMAL
PROTHROM AB SERPL-ACNC: 15.6 SEC — HIGH (ref 9.8–13.1)
PTH RELATED PROT SERPL-MCNC: <2 PMOL/L — SIGNIFICANT CHANGE UP
PTH RELATED PROT SERPL-MCNC: <2 PMOL/L — SIGNIFICANT CHANGE UP
PTH-INTACT FLD-MCNC: 515 PG/ML — HIGH (ref 15–65)
RAPID RVP RESULT: SIGNIFICANT CHANGE UP
RBC # BLD: 4.04 M/UL — LOW (ref 4.2–5.8)
RBC # BLD: 4.23 M/UL — SIGNIFICANT CHANGE UP (ref 4.2–5.8)
RBC # BLD: 4.41 M/UL — SIGNIFICANT CHANGE UP (ref 4.2–5.8)
RBC # BLD: 4.98 M/UL — SIGNIFICANT CHANGE UP (ref 4.2–5.8)
RBC # FLD: 16.6 % — HIGH (ref 10.3–14.5)
RBC # FLD: 17 % — HIGH (ref 10.3–14.5)
RBC # FLD: 17 % — HIGH (ref 10.3–14.5)
RBC # FLD: 17.2 % — HIGH (ref 10.3–14.5)
RSV RNA SPEC QL NAA+PROBE: SIGNIFICANT CHANGE UP
RV+EV RNA SPEC QL NAA+PROBE: SIGNIFICANT CHANGE UP
S AUREUS DNA NOSE QL NAA+PROBE: DETECTED
SARS-COV-2 RNA SPEC QL NAA+PROBE: SIGNIFICANT CHANGE UP
SODIUM SERPL-SCNC: 139 MMOL/L — SIGNIFICANT CHANGE UP (ref 135–145)
SODIUM SERPL-SCNC: 140 MMOL/L — SIGNIFICANT CHANGE UP (ref 135–145)
SODIUM SERPL-SCNC: 141 MMOL/L — SIGNIFICANT CHANGE UP (ref 135–145)
SODIUM SERPL-SCNC: 145 MMOL/L — SIGNIFICANT CHANGE UP (ref 135–145)
SP GR SPEC: 1.02 — SIGNIFICANT CHANGE UP (ref 1.01–1.02)
SPECIMEN SOURCE: SIGNIFICANT CHANGE UP
UROBILINOGEN FLD QL: SIGNIFICANT CHANGE UP
VIT D25+D1,25 OH+D1,25 PNL SERPL-MCNC: 22.5 PG/ML — SIGNIFICANT CHANGE UP (ref 19.9–79.3)
WBC # BLD: 3.52 K/UL — LOW (ref 3.8–10.5)
WBC # BLD: 3.86 K/UL — SIGNIFICANT CHANGE UP (ref 3.8–10.5)
WBC # BLD: 4.21 K/UL — SIGNIFICANT CHANGE UP (ref 3.8–10.5)
WBC # BLD: 6.89 K/UL — SIGNIFICANT CHANGE UP (ref 3.8–10.5)
WBC # FLD AUTO: 3.52 K/UL — LOW (ref 3.8–10.5)
WBC # FLD AUTO: 3.86 K/UL — SIGNIFICANT CHANGE UP (ref 3.8–10.5)
WBC # FLD AUTO: 4.21 K/UL — SIGNIFICANT CHANGE UP (ref 3.8–10.5)
WBC # FLD AUTO: 6.89 K/UL — SIGNIFICANT CHANGE UP (ref 3.8–10.5)

## 2020-01-01 PROCEDURE — 93010 ELECTROCARDIOGRAM REPORT: CPT

## 2020-01-01 PROCEDURE — 86334 IMMUNOFIX E-PHORESIS SERUM: CPT | Mod: 26

## 2020-01-01 PROCEDURE — 71046 X-RAY EXAM CHEST 2 VIEWS: CPT | Mod: 26

## 2020-01-01 PROCEDURE — 99239 HOSP IP/OBS DSCHRG MGMT >30: CPT

## 2020-01-01 PROCEDURE — 99291 CRITICAL CARE FIRST HOUR: CPT

## 2020-01-01 PROCEDURE — 99223 1ST HOSP IP/OBS HIGH 75: CPT | Mod: AI,GC

## 2020-01-01 PROCEDURE — 99233 SBSQ HOSP IP/OBS HIGH 50: CPT | Mod: GC

## 2020-01-01 RX ORDER — CITALOPRAM 10 MG/1
20 TABLET, FILM COATED ORAL DAILY
Refills: 0 | Status: DISCONTINUED | OUTPATIENT
Start: 2020-01-01 | End: 2020-01-01

## 2020-01-01 RX ORDER — PETROLATUM,WHITE
1 JELLY (GRAM) TOPICAL
Refills: 0 | Status: DISCONTINUED | OUTPATIENT
Start: 2020-01-01 | End: 2020-01-01

## 2020-01-01 RX ORDER — MUPIROCIN 20 MG/G
1 OINTMENT TOPICAL
Refills: 0 | Status: DISCONTINUED | OUTPATIENT
Start: 2020-01-01 | End: 2020-01-01

## 2020-01-01 RX ORDER — LANOLIN/MINERAL OIL
1 LOTION (ML) TOPICAL
Refills: 0 | Status: DISCONTINUED | OUTPATIENT
Start: 2020-01-01 | End: 2020-01-01

## 2020-01-01 RX ORDER — CINACALCET 30 MG/1
1 TABLET, FILM COATED ORAL
Qty: 0 | Refills: 0 | DISCHARGE
Start: 2020-01-01

## 2020-01-01 RX ORDER — MIDODRINE HYDROCHLORIDE 2.5 MG/1
2 TABLET ORAL
Qty: 0 | Refills: 0 | DISCHARGE

## 2020-01-01 RX ORDER — SODIUM CHLORIDE 9 MG/ML
500 INJECTION INTRAMUSCULAR; INTRAVENOUS; SUBCUTANEOUS ONCE
Refills: 0 | Status: DISCONTINUED | OUTPATIENT
Start: 2020-01-01 | End: 2020-01-01

## 2020-01-01 RX ORDER — SENNA PLUS 8.6 MG/1
2 TABLET ORAL AT BEDTIME
Refills: 0 | Status: DISCONTINUED | OUTPATIENT
Start: 2020-01-01 | End: 2020-01-01

## 2020-01-01 RX ORDER — ASPIRIN/CALCIUM CARB/MAGNESIUM 324 MG
81 TABLET ORAL DAILY
Refills: 0 | Status: DISCONTINUED | OUTPATIENT
Start: 2020-01-01 | End: 2020-01-01

## 2020-01-01 RX ORDER — CHLORHEXIDINE GLUCONATE 213 G/1000ML
1 SOLUTION TOPICAL DAILY
Refills: 0 | Status: DISCONTINUED | OUTPATIENT
Start: 2020-01-01 | End: 2020-01-01

## 2020-01-01 RX ORDER — MUPIROCIN 20 MG/G
1 OINTMENT TOPICAL
Qty: 0 | Refills: 0 | DISCHARGE
Start: 2020-01-01

## 2020-01-01 RX ORDER — ACETAMINOPHEN 500 MG
975 TABLET ORAL ONCE
Refills: 0 | Status: COMPLETED | OUTPATIENT
Start: 2020-01-01 | End: 2020-01-01

## 2020-01-01 RX ORDER — HEPARIN SODIUM 5000 [USP'U]/ML
5000 INJECTION INTRAVENOUS; SUBCUTANEOUS EVERY 8 HOURS
Refills: 0 | Status: DISCONTINUED | OUTPATIENT
Start: 2020-01-01 | End: 2020-01-01

## 2020-01-01 RX ORDER — PIPERACILLIN AND TAZOBACTAM 4; .5 G/20ML; G/20ML
3.38 INJECTION, POWDER, LYOPHILIZED, FOR SOLUTION INTRAVENOUS ONCE
Refills: 0 | Status: COMPLETED | OUTPATIENT
Start: 2020-01-01 | End: 2020-01-01

## 2020-01-01 RX ORDER — SIMETHICONE 80 MG/1
1 TABLET, CHEWABLE ORAL
Qty: 0 | Refills: 0 | DISCHARGE

## 2020-01-01 RX ORDER — CINACALCET 30 MG/1
30 TABLET, FILM COATED ORAL
Refills: 0 | Status: DISCONTINUED | OUTPATIENT
Start: 2020-01-01 | End: 2020-01-01

## 2020-01-01 RX ORDER — PANTOPRAZOLE SODIUM 20 MG/1
40 TABLET, DELAYED RELEASE ORAL
Refills: 0 | Status: DISCONTINUED | OUTPATIENT
Start: 2020-01-01 | End: 2020-01-01

## 2020-01-01 RX ORDER — CEFTRIAXONE 500 MG/1
1000 INJECTION, POWDER, FOR SOLUTION INTRAMUSCULAR; INTRAVENOUS EVERY 24 HOURS
Refills: 0 | Status: ACTIVE | OUTPATIENT
Start: 2020-01-01 | End: 2020-01-01

## 2020-01-01 RX ORDER — ATORVASTATIN CALCIUM 80 MG/1
10 TABLET, FILM COATED ORAL AT BEDTIME
Refills: 0 | Status: DISCONTINUED | OUTPATIENT
Start: 2020-01-01 | End: 2020-01-01

## 2020-01-01 RX ORDER — LACTOBACILLUS ACIDOPHILUS 100MM CELL
1 CAPSULE ORAL
Refills: 0 | Status: DISCONTINUED | OUTPATIENT
Start: 2020-01-01 | End: 2020-01-01

## 2020-01-01 RX ORDER — MIDODRINE HYDROCHLORIDE 2.5 MG/1
10 TABLET ORAL THREE TIMES A DAY
Refills: 0 | Status: DISCONTINUED | OUTPATIENT
Start: 2020-01-01 | End: 2020-01-01

## 2020-01-01 RX ORDER — VANCOMYCIN HCL 1 G
1000 VIAL (EA) INTRAVENOUS ONCE
Refills: 0 | Status: COMPLETED | OUTPATIENT
Start: 2020-01-01 | End: 2020-01-01

## 2020-01-01 RX ORDER — SODIUM CHLORIDE 9 MG/ML
1000 INJECTION INTRAMUSCULAR; INTRAVENOUS; SUBCUTANEOUS ONCE
Refills: 0 | Status: COMPLETED | OUTPATIENT
Start: 2020-01-01 | End: 2020-01-01

## 2020-01-01 RX ORDER — POLYETHYLENE GLYCOL 3350 17 G/17G
17 POWDER, FOR SOLUTION ORAL DAILY
Refills: 0 | Status: DISCONTINUED | OUTPATIENT
Start: 2020-01-01 | End: 2020-01-01

## 2020-01-01 RX ORDER — MUPIROCIN 20 MG/G
1 OINTMENT TOPICAL
Qty: 0 | Refills: 0 | DISCHARGE
Start: 2020-01-01 | End: 2020-01-01

## 2020-01-01 RX ORDER — TAMSULOSIN HYDROCHLORIDE 0.4 MG/1
0.4 CAPSULE ORAL AT BEDTIME
Refills: 0 | Status: DISCONTINUED | OUTPATIENT
Start: 2020-01-01 | End: 2020-01-01

## 2020-01-01 RX ADMIN — MUPIROCIN 1 APPLICATION(S): 20 OINTMENT TOPICAL at 17:57

## 2020-01-01 RX ADMIN — Medication 1 TABLET(S): at 17:57

## 2020-01-01 RX ADMIN — Medication 1 APPLICATION(S): at 05:27

## 2020-01-01 RX ADMIN — CHLORHEXIDINE GLUCONATE 1 APPLICATION(S): 213 SOLUTION TOPICAL at 12:10

## 2020-01-01 RX ADMIN — TAMSULOSIN HYDROCHLORIDE 0.4 MILLIGRAM(S): 0.4 CAPSULE ORAL at 21:18

## 2020-01-01 RX ADMIN — HEPARIN SODIUM 5000 UNIT(S): 5000 INJECTION INTRAVENOUS; SUBCUTANEOUS at 06:34

## 2020-01-01 RX ADMIN — MUPIROCIN 1 APPLICATION(S): 20 OINTMENT TOPICAL at 05:27

## 2020-01-01 RX ADMIN — Medication 1 APPLICATION(S): at 22:40

## 2020-01-01 RX ADMIN — Medication 50 MILLIGRAM(S): at 17:05

## 2020-01-01 RX ADMIN — CINACALCET 30 MILLIGRAM(S): 30 TABLET, FILM COATED ORAL at 18:02

## 2020-01-01 RX ADMIN — CINACALCET 30 MILLIGRAM(S): 30 TABLET, FILM COATED ORAL at 05:26

## 2020-01-01 RX ADMIN — SENNA PLUS 2 TABLET(S): 8.6 TABLET ORAL at 23:49

## 2020-01-01 RX ADMIN — CITALOPRAM 20 MILLIGRAM(S): 10 TABLET, FILM COATED ORAL at 12:04

## 2020-01-01 RX ADMIN — Medication 1 TABLET(S): at 06:33

## 2020-01-01 RX ADMIN — CEFTRIAXONE 100 MILLIGRAM(S): 500 INJECTION, POWDER, FOR SOLUTION INTRAMUSCULAR; INTRAVENOUS at 22:40

## 2020-01-01 RX ADMIN — Medication 1 TABLET(S): at 05:25

## 2020-01-01 RX ADMIN — Medication 975 MILLIGRAM(S): at 09:03

## 2020-01-01 RX ADMIN — Medication 1 APPLICATION(S): at 12:02

## 2020-01-01 RX ADMIN — MIDODRINE HYDROCHLORIDE 10 MILLIGRAM(S): 2.5 TABLET ORAL at 06:33

## 2020-01-01 RX ADMIN — HEPARIN SODIUM 5000 UNIT(S): 5000 INJECTION INTRAVENOUS; SUBCUTANEOUS at 21:41

## 2020-01-01 RX ADMIN — HEPARIN SODIUM 5000 UNIT(S): 5000 INJECTION INTRAVENOUS; SUBCUTANEOUS at 05:26

## 2020-01-01 RX ADMIN — CINACALCET 30 MILLIGRAM(S): 30 TABLET, FILM COATED ORAL at 05:25

## 2020-01-01 RX ADMIN — Medication 1 APPLICATION(S): at 17:56

## 2020-01-01 RX ADMIN — CHLORHEXIDINE GLUCONATE 1 APPLICATION(S): 213 SOLUTION TOPICAL at 11:56

## 2020-01-01 RX ADMIN — SODIUM CHLORIDE 1000 MILLILITER(S): 9 INJECTION INTRAMUSCULAR; INTRAVENOUS; SUBCUTANEOUS at 13:55

## 2020-01-01 RX ADMIN — Medication 50 MILLIGRAM(S): at 17:56

## 2020-01-01 RX ADMIN — PIPERACILLIN AND TAZOBACTAM 3.38 GRAM(S): 4; .5 INJECTION, POWDER, LYOPHILIZED, FOR SOLUTION INTRAVENOUS at 10:00

## 2020-01-01 RX ADMIN — PANTOPRAZOLE SODIUM 40 MILLIGRAM(S): 20 TABLET, DELAYED RELEASE ORAL at 05:27

## 2020-01-01 RX ADMIN — MIDODRINE HYDROCHLORIDE 10 MILLIGRAM(S): 2.5 TABLET ORAL at 05:27

## 2020-01-01 RX ADMIN — Medication 250 MILLIGRAM(S): at 10:07

## 2020-01-01 RX ADMIN — MIDODRINE HYDROCHLORIDE 10 MILLIGRAM(S): 2.5 TABLET ORAL at 12:15

## 2020-01-01 RX ADMIN — SENNA PLUS 2 TABLET(S): 8.6 TABLET ORAL at 21:18

## 2020-01-01 RX ADMIN — MIDODRINE HYDROCHLORIDE 10 MILLIGRAM(S): 2.5 TABLET ORAL at 05:25

## 2020-01-01 RX ADMIN — MUPIROCIN 1 APPLICATION(S): 20 OINTMENT TOPICAL at 05:26

## 2020-01-01 RX ADMIN — CEFTRIAXONE 100 MILLIGRAM(S): 500 INJECTION, POWDER, FOR SOLUTION INTRAMUSCULAR; INTRAVENOUS at 23:49

## 2020-01-01 RX ADMIN — Medication 1 APPLICATION(S): at 12:10

## 2020-01-01 RX ADMIN — ATORVASTATIN CALCIUM 10 MILLIGRAM(S): 80 TABLET, FILM COATED ORAL at 21:41

## 2020-01-01 RX ADMIN — MIDODRINE HYDROCHLORIDE 10 MILLIGRAM(S): 2.5 TABLET ORAL at 17:56

## 2020-01-01 RX ADMIN — Medication 50 MILLIGRAM(S): at 05:26

## 2020-01-01 RX ADMIN — POLYETHYLENE GLYCOL 3350 17 GRAM(S): 17 POWDER, FOR SOLUTION ORAL at 11:55

## 2020-01-01 RX ADMIN — MIDODRINE HYDROCHLORIDE 10 MILLIGRAM(S): 2.5 TABLET ORAL at 11:55

## 2020-01-01 RX ADMIN — Medication 975 MILLIGRAM(S): at 10:07

## 2020-01-01 RX ADMIN — HEPARIN SODIUM 5000 UNIT(S): 5000 INJECTION INTRAVENOUS; SUBCUTANEOUS at 11:55

## 2020-01-01 RX ADMIN — HEPARIN SODIUM 5000 UNIT(S): 5000 INJECTION INTRAVENOUS; SUBCUTANEOUS at 12:11

## 2020-01-01 RX ADMIN — MIDODRINE HYDROCHLORIDE 10 MILLIGRAM(S): 2.5 TABLET ORAL at 17:04

## 2020-01-01 RX ADMIN — Medication 50 MILLIGRAM(S): at 06:33

## 2020-01-01 RX ADMIN — Medication 1000 MILLIGRAM(S): at 13:54

## 2020-01-01 RX ADMIN — Medication 81 MILLIGRAM(S): at 12:04

## 2020-01-01 RX ADMIN — HEPARIN SODIUM 5000 UNIT(S): 5000 INJECTION INTRAVENOUS; SUBCUTANEOUS at 12:14

## 2020-01-01 RX ADMIN — Medication 81 MILLIGRAM(S): at 11:55

## 2020-01-01 RX ADMIN — Medication 1 APPLICATION(S): at 17:05

## 2020-01-01 RX ADMIN — POLYETHYLENE GLYCOL 3350 17 GRAM(S): 17 POWDER, FOR SOLUTION ORAL at 12:11

## 2020-01-01 RX ADMIN — PIPERACILLIN AND TAZOBACTAM 200 GRAM(S): 4; .5 INJECTION, POWDER, LYOPHILIZED, FOR SOLUTION INTRAVENOUS at 09:03

## 2020-01-01 RX ADMIN — MUPIROCIN 1 APPLICATION(S): 20 OINTMENT TOPICAL at 18:38

## 2020-01-01 RX ADMIN — CITALOPRAM 20 MILLIGRAM(S): 10 TABLET, FILM COATED ORAL at 11:55

## 2020-01-01 RX ADMIN — SENNA PLUS 2 TABLET(S): 8.6 TABLET ORAL at 21:41

## 2020-01-01 RX ADMIN — Medication 1 TABLET(S): at 05:26

## 2020-01-01 RX ADMIN — ATORVASTATIN CALCIUM 10 MILLIGRAM(S): 80 TABLET, FILM COATED ORAL at 21:18

## 2020-01-01 RX ADMIN — HEPARIN SODIUM 5000 UNIT(S): 5000 INJECTION INTRAVENOUS; SUBCUTANEOUS at 21:18

## 2020-01-01 RX ADMIN — HEPARIN SODIUM 5000 UNIT(S): 5000 INJECTION INTRAVENOUS; SUBCUTANEOUS at 05:27

## 2020-01-01 RX ADMIN — HEPARIN SODIUM 5000 UNIT(S): 5000 INJECTION INTRAVENOUS; SUBCUTANEOUS at 23:49

## 2020-01-01 RX ADMIN — TAMSULOSIN HYDROCHLORIDE 0.4 MILLIGRAM(S): 0.4 CAPSULE ORAL at 21:41

## 2020-01-01 RX ADMIN — PANTOPRAZOLE SODIUM 40 MILLIGRAM(S): 20 TABLET, DELAYED RELEASE ORAL at 06:33

## 2020-01-01 RX ADMIN — Medication 1 APPLICATION(S): at 05:25

## 2020-01-01 RX ADMIN — POLYETHYLENE GLYCOL 3350 17 GRAM(S): 17 POWDER, FOR SOLUTION ORAL at 12:04

## 2020-01-01 RX ADMIN — PANTOPRAZOLE SODIUM 40 MILLIGRAM(S): 20 TABLET, DELAYED RELEASE ORAL at 05:26

## 2020-01-01 RX ADMIN — CINACALCET 30 MILLIGRAM(S): 30 TABLET, FILM COATED ORAL at 17:56

## 2020-01-01 RX ADMIN — Medication 81 MILLIGRAM(S): at 12:10

## 2020-01-01 RX ADMIN — TAMSULOSIN HYDROCHLORIDE 0.4 MILLIGRAM(S): 0.4 CAPSULE ORAL at 23:49

## 2020-01-01 RX ADMIN — CHLORHEXIDINE GLUCONATE 1 APPLICATION(S): 213 SOLUTION TOPICAL at 12:15

## 2020-01-01 RX ADMIN — CITALOPRAM 20 MILLIGRAM(S): 10 TABLET, FILM COATED ORAL at 12:11

## 2020-01-01 RX ADMIN — ATORVASTATIN CALCIUM 10 MILLIGRAM(S): 80 TABLET, FILM COATED ORAL at 23:49

## 2020-01-01 RX ADMIN — Medication 50 MILLIGRAM(S): at 05:25

## 2020-01-01 RX ADMIN — MIDODRINE HYDROCHLORIDE 10 MILLIGRAM(S): 2.5 TABLET ORAL at 12:11

## 2020-01-01 RX ADMIN — SODIUM CHLORIDE 1000 MILLILITER(S): 9 INJECTION INTRAMUSCULAR; INTRAVENOUS; SUBCUTANEOUS at 09:03

## 2020-01-01 RX ADMIN — Medication 1 APPLICATION(S): at 11:55

## 2020-01-01 RX ADMIN — Medication 1 TABLET(S): at 17:04

## 2020-03-24 NOTE — ED ADULT TRIAGE NOTE - SPO2 (%)
Pt reports feeling like she is wheezing and has a productive cough with white sputum. Denies fever.   Breath sounds clear ILEANA. NAD. RR even/unlabored.   
99

## 2020-12-08 PROBLEM — K40.90 UNILATERAL INGUINAL HERNIA, WITHOUT OBSTRUCTION OR GANGRENE, NOT SPECIFIED AS RECURRENT: Chronic | Status: ACTIVE | Noted: 2018-11-27

## 2020-12-08 NOTE — ED ADULT TRIAGE NOTE - CHIEF COMPLAINT QUOTE
pt brought from [facility] for lethargy x1 day and felt warm. Pt AOX1 at baseline and at present, as per EMS staff states pt sometimes more conversational. Pt hx ESRD with L AV fistula, as per paperwork goes for dialysis TTS but unsure last treatment. Pt does not verbalize any complaints pt brought from [facility] for lethargy x1 day and felt warm. Pt AOX1 at baseline and at present, as per EMS staff states pt sometimes more conversational. Pt hx ESRD with L AV fistula, as per paperwork goes for dialysis TTS but unsure last treatment. Pt does not verbalize any complaints. Uses 3 L nasal cannula chronically

## 2020-12-08 NOTE — ED PROVIDER NOTE - PROGRESS NOTE DETAILS
Rick PGY2 - Poor LV squeeze on beside echo w/ plethoric IVC.  Previous echo from 2016 shows EF of 30% w/ moderate LV global hypokinesis.  Will hold off on any more fluids.  Aware that lactate is above 4.  Will re-draw lactate after 1L fluids are in. Rick PGY2 - Pt. will be admitted to hospitalist for urosepsis.  Pt. aware and agrees w/ plan.  All other questions and concerns have been addressed.

## 2020-12-08 NOTE — MEDICAL STUDENT ADULT H&P (EDUCATION) - NS MD HP STUD PE VITALS FT
Vital Signs Last 24 Hrs  T(C): 36.7 (08 Dec 2020 12:09), Max: 38.6 (08 Dec 2020 07:57)  T(F): 98.1 (08 Dec 2020 12:09), Max: 101.5 (08 Dec 2020 07:57)  HR: 103 (08 Dec 2020 13:28) (90 - 110)  BP: 110/81 (08 Dec 2020 13:28) (107/79 - 148/100)  BP(mean): --  RR: 20 (08 Dec 2020 13:28) (18 - 20)  SpO2: 99% (08 Dec 2020 13:28) (99% - 100%)

## 2020-12-08 NOTE — ED ADULT NURSE NOTE - OBJECTIVE STATEMENT
Pt presents to the ED a&ox1, oriented to self, sent from [facility] for lethargy x1 day and fever. As per EMS, pt a&ox1 at baseline. Pt currently 101.5 rectally, sinus tachy on the monitor, normotensive. Pt unable answer questions at this time but able to follow commands. Skin is clean dry and intact. Breathing even and unlabored. Pt chronically on 3L NC but satting 100% on RA currently. PMH includes ESRD with left AV fistula. As per paperwork pt schedule for dialysis Tuesday, Thursday, Saturday, last dialysis unknown. Nonverbal indicators of pain absent at this time. Report to be given to day shift RN. Will continue to monitor.

## 2020-12-08 NOTE — H&P ADULT - ASSESSMENT
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 Found to be febrile to 101.5, tachycardic to 110, lactate 4.2, admitted for sepsis possibly 2/2 UTI.

## 2020-12-08 NOTE — ED PROVIDER NOTE - CLINICAL SUMMARY MEDICAL DECISION MAKING FREE TEXT BOX
70M sent in by [facility] staff for lethargy.  Febrile to 101, crackles in left lower lobe.  Likely infectious.  Will get sepsis w/u, provide empiric abx, administer fluids, reassess, and dispo pending results.

## 2020-12-08 NOTE — H&P ADULT - NSICDXPASTMEDICALHX_GEN_ALL_CORE_FT
PAST MEDICAL HISTORY:  Abscess of sacrum     Anemia due to chronic renal failure treated with erythropoietin, stage 2 (mild)     CAD (coronary artery disease)     CAD in native artery     Cavitary lung disease     Cavitary lung disease     ESRD (end stage renal disease)     ESRD (end stage renal disease)     HLD (hyperlipidemia)     HPTH (hyperparathyroidism) Secondary    HTN (hypertension)     Hypertension     Inguinal hernia     Neurogenic bladder     NSTEMI (non-ST elevated myocardial infarction)     Spinal abscess     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 device, implant or graft

## 2020-12-08 NOTE — CONSULT NOTE ADULT - SUBJECTIVE AND OBJECTIVE BOX
HPI: Mr. Beaver is a justin 70 year-old man with history of multiple medical issues including hypertension, coronary artery disease, past spinal abscess requiring prolonged-therapy IV antibiotics, and end stage renal disease. He undergoes hemodialysis , , and  at the [facility] [place], under the care of my partner Dr. Ramón Resendiz. He presented today to the [facility] ER with fatigue. He was noted in the ER to have a temperature of 101.5.  In the ER, Mr. Beaver has received 1L of NS, in addition to IV Vancomycin+Zosyn.   PAST MEDICAL & SURGICAL HISTORY: Inguinal hernia HLD (hyperlipidemia) Neurogenic bladder Spinal abscess Secondary Hyperparathyroidism HTN (hypertension) Cavitary lung disease Hypertension ESRD (end stage renal disease) CAD - primary angioplasty with coronary stent Kidney abscess   Allergies No Known Allergies  SOCIAL HISTORY: Denies ETOh,Smoking,   FAMILY HISTORY: No pertinent family history in first degree relatives Family history of breast cancer (Sibling)  REVIEW OF SYSTEMS: CONSTITUTIONAL: (+)fatigue; (+)fever EYES/ENT: No visual changes;  No vertigo or throat pain  NECK: No pain or stiffness RESPIRATORY: No cough, wheezing, hemoptysis; No shortness of breath CARDIOVASCULAR: No chest pain or palpitations GASTROINTESTINAL: No abdominal or epigastric pain. No nausea, vomiting, or hematemesis; No diarrhea or constipation. No melena or hematochezia. GENITOURINARY: No dysuria, frequency or hematuria NEUROLOGICAL: No numbness or weakness SKIN: No itching, burning, rashes, or lesions  All other review of systems is negative unless indicated above.  VITAL: T(C): , Max: 38.6 (20 @ 07:57) T(F): , Max: 101.5 (20 @ 07:57) HR: 103 (20 @ 13:28) BP: 110/81 (20 @ 13:28) RR: 20 (20 @ 13:28) SpO2: 99% (20 @ :28)  PHYSICAL EXAM: Constitutional: NAD, Alert HEENT: NCAT, DMM Neck: Supple, No JVD Respiratory: CTA-b/l Cardiovascular: tachy s1s2 Gastrointestinal: BS+, soft, NT/ND Extremities: No peripheral edema b/l Neurological: no focal deficits; strength grossly intact Back: no CVAT b/l Skin: No rashes, no nevi  LABS:                      14.1  6.89  )-----------( 154      ( 08 Dec 2020 09:13 )            44.5   Na(145)/K(5.7)/Cl(96)/HCO3(30)/BUN(95)/Cr(8.16)Glu(121)/Ca(12.6)/Mg(--)/PO4(--)    12-08 @ 09:13  Urinalysis Basic - ( 08 Dec 2020 11:29 ) Color: Dark Brown / Appearance: Turbid / S.018 / pH: x Gluc: x / Ketone: Negative  / Bili: Negative / Urobili: <2 mg/dL  Blood: x / Protein: 300 mg/dL / Nitrite: Negative  Leuk Esterase: Large / RBC: 5-10 /HPF / WBC 15-25 /HPF  Sq Epi: x / Non Sq Epi: FEW /HPF / Bacteria: Moderate  IMAGING: < from: MRI Thoracic Spine w/o Cont (17 @ 06:07) > Interval collapse of T5 and T6 without epidural fluid or abnormal fluid  signal within the intervertebral disc spaces. Resolution of abnormal  epidural fluid since 2016. Cord signal abnormality consistent with  changes of myelomalacia.   ASSESSMENT: (1)Renal - ESRD - HD TTS at [facility]; due for HD today (2)Hyperkalemia- renally mediated (3)Hypercalcemia - etiology?  RECOMMEND:   Thank you for involving [facility] in this patient's care.  With warm regards,  Heath Huff MD  [facility] Office: [phone] Cell: [phone]         HPI: Mr. Beaver is a justin 70 year-old man with history of multiple medical issues including hypertension, coronary artery disease, past spinal abscess requiring prolonged-therapy IV antibiotics, and end stage renal disease. He undergoes hemodialysis , , and  at the [facility] [place], under the care of my partner Dr. Ramón Resendiz. He presented today to the [facility] ER with fatigue. He was noted in the ER to have a temperature of 101.5.  In the ER, Mr. Beaver has received 1L of NS, in addition to IV Vancomycin+Zosyn.  History quite limited from patient due to his dementia +/- delirium   PAST MEDICAL & SURGICAL HISTORY: Inguinal hernia HLD (hyperlipidemia) Neurogenic bladder Spinal abscess Secondary Hyperparathyroidism HTN (hypertension) Cavitary lung disease Hypertension ESRD (end stage renal disease) CAD - primary angioplasty with coronary stent Kidney abscess Dementia  Allergies No Known Allergies  SOCIAL HISTORY: Denies ETOh,Smoking,   FAMILY HISTORY: No pertinent family history in first degree relatives Family history of breast cancer (Sibling)  REVIEW OF SYSTEMS: unable to obtain from patient - not answering simple questions appropriately  VITAL: T(C): , Max: 38.6 (20 @ 07:57) T(F): , Max: 101.5 (20 @ 07:57) HR: 103 (20 @ 13:28) BP: 110/81 (20 @ 13:28) RR: 20 (20 @ 13:28) SpO2: 99% (20 @ 13:28)  PHYSICAL EXAM: Constitutional: lethargic/confused HEENT: NCAT, DMM Neck: Supple, No JVD Respiratory: CTA-b/l Cardiovascular: tachy s1s2, (+)1/6 GUZMAN Gastrointestinal: BS+, soft, NT/ND Extremities: No peripheral edema b/l Neurological: no focal deficits; strength grossly intact Back: no CVAT b/l Skin: No rashes, no nevi Access: LUE AVF (+)thrill  LABS:                      14.1  6.89  )-----------( 154      ( 08 Dec 2020 09:13 )            44.5   Na(145)/K(5.7)/Cl(96)/HCO3(30)/BUN(95)/Cr(8.16)Glu(121)/Ca(12.6)/Mg(--)/PO4(--)    12- @ 09:13  Urinalysis Basic - ( 08 Dec 2020 11:29 ) Color: Dark Brown / Appearance: Turbid / S.018 / pH: x Gluc: x / Ketone: Negative  / Bili: Negative / Urobili: <2 mg/dL  Blood: x / Protein: 300 mg/dL / Nitrite: Negative  Leuk Esterase: Large / RBC: 5-10 /HPF / WBC 15-25 /HPF  Sq Epi: x / Non Sq Epi: FEW /HPF / Bacteria: Moderate  IMAGING: < from: MRI Thoracic Spine w/o Cont (17 @ 06:07) > Interval collapse of T5 and T6 without epidural fluid or abnormal fluid  signal within the intervertebral disc spaces. Resolution of abnormal  epidural fluid since 2016. Cord signal abnormality consistent with  changes of myelomalacia.   ASSESSMENT: (1)Renal - ESRD - HD TTS at [facility]; due for HD today (2)Hyperkalemia- renally mediated (3)Hypercalcemia - etiology?  RECOMMEND: (1)HD today - no net UF; 2K, 2Ca bath (2)Pan-Cx as planned (3)Abx for GFR<10/HD (4)Serologic workup for hypercalcemia - SPEP/Immunofixation/PTH/PTHRP/25D/1,25D (5)No needlesticks LUE, besides for HD  Thank you for involving [facility] in this patient's care.  With warm regards,  Heath Huff MD  [facility] Office: [phone] Cell: [phone]

## 2020-12-08 NOTE — H&P ADULT - NSICDXFAMILYHX_GEN_ALL_CORE_FT
FAMILY HISTORY:  No pertinent family history in first degree relatives    Sibling  Still living? Unknown  Family history of breast cancer, Age at diagnosis: Age Unknown

## 2020-12-08 NOTE — H&P ADULT - NSHPLABSRESULTS_GEN_ALL_CORE
LABS:                        14.1   6.89  )-----------( 154      ( 08 Dec 2020 09:13 )             44.5     12-    145  |  96<L>  |  95<H>  ----------------------------<  121<H>  5.7<H>   |  30  |  8.16<H>    Ca    12.6<H>      08 Dec 2020 09:13    TPro  7.9  /  Alb  4.2  /  TBili  0.9  /  DBili  x   /  AST  36  /  ALT  34  /  AlkPhos  168<H>  12-08    PT/INR - ( 08 Dec 2020 09:13 )   PT: 15.6 sec;   INR: 1.38 ratio         PTT - ( 08 Dec 2020 09:13 )  PTT:38.9 sec  Urinalysis Basic - ( 08 Dec 2020 11:29 )    Color: Dark Brown / Appearance: Turbid / S.018 / pH: x  Gluc: x / Ketone: Negative  / Bili: Negative / Urobili: <2 mg/dL   Blood: x / Protein: 300 mg/dL / Nitrite: Negative   Leuk Esterase: Large / RBC: 5-10 /HPF / WBC 15-25 /HPF   Sq Epi: x / Non Sq Epi: FEW /HPF / Bacteria: Moderate      CAPILLARY BLOOD GLUCOSE    RADIOLOGY/ADDITIONAL TESTS:        EKG: LABS:                        14.1   6.89  )-----------( 154      ( 08 Dec 2020 09:13 )             44.5     12-    145  |  96<L>  |  95<H>  ----------------------------<  121<H>  5.7<H>   |  30  |  8.16<H>    Ca    12.6<H>      08 Dec 2020 09:13    TPro  7.9  /  Alb  4.2  /  TBili  0.9  /  DBili  x   /  AST  36  /  ALT  34  /  AlkPhos  168<H>  12-08    PT/INR - ( 08 Dec 2020 09:13 )   PT: 15.6 sec;   INR: 1.38 ratio         PTT - ( 08 Dec 2020 09:13 )  PTT:38.9 sec  Urinalysis Basic - ( 08 Dec 2020 11:29 )    Color: Dark Brown / Appearance: Turbid / S.018 / pH: x  Gluc: x / Ketone: Negative  / Bili: Negative / Urobili: <2 mg/dL   Blood: x / Protein: 300 mg/dL / Nitrite: Negative   Leuk Esterase: Large / RBC: 5-10 /HPF / WBC 15-25 /HPF   Sq Epi: x / Non Sq Epi: FEW /HPF / Bacteria: Moderate      CAPILLARY BLOOD GLUCOSE    RADIOLOGY/ADDITIONAL TESTS:  < from: Xray Chest 2 Views PA/Lat (20 @ 15:18) >    INTERPRETATION:    Heart size and the mediastinum cannot be accurately evaluated on this projection.  There is patchy right lower lung opacity.  There is an elevated left hemidiaphragm. There is a small loculated left pleural effusion which appears to extend into the major fissure. There is likely associated passive atelectasis. Underlying atelectasis of other cause and/or other pathology including, but not limited to, pneumonia is not excluded.  No pneumothorax is seen.  No acute bony abnormality is noted.      IMPRESSION:  Limited images.    Patchy right lower lung opacity which could be due to atelectasis or pneumonia.    Elevated left hemidiaphragm.    Small loculated left pleural effusion with apparent extension into the major fissure. Likely associated passive atelectasis. Underlying atelectasis of other cause and/or other pathology including, but not limited to, pneumonia is not excluded.    < end of copied text >          EKG: LABS:                        14.1   6.89  )-----------( 154      ( 08 Dec 2020 09:13 )             44.5     12-    145  |  96<L>  |  95<H>  ----------------------------<  121<H>  5.7<H>   |  30  |  8.16<H>    Ca    12.6<H>      08 Dec 2020 09:13    TPro  7.9  /  Alb  4.2  /  TBili  0.9  /  DBili  x   /  AST  36  /  ALT  34  /  AlkPhos  168<H>  12-08    PT/INR - ( 08 Dec 2020 09:13 )   PT: 15.6 sec;   INR: 1.38 ratio         PTT - ( 08 Dec 2020 09:13 )  PTT:38.9 sec  Urinalysis Basic - ( 08 Dec 2020 11:29 )    Color: Dark Brown / Appearance: Turbid / S.018 / pH: x  Gluc: x / Ketone: Negative  / Bili: Negative / Urobili: <2 mg/dL   Blood: x / Protein: 300 mg/dL / Nitrite: Negative   Leuk Esterase: Large / RBC: 5-10 /HPF / WBC 15-25 /HPF   Sq Epi: x / Non Sq Epi: FEW /HPF / Bacteria: Moderate      CAPILLARY BLOOD GLUCOSE    RADIOLOGY/ADDITIONAL TESTS:  < from: Xray Chest 2 Views PA/Lat (20 @ 15:18) >    INTERPRETATION:    Heart size and the mediastinum cannot be accurately evaluated on this projection.  There is patchy right lower lung opacity.  There is an elevated left hemidiaphragm. There is a small loculated left pleural effusion which appears to extend into the major fissure. There is likely associated passive atelectasis. Underlying atelectasis of other cause and/or other pathology including, but not limited to, pneumonia is not excluded.  No pneumothorax is seen.  No acute bony abnormality is noted.      IMPRESSION:  Limited images.    Patchy right lower lung opacity which could be due to atelectasis or pneumonia.    Elevated left hemidiaphragm.    Small loculated left pleural effusion with apparent extension into the major fissure. Likely associated passive atelectasis. Underlying atelectasis of other cause and/or other pathology including, but not limited to, pneumonia is not excluded.    < end of copied text >      EKG: Sinus tachycardia

## 2020-12-08 NOTE — H&P ADULT - NSHPPHYSICALEXAM_GEN_ALL_CORE
Vital Signs Last 24 Hrs  T(C): 36.7 (08 Dec 2020 12:09), Max: 38.6 (08 Dec 2020 07:57)  T(F): 98.1 (08 Dec 2020 12:09), Max: 101.5 (08 Dec 2020 07:57)  HR: 103 (08 Dec 2020 13:28) (90 - 110)  BP: 110/81 (08 Dec 2020 13:28) (107/79 - 148/100)  BP(mean): --  RR: 20 (08 Dec 2020 13:28) (18 - 20)  SpO2: 99% (08 Dec 2020 13:28) (99% - 100%) Vital Signs Last 24 Hrs  T(C): 36.7 (08 Dec 2020 12:09), Max: 38.6 (08 Dec 2020 07:57)  T(F): 98.1 (08 Dec 2020 12:09), Max: 101.5 (08 Dec 2020 07:57)  HR: 103 (08 Dec 2020 13:28) (90 - 110)  BP: 110/81 (08 Dec 2020 13:28) (107/79 - 148/100)  BP(mean): --  RR: 20 (08 Dec 2020 13:28) (18 - 20)  SpO2: 99% (08 Dec 2020 13:28) (99% - 100%)    PHYSICAL EXAM:  GENERAL: NAD, prefers to lean towards the left, thin male lying in bed comfortably  HEENT:  NCAT, supple neck  CHEST/LUNG: Mild crackles left lung base; No rales, rhonchi, wheezing, or rubs. Unlabored respirations  HEART: Regular rate and rhythm; S1, S2 present. No murmurs, rubs, or gallops  ABDOMEN: Bowel sounds present; Soft, Nontender, Nondistended.   EXTREMITIES:  No pedal edema  NERVOUS SYSTEM:  Alert & Oriented X2-3 (does not know he is in the hospital), speech clear.  MSK:  strength 3/5. Able to move legs R>L, able to wiggle toes.

## 2020-12-08 NOTE — ED PROVIDER NOTE - CARE PLAN
Principal Discharge DX:	SIRS (systemic inflammatory response syndrome)   Principal Discharge DX:	UTI (urinary tract infection)

## 2020-12-08 NOTE — H&P ADULT - PROBLEM SELECTOR PLAN 9
Dispo: likely to [facility]    1.  Name of PCP:   2.  PCP Contacted on Admission: [ ] Y    [ ] N    3.  PCP contacted at Discharge: [ ] Y    [ ] N    [ ] N/A  4.  Post-Discharge Appointment Date and Location:  5.  Summary of Handoff given to PCP:

## 2020-12-08 NOTE — H&P ADULT - HISTORY OF PRESENT ILLNESS
70M w/ PMH ESRD (T/Th/Sa), CAD s/p stents, HLS, neurogenic bladder sent from [facility] for lethargy.       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 Collateral obtained from nurse at [facility] said that he was increasingly lethargic and sleepy this AM, more than usual for him.  On interview, pt was severely distracted and could not pay attention.  Duration of interview was limited by patient's baseline dementia/delirium, as he often would look around and not participate in the interview. He states that he felt dizzy this AM and also endorse some lower back pain. AAO to name, birthday, president and year. Denied any chest pain, abdominal pain, cough, SOB, headache, subjective fever, n/v, and chills.          In ED,  Tmax 101.5, , 113/76, 100% on 3LNC --> now 99% on RA. WBC 4.93, lactate 4.2   s/p 1x dose of vanc/zosyn  s/p 1L NS

## 2020-12-08 NOTE — ED PROVIDER NOTE - PHYSICAL EXAMINATION
GENERAL: Patient awake alert NAD.  HEENT: NC/AT.  LUNGS: Crackles in left lower lobe.  CARDIAC: RRR, no m/r/g.  ABDOMEN: Soft, NT, ND, No rebound, guarding.  EXT: No edema. No calf tenderness. CV 2+DP/PT bilaterally.  MSK: No pain with movement, no deformities.  NEURO: A&Ox3. Moving all extremities.  SKIN: Warm and dry. No rash.  PSYCH: Normal affect.

## 2020-12-08 NOTE — MEDICAL STUDENT ADULT H&P (EDUCATION) - NS MD HP STUD ROS GENERAL FT
REVIEW OF SYSTEMS:  [ ] Unable to assess full ROS due to pt status    No fever, chills, night sweats, or fatigue

## 2020-12-08 NOTE — ED PROVIDER NOTE - OBJECTIVE STATEMENT
70M w/ PMHx of ESRD on HD (Tues/Thurs/Sat), MRSA bacteremia on vibramycin for possible vegetation seen on MG, CAD s/p stents, HLD, neurogenic bladder (catheterized twice daily) sent in from [facility] for lethargy.  Pt. states he has some back pain.  Denies any abd pain, CP, SOB.  Pt. denies any sick contacts.

## 2020-12-08 NOTE — H&P ADULT - PROBLEM SELECTOR PLAN 1
Meets SIRS criteria with fever 101.5, , lactate 4.2, likely urosepsis  - UA +for large leuk est, mod bacteria, 15-25 WBC  - f/u Ucx, bcx  - Meets SIRS criteria with fever 101.5, , lactate 4.2, likely urosepsis  - UA +for large leuk est, mod bacteria, 15-25 WBC  - f/u Ucx, bcx

## 2020-12-08 NOTE — ED PROVIDER NOTE - ATTENDING CONTRIBUTION TO CARE
Dr. Merrill: 69 yo male with ESRD on HD TTS, possible endocarditis (MRSA bacteremia on abx for possible vegetation), CAD with stents, HLD, neurogenic bladder, in ED due to fatigue/lethargy.  When asked what brings him to the ED, pt states that he has no complaints now, but that "they said I might get pain".  On exam pt chronically-ill appearing but in NAD, mucous membranes dry, heart RRR, lungs crackles at left base, abd NTND, extremities without swelling, strength decreased but equal in all extremities and skin without rash.  Pt meets sepsis criteria, will receive 1 L IVF but not full 30 cc/kg because he presented with signs of overload (left base crackles) as well as known low EF (30% in 2016) and bedside US today showing globally decreased cardiac function and plethoric IVC.

## 2020-12-08 NOTE — H&P ADULT - NSHPREVIEWOFSYSTEMS_GEN_ALL_CORE
CONSTITUTIONAL: No fever, no chills  EYES: No eye pain, no visual disturbance  Mouth: no pain in mouth, no cuts  RESPIRATORY: No cough, No sob  CARDIOVASCULAR: No CP, no palpitations  GASTROINTESTINAL: no abdominal pain, no n/v/d  NEUROLOGICAL: No headaches, no weakness  SKIN: No itching, no rashes  MUSCULOSKELETAL: No joint pain, no joint swelling  PSYCHIATRY: no insomnia, no irritability

## 2020-12-08 NOTE — ED ADULT NURSE NOTE - NSIMPLEMENTINTERV_GEN_ALL_ED
Implemented All Fall Risk Interventions:  [place] to call system. Call bell, personal items and telephone within reach. Instruct patient to call for assistance. Room bathroom lighting operational. Non-slip footwear when patient is off stretcher. Physically safe environment: no spills, clutter or unnecessary equipment. Stretcher in lowest position, wheels locked, appropriate side rails in place. Provide visual cue, wrist band, yellow gown, etc. Monitor gait and stability. Monitor for mental status changes and reorient to person, place, and time. Review medications for side effects contributing to fall risk. Reinforce activity limits and safety measures with patient and family.

## 2020-12-08 NOTE — H&P ADULT - PROBLEM SELECTOR PLAN 3
- Nephro consulted, appreciate recs  - on midodrine 10 TID    #hypercalcemia  - previous history of hyperparathyroidism  - f/u PTH, PTHrP, Vit D 25, 1-25

## 2020-12-08 NOTE — MEDICAL STUDENT ADULT H&P (EDUCATION) - NS MD HP STUD ASPLAN ASSES FT
71 y/o male w/ PMH s/f ESRD, HTN, CAD, HLD, neurogenic bladder, spinal abscess presenting with reported fatigue and lethargy as per [facility] staff, sent over to rule out sepsis.

## 2020-12-08 NOTE — H&P ADULT - ATTENDING COMMENTS
Patient was seen and evaluated by me at 5PM on 12/8/20  70M ESRD dementia neurogenic bladder admitted from [facility] with sepsis du eto UTI wich was present on admission  --CTX, f/u urine cx, blood cx  --HD per renal

## 2020-12-08 NOTE — MEDICAL STUDENT ADULT H&P (EDUCATION) - NS MD HP STUD PMH FT
Hyperlipidemia  Neurogenic bladder  Spinal abscess  Secondary Hyperparathyroidism  HTN (hypertension)  Cavitary lung disease  Hypertension  ESRD (end stage renal disease)  CAD - primary angioplasty with coronary stent  Kidney abscess  Dementia

## 2020-12-09 NOTE — PROGRESS NOTE ADULT - SUBJECTIVE AND OBJECTIVE BOX
PROGRESS NOTE:   Authored by Dr. Zonia Galo MD, Pager [phone] [facility], [address] [facility]     Patient is a 70y old  Male who presents with a chief complaint of Urosepsis (08 Dec 2020 16:36)      SUBJECTIVE / OVERNIGHT EVENTS: No events overnight.    ADDITIONAL REVIEW OF SYSTEMS: Denies fevers, chills, n/v.    MEDICATIONS  (STANDING):  aspirin  chewable 81 milliGRAM(s) Oral daily  atorvastatin 10 milliGRAM(s) Oral at bedtime  cefTRIAXone   IVPB 1000 milliGRAM(s) IV Intermittent every 24 hours  chlorhexidine 4% Liquid 1 Application(s) Topical daily  citalopram 20 milliGRAM(s) Oral daily  doxycycline hyclate Capsule 50 milliGRAM(s) Oral every 12 hours  heparin   Injectable 5000 Unit(s) SubCutaneous every 8 hours  lactobacillus acidophilus 1 Tablet(s) Oral two times a day  midodrine. 10 milliGRAM(s) Oral three times a day  mineral oil/petrolatum Hydrophilic Ointment 1 Application(s) Topical four times a day  pantoprazole    Tablet 40 milliGRAM(s) Oral before breakfast  polyethylene glycol 3350 17 Gram(s) Oral daily  senna 2 Tablet(s) Oral at bedtime  tamsulosin 0.4 milliGRAM(s) Oral at bedtime    MEDICATIONS  (PRN):      CAPILLARY BLOOD GLUCOSE        I&O's Summary    08 Dec 2020 07:01  -  09 Dec 2020 07:00  --------------------------------------------------------  IN: 400 mL / OUT: 200 mL / NET: 200 mL        PHYSICAL EXAM:  Vital Signs Last 24 Hrs  T(C): 36.6 (09 Dec 2020 05:10), Max: 38.6 (08 Dec 2020 07:57)  T(F): 97.9 (09 Dec 2020 05:10), Max: 101.5 (08 Dec 2020 07:57)  HR: 94 (09 Dec 2020 05:10) (84 - 110)  BP: 102/64 (09 Dec 2020 05:10) (100/59 - 148/100)  BP(mean): --  RR: 18 (09 Dec 2020 05:10) (17 - 20)  SpO2: 98% (09 Dec 2020 05:10) (96% - 100%)    CONSTITUTIONAL: NAD, lying in bed comfortably  RESPIRATORY: Normal respiratory effort; CTABL  CARDIOVASCULAR: Regular rate and rhythm, normal S1 and S2, no murmur/rub/gallop  ABDOMEN: Soft, nondistended, nontender to palpation, normoactive bowel sounds, no rebound/guarding  MUSCLOSKELETAL: no joint swelling or tenderness to palpation, FROM all extremities  NEURO: AAOx , full and equal strength all extremities   EXTREMITIES: no pedal edema    LABS:                        14.1   6.89  )-----------( 154      ( 08 Dec 2020 09:13 )             44.5     12-08    145  |  96<L>  |  95<H>  ----------------------------<  121<H>  5.7<H>   |  30  |  8.16<H>    Ca    12.6<H>      08 Dec 2020 09:13    TPro  7.9  /  Alb  4.2  /  TBili  0.9  /  DBili  x   /  AST  36  /  ALT  34  /  AlkPhos  168<H>  12-08    PT/INR - ( 08 Dec 2020 09:13 )   PT: 15.6 sec;   INR: 1.38 ratio         PTT - ( 08 Dec 2020 09:13 )  PTT:38.9 sec      Urinalysis Basic - ( 08 Dec 2020 11:29 )    Color: Dark Brown / Appearance: Turbid / S.018 / pH: x  Gluc: x / Ketone: Negative  / Bili: Negative / Urobili: <2 mg/dL   Blood: x / Protein: 300 mg/dL / Nitrite: Negative   Leuk Esterase: Large / RBC: 5-10 /HPF / WBC 15-25 /HPF   Sq Epi: x / Non Sq Epi: FEW /HPF / Bacteria: Moderate          RADIOLOGY & ADDITIONAL TESTS:     PROGRESS NOTE:   Authored by Dr. Zonia Galo MD, Pager [phone] [facility], [address] [facility]     Patient is a 70y old  Male who presents with a chief complaint of Urosepsis (08 Dec 2020 16:36)      SUBJECTIVE / OVERNIGHT EVENTS: No events overnight. Pt seen this AM denying complaints.    ADDITIONAL REVIEW OF SYSTEMS: Denies fevers, chills, n/v.    MEDICATIONS  (STANDING):  aspirin  chewable 81 milliGRAM(s) Oral daily  atorvastatin 10 milliGRAM(s) Oral at bedtime  cefTRIAXone   IVPB 1000 milliGRAM(s) IV Intermittent every 24 hours  chlorhexidine 4% Liquid 1 Application(s) Topical daily  citalopram 20 milliGRAM(s) Oral daily  doxycycline hyclate Capsule 50 milliGRAM(s) Oral every 12 hours  heparin   Injectable 5000 Unit(s) SubCutaneous every 8 hours  lactobacillus acidophilus 1 Tablet(s) Oral two times a day  midodrine. 10 milliGRAM(s) Oral three times a day  mineral oil/petrolatum Hydrophilic Ointment 1 Application(s) Topical four times a day  pantoprazole    Tablet 40 milliGRAM(s) Oral before breakfast  polyethylene glycol 3350 17 Gram(s) Oral daily  senna 2 Tablet(s) Oral at bedtime  tamsulosin 0.4 milliGRAM(s) Oral at bedtime    MEDICATIONS  (PRN):      CAPILLARY BLOOD GLUCOSE        I&O's Summary    08 Dec 2020 07:01  -  09 Dec 2020 07:00  --------------------------------------------------------  IN: 400 mL / OUT: 200 mL / NET: 200 mL        PHYSICAL EXAM:  Vital Signs Last 24 Hrs  T(C): 36.6 (09 Dec 2020 05:10), Max: 38.6 (08 Dec 2020 07:57)  T(F): 97.9 (09 Dec 2020 05:10), Max: 101.5 (08 Dec 2020 07:57)  HR: 94 (09 Dec 2020 05:10) (84 - 110)  BP: 102/64 (09 Dec 2020 05:10) (100/59 - 148/100)  BP(mean): --  RR: 18 (09 Dec 2020 05:10) (17 - 20)  SpO2: 98% (09 Dec 2020 05:10) (96% - 100%)    CONSTITUTIONAL: NAD, lying in bed comfortably  RESPIRATORY: Normal respiratory effort; CTABL  CARDIOVASCULAR: Regular rate and rhythm, normal S1 and S2, no murmur/rub/gallop  ABDOMEN: Soft, nondistended, nontender to palpation, normoactive bowel sounds, no rebound/guarding  MUSCLOSKELETAL: no joint swelling or tenderness to palpation, FROM all extremities  NEURO: AAOx2-3 (thinks he is at [facility]), full and equal strength all extremities   EXTREMITIES: no pedal edema, dry legs bilaterally     LABS:                        14.1   6.89  )-----------( 154      ( 08 Dec 2020 09:13 )             44.5     12-08    145  |  96<L>  |  95<H>  ----------------------------<  121<H>  5.7<H>   |  30  |  8.16<H>    Ca    12.6<H>      08 Dec 2020 09:13    TPro  7.9  /  Alb  4.2  /  TBili  0.9  /  DBili  x   /  AST  36  /  ALT  34  /  AlkPhos  168<H>  12-08    PT/INR - ( 08 Dec 2020 09:13 )   PT: 15.6 sec;   INR: 1.38 ratio         PTT - ( 08 Dec 2020 09:13 )  PTT:38.9 sec      Urinalysis Basic - ( 08 Dec 2020 11:29 )    Color: Dark Brown / Appearance: Turbid / S.018 / pH: x  Gluc: x / Ketone: Negative  / Bili: Negative / Urobili: <2 mg/dL   Blood: x / Protein: 300 mg/dL / Nitrite: Negative   Leuk Esterase: Large / RBC: 5-10 /HPF / WBC 15-25 /HPF   Sq Epi: x / Non Sq Epi: FEW /HPF / Bacteria: Moderate    RADIOLOGY & ADDITIONAL TESTS:

## 2020-12-09 NOTE — PROGRESS NOTE ADULT - SUBJECTIVE AND OBJECTIVE BOX
Overnight events noted   VITAL: T(C): , Max: 37.2 (20 @ 18:41) T(F): , Max: 98.9 (20 @ 18:41) HR: 85 (20 @ 12:10) BP: 92/62 (20 @ 12:10) BP(mean): -- RR: 18 (20 @ 12:10) SpO2: 99% (20 @ 12:10) Wt(kg): --   PHYSICAL EXAM: deferred in accordance with current standards limiting patient contact   LABS:                      11.8  3.86  )-----------( 91       ( 09 Dec 2020 09:30 )            38.1   Na(140)/K(4.5)/Cl(96)/HCO3(29)/BUN(64)/Cr(6.13)Glu(83)/Ca(11.2)/Mg(2.3)/PO4(5.9)     @ 08:50 Na(145)/K(5.7)/Cl(96)/HCO3(30)/BUN(95)/Cr(8.16)Glu(121)/Ca(12.6)/Mg(--)/PO4(--)     @ 09:13  Urinalysis Basic - ( 08 Dec 2020 11:29 ) Color: Dark Brown / Appearance: Turbid / S.018 / pH: x Gluc: x / Ketone: Negative  / Bili: Negative / Urobili: <2 mg/dL  Blood: x / Protein: 300 mg/dL / Nitrite: Negative  Leuk Esterase: Large / RBC: 5-10 /HPF / WBC 15-25 /HPF  Sq Epi: x / Non Sq Epi: FEW /HPF / Bacteria: Moderate   IMPRESSION: 70M w/ dementia, HTN, CAD, past spinal abscess, and ESRD-HD TTS, 20 a/w febrile illness  (1)Renal - ESRD - HD TTS - due for next HD tomorrow  (2)Hypercalcemia - high PTH at 515 - appears to be primary hyperparathyroidism (not secondary/tertiary hyperparathyroidism). Highly indicated for Sensipar  (3)ID - febrile illness - on Rocephin - cultures negative to date - appears to have defervesced   RECOMMEND: (1)Agree with addition of Sensipar 30mg po bid (2)Next HD tomorrow - minimal UF (3)F/U cultures; continue Abx for GFR<10/HD      Heath Huff MD [facility] Office: [phone] Cell: [phone]       No complaints   VITAL: T(C): , Max: 37.2 (20 @ 18:41) T(F): , Max: 98.9 (20 @ 18:41) HR: 85 (20 @ 12:10) BP: 92/62 (20 @ 12:10) BP(mean): -- RR: 18 (20 @ 12:10) SpO2: 99% (20 @ 12:10)   PHYSICAL EXAM: Constitutional: lethargic/confused HEENT: NCAT, DMM Neck: Supple, No JVD Respiratory: CTA-b/l Cardiovascular: reg s1s2, (+)1/6 GUZMAN Gastrointestinal: BS+, soft, NT/ND Extremities: No peripheral edema b/l Neurological: no focal deficits; strength grossly intact Back: no CVAT b/l Skin: No rashes, no nevi Access: LUE AVF (+)thrill  LABS:                      11.8  3.86  )-----------( 91       ( 09 Dec 2020 09:30 )            38.1   Na(140)/K(4.5)/Cl(96)/HCO3(29)/BUN(64)/Cr(6.13)Glu(83)/Ca(11.2)/Mg(2.3)/PO4(5.9)     @ 08:50 Na(145)/K(5.7)/Cl(96)/HCO3(30)/BUN(95)/Cr(8.16)Glu(121)/Ca(12.6)/Mg(--)/PO4(--)     @ 09:13  Urinalysis Basic - ( 08 Dec 2020 11:29 ) Color: Dark Brown / Appearance: Turbid / S.018 / pH: x Gluc: x / Ketone: Negative  / Bili: Negative / Urobili: <2 mg/dL  Blood: x / Protein: 300 mg/dL / Nitrite: Negative  Leuk Esterase: Large / RBC: 5-10 /HPF / WBC 15-25 /HPF  Sq Epi: x / Non Sq Epi: FEW /HPF / Bacteria: Moderate   IMPRESSION: 70M w/ dementia, HTN, CAD, past spinal abscess, and ESRD-HD TTS, 20 a/w febrile illness  (1)Renal - ESRD - HD TTS - due for next HD tomorrow  (2)Hypercalcemia - high PTH at 515 - appears to be primary hyperparathyroidism (not secondary/tertiary hyperparathyroidism). Highly indicated for Sensipar  (3)ID - febrile illness - on Rocephin - cultures negative to date - appears to have defervesced   RECOMMEND: (1)Agree with addition of Sensipar 30mg po bid (2)Next HD tomorrow - minimal UF (3)F/U cultures; continue Abx for GFR<10/HD      Heath Huff MD [facility] Office: [phone] Cell: [phone]

## 2020-12-09 NOTE — PROGRESS NOTE ADULT - PROBLEM SELECTOR PLAN 9
Dispo: likely to [facility]    1.  Name of PCP:   2.  PCP Contacted on Admission: [ ] Y    [ ] N    3.  PCP contacted at Discharge: [ ] Y    [ ] N    [ ] N/A  4.  Post-Discharge Appointment Date and Location:  5.  Summary of Handoff given to PCP:

## 2020-12-09 NOTE — PROGRESS NOTE ADULT - PROBLEM SELECTOR PLAN 3
- Nephro consulted, appreciate recs  - on midodrine 10 TID    #hypercalcemia  - previous history of hyperparathyroidism  - f/u PTH, PTHrP, Vit D 25, 1-25 - Nephro consulted, appreciate recs  - on midodrine 10 TID    #hypercalcemia  - previous history of secondary hyperparathyroidism  - f/u PTH, PTHrP, Vit D 25, 1-25 - Nephro consulted, appreciate recs  - on midodrine 10 TID    #hypercalcemia  - previous history of primary hyperparathyroidism  - PTH elevated to 515, start cinacalcet 30mg BID  - f/u PTHrP, Vit D 25, 1-25

## 2020-12-09 NOTE — PROGRESS NOTE ADULT - PROBLEM SELECTOR PLAN 1
Meets SIRS criteria with fever 101.5, , lactate 4.2, likely urosepsis  - UA +for large leuk est, mod bacteria, 15-25 WBC  - f/u Ucx, bcx Meets SIRS criteria with fever 101.5, , lactate 4.2, likely urosepsis  - UA +for large leuk est, mod bacteria, 15-25 WBC  - on CTX (12/8-)  - f/u Ucx, bcx Meets SIRS criteria with fever 101.5, , lactate 4.2, likely urosepsis  - UA +for large leuk est, mod bacteria, 15-25 WBC  - on CTX (12/8-)  - f/u Ucx  - f/u bcx Meets SIRS criteria with fever 101.5, , lactate 4.2, likely urosepsis  - UA +for large leuk est, mod bacteria, 15-25 WBC  - on CTX (12/8-)  - f/u Ucx  - f/u bcx, NGTD

## 2020-12-09 NOTE — CHART NOTE - NSCHARTNOTEFT_GEN_A_CORE
Got called by nurse that BP is low at 95/64. MAP is at 74. Currently not concerned since MAP >65. Asymptomatic. A&Ox3

## 2020-12-09 NOTE — PROGRESS NOTE ADULT - ASSESSMENT
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 Found to be febrile to 101.5, tachycardic to 110, lactate 4.2, admitted for sepsis possibly 2/2 UTI. 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 Found to be febrile to 101.5, tachycardic to 110, lactate 4.2, admitted for sepsis possibly 2/2 UTI.

## 2020-12-10 NOTE — PROGRESS NOTE ADULT - PROBLEM SELECTOR PLAN 9
Dispo: likely to [facility]    1.  Name of PCP:   2.  PCP Contacted on Admission: [ ] Y    [ ] N    3.  PCP contacted at Discharge: [ ] Y    [ ] N    [ ] N/A  4.  Post-Discharge Appointment Date and Location:  5.  Summary of Handoff given to PCP:

## 2020-12-10 NOTE — PROGRESS NOTE ADULT - ATTENDING COMMENTS
70M ESRD dementia neurogenic bladder admitted from [facility] with sepsis due to UTI wich was present on admission  --sepsi shas resolved, clinically improved  --CTX through today, urine cx nondiagnostic, blood cx NGTD  --HD per renal  --if remains stable anticipate return to [facility] tomorrow
70M ESRD dementia neurogenic bladder admitted from [facility] with sepsis due to UTI wich was present on admission  --CTX, f/u urine cx, blood cx  --HD per renal

## 2020-12-10 NOTE — DISCHARGE NOTE PROVIDER - NSDCCPCAREPLAN_GEN_ALL_CORE_FT
PRINCIPAL DISCHARGE DIAGNOSIS  Diagnosis: UTI (urinary tract infection)  Assessment and Plan of Treatment: You were brought from [facility] for lethargy and found to have a fever. Your symptoms are likely from a urinary tract infection. You were given intravenous antibiotics with resolution of your fevers and symptoms. You did not grow any bacteria in your blood. Please return to the hospital if you continue to have symptoms.

## 2020-12-10 NOTE — DISCHARGE NOTE PROVIDER - NSDCMRMEDTOKEN_GEN_ALL_CORE_FT
Acidophilus oral tablet: 1 tab(s) orally once a day  Aquaphor topical ointment: Apply topically to affected area 4 times a day  aspirin 81 mg oral tablet: 1 tab(s) orally once a day  citalopram 20 mg oral tablet: 1 tab(s) orally once a day  Claritin 5 mg oral tablet, chewable: 1 tab(s) orally every 12 hours  Lipitor 10 mg oral tablet: 1 tab(s) orally once a day  midodrine 10 mg oral tablet: 1 tab(s) orally 3 times a day  MiraLax oral powder for reconstitution:   Protonix 20 mg oral delayed release tablet: 1 tab(s) orally once a day  Remedy Skin Repair lotion: Apply topically to affected area 2 times a day prn   Senna 8.6 mg oral tablet: 2 tab(s) orally once a day (at bedtime)  tamsulosin 0.4 mg oral capsule: 1 cap(s) orally once a day  Vibramycin 100 mg oral capsule: 1 cap(s) orally a day   Acidophilus oral tablet: 1 tab(s) orally once a day  Aquaphor topical ointment: Apply topically to affected area 4 times a day  aspirin 81 mg oral tablet: 1 tab(s) orally once a day  cinacalcet 30 mg oral tablet: 1 tab(s) orally 2 times a day  citalopram 20 mg oral tablet: 1 tab(s) orally once a day  Claritin 5 mg oral tablet, chewable: 1 tab(s) orally every 12 hours  Lipitor 10 mg oral tablet: 1 tab(s) orally once a day  midodrine 10 mg oral tablet: 1 tab(s) orally 3 times a day  MiraLax oral powder for reconstitution:   mupirocin 2% topical ointment: 1 application topically 2 times a day   Protonix 20 mg oral delayed release tablet: 1 tab(s) orally once a day  Remedy Skin Repair lotion: Apply topically to affected area 2 times a day prn   Senna 8.6 mg oral tablet: 2 tab(s) orally once a day (at bedtime)  tamsulosin 0.4 mg oral capsule: 1 cap(s) orally once a day  Vibramycin 100 mg oral capsule: 1 cap(s) orally a day

## 2020-12-10 NOTE — PROGRESS NOTE ADULT - PROBLEM SELECTOR PLAN 3
- Nephro consulted, appreciate recs  - on midodrine 10 TID    #hypercalcemia  - previous history of primary hyperparathyroidism  - PTH elevated to 515, start cinacalcet 30mg BID  - f/u PTHrP, Vit D 25, 1-25

## 2020-12-10 NOTE — DISCHARGE NOTE PROVIDER - HOSPITAL COURSE
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 Found to be septic in the ED (T 101.5, , lactate 4.2 however no leukocytosis). UA positive for mod bacteria, large leuk est and 15-25 WBC, started on CTX and admitted for likely urosepsis. Pt remained afebrile on CTX. MRSA/MSSA nasal swab was performed and pt was found positive, started on Bactroban ointment for 5 day duration.    Blood cultures negative for past 48hrs. Urine culture >3 organisms, likely contaminated. Pt completed 3days of empiric CTX for UTI. He is stable to be discharged back to [facility].   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 Found to be septic in the ED (T 101.5, , lactate 4.2 however no leukocytosis). UA positive for mod bacteria, large leuk est and 15-25 WBC, started on CTX and admitted for likely urosepsis. Pt remained afebrile on CTX. MRSA/MSSA nasal swab was performed and pt was found positive, started on Bactroban ointment for 5 day duration.    Blood cultures negative for past 48hrs. Urine culture >3 organisms, likely contaminated. Pt completed 3days of empiric CTX for UTI with improved mental status and remained afebrile. He is stable to be discharged back to [facility].

## 2020-12-10 NOTE — DISCHARGE NOTE PROVIDER - CARE PROVIDER_API CALL
Heath Huff)  Internal Medicine; Nephrology  [address]  [place] [address]  Phone: [phone]  Fax: [phone]  Follow Up Time:

## 2020-12-10 NOTE — PROGRESS NOTE ADULT - ASSESSMENT
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 Found to be febrile to 101.5, tachycardic to 110, lactate 4.2, admitted for sepsis possibly 2/2 UTI.

## 2020-12-10 NOTE — PROGRESS NOTE ADULT - SUBJECTIVE AND OBJECTIVE BOX
PROGRESS NOTE:   Authored by Dr. Zonia Galo MD, Pager [phone] [facility], [id number] [address]     Patient is a 70y old  Male who presents with a chief complaint of Urosepsis (09 Dec 2020 14:15)      SUBJECTIVE / OVERNIGHT EVENTS: No events overnight. For HD today.    ADDITIONAL REVIEW OF SYSTEMS: Denies fevers, chills, n/v.    MEDICATIONS  (STANDING):  AQUAPHOR (petrolatum Ointment) 1 Application(s) Topical four times a day  aspirin  chewable 81 milliGRAM(s) Oral daily  atorvastatin 10 milliGRAM(s) Oral at bedtime  cefTRIAXone   IVPB 1000 milliGRAM(s) IV Intermittent every 24 hours  chlorhexidine 4% Liquid 1 Application(s) Topical daily  cinacalcet 30 milliGRAM(s) Oral two times a day  citalopram 20 milliGRAM(s) Oral daily  doxycycline hyclate Capsule 50 milliGRAM(s) Oral every 12 hours  heparin   Injectable 5000 Unit(s) SubCutaneous every 8 hours  lactobacillus acidophilus 1 Tablet(s) Oral two times a day  midodrine. 10 milliGRAM(s) Oral three times a day  mupirocin 2% Ointment 1 Application(s) Topical two times a day  pantoprazole    Tablet 40 milliGRAM(s) Oral before breakfast  polyethylene glycol 3350 17 Gram(s) Oral daily  senna 2 Tablet(s) Oral at bedtime  tamsulosin 0.4 milliGRAM(s) Oral at bedtime    MEDICATIONS  (PRN):      CAPILLARY BLOOD GLUCOSE        I&O's Summary      PHYSICAL EXAM:  Vital Signs Last 24 Hrs  T(C): 36.4 (10 Dec 2020 05:25), Max: 36.4 (09 Dec 2020 12:10)  T(F): 97.6 (10 Dec 2020 05:25), Max: 97.6 (09 Dec 2020 12:10)  HR: 86 (10 Dec 2020 05:25) (79 - 86)  BP: 98/72 (10 Dec 2020 05:25) (84/56 - 98/72)  BP(mean): --  RR: 18 (10 Dec 2020 05:25) (18 - 18)  SpO2: 100% (10 Dec 2020 05:25) (99% - 100%)    CONSTITUTIONAL: NAD, lying in bed comfortably  RESPIRATORY: Normal respiratory effort; CTABL  CARDIOVASCULAR: Regular rate and rhythm, normal S1 and S2, no murmur/rub/gallop  ABDOMEN: Soft, nondistended, nontender to palpation, normoactive bowel sounds, no rebound/guarding  MUSCLOSKELETAL: no joint swelling or tenderness to palpation, FROM all extremities  NEURO: AAOx2-3 (thinks he is at [facility]), full and equal strength all extremities   EXTREMITIES: no pedal edema, dry legs bilaterally     LABS:                        11.8   3.86  )-----------( 91       ( 09 Dec 2020 09:30 )             38.1     12-    140  |  96<L>  |  64<H>  ----------------------------<  83  4.5   |  29  |  6.13<H>    Ca    11.2<H>      09 Dec 2020 08:50  Phos  5.9     12  Mg     2.3         TPro  6.8  /  Alb  3.5  /  TBili  0.8  /  DBili  x   /  AST  32  /  ALT  37  /  AlkPhos  151<H>  12-09    PT/INR - ( 08 Dec 2020 09:13 )   PT: 15.6 sec;   INR: 1.38 ratio         PTT - ( 08 Dec 2020 09:13 )  PTT:38.9 sec      Urinalysis Basic - ( 08 Dec 2020 11:29 )    Color: Dark Brown / Appearance: Turbid / S.018 / pH: x  Gluc: x / Ketone: Negative  / Bili: Negative / Urobili: <2 mg/dL   Blood: x / Protein: 300 mg/dL / Nitrite: Negative   Leuk Esterase: Large / RBC: 5-10 /HPF / WBC 15-25 /HPF   Sq Epi: x / Non Sq Epi: FEW /HPF / Bacteria: Moderate        Culture - Blood (collected 09 Dec 2020 02:32)  Source: .Blood Blood-Peripheral  Preliminary Report (10 Dec 2020 03:01):    No growth to date.    Culture - Blood (collected 09 Dec 2020 02:32)  Source: .Blood Blood-Peripheral  Preliminary Report (10 Dec 2020 03:01):    No growth to date.    Culture - Urine (collected 08 Dec 2020 17:52)  Source: .Urine Clean Catch (Midstream)  Final Report (09 Dec 2020 17:17):    >=3 organisms. Probable collection contamination.    Culture - Blood (collected 08 Dec 2020 11:58)  Source: .Blood Blood-Peripheral  Preliminary Report (09 Dec 2020 12:01):    No growth to date.    Culture - Blood (collected 08 Dec 2020 11:58)  Source: .Blood Blood-Peripheral  Preliminary Report (09 Dec 2020 12:01):    No growth to date.        RADIOLOGY & ADDITIONAL TESTS:     PROGRESS NOTE:   Authored by Dr. Zonia Galo MD, Pager [phone] [facility], [id number] [address]     Patient is a 70y old  Male who presents with a chief complaint of Urosepsis (09 Dec 2020 14:15)      SUBJECTIVE / OVERNIGHT EVENTS: No events overnight. For HD today. AAOx3 this AM, denies complaints.     ADDITIONAL REVIEW OF SYSTEMS: Denies fevers, chills, n/v.    MEDICATIONS  (STANDING):  AQUAPHOR (petrolatum Ointment) 1 Application(s) Topical four times a day  aspirin  chewable 81 milliGRAM(s) Oral daily  atorvastatin 10 milliGRAM(s) Oral at bedtime  cefTRIAXone   IVPB 1000 milliGRAM(s) IV Intermittent every 24 hours  chlorhexidine 4% Liquid 1 Application(s) Topical daily  cinacalcet 30 milliGRAM(s) Oral two times a day  citalopram 20 milliGRAM(s) Oral daily  doxycycline hyclate Capsule 50 milliGRAM(s) Oral every 12 hours  heparin   Injectable 5000 Unit(s) SubCutaneous every 8 hours  lactobacillus acidophilus 1 Tablet(s) Oral two times a day  midodrine. 10 milliGRAM(s) Oral three times a day  mupirocin 2% Ointment 1 Application(s) Topical two times a day  pantoprazole    Tablet 40 milliGRAM(s) Oral before breakfast  polyethylene glycol 3350 17 Gram(s) Oral daily  senna 2 Tablet(s) Oral at bedtime  tamsulosin 0.4 milliGRAM(s) Oral at bedtime    MEDICATIONS  (PRN):      CAPILLARY BLOOD GLUCOSE        I&O's Summary      PHYSICAL EXAM:  Vital Signs Last 24 Hrs  T(C): 36.4 (10 Dec 2020 05:25), Max: 36.4 (09 Dec 2020 12:10)  T(F): 97.6 (10 Dec 2020 05:25), Max: 97.6 (09 Dec 2020 12:10)  HR: 86 (10 Dec 2020 05:25) (79 - 86)  BP: 98/72 (10 Dec 2020 05:25) (84/56 - 98/72)  BP(mean): --  RR: 18 (10 Dec 2020 05:25) (18 - 18)  SpO2: 100% (10 Dec 2020 05:25) (99% - 100%)    CONSTITUTIONAL: NAD, lying in bed comfortably  RESPIRATORY: Normal respiratory effort; CTABL  CARDIOVASCULAR: Regular rate and rhythm, normal S1 and S2, no murmur/rub/gallop  ABDOMEN: Soft, nondistended, nontender to palpation, normoactive bowel sounds, no rebound/guarding  MUSCLOSKELETAL: no joint swelling or tenderness to palpation, FROM all extremities  NEURO: AAOx2-3 (thinks he is at [facility]), full and equal strength all extremities   EXTREMITIES: no pedal edema, dry legs bilaterally     LABS:                        11.8   3.86  )-----------( 91       ( 09 Dec 2020 09:30 )             38.1     12-    140  |  96<L>  |  64<H>  ----------------------------<  83  4.5   |  29  |  6.13<H>    Ca    11.2<H>      09 Dec 2020 08:50  Phos  5.9     12-  Mg     2.3     12    TPro  6.8  /  Alb  3.5  /  TBili  0.8  /  DBili  x   /  AST  32  /  ALT  37  /  AlkPhos  151<H>  12-09    PT/INR - ( 08 Dec 2020 09:13 )   PT: 15.6 sec;   INR: 1.38 ratio         PTT - ( 08 Dec 2020 09:13 )  PTT:38.9 sec      Urinalysis Basic - ( 08 Dec 2020 11:29 )    Color: Dark Brown / Appearance: Turbid / S.018 / pH: x  Gluc: x / Ketone: Negative  / Bili: Negative / Urobili: <2 mg/dL   Blood: x / Protein: 300 mg/dL / Nitrite: Negative   Leuk Esterase: Large / RBC: 5-10 /HPF / WBC 15-25 /HPF   Sq Epi: x / Non Sq Epi: FEW /HPF / Bacteria: Moderate        Culture - Blood (collected 09 Dec 2020 02:32)  Source: .Blood Blood-Peripheral  Preliminary Report (10 Dec 2020 03:01):    No growth to date.    Culture - Blood (collected 09 Dec 2020 02:32)  Source: .Blood Blood-Peripheral  Preliminary Report (10 Dec 2020 03:01):    No growth to date.    Culture - Urine (collected 08 Dec 2020 17:52)  Source: .Urine Clean Catch (Midstream)  Final Report (09 Dec 2020 17:17):    >=3 organisms. Probable collection contamination.    Culture - Blood (collected 08 Dec 2020 11:58)  Source: .Blood Blood-Peripheral  Preliminary Report (09 Dec 2020 12:01):    No growth to date.    Culture - Blood (collected 08 Dec 2020 11:58)  Source: .Blood Blood-Peripheral  Preliminary Report (09 Dec 2020 12:01):    No growth to date.        RADIOLOGY & ADDITIONAL TESTS:     PROGRESS NOTE:   Authored by Dr. Zonia Galo MD, Pager [phone] [facility], [id number] [address]     Patient is a 70y old  Male who presents with a chief complaint of Urosepsis (09 Dec 2020 14:15)      SUBJECTIVE / OVERNIGHT EVENTS: No events overnight. For HD today. AAOx3 this AM, denies complaints.     ADDITIONAL REVIEW OF SYSTEMS: Denies fevers, chills, n/v.    MEDICATIONS  (STANDING):  AQUAPHOR (petrolatum Ointment) 1 Application(s) Topical four times a day  aspirin  chewable 81 milliGRAM(s) Oral daily  atorvastatin 10 milliGRAM(s) Oral at bedtime  cefTRIAXone   IVPB 1000 milliGRAM(s) IV Intermittent every 24 hours  chlorhexidine 4% Liquid 1 Application(s) Topical daily  cinacalcet 30 milliGRAM(s) Oral two times a day  citalopram 20 milliGRAM(s) Oral daily  doxycycline hyclate Capsule 50 milliGRAM(s) Oral every 12 hours  heparin   Injectable 5000 Unit(s) SubCutaneous every 8 hours  lactobacillus acidophilus 1 Tablet(s) Oral two times a day  midodrine. 10 milliGRAM(s) Oral three times a day  mupirocin 2% Ointment 1 Application(s) Topical two times a day  pantoprazole    Tablet 40 milliGRAM(s) Oral before breakfast  polyethylene glycol 3350 17 Gram(s) Oral daily  senna 2 Tablet(s) Oral at bedtime  tamsulosin 0.4 milliGRAM(s) Oral at bedtime    MEDICATIONS  (PRN):      CAPILLARY BLOOD GLUCOSE        I&O's Summary      PHYSICAL EXAM:  Vital Signs Last 24 Hrs  T(C): 36.4 (10 Dec 2020 05:25), Max: 36.4 (09 Dec 2020 12:10)  T(F): 97.6 (10 Dec 2020 05:25), Max: 97.6 (09 Dec 2020 12:10)  HR: 86 (10 Dec 2020 05:25) (79 - 86)  BP: 98/72 (10 Dec 2020 05:25) (84/56 - 98/72)  BP(mean): --  RR: 18 (10 Dec 2020 05:25) (18 - 18)  SpO2: 100% (10 Dec 2020 05:25) (99% - 100%)    CONSTITUTIONAL: NAD, lying in bed comfortably  RESPIRATORY: Normal respiratory effort; CTABL  CARDIOVASCULAR: Regular rate and rhythm, normal S1 and S2, no murmur/rub/gallop  ABDOMEN: Soft, nondistended, nontender to palpation, normoactive bowel sounds, no rebound/guarding  MUSCLOSKELETAL: no joint swelling or tenderness to palpation, FROM all extremities  NEURO: AAOx3, full and equal strength all extremities   EXTREMITIES: no pedal edema, dry legs bilaterally     LABS:                        11.8   3.86  )-----------( 91       ( 09 Dec 2020 09:30 )             38.1     12-    140  |  96<L>  |  64<H>  ----------------------------<  83  4.5   |  29  |  6.13<H>    Ca    11.2<H>      09 Dec 2020 08:50  Phos  5.9       Mg     2.3         TPro  6.8  /  Alb  3.5  /  TBili  0.8  /  DBili  x   /  AST  32  /  ALT  37  /  AlkPhos  151<H>  12-09    PT/INR - ( 08 Dec 2020 09:13 )   PT: 15.6 sec;   INR: 1.38 ratio         PTT - ( 08 Dec 2020 09:13 )  PTT:38.9 sec      Urinalysis Basic - ( 08 Dec 2020 11:29 )    Color: Dark Brown / Appearance: Turbid / S.018 / pH: x  Gluc: x / Ketone: Negative  / Bili: Negative / Urobili: <2 mg/dL   Blood: x / Protein: 300 mg/dL / Nitrite: Negative   Leuk Esterase: Large / RBC: 5-10 /HPF / WBC 15-25 /HPF   Sq Epi: x / Non Sq Epi: FEW /HPF / Bacteria: Moderate        Culture - Blood (collected 09 Dec 2020 02:32)  Source: .Blood Blood-Peripheral  Preliminary Report (10 Dec 2020 03:01):    No growth to date.    Culture - Blood (collected 09 Dec 2020 02:32)  Source: .Blood Blood-Peripheral  Preliminary Report (10 Dec 2020 03:01):    No growth to date.    Culture - Urine (collected 08 Dec 2020 17:52)  Source: .Urine Clean Catch (Midstream)  Final Report (09 Dec 2020 17:17):    >=3 organisms. Probable collection contamination.    Culture - Blood (collected 08 Dec 2020 11:58)  Source: .Blood Blood-Peripheral  Preliminary Report (09 Dec 2020 12:01):    No growth to date.    Culture - Blood (collected 08 Dec 2020 11:58)  Source: .Blood Blood-Peripheral  Preliminary Report (09 Dec 2020 12:01):    No growth to date.        RADIOLOGY & ADDITIONAL TESTS:

## 2020-12-10 NOTE — PROGRESS NOTE ADULT - PROBLEM SELECTOR PLAN 4
previous hx MRSA spinal infection  - vibramycin 100mg daily, life-long treatment    #MRSA/MSSA nasal swab positive  - started on bactroban nasally (12/9-) for 5d duration

## 2020-12-10 NOTE — PROGRESS NOTE ADULT - PROBLEM SELECTOR PLAN 1
Meets SIRS criteria with fever 101.5, , lactate 4.2, likely urosepsis  - UA +for large leuk est, mod bacteria, 15-25 WBC  - on CTX (12/8-)  - f/u Ucx  - f/u bcx, NGTD

## 2020-12-11 NOTE — PROGRESS NOTE ADULT - PROBLEM SELECTOR PLAN 3
- Nephro consulted, appreciate recs  - on midodrine 10 TID    #hypercalcemia  - previous history of primary hyperparathyroidism  - PTH elevated to 515, start cinacalcet 30mg BID  - f/u PTHrP, Vit D 25, 1-25 - Nephro consulted, appreciate recs  - on midodrine 10 TID    #hypercalcemia  - previous history of primary hyperparathyroidism  - PTH elevated to 515, started on cinacalcet 30mg BID, Ca improved  - f/u PTHrP, Vit D 25, 1-25

## 2020-12-11 NOTE — PROGRESS NOTE ADULT - ASSESSMENT
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 Found to be febrile to 101.5, tachycardic to 110, lactate 4.2, admitted for sepsis possibly 2/2 UTI.

## 2020-12-11 NOTE — DISCHARGE NOTE NURSING/CASE MANAGEMENT/SOCIAL WORK - PATIENT PORTAL LINK FT
You can access the FollowMyHealth Patient Portal offered by [facility] by registering at the following website: [email or web address]. By joining [employer]’s FollowMyHealth portal, you will also be able to view your health information using other applications (apps) compatible with our system.

## 2020-12-11 NOTE — PROGRESS NOTE ADULT - SUBJECTIVE AND OBJECTIVE BOX
Overnight events noted   VITAL: T(C): , Max: 37 (12-10-20 @ 21:22) T(F): , Max: 98.6 (12-10-20 @ 21:22) HR: 83 (12-11-20 @ 05:24) BP: 93/63 (12-11-20 @ 05:24) BP(mean): -- RR: 16 (12-11-20 @ 05:24) SpO2: 100% (12-11-20 @ 05:24)   PHYSICAL EXAM: Constitutional: more alert and conversive than the prior 2 days HEENT: NCAT, DMM Neck: Supple, No JVD Respiratory: CTA-b/l Cardiovascular: reg s1s2, (+)1/6 GUZMAN Gastrointestinal: BS+, soft, NT/ND Extremities: No peripheral edema b/l Neurological: no focal deficits; strength grossly intact Back: no CVAT b/l Skin: No rashes, no nevi Access: LUE AVF (+)thrill   LABS:                      11.1  3.52  )-----------( 89       ( 11 Dec 2020 06:52 )            35.6   Na(141)/K(4.0)/Cl(99)/HCO3(27)/BUN(60)/Cr(6.23)Glu(85)/Ca(9.3)/Mg(2.2)/PO4(4.4)    12-11 @ 06:52 Na(139)/K(4.5)/Cl(97)/HCO3(25)/BUN(86)/Cr(7.18)Glu(82)/Ca(10.2)/Mg(2.4)/PO4(5.5)    12-10 @ 08:18 Na(140)/K(4.5)/Cl(96)/HCO3(29)/BUN(64)/Cr(6.13)Glu(83)/Ca(11.2)/Mg(2.3)/PO4(5.9)    12-09 @ 08:50   IMPRESSION: 70M w/ dementia, HTN, CAD, past spinal abscess, and ESRD-HD TTS, 12/8/20 a/w febrile illness  (1)Renal - ESRD - HD TTS - due for next HD tomorrow  (2)Hypercalcemia - primary hyperparathyroidism - appears to be improving with use of Sensipar  (3)ID - presumed urosepsis - completed IV Rocephin course over past few days   RECOMMEND: (1)Sensipar as ordered (2)Next HD 12/12- 0.5kg UF as able      Heath Huff MD [facility] Office: [phone] Cell: [phone]      No pain , no sob   VITAL: T(C): , Max: 37 (12-10-20 @ 21:22) T(F): , Max: 98.6 (12-10-20 @ 21:22) HR: 83 (12-11-20 @ 05:24) BP: 93/63 (12-11-20 @ 05:24) BP(mean): -- RR: 16 (12-11-20 @ 05:24) SpO2: 100% (12-11-20 @ 05:24)   PHYSICAL EXAM: Constitutional: Alert, NAD HEENT: NCAT, DMM Neck: Supple, No JVD Respiratory: CTA-b/l Cardiovascular: reg s1s2, (+)1/6 GUZMAN Gastrointestinal: BS+, soft, NT/ND Extremities: No peripheral edema b/l Neurological: no focal deficits; strength grossly intact Back: no CVAT b/l Skin: No rashes, no nevi Access: LUE AVF (+)thrill   LABS:                      11.1  3.52  )-----------( 89       ( 11 Dec 2020 06:52 )            35.6   Na(141)/K(4.0)/Cl(99)/HCO3(27)/BUN(60)/Cr(6.23)Glu(85)/Ca(9.3)/Mg(2.2)/PO4(4.4)    12-11 @ 06:52 Na(139)/K(4.5)/Cl(97)/HCO3(25)/BUN(86)/Cr(7.18)Glu(82)/Ca(10.2)/Mg(2.4)/PO4(5.5)    12-10 @ 08:18 Na(140)/K(4.5)/Cl(96)/HCO3(29)/BUN(64)/Cr(6.13)Glu(83)/Ca(11.2)/Mg(2.3)/PO4(5.9)    12-09 @ 08:50   IMPRESSION: 70M w/ dementia, HTN, CAD, past spinal abscess, and ESRD-HD TTS, 12/8/20 a/w febrile illness  (1)Renal - ESRD - HD TTS - due for next HD tomorrow  (2)Hypercalcemia - primary hyperparathyroidism - appears to be improving with use of Sensipar  (3)ID - presumed urosepsis - completed IV Rocephin course over past few days   RECOMMEND: (1)Sensipar as ordered (2)Next HD 12/12- 0.5kg UF as able      Heath Huff MD [facility] Office: [phone] Cell: [phone]

## 2020-12-11 NOTE — PROGRESS NOTE ADULT - PROBLEM SELECTOR PLAN 9
Dispo: likely to [facility]    1.  Name of PCP:   2.  PCP Contacted on Admission: [ ] Y    [ ] N    3.  PCP contacted at Discharge: [ ] Y    [ ] N    [ ] N/A  4.  Post-Discharge Appointment Date and Location:  5.  Summary of Handoff given to PCP:

## 2020-12-11 NOTE — PROGRESS NOTE ADULT - PROBLEM SELECTOR PLAN 1
Meets SIRS criteria with fever 101.5, , lactate 4.2, likely urosepsis  - UA +for large leuk est, mod bacteria, 15-25 WBC  - on CTX (12/8-)  - f/u Ucx  - f/u bcx, NGTD Meets SIRS criteria with fever 101.5, , lactate 4.2, likely urosepsis. Now resolved with empiric CTX  - UA +for large leuk est, mod bacteria, 15-25 WBC  - on CTX (12/8-)  - Ucx contaminated (>3 organisms)  - f/u bcx, NGTD

## 2020-12-11 NOTE — DISCHARGE NOTE NURSING/CASE MANAGEMENT/SOCIAL WORK - NSDCCRNAME_GEN_ALL_CORE_FT
[facility] [phone] [place] [address] [id number] [place] [address] [facility] (onsite at [facility]), next community dialysis session is scheduled for Saturday 12/12/2020

## 2020-12-11 NOTE — PROGRESS NOTE ADULT - SUBJECTIVE AND OBJECTIVE BOX
PROGRESS NOTE:   Authored by Dr. Zonia Galo MD, Pager [phone] [facility], [id number] [facility]     Patient is a 70y old  Male who presents with a chief complaint of Urosepsis (10 Dec 2020 15:33)      SUBJECTIVE / OVERNIGHT EVENTS: No events overnight.    ADDITIONAL REVIEW OF SYSTEMS: Denies fevers, chills, n/v.    MEDICATIONS  (STANDING):  AQUAPHOR (petrolatum Ointment) 1 Application(s) Topical four times a day  aspirin  chewable 81 milliGRAM(s) Oral daily  atorvastatin 10 milliGRAM(s) Oral at bedtime  chlorhexidine 4% Liquid 1 Application(s) Topical daily  cinacalcet 30 milliGRAM(s) Oral two times a day  citalopram 20 milliGRAM(s) Oral daily  doxycycline hyclate Capsule 50 milliGRAM(s) Oral every 12 hours  heparin   Injectable 5000 Unit(s) SubCutaneous every 8 hours  lactobacillus acidophilus 1 Tablet(s) Oral two times a day  midodrine. 10 milliGRAM(s) Oral three times a day  mupirocin 2% Ointment 1 Application(s) Topical two times a day  pantoprazole    Tablet 40 milliGRAM(s) Oral before breakfast  polyethylene glycol 3350 17 Gram(s) Oral daily  senna 2 Tablet(s) Oral at bedtime  tamsulosin 0.4 milliGRAM(s) Oral at bedtime    MEDICATIONS  (PRN):      CAPILLARY BLOOD GLUCOSE        I&O's Summary    10 Dec 2020 07:01  -  11 Dec 2020 06:55  --------------------------------------------------------  IN: 500 mL / OUT: 235 mL / NET: 265 mL        PHYSICAL EXAM:  Vital Signs Last 24 Hrs  T(C): 36.6 (11 Dec 2020 05:24), Max: 37 (10 Dec 2020 21:22)  T(F): 97.8 (11 Dec 2020 05:24), Max: 98.6 (10 Dec 2020 21:22)  HR: 83 (11 Dec 2020 05:24) (80 - 93)  BP: 93/63 (11 Dec 2020 05:24) (92/60 - 108/65)  BP(mean): --  RR: 16 (11 Dec 2020 05:24) (16 - 18)  SpO2: 100% (11 Dec 2020 05:24) (99% - 100%)    CONSTITUTIONAL: NAD, lying in bed comfortably  RESPIRATORY: Normal respiratory effort; CTABL  CARDIOVASCULAR: Regular rate and rhythm, normal S1 and S2, no murmur/rub/gallop  ABDOMEN: Soft, nondistended, nontender to palpation, normoactive bowel sounds, no rebound/guarding  MUSCLOSKELETAL: no joint swelling or tenderness to palpation, FROM all extremities  NEURO: AAOx3 to person, place, and time, full and equal strength all extremities   EXTREMITIES: no pedal edema    LABS:                        12.3   4.21  )-----------( 104      ( 10 Dec 2020 08:18 )             38.7     12-10    139  |  97<L>  |  86<H>  ----------------------------<  82  4.5   |  25  |  7.18<H>    Ca    10.2      10 Dec 2020 08:18  Phos  5.5     12-10  Mg     2.4     12-10    TPro  6.3  /  Alb  x   /  TBili  x   /  DBili  x   /  AST  x   /  ALT  x   /  AlkPhos  x   12-10              Culture - Blood (collected 09 Dec 2020 02:32)  Source: .Blood Blood-Peripheral  Preliminary Report (10 Dec 2020 03:01):    No growth to date.    Culture - Blood (collected 09 Dec 2020 02:32)  Source: .Blood Blood-Peripheral  Preliminary Report (10 Dec 2020 03:01):    No growth to date.    Culture - Urine (collected 08 Dec 2020 17:52)  Source: .Urine Clean Catch (Midstream)  Final Report (09 Dec 2020 17:17):    >=3 organisms. Probable collection contamination.    Culture - Blood (collected 08 Dec 2020 11:58)  Source: .Blood Blood-Peripheral  Preliminary Report (09 Dec 2020 12:01):    No growth to date.    Culture - Blood (collected 08 Dec 2020 11:58)  Source: .Blood Blood-Peripheral  Preliminary Report (09 Dec 2020 12:01):    No growth to date.        RADIOLOGY & ADDITIONAL TESTS:     PROGRESS NOTE:   Authored by Dr. Zonia Galo MD, Pager [phone] [facility], [id number] [facility]     Patient is a 70y old  Male who presents with a chief complaint of Urosepsis (10 Dec 2020 15:33)      SUBJECTIVE / OVERNIGHT EVENTS: No events overnight. Received dialysis yesterday. AAOx3 today, appears more alert. Denies complaints.    ADDITIONAL REVIEW OF SYSTEMS: Denies fevers, chills, n/v.    MEDICATIONS  (STANDING):  AQUAPHOR (petrolatum Ointment) 1 Application(s) Topical four times a day  aspirin  chewable 81 milliGRAM(s) Oral daily  atorvastatin 10 milliGRAM(s) Oral at bedtime  chlorhexidine 4% Liquid 1 Application(s) Topical daily  cinacalcet 30 milliGRAM(s) Oral two times a day  citalopram 20 milliGRAM(s) Oral daily  doxycycline hyclate Capsule 50 milliGRAM(s) Oral every 12 hours  heparin   Injectable 5000 Unit(s) SubCutaneous every 8 hours  lactobacillus acidophilus 1 Tablet(s) Oral two times a day  midodrine. 10 milliGRAM(s) Oral three times a day  mupirocin 2% Ointment 1 Application(s) Topical two times a day  pantoprazole    Tablet 40 milliGRAM(s) Oral before breakfast  polyethylene glycol 3350 17 Gram(s) Oral daily  senna 2 Tablet(s) Oral at bedtime  tamsulosin 0.4 milliGRAM(s) Oral at bedtime    MEDICATIONS  (PRN):      CAPILLARY BLOOD GLUCOSE        I&O's Summary    10 Dec 2020 07:01  -  11 Dec 2020 06:55  --------------------------------------------------------  IN: 500 mL / OUT: 235 mL / NET: 265 mL        PHYSICAL EXAM:  Vital Signs Last 24 Hrs  T(C): 36.6 (11 Dec 2020 05:24), Max: 37 (10 Dec 2020 21:22)  T(F): 97.8 (11 Dec 2020 05:24), Max: 98.6 (10 Dec 2020 21:22)  HR: 83 (11 Dec 2020 05:24) (80 - 93)  BP: 93/63 (11 Dec 2020 05:24) (92/60 - 108/65)  BP(mean): --  RR: 16 (11 Dec 2020 05:24) (16 - 18)  SpO2: 100% (11 Dec 2020 05:24) (99% - 100%)    CONSTITUTIONAL: NAD, lying in bed comfortably  RESPIRATORY: Normal respiratory effort; CTABL  CARDIOVASCULAR: Regular rate and rhythm, normal S1 and S2, no murmur/rub/gallop  ABDOMEN: Soft, nondistended, nontender to palpation, normoactive bowel sounds, no rebound/guarding  MUSCLOSKELETAL: no joint swelling or tenderness to palpation, FROM all extremities  NEURO: AAOx3 to person, place, and time, full and equal strength all extremities   EXTREMITIES: no pedal edema    LABS:                        12.3   4.21  )-----------( 104      ( 10 Dec 2020 08:18 )             38.7     12-10    139  |  97<L>  |  86<H>  ----------------------------<  82  4.5   |  25  |  7.18<H>    Ca    10.2      10 Dec 2020 08:18  Phos  5.5     12-10  Mg     2.4     12-10    TPro  6.3  /  Alb  x   /  TBili  x   /  DBili  x   /  AST  x   /  ALT  x   /  AlkPhos  x   12-10              Culture - Blood (collected 09 Dec 2020 02:32)  Source: .Blood Blood-Peripheral  Preliminary Report (10 Dec 2020 03:01):    No growth to date.    Culture - Blood (collected 09 Dec 2020 02:32)  Source: .Blood Blood-Peripheral  Preliminary Report (10 Dec 2020 03:01):    No growth to date.    Culture - Urine (collected 08 Dec 2020 17:52)  Source: .Urine Clean Catch (Midstream)  Final Report (09 Dec 2020 17:17):    >=3 organisms. Probable collection contamination.    Culture - Blood (collected 08 Dec 2020 11:58)  Source: .Blood Blood-Peripheral  Preliminary Report (09 Dec 2020 12:01):    No growth to date.    Culture - Blood (collected 08 Dec 2020 11:58)  Source: .Blood Blood-Peripheral  Preliminary Report (09 Dec 2020 12:01):    No growth to date.        RADIOLOGY & ADDITIONAL TESTS:

## 2021-01-01 ENCOUNTER — TRANSCRIPTION ENCOUNTER (OUTPATIENT)
Age: 71
End: 2021-01-01

## 2021-01-01 ENCOUNTER — INPATIENT (INPATIENT)
Facility: HOSPITAL | Age: 71
LOS: 22 days | End: 2021-03-24
Attending: HOSPITALIST | Admitting: HOSPITALIST
Payer: MEDICARE

## 2021-01-01 ENCOUNTER — INPATIENT (INPATIENT)
Facility: HOSPITAL | Age: 71
LOS: 7 days | Discharge: NURSING, REHAB OR LONG-TERM CARE | End: 2021-02-04
Attending: HOSPITALIST | Admitting: HOSPITALIST
Payer: MEDICARE

## 2021-01-01 VITALS
OXYGEN SATURATION: 90 % | DIASTOLIC BLOOD PRESSURE: 32 MMHG | RESPIRATION RATE: 17 BRPM | SYSTOLIC BLOOD PRESSURE: 60 MMHG | HEART RATE: 68 BPM | TEMPERATURE: 98 F

## 2021-01-01 VITALS
HEIGHT: 66 IN | TEMPERATURE: 98 F | SYSTOLIC BLOOD PRESSURE: 116 MMHG | DIASTOLIC BLOOD PRESSURE: 84 MMHG | OXYGEN SATURATION: 100 % | HEART RATE: 97 BPM | RESPIRATION RATE: 16 BRPM

## 2021-01-01 VITALS
RESPIRATION RATE: 19 BRPM | OXYGEN SATURATION: 95 % | HEART RATE: 68 BPM | DIASTOLIC BLOOD PRESSURE: 60 MMHG | TEMPERATURE: 98 F | SYSTOLIC BLOOD PRESSURE: 101 MMHG

## 2021-01-01 VITALS
RESPIRATION RATE: 16 BRPM | HEART RATE: 120 BPM | HEIGHT: 66 IN | SYSTOLIC BLOOD PRESSURE: 109 MMHG | TEMPERATURE: 98 F | OXYGEN SATURATION: 99 % | DIASTOLIC BLOOD PRESSURE: 76 MMHG

## 2021-01-01 DIAGNOSIS — E87.5 HYPERKALEMIA: ICD-10-CM

## 2021-01-01 DIAGNOSIS — R62.7 ADULT FAILURE TO THRIVE: ICD-10-CM

## 2021-01-01 DIAGNOSIS — E78.5 HYPERLIPIDEMIA, UNSPECIFIED: ICD-10-CM

## 2021-01-01 DIAGNOSIS — Z71.89 OTHER SPECIFIED COUNSELING: ICD-10-CM

## 2021-01-01 DIAGNOSIS — N18.6 END STAGE RENAL DISEASE: ICD-10-CM

## 2021-01-01 DIAGNOSIS — G93.41 METABOLIC ENCEPHALOPATHY: ICD-10-CM

## 2021-01-01 DIAGNOSIS — I10 ESSENTIAL (PRIMARY) HYPERTENSION: ICD-10-CM

## 2021-01-01 DIAGNOSIS — I95.9 HYPOTENSION, UNSPECIFIED: ICD-10-CM

## 2021-01-01 DIAGNOSIS — R74.01 ELEVATION OF LEVELS OF LIVER TRANSAMINASE LEVELS: ICD-10-CM

## 2021-01-01 DIAGNOSIS — N15.1 RENAL AND PERINEPHRIC ABSCESS: Chronic | ICD-10-CM

## 2021-01-01 DIAGNOSIS — F32.9 MAJOR DEPRESSIVE DISORDER, SINGLE EPISODE, UNSPECIFIED: ICD-10-CM

## 2021-01-01 DIAGNOSIS — E87.0 HYPEROSMOLALITY AND HYPERNATREMIA: ICD-10-CM

## 2021-01-01 DIAGNOSIS — R41.82 ALTERED MENTAL STATUS, UNSPECIFIED: ICD-10-CM

## 2021-01-01 DIAGNOSIS — D72.10 EOSINOPHILIA, UNSPECIFIED: ICD-10-CM

## 2021-01-01 DIAGNOSIS — Z29.9 ENCOUNTER FOR PROPHYLACTIC MEASURES, UNSPECIFIED: ICD-10-CM

## 2021-01-01 DIAGNOSIS — E83.52 HYPERCALCEMIA: ICD-10-CM

## 2021-01-01 DIAGNOSIS — E16.2 HYPOGLYCEMIA, UNSPECIFIED: ICD-10-CM

## 2021-01-01 DIAGNOSIS — A41.9 SEPSIS, UNSPECIFIED ORGANISM: ICD-10-CM

## 2021-01-01 DIAGNOSIS — J90 PLEURAL EFFUSION, NOT ELSEWHERE CLASSIFIED: ICD-10-CM

## 2021-01-01 DIAGNOSIS — I47.2 VENTRICULAR TACHYCARDIA: ICD-10-CM

## 2021-01-01 DIAGNOSIS — F03.90 UNSPECIFIED DEMENTIA WITHOUT BEHAVIORAL DISTURBANCE: ICD-10-CM

## 2021-01-01 DIAGNOSIS — I50.9 HEART FAILURE, UNSPECIFIED: ICD-10-CM

## 2021-01-01 DIAGNOSIS — A49.02 METHICILLIN RESISTANT STAPHYLOCOCCUS AUREUS INFECTION, UNSPECIFIED SITE: ICD-10-CM

## 2021-01-01 DIAGNOSIS — Z95.5 PRESENCE OF CORONARY ANGIOPLASTY IMPLANT AND GRAFT: Chronic | ICD-10-CM

## 2021-01-01 DIAGNOSIS — T68.XXXD HYPOTHERMIA, SUBSEQUENT ENCOUNTER: ICD-10-CM

## 2021-01-01 DIAGNOSIS — I50.22 CHRONIC SYSTOLIC (CONGESTIVE) HEART FAILURE: ICD-10-CM

## 2021-01-01 DIAGNOSIS — D69.6 THROMBOCYTOPENIA, UNSPECIFIED: ICD-10-CM

## 2021-01-01 DIAGNOSIS — Z02.9 ENCOUNTER FOR ADMINISTRATIVE EXAMINATIONS, UNSPECIFIED: ICD-10-CM

## 2021-01-01 DIAGNOSIS — N39.0 URINARY TRACT INFECTION, SITE NOT SPECIFIED: ICD-10-CM

## 2021-01-01 DIAGNOSIS — K21.9 GASTRO-ESOPHAGEAL REFLUX DISEASE WITHOUT ESOPHAGITIS: ICD-10-CM

## 2021-01-01 DIAGNOSIS — I25.10 ATHEROSCLEROTIC HEART DISEASE OF NATIVE CORONARY ARTERY WITHOUT ANGINA PECTORIS: ICD-10-CM

## 2021-01-01 LAB
ALBUMIN SERPL ELPH-MCNC: 1.8 G/DL — LOW (ref 3.3–5)
ALBUMIN SERPL ELPH-MCNC: 2.4 G/DL — LOW (ref 3.3–5)
ALBUMIN SERPL ELPH-MCNC: 2.4 G/DL — LOW (ref 3.3–5)
ALBUMIN SERPL ELPH-MCNC: 2.6 G/DL — LOW (ref 3.3–5)
ALBUMIN SERPL ELPH-MCNC: 2.6 G/DL — LOW (ref 3.3–5)
ALBUMIN SERPL ELPH-MCNC: 2.8 G/DL — LOW (ref 3.3–5)
ALBUMIN SERPL ELPH-MCNC: 2.8 G/DL — LOW (ref 3.3–5)
ALBUMIN SERPL ELPH-MCNC: 2.9 G/DL — LOW (ref 3.3–5)
ALBUMIN SERPL ELPH-MCNC: 3 G/DL — LOW (ref 3.3–5)
ALBUMIN SERPL ELPH-MCNC: 3.1 G/DL — LOW (ref 3.3–5)
ALBUMIN SERPL ELPH-MCNC: 3.2 G/DL — LOW (ref 3.3–5)
ALBUMIN SERPL ELPH-MCNC: 3.3 G/DL — SIGNIFICANT CHANGE UP (ref 3.3–5)
ALBUMIN SERPL ELPH-MCNC: 3.3 G/DL — SIGNIFICANT CHANGE UP (ref 3.3–5)
ALBUMIN SERPL ELPH-MCNC: 3.4 G/DL — SIGNIFICANT CHANGE UP (ref 3.3–5)
ALBUMIN SERPL ELPH-MCNC: 3.6 G/DL — SIGNIFICANT CHANGE UP (ref 3.3–5)
ALBUMIN SERPL ELPH-MCNC: 3.7 G/DL — SIGNIFICANT CHANGE UP (ref 3.3–5)
ALBUMIN SERPL ELPH-MCNC: 3.7 G/DL — SIGNIFICANT CHANGE UP (ref 3.3–5)
ALBUMIN SERPL ELPH-MCNC: 3.8 G/DL — SIGNIFICANT CHANGE UP (ref 3.3–5)
ALP SERPL-CCNC: 125 U/L — HIGH (ref 40–120)
ALP SERPL-CCNC: 133 U/L — HIGH (ref 40–120)
ALP SERPL-CCNC: 134 U/L — HIGH (ref 40–120)
ALP SERPL-CCNC: 155 U/L — HIGH (ref 40–120)
ALP SERPL-CCNC: 156 U/L — HIGH (ref 40–120)
ALP SERPL-CCNC: 156 U/L — HIGH (ref 40–120)
ALP SERPL-CCNC: 158 U/L — HIGH (ref 40–120)
ALP SERPL-CCNC: 199 U/L — HIGH (ref 40–120)
ALP SERPL-CCNC: 221 U/L — HIGH (ref 40–120)
ALP SERPL-CCNC: 248 U/L — HIGH (ref 40–120)
ALP SERPL-CCNC: 263 U/L — HIGH (ref 40–120)
ALP SERPL-CCNC: 275 U/L — HIGH (ref 40–120)
ALP SERPL-CCNC: 283 U/L — HIGH (ref 40–120)
ALP SERPL-CCNC: 284 U/L — HIGH (ref 40–120)
ALP SERPL-CCNC: 288 U/L — HIGH (ref 40–120)
ALP SERPL-CCNC: 324 U/L — HIGH (ref 40–120)
ALP SERPL-CCNC: 332 U/L — HIGH (ref 40–120)
ALP SERPL-CCNC: 364 U/L — HIGH (ref 40–120)
ALP SERPL-CCNC: 381 U/L — HIGH (ref 40–120)
ALP SERPL-CCNC: 382 U/L — HIGH (ref 40–120)
ALP SERPL-CCNC: 529 U/L — HIGH (ref 40–120)
ALP SERPL-CCNC: 568 U/L — HIGH (ref 40–120)
ALT FLD-CCNC: 1100 U/L — HIGH (ref 4–41)
ALT FLD-CCNC: 127 U/L — HIGH (ref 4–41)
ALT FLD-CCNC: 1361 U/L — HIGH (ref 4–41)
ALT FLD-CCNC: 149 U/L — HIGH (ref 4–41)
ALT FLD-CCNC: 150 U/L — HIGH (ref 4–41)
ALT FLD-CCNC: 206 U/L — HIGH (ref 4–41)
ALT FLD-CCNC: 295 U/L — HIGH (ref 4–41)
ALT FLD-CCNC: 30 U/L — SIGNIFICANT CHANGE UP (ref 4–41)
ALT FLD-CCNC: 34 U/L — SIGNIFICANT CHANGE UP (ref 4–41)
ALT FLD-CCNC: 37 U/L — SIGNIFICANT CHANGE UP (ref 4–41)
ALT FLD-CCNC: 39 U/L — SIGNIFICANT CHANGE UP (ref 4–41)
ALT FLD-CCNC: 545 U/L — HIGH (ref 4–41)
ALT FLD-CCNC: 56 U/L — HIGH (ref 4–41)
ALT FLD-CCNC: 618 U/L — HIGH (ref 4–41)
ALT FLD-CCNC: 653 U/L — HIGH (ref 4–41)
ALT FLD-CCNC: 753 U/L — HIGH (ref 4–41)
ALT FLD-CCNC: 782 U/L — HIGH (ref 4–41)
ALT FLD-CCNC: 801 U/L — HIGH (ref 4–41)
ALT FLD-CCNC: 859 U/L — HIGH (ref 4–41)
ALT FLD-CCNC: 87 U/L — HIGH (ref 4–41)
ALT FLD-CCNC: 906 U/L — HIGH (ref 4–41)
ALT FLD-CCNC: SIGNIFICANT CHANGE UP U/L (ref 4–41)
AMMONIA BLD-MCNC: 14 UMOL/L — SIGNIFICANT CHANGE UP (ref 11–55)
AMMONIA BLD-MCNC: 43 UMOL/L — SIGNIFICANT CHANGE UP (ref 11–55)
ANA TITR SER: NEGATIVE — SIGNIFICANT CHANGE UP
ANION GAP SERPL CALC-SCNC: 10 MMOL/L — SIGNIFICANT CHANGE UP (ref 7–14)
ANION GAP SERPL CALC-SCNC: 11 MMOL/L — SIGNIFICANT CHANGE UP (ref 7–14)
ANION GAP SERPL CALC-SCNC: 11 MMOL/L — SIGNIFICANT CHANGE UP (ref 7–14)
ANION GAP SERPL CALC-SCNC: 12 MMOL/L — SIGNIFICANT CHANGE UP (ref 7–14)
ANION GAP SERPL CALC-SCNC: 13 MMOL/L — SIGNIFICANT CHANGE UP (ref 7–14)
ANION GAP SERPL CALC-SCNC: 14 MMOL/L — SIGNIFICANT CHANGE UP (ref 7–14)
ANION GAP SERPL CALC-SCNC: 15 MMOL/L — HIGH (ref 7–14)
ANION GAP SERPL CALC-SCNC: 16 MMOL/L — HIGH (ref 7–14)
ANION GAP SERPL CALC-SCNC: 17 MMOL/L — HIGH (ref 7–14)
ANION GAP SERPL CALC-SCNC: 18 MMOL/L — HIGH (ref 7–14)
ANION GAP SERPL CALC-SCNC: 19 MMOL/L — HIGH (ref 7–14)
ANION GAP SERPL CALC-SCNC: 19 MMOL/L — HIGH (ref 7–14)
ANION GAP SERPL CALC-SCNC: 20 MMOL/L — HIGH (ref 7–14)
ANION GAP SERPL CALC-SCNC: 20 MMOL/L — HIGH (ref 7–14)
ANION GAP SERPL CALC-SCNC: 21 MMOL/L — HIGH (ref 7–14)
ANION GAP SERPL CALC-SCNC: 21 MMOL/L — HIGH (ref 7–14)
ANION GAP SERPL CALC-SCNC: 22 MMOL/L — HIGH (ref 7–14)
ANION GAP SERPL CALC-SCNC: 23 MMOL/L — HIGH (ref 7–14)
ANION GAP SERPL CALC-SCNC: 23 MMOL/L — HIGH (ref 7–14)
ANION GAP SERPL CALC-SCNC: 25 MMOL/L — HIGH (ref 7–14)
ANION GAP SERPL CALC-SCNC: 26 MMOL/L — HIGH (ref 7–14)
APPEARANCE UR: ABNORMAL
APTT BLD: 33 SEC — SIGNIFICANT CHANGE UP (ref 27–36.3)
APTT BLD: 33.5 SEC — SIGNIFICANT CHANGE UP (ref 27–36.3)
APTT BLD: 44.4 SEC — HIGH (ref 27–36.3)
APTT BLD: 45.8 SEC — HIGH (ref 27–36.3)
APTT BLD: 46.3 SEC — HIGH (ref 27–36.3)
APTT BLD: 75.2 SEC — HIGH (ref 27–36.3)
AST SERPL-CCNC: 105 U/L — HIGH (ref 4–40)
AST SERPL-CCNC: 118 U/L — HIGH (ref 4–40)
AST SERPL-CCNC: 126 U/L — HIGH (ref 4–40)
AST SERPL-CCNC: 131 U/L — HIGH (ref 4–40)
AST SERPL-CCNC: 23 U/L — SIGNIFICANT CHANGE UP (ref 4–40)
AST SERPL-CCNC: 25 U/L — SIGNIFICANT CHANGE UP (ref 4–40)
AST SERPL-CCNC: 26 U/L — SIGNIFICANT CHANGE UP (ref 4–40)
AST SERPL-CCNC: 264 U/L — HIGH (ref 4–40)
AST SERPL-CCNC: 298 U/L — HIGH (ref 4–40)
AST SERPL-CCNC: 300 U/L — HIGH (ref 4–40)
AST SERPL-CCNC: 310 U/L — HIGH (ref 4–40)
AST SERPL-CCNC: 33 U/L — SIGNIFICANT CHANGE UP (ref 4–40)
AST SERPL-CCNC: 373 U/L — HIGH (ref 4–40)
AST SERPL-CCNC: 399 U/L — HIGH (ref 4–40)
AST SERPL-CCNC: 447 U/L — HIGH (ref 4–40)
AST SERPL-CCNC: 53 U/L — HIGH (ref 4–40)
AST SERPL-CCNC: 617 U/L — HIGH (ref 4–40)
AST SERPL-CCNC: 71 U/L — HIGH (ref 4–40)
AST SERPL-CCNC: 77 U/L — HIGH (ref 4–40)
AST SERPL-CCNC: 785 U/L — HIGH (ref 4–40)
AST SERPL-CCNC: 793 U/L — HIGH (ref 4–40)
AST SERPL-CCNC: 957 U/L — HIGH (ref 4–40)
B PERT DNA SPEC QL NAA+PROBE: SIGNIFICANT CHANGE UP
B PERT DNA SPEC QL NAA+PROBE: SIGNIFICANT CHANGE UP
BASE EXCESS BLDV CALC-SCNC: 1.7 MMOL/L — SIGNIFICANT CHANGE UP (ref -3–2)
BASOPHILS # BLD AUTO: 0 K/UL — SIGNIFICANT CHANGE UP (ref 0–0.2)
BASOPHILS # BLD AUTO: 0.02 K/UL — SIGNIFICANT CHANGE UP (ref 0–0.2)
BASOPHILS # BLD AUTO: 0.03 K/UL — SIGNIFICANT CHANGE UP (ref 0–0.2)
BASOPHILS # BLD AUTO: 0.03 K/UL — SIGNIFICANT CHANGE UP (ref 0–0.2)
BASOPHILS # BLD AUTO: 0.04 K/UL — SIGNIFICANT CHANGE UP (ref 0–0.2)
BASOPHILS # BLD AUTO: 0.05 K/UL — SIGNIFICANT CHANGE UP (ref 0–0.2)
BASOPHILS # BLD AUTO: 0.05 K/UL — SIGNIFICANT CHANGE UP (ref 0–0.2)
BASOPHILS # BLD AUTO: 0.06 K/UL — SIGNIFICANT CHANGE UP (ref 0–0.2)
BASOPHILS NFR BLD AUTO: 0 % — SIGNIFICANT CHANGE UP (ref 0–2)
BASOPHILS NFR BLD AUTO: 0.4 % — SIGNIFICANT CHANGE UP (ref 0–2)
BASOPHILS NFR BLD AUTO: 0.5 % — SIGNIFICANT CHANGE UP (ref 0–2)
BASOPHILS NFR BLD AUTO: 0.5 % — SIGNIFICANT CHANGE UP (ref 0–2)
BASOPHILS NFR BLD AUTO: 0.6 % — SIGNIFICANT CHANGE UP (ref 0–2)
BASOPHILS NFR BLD AUTO: 1 % — SIGNIFICANT CHANGE UP (ref 0–2)
BASOPHILS NFR BLD AUTO: 1.2 % — SIGNIFICANT CHANGE UP (ref 0–2)
BASOPHILS NFR BLD AUTO: 1.3 % — SIGNIFICANT CHANGE UP (ref 0–2)
BILIRUB DIRECT SERPL-MCNC: 0.5 MG/DL — HIGH (ref 0–0.2)
BILIRUB DIRECT SERPL-MCNC: 0.6 MG/DL — HIGH (ref 0–0.2)
BILIRUB DIRECT SERPL-MCNC: 0.8 MG/DL — HIGH (ref 0–0.2)
BILIRUB DIRECT SERPL-MCNC: SIGNIFICANT CHANGE UP MG/DL (ref 0–0.2)
BILIRUB INDIRECT FLD-MCNC: 0.3 MG/DL — SIGNIFICANT CHANGE UP (ref 0–1)
BILIRUB INDIRECT FLD-MCNC: 0.5 MG/DL — SIGNIFICANT CHANGE UP (ref 0–1)
BILIRUB INDIRECT FLD-MCNC: 0.6 MG/DL — SIGNIFICANT CHANGE UP (ref 0–1)
BILIRUB INDIRECT FLD-MCNC: SIGNIFICANT CHANGE UP MG/DL (ref 0–1)
BILIRUB SERPL-MCNC: 0.6 MG/DL — SIGNIFICANT CHANGE UP (ref 0.2–1.2)
BILIRUB SERPL-MCNC: 0.7 MG/DL — SIGNIFICANT CHANGE UP (ref 0.2–1.2)
BILIRUB SERPL-MCNC: 0.8 MG/DL — SIGNIFICANT CHANGE UP (ref 0.2–1.2)
BILIRUB SERPL-MCNC: 0.9 MG/DL — SIGNIFICANT CHANGE UP (ref 0.2–1.2)
BILIRUB SERPL-MCNC: 1 MG/DL — SIGNIFICANT CHANGE UP (ref 0.2–1.2)
BILIRUB SERPL-MCNC: 1 MG/DL — SIGNIFICANT CHANGE UP (ref 0.2–1.2)
BILIRUB SERPL-MCNC: 1.1 MG/DL — SIGNIFICANT CHANGE UP (ref 0.2–1.2)
BILIRUB SERPL-MCNC: 1.2 MG/DL — SIGNIFICANT CHANGE UP (ref 0.2–1.2)
BILIRUB SERPL-MCNC: 1.3 MG/DL — HIGH (ref 0.2–1.2)
BILIRUB SERPL-MCNC: 1.4 MG/DL — HIGH (ref 0.2–1.2)
BILIRUB SERPL-MCNC: 1.6 MG/DL — HIGH (ref 0.2–1.2)
BILIRUB SERPL-MCNC: 1.6 MG/DL — HIGH (ref 0.2–1.2)
BILIRUB SERPL-MCNC: 1.8 MG/DL — HIGH (ref 0.2–1.2)
BILIRUB UR-MCNC: NEGATIVE — SIGNIFICANT CHANGE UP
BLD GP AB SCN SERPL QL: NEGATIVE — SIGNIFICANT CHANGE UP
BLOOD GAS ARTERIAL COMPREHENSIVE RESULT: SIGNIFICANT CHANGE UP
BLOOD GAS ARTERIAL COMPREHENSIVE RESULT: SIGNIFICANT CHANGE UP
BLOOD GAS VENOUS - CREATININE: 5.3 MG/DL — HIGH (ref 0.5–1.3)
BLOOD GAS VENOUS COMPREHENSIVE RESULT: SIGNIFICANT CHANGE UP
BUN SERPL-MCNC: 28 MG/DL — HIGH (ref 7–23)
BUN SERPL-MCNC: 33 MG/DL — HIGH (ref 7–23)
BUN SERPL-MCNC: 38 MG/DL — HIGH (ref 7–23)
BUN SERPL-MCNC: 38 MG/DL — HIGH (ref 7–23)
BUN SERPL-MCNC: 39 MG/DL — HIGH (ref 7–23)
BUN SERPL-MCNC: 41 MG/DL — HIGH (ref 7–23)
BUN SERPL-MCNC: 44 MG/DL — HIGH (ref 7–23)
BUN SERPL-MCNC: 47 MG/DL — HIGH (ref 7–23)
BUN SERPL-MCNC: 49 MG/DL — HIGH (ref 7–23)
BUN SERPL-MCNC: 52 MG/DL — HIGH (ref 7–23)
BUN SERPL-MCNC: 53 MG/DL — HIGH (ref 7–23)
BUN SERPL-MCNC: 54 MG/DL — HIGH (ref 7–23)
BUN SERPL-MCNC: 58 MG/DL — HIGH (ref 7–23)
BUN SERPL-MCNC: 58 MG/DL — HIGH (ref 7–23)
BUN SERPL-MCNC: 60 MG/DL — HIGH (ref 7–23)
BUN SERPL-MCNC: 62 MG/DL — HIGH (ref 7–23)
BUN SERPL-MCNC: 64 MG/DL — HIGH (ref 7–23)
BUN SERPL-MCNC: 64 MG/DL — HIGH (ref 7–23)
BUN SERPL-MCNC: 67 MG/DL — HIGH (ref 7–23)
BUN SERPL-MCNC: 68 MG/DL — HIGH (ref 7–23)
BUN SERPL-MCNC: 68 MG/DL — HIGH (ref 7–23)
BUN SERPL-MCNC: 70 MG/DL — HIGH (ref 7–23)
BUN SERPL-MCNC: 73 MG/DL — HIGH (ref 7–23)
BUN SERPL-MCNC: 78 MG/DL — HIGH (ref 7–23)
BUN SERPL-MCNC: 79 MG/DL — HIGH (ref 7–23)
BUN SERPL-MCNC: 81 MG/DL — HIGH (ref 7–23)
BUN SERPL-MCNC: 84 MG/DL — HIGH (ref 7–23)
BUN SERPL-MCNC: 91 MG/DL — HIGH (ref 7–23)
BUN SERPL-MCNC: 92 MG/DL — HIGH (ref 7–23)
BUN SERPL-MCNC: 94 MG/DL — HIGH (ref 7–23)
C PEPTIDE SERPL-MCNC: 4.6 NG/ML — HIGH (ref 1.1–4.4)
C PNEUM DNA SPEC QL NAA+PROBE: SIGNIFICANT CHANGE UP
C PNEUM DNA SPEC QL NAA+PROBE: SIGNIFICANT CHANGE UP
CA-I BLD-SCNC: 0.96 MMOL/L — LOW (ref 1.15–1.29)
CA-I BLD-SCNC: 1.25 MMOL/L — SIGNIFICANT CHANGE UP (ref 1.15–1.29)
CALCIUM SERPL-MCNC: 10.1 MG/DL — SIGNIFICANT CHANGE UP (ref 8.4–10.5)
CALCIUM SERPL-MCNC: 10.2 MG/DL — SIGNIFICANT CHANGE UP (ref 8.4–10.5)
CALCIUM SERPL-MCNC: 10.5 MG/DL — SIGNIFICANT CHANGE UP (ref 8.4–10.5)
CALCIUM SERPL-MCNC: 10.6 MG/DL — HIGH (ref 8.4–10.5)
CALCIUM SERPL-MCNC: 10.9 MG/DL — HIGH (ref 8.4–10.5)
CALCIUM SERPL-MCNC: 10.9 MG/DL — HIGH (ref 8.4–10.5)
CALCIUM SERPL-MCNC: 11.1 MG/DL — HIGH (ref 8.4–10.5)
CALCIUM SERPL-MCNC: 11.1 MG/DL — HIGH (ref 8.4–10.5)
CALCIUM SERPL-MCNC: 11.2 MG/DL — HIGH (ref 8.4–10.5)
CALCIUM SERPL-MCNC: 11.4 MG/DL — HIGH (ref 8.4–10.5)
CALCIUM SERPL-MCNC: 12.2 MG/DL — HIGH (ref 8.4–10.5)
CALCIUM SERPL-MCNC: 13.8 MG/DL — CRITICAL HIGH (ref 8.4–10.5)
CALCIUM SERPL-MCNC: 14.2 MG/DL — CRITICAL HIGH (ref 8.4–10.5)
CALCIUM SERPL-MCNC: 7.6 MG/DL — LOW (ref 8.4–10.5)
CALCIUM SERPL-MCNC: 7.6 MG/DL — LOW (ref 8.4–10.5)
CALCIUM SERPL-MCNC: 7.8 MG/DL — LOW (ref 8.4–10.5)
CALCIUM SERPL-MCNC: 7.9 MG/DL — LOW (ref 8.4–10.5)
CALCIUM SERPL-MCNC: 7.9 MG/DL — LOW (ref 8.4–10.5)
CALCIUM SERPL-MCNC: 8.1 MG/DL — LOW (ref 8.4–10.5)
CALCIUM SERPL-MCNC: 8.4 MG/DL — SIGNIFICANT CHANGE UP (ref 8.4–10.5)
CALCIUM SERPL-MCNC: 8.4 MG/DL — SIGNIFICANT CHANGE UP (ref 8.4–10.5)
CALCIUM SERPL-MCNC: 8.5 MG/DL — SIGNIFICANT CHANGE UP (ref 8.4–10.5)
CALCIUM SERPL-MCNC: 8.6 MG/DL — SIGNIFICANT CHANGE UP (ref 8.4–10.5)
CALCIUM SERPL-MCNC: 8.7 MG/DL — SIGNIFICANT CHANGE UP (ref 8.4–10.5)
CALCIUM SERPL-MCNC: 8.9 MG/DL — SIGNIFICANT CHANGE UP (ref 8.4–10.5)
CALCIUM SERPL-MCNC: 9 MG/DL — SIGNIFICANT CHANGE UP (ref 8.4–10.5)
CALCIUM SERPL-MCNC: 9.1 MG/DL — SIGNIFICANT CHANGE UP (ref 8.4–10.5)
CALCIUM SERPL-MCNC: 9.2 MG/DL — SIGNIFICANT CHANGE UP (ref 8.4–10.5)
CALCIUM SERPL-MCNC: 9.4 MG/DL — SIGNIFICANT CHANGE UP (ref 8.4–10.5)
CALCIUM SERPL-MCNC: 9.7 MG/DL — SIGNIFICANT CHANGE UP (ref 8.4–10.5)
CALCIUM SERPL-MCNC: 9.9 MG/DL — SIGNIFICANT CHANGE UP (ref 8.4–10.5)
CALCIUM SERPL-MCNC: 9.9 MG/DL — SIGNIFICANT CHANGE UP (ref 8.4–10.5)
CHLORIDE BLDV-SCNC: 96 MMOL/L — SIGNIFICANT CHANGE UP (ref 96–108)
CHLORIDE SERPL-SCNC: 100 MMOL/L — SIGNIFICANT CHANGE UP (ref 98–107)
CHLORIDE SERPL-SCNC: 100 MMOL/L — SIGNIFICANT CHANGE UP (ref 98–107)
CHLORIDE SERPL-SCNC: 101 MMOL/L — SIGNIFICANT CHANGE UP (ref 98–107)
CHLORIDE SERPL-SCNC: 88 MMOL/L — LOW (ref 98–107)
CHLORIDE SERPL-SCNC: 89 MMOL/L — LOW (ref 98–107)
CHLORIDE SERPL-SCNC: 89 MMOL/L — LOW (ref 98–107)
CHLORIDE SERPL-SCNC: 90 MMOL/L — LOW (ref 98–107)
CHLORIDE SERPL-SCNC: 91 MMOL/L — LOW (ref 98–107)
CHLORIDE SERPL-SCNC: 92 MMOL/L — LOW (ref 98–107)
CHLORIDE SERPL-SCNC: 93 MMOL/L — LOW (ref 98–107)
CHLORIDE SERPL-SCNC: 94 MMOL/L — LOW (ref 98–107)
CHLORIDE SERPL-SCNC: 95 MMOL/L — LOW (ref 98–107)
CHLORIDE SERPL-SCNC: 95 MMOL/L — LOW (ref 98–107)
CHLORIDE SERPL-SCNC: 97 MMOL/L — LOW (ref 98–107)
CHLORIDE SERPL-SCNC: 98 MMOL/L — SIGNIFICANT CHANGE UP (ref 98–107)
CHLORIDE SERPL-SCNC: 98 MMOL/L — SIGNIFICANT CHANGE UP (ref 98–107)
CHLORIDE SERPL-SCNC: 99 MMOL/L — SIGNIFICANT CHANGE UP (ref 98–107)
CHOLEST SERPL-MCNC: 133 MG/DL — SIGNIFICANT CHANGE UP
CLOSURE TME COLL+EPINEP BLD: 41 K/UL — LOW (ref 150–400)
CO2 SERPL-SCNC: 16 MMOL/L — LOW (ref 22–31)
CO2 SERPL-SCNC: 18 MMOL/L — LOW (ref 22–31)
CO2 SERPL-SCNC: 19 MMOL/L — LOW (ref 22–31)
CO2 SERPL-SCNC: 20 MMOL/L — LOW (ref 22–31)
CO2 SERPL-SCNC: 21 MMOL/L — LOW (ref 22–31)
CO2 SERPL-SCNC: 21 MMOL/L — LOW (ref 22–31)
CO2 SERPL-SCNC: 22 MMOL/L — SIGNIFICANT CHANGE UP (ref 22–31)
CO2 SERPL-SCNC: 23 MMOL/L — SIGNIFICANT CHANGE UP (ref 22–31)
CO2 SERPL-SCNC: 24 MMOL/L — SIGNIFICANT CHANGE UP (ref 22–31)
CO2 SERPL-SCNC: 24 MMOL/L — SIGNIFICANT CHANGE UP (ref 22–31)
CO2 SERPL-SCNC: 25 MMOL/L — SIGNIFICANT CHANGE UP (ref 22–31)
CO2 SERPL-SCNC: 26 MMOL/L — SIGNIFICANT CHANGE UP (ref 22–31)
CO2 SERPL-SCNC: 27 MMOL/L — SIGNIFICANT CHANGE UP (ref 22–31)
CO2 SERPL-SCNC: 28 MMOL/L — SIGNIFICANT CHANGE UP (ref 22–31)
CO2 SERPL-SCNC: 29 MMOL/L — SIGNIFICANT CHANGE UP (ref 22–31)
CO2 SERPL-SCNC: 30 MMOL/L — SIGNIFICANT CHANGE UP (ref 22–31)
CO2 SERPL-SCNC: 30 MMOL/L — SIGNIFICANT CHANGE UP (ref 22–31)
CO2 SERPL-SCNC: 31 MMOL/L — SIGNIFICANT CHANGE UP (ref 22–31)
CO2 SERPL-SCNC: 32 MMOL/L — HIGH (ref 22–31)
COLOR SPEC: ABNORMAL
CORTIS AM PEAK SERPL-MCNC: 26.5 UG/DL — HIGH (ref 6–18.4)
CORTIS SERPL-MCNC: 16 UG/DL — SIGNIFICANT CHANGE UP
CREAT SERPL-MCNC: 2.8 MG/DL — HIGH (ref 0.5–1.3)
CREAT SERPL-MCNC: 2.82 MG/DL — HIGH (ref 0.5–1.3)
CREAT SERPL-MCNC: 3.11 MG/DL — HIGH (ref 0.5–1.3)
CREAT SERPL-MCNC: 3.23 MG/DL — HIGH (ref 0.5–1.3)
CREAT SERPL-MCNC: 3.33 MG/DL — HIGH (ref 0.5–1.3)
CREAT SERPL-MCNC: 3.41 MG/DL — HIGH (ref 0.5–1.3)
CREAT SERPL-MCNC: 3.74 MG/DL — HIGH (ref 0.5–1.3)
CREAT SERPL-MCNC: 3.78 MG/DL — HIGH (ref 0.5–1.3)
CREAT SERPL-MCNC: 3.8 MG/DL — HIGH (ref 0.5–1.3)
CREAT SERPL-MCNC: 3.84 MG/DL — HIGH (ref 0.5–1.3)
CREAT SERPL-MCNC: 3.96 MG/DL — HIGH (ref 0.5–1.3)
CREAT SERPL-MCNC: 4.06 MG/DL — HIGH (ref 0.5–1.3)
CREAT SERPL-MCNC: 4.1 MG/DL — HIGH (ref 0.5–1.3)
CREAT SERPL-MCNC: 4.15 MG/DL — HIGH (ref 0.5–1.3)
CREAT SERPL-MCNC: 4.16 MG/DL — HIGH (ref 0.5–1.3)
CREAT SERPL-MCNC: 4.18 MG/DL — HIGH (ref 0.5–1.3)
CREAT SERPL-MCNC: 4.5 MG/DL — HIGH (ref 0.5–1.3)
CREAT SERPL-MCNC: 4.51 MG/DL — HIGH (ref 0.5–1.3)
CREAT SERPL-MCNC: 4.54 MG/DL — HIGH (ref 0.5–1.3)
CREAT SERPL-MCNC: 4.63 MG/DL — HIGH (ref 0.5–1.3)
CREAT SERPL-MCNC: 4.79 MG/DL — HIGH (ref 0.5–1.3)
CREAT SERPL-MCNC: 4.94 MG/DL — HIGH (ref 0.5–1.3)
CREAT SERPL-MCNC: 4.99 MG/DL — HIGH (ref 0.5–1.3)
CREAT SERPL-MCNC: 5.01 MG/DL — HIGH (ref 0.5–1.3)
CREAT SERPL-MCNC: 5.13 MG/DL — HIGH (ref 0.5–1.3)
CREAT SERPL-MCNC: 5.18 MG/DL — HIGH (ref 0.5–1.3)
CREAT SERPL-MCNC: 5.39 MG/DL — HIGH (ref 0.5–1.3)
CREAT SERPL-MCNC: 5.51 MG/DL — HIGH (ref 0.5–1.3)
CREAT SERPL-MCNC: 5.57 MG/DL — HIGH (ref 0.5–1.3)
CREAT SERPL-MCNC: 5.71 MG/DL — HIGH (ref 0.5–1.3)
CREAT SERPL-MCNC: 6.01 MG/DL — HIGH (ref 0.5–1.3)
CREAT SERPL-MCNC: 6.07 MG/DL — HIGH (ref 0.5–1.3)
CREAT SERPL-MCNC: 6.39 MG/DL — HIGH (ref 0.5–1.3)
CREAT SERPL-MCNC: 6.44 MG/DL — HIGH (ref 0.5–1.3)
CREAT SERPL-MCNC: 8.64 MG/DL — HIGH (ref 0.5–1.3)
CREAT SERPL-MCNC: 8.72 MG/DL — HIGH (ref 0.5–1.3)
CULTURE RESULTS: SIGNIFICANT CHANGE UP
DIFF PNL FLD: ABNORMAL
EOSINOPHIL # BLD AUTO: 0 K/UL — SIGNIFICANT CHANGE UP (ref 0–0.5)
EOSINOPHIL # BLD AUTO: 0 K/UL — SIGNIFICANT CHANGE UP (ref 0–0.5)
EOSINOPHIL # BLD AUTO: 0.02 K/UL — SIGNIFICANT CHANGE UP (ref 0–0.5)
EOSINOPHIL # BLD AUTO: 0.03 K/UL — SIGNIFICANT CHANGE UP (ref 0–0.5)
EOSINOPHIL # BLD AUTO: 0.15 K/UL — SIGNIFICANT CHANGE UP (ref 0–0.5)
EOSINOPHIL # BLD AUTO: 0.21 K/UL — SIGNIFICANT CHANGE UP (ref 0–0.5)
EOSINOPHIL # BLD AUTO: 0.3 K/UL — SIGNIFICANT CHANGE UP (ref 0–0.5)
EOSINOPHIL # BLD AUTO: 0.33 K/UL — SIGNIFICANT CHANGE UP (ref 0–0.5)
EOSINOPHIL # BLD AUTO: 0.33 K/UL — SIGNIFICANT CHANGE UP (ref 0–0.5)
EOSINOPHIL # BLD AUTO: 0.34 K/UL — SIGNIFICANT CHANGE UP (ref 0–0.5)
EOSINOPHIL NFR BLD AUTO: 0 % — SIGNIFICANT CHANGE UP (ref 0–6)
EOSINOPHIL NFR BLD AUTO: 0 % — SIGNIFICANT CHANGE UP (ref 0–6)
EOSINOPHIL NFR BLD AUTO: 0.4 % — SIGNIFICANT CHANGE UP (ref 0–6)
EOSINOPHIL NFR BLD AUTO: 0.8 % — SIGNIFICANT CHANGE UP (ref 0–6)
EOSINOPHIL NFR BLD AUTO: 1.8 % — SIGNIFICANT CHANGE UP (ref 0–6)
EOSINOPHIL NFR BLD AUTO: 4.3 % — SIGNIFICANT CHANGE UP (ref 0–6)
EOSINOPHIL NFR BLD AUTO: 5.8 % — SIGNIFICANT CHANGE UP (ref 0–6)
EOSINOPHIL NFR BLD AUTO: 6.5 % — HIGH (ref 0–6)
EOSINOPHIL NFR BLD AUTO: 6.6 % — HIGH (ref 0–6)
EOSINOPHIL NFR BLD AUTO: 8.8 % — HIGH (ref 0–6)
EOSINOPHIL NFR URNS MANUAL: NEGATIVE — SIGNIFICANT CHANGE UP
FLUAV H1 2009 PAND RNA SPEC QL NAA+PROBE: SIGNIFICANT CHANGE UP
FLUAV H1 RNA SPEC QL NAA+PROBE: SIGNIFICANT CHANGE UP
FLUAV H3 RNA SPEC QL NAA+PROBE: SIGNIFICANT CHANGE UP
FLUAV SUBTYP SPEC NAA+PROBE: SIGNIFICANT CHANGE UP
FLUAV SUBTYP SPEC NAA+PROBE: SIGNIFICANT CHANGE UP
FLUBV RNA SPEC QL NAA+PROBE: SIGNIFICANT CHANGE UP
FLUBV RNA SPEC QL NAA+PROBE: SIGNIFICANT CHANGE UP
GAS PNL BLDV: 131 MMOL/L — LOW (ref 136–146)
GGT SERPL-CCNC: 171 U/L — HIGH (ref 8–61)
GLUCOSE BLDC GLUCOMTR-MCNC: 100 MG/DL — HIGH (ref 70–99)
GLUCOSE BLDC GLUCOMTR-MCNC: 103 MG/DL — HIGH (ref 70–99)
GLUCOSE BLDC GLUCOMTR-MCNC: 105 MG/DL — HIGH (ref 70–99)
GLUCOSE BLDC GLUCOMTR-MCNC: 106 MG/DL — HIGH (ref 70–99)
GLUCOSE BLDC GLUCOMTR-MCNC: 107 MG/DL — HIGH (ref 70–99)
GLUCOSE BLDC GLUCOMTR-MCNC: 107 MG/DL — HIGH (ref 70–99)
GLUCOSE BLDC GLUCOMTR-MCNC: 108 MG/DL — HIGH (ref 70–99)
GLUCOSE BLDC GLUCOMTR-MCNC: 109 MG/DL — HIGH (ref 70–99)
GLUCOSE BLDC GLUCOMTR-MCNC: 110 MG/DL — HIGH (ref 70–99)
GLUCOSE BLDC GLUCOMTR-MCNC: 110 MG/DL — HIGH (ref 70–99)
GLUCOSE BLDC GLUCOMTR-MCNC: 111 MG/DL — HIGH (ref 70–99)
GLUCOSE BLDC GLUCOMTR-MCNC: 112 MG/DL — HIGH (ref 70–99)
GLUCOSE BLDC GLUCOMTR-MCNC: 113 MG/DL — HIGH (ref 70–99)
GLUCOSE BLDC GLUCOMTR-MCNC: 114 MG/DL — HIGH (ref 70–99)
GLUCOSE BLDC GLUCOMTR-MCNC: 117 MG/DL — HIGH (ref 70–99)
GLUCOSE BLDC GLUCOMTR-MCNC: 120 MG/DL — HIGH (ref 70–99)
GLUCOSE BLDC GLUCOMTR-MCNC: 120 MG/DL — HIGH (ref 70–99)
GLUCOSE BLDC GLUCOMTR-MCNC: 121 MG/DL — HIGH (ref 70–99)
GLUCOSE BLDC GLUCOMTR-MCNC: 123 MG/DL — HIGH (ref 70–99)
GLUCOSE BLDC GLUCOMTR-MCNC: 123 MG/DL — HIGH (ref 70–99)
GLUCOSE BLDC GLUCOMTR-MCNC: 124 MG/DL — HIGH (ref 70–99)
GLUCOSE BLDC GLUCOMTR-MCNC: 124 MG/DL — HIGH (ref 70–99)
GLUCOSE BLDC GLUCOMTR-MCNC: 126 MG/DL — HIGH (ref 70–99)
GLUCOSE BLDC GLUCOMTR-MCNC: 127 MG/DL — HIGH (ref 70–99)
GLUCOSE BLDC GLUCOMTR-MCNC: 128 MG/DL — HIGH (ref 70–99)
GLUCOSE BLDC GLUCOMTR-MCNC: 130 MG/DL — HIGH (ref 70–99)
GLUCOSE BLDC GLUCOMTR-MCNC: 131 MG/DL — HIGH (ref 70–99)
GLUCOSE BLDC GLUCOMTR-MCNC: 133 MG/DL — HIGH (ref 70–99)
GLUCOSE BLDC GLUCOMTR-MCNC: 133 MG/DL — HIGH (ref 70–99)
GLUCOSE BLDC GLUCOMTR-MCNC: 134 MG/DL — HIGH (ref 70–99)
GLUCOSE BLDC GLUCOMTR-MCNC: 139 MG/DL — HIGH (ref 70–99)
GLUCOSE BLDC GLUCOMTR-MCNC: 139 MG/DL — HIGH (ref 70–99)
GLUCOSE BLDC GLUCOMTR-MCNC: 140 MG/DL — HIGH (ref 70–99)
GLUCOSE BLDC GLUCOMTR-MCNC: 140 MG/DL — HIGH (ref 70–99)
GLUCOSE BLDC GLUCOMTR-MCNC: 141 MG/DL — HIGH (ref 70–99)
GLUCOSE BLDC GLUCOMTR-MCNC: 143 MG/DL — HIGH (ref 70–99)
GLUCOSE BLDC GLUCOMTR-MCNC: 143 MG/DL — HIGH (ref 70–99)
GLUCOSE BLDC GLUCOMTR-MCNC: 145 MG/DL — HIGH (ref 70–99)
GLUCOSE BLDC GLUCOMTR-MCNC: 149 MG/DL — HIGH (ref 70–99)
GLUCOSE BLDC GLUCOMTR-MCNC: 150 MG/DL — HIGH (ref 70–99)
GLUCOSE BLDC GLUCOMTR-MCNC: 165 MG/DL — HIGH (ref 70–99)
GLUCOSE BLDC GLUCOMTR-MCNC: 206 MG/DL — HIGH (ref 70–99)
GLUCOSE BLDC GLUCOMTR-MCNC: 27 MG/DL — CRITICAL LOW (ref 70–99)
GLUCOSE BLDC GLUCOMTR-MCNC: 274 MG/DL — HIGH (ref 70–99)
GLUCOSE BLDC GLUCOMTR-MCNC: 31 MG/DL — CRITICAL LOW (ref 70–99)
GLUCOSE BLDC GLUCOMTR-MCNC: 32 MG/DL — CRITICAL LOW (ref 70–99)
GLUCOSE BLDC GLUCOMTR-MCNC: 49 MG/DL — CRITICAL LOW (ref 70–99)
GLUCOSE BLDC GLUCOMTR-MCNC: 53 MG/DL — CRITICAL LOW (ref 70–99)
GLUCOSE BLDC GLUCOMTR-MCNC: 53 MG/DL — CRITICAL LOW (ref 70–99)
GLUCOSE BLDC GLUCOMTR-MCNC: 54 MG/DL — CRITICAL LOW (ref 70–99)
GLUCOSE BLDC GLUCOMTR-MCNC: 55 MG/DL — LOW (ref 70–99)
GLUCOSE BLDC GLUCOMTR-MCNC: 56 MG/DL — LOW (ref 70–99)
GLUCOSE BLDC GLUCOMTR-MCNC: 56 MG/DL — LOW (ref 70–99)
GLUCOSE BLDC GLUCOMTR-MCNC: 57 MG/DL — LOW (ref 70–99)
GLUCOSE BLDC GLUCOMTR-MCNC: 59 MG/DL — LOW (ref 70–99)
GLUCOSE BLDC GLUCOMTR-MCNC: 59 MG/DL — LOW (ref 70–99)
GLUCOSE BLDC GLUCOMTR-MCNC: 60 MG/DL — LOW (ref 70–99)
GLUCOSE BLDC GLUCOMTR-MCNC: 61 MG/DL — LOW (ref 70–99)
GLUCOSE BLDC GLUCOMTR-MCNC: 65 MG/DL — LOW (ref 70–99)
GLUCOSE BLDC GLUCOMTR-MCNC: 65 MG/DL — LOW (ref 70–99)
GLUCOSE BLDC GLUCOMTR-MCNC: 68 MG/DL — LOW (ref 70–99)
GLUCOSE BLDC GLUCOMTR-MCNC: 69 MG/DL — LOW (ref 70–99)
GLUCOSE BLDC GLUCOMTR-MCNC: 72 MG/DL — SIGNIFICANT CHANGE UP (ref 70–99)
GLUCOSE BLDC GLUCOMTR-MCNC: 72 MG/DL — SIGNIFICANT CHANGE UP (ref 70–99)
GLUCOSE BLDC GLUCOMTR-MCNC: 73 MG/DL — SIGNIFICANT CHANGE UP (ref 70–99)
GLUCOSE BLDC GLUCOMTR-MCNC: 74 MG/DL — SIGNIFICANT CHANGE UP (ref 70–99)
GLUCOSE BLDC GLUCOMTR-MCNC: 75 MG/DL — SIGNIFICANT CHANGE UP (ref 70–99)
GLUCOSE BLDC GLUCOMTR-MCNC: 76 MG/DL — SIGNIFICANT CHANGE UP (ref 70–99)
GLUCOSE BLDC GLUCOMTR-MCNC: 77 MG/DL — SIGNIFICANT CHANGE UP (ref 70–99)
GLUCOSE BLDC GLUCOMTR-MCNC: 77 MG/DL — SIGNIFICANT CHANGE UP (ref 70–99)
GLUCOSE BLDC GLUCOMTR-MCNC: 78 MG/DL — SIGNIFICANT CHANGE UP (ref 70–99)
GLUCOSE BLDC GLUCOMTR-MCNC: 78 MG/DL — SIGNIFICANT CHANGE UP (ref 70–99)
GLUCOSE BLDC GLUCOMTR-MCNC: 79 MG/DL — SIGNIFICANT CHANGE UP (ref 70–99)
GLUCOSE BLDC GLUCOMTR-MCNC: 80 MG/DL — SIGNIFICANT CHANGE UP (ref 70–99)
GLUCOSE BLDC GLUCOMTR-MCNC: 80 MG/DL — SIGNIFICANT CHANGE UP (ref 70–99)
GLUCOSE BLDC GLUCOMTR-MCNC: 81 MG/DL — SIGNIFICANT CHANGE UP (ref 70–99)
GLUCOSE BLDC GLUCOMTR-MCNC: 81 MG/DL — SIGNIFICANT CHANGE UP (ref 70–99)
GLUCOSE BLDC GLUCOMTR-MCNC: 82 MG/DL — SIGNIFICANT CHANGE UP (ref 70–99)
GLUCOSE BLDC GLUCOMTR-MCNC: 83 MG/DL — SIGNIFICANT CHANGE UP (ref 70–99)
GLUCOSE BLDC GLUCOMTR-MCNC: 83 MG/DL — SIGNIFICANT CHANGE UP (ref 70–99)
GLUCOSE BLDC GLUCOMTR-MCNC: 85 MG/DL — SIGNIFICANT CHANGE UP (ref 70–99)
GLUCOSE BLDC GLUCOMTR-MCNC: 86 MG/DL — SIGNIFICANT CHANGE UP (ref 70–99)
GLUCOSE BLDC GLUCOMTR-MCNC: 88 MG/DL — SIGNIFICANT CHANGE UP (ref 70–99)
GLUCOSE BLDC GLUCOMTR-MCNC: 89 MG/DL — SIGNIFICANT CHANGE UP (ref 70–99)
GLUCOSE BLDC GLUCOMTR-MCNC: 89 MG/DL — SIGNIFICANT CHANGE UP (ref 70–99)
GLUCOSE BLDC GLUCOMTR-MCNC: 90 MG/DL — SIGNIFICANT CHANGE UP (ref 70–99)
GLUCOSE BLDC GLUCOMTR-MCNC: 91 MG/DL — SIGNIFICANT CHANGE UP (ref 70–99)
GLUCOSE BLDC GLUCOMTR-MCNC: 91 MG/DL — SIGNIFICANT CHANGE UP (ref 70–99)
GLUCOSE BLDC GLUCOMTR-MCNC: 92 MG/DL — SIGNIFICANT CHANGE UP (ref 70–99)
GLUCOSE BLDC GLUCOMTR-MCNC: 93 MG/DL — SIGNIFICANT CHANGE UP (ref 70–99)
GLUCOSE BLDC GLUCOMTR-MCNC: 93 MG/DL — SIGNIFICANT CHANGE UP (ref 70–99)
GLUCOSE BLDC GLUCOMTR-MCNC: 94 MG/DL — SIGNIFICANT CHANGE UP (ref 70–99)
GLUCOSE BLDC GLUCOMTR-MCNC: 95 MG/DL — SIGNIFICANT CHANGE UP (ref 70–99)
GLUCOSE BLDC GLUCOMTR-MCNC: 97 MG/DL — SIGNIFICANT CHANGE UP (ref 70–99)
GLUCOSE BLDC GLUCOMTR-MCNC: 97 MG/DL — SIGNIFICANT CHANGE UP (ref 70–99)
GLUCOSE BLDC GLUCOMTR-MCNC: 98 MG/DL — SIGNIFICANT CHANGE UP (ref 70–99)
GLUCOSE BLDC GLUCOMTR-MCNC: 98 MG/DL — SIGNIFICANT CHANGE UP (ref 70–99)
GLUCOSE BLDC GLUCOMTR-MCNC: <25 MG/DL — CRITICAL LOW (ref 70–99)
GLUCOSE BLDV-MCNC: 82 MG/DL — SIGNIFICANT CHANGE UP (ref 70–99)
GLUCOSE SERPL-MCNC: 104 MG/DL — HIGH (ref 70–99)
GLUCOSE SERPL-MCNC: 106 MG/DL — HIGH (ref 70–99)
GLUCOSE SERPL-MCNC: 107 MG/DL — HIGH (ref 70–99)
GLUCOSE SERPL-MCNC: 108 MG/DL — HIGH (ref 70–99)
GLUCOSE SERPL-MCNC: 114 MG/DL — HIGH (ref 70–99)
GLUCOSE SERPL-MCNC: 114 MG/DL — HIGH (ref 70–99)
GLUCOSE SERPL-MCNC: 117 MG/DL — HIGH (ref 70–99)
GLUCOSE SERPL-MCNC: 117 MG/DL — HIGH (ref 70–99)
GLUCOSE SERPL-MCNC: 118 MG/DL — HIGH (ref 70–99)
GLUCOSE SERPL-MCNC: 118 MG/DL — HIGH (ref 70–99)
GLUCOSE SERPL-MCNC: 123 MG/DL — HIGH (ref 70–99)
GLUCOSE SERPL-MCNC: 124 MG/DL — HIGH (ref 70–99)
GLUCOSE SERPL-MCNC: 125 MG/DL — HIGH (ref 70–99)
GLUCOSE SERPL-MCNC: 136 MG/DL — HIGH (ref 70–99)
GLUCOSE SERPL-MCNC: 141 MG/DL — HIGH (ref 70–99)
GLUCOSE SERPL-MCNC: 143 MG/DL — HIGH (ref 70–99)
GLUCOSE SERPL-MCNC: 157 MG/DL — HIGH (ref 70–99)
GLUCOSE SERPL-MCNC: 159 MG/DL — HIGH (ref 70–99)
GLUCOSE SERPL-MCNC: 166 MG/DL — HIGH (ref 70–99)
GLUCOSE SERPL-MCNC: 188 MG/DL — HIGH (ref 70–99)
GLUCOSE SERPL-MCNC: 204 MG/DL — HIGH (ref 70–99)
GLUCOSE SERPL-MCNC: 256 MG/DL — HIGH (ref 70–99)
GLUCOSE SERPL-MCNC: 49 MG/DL — CRITICAL LOW (ref 70–99)
GLUCOSE SERPL-MCNC: 73 MG/DL — SIGNIFICANT CHANGE UP (ref 70–99)
GLUCOSE SERPL-MCNC: 74 MG/DL — SIGNIFICANT CHANGE UP (ref 70–99)
GLUCOSE SERPL-MCNC: 76 MG/DL — SIGNIFICANT CHANGE UP (ref 70–99)
GLUCOSE SERPL-MCNC: 77 MG/DL — SIGNIFICANT CHANGE UP (ref 70–99)
GLUCOSE SERPL-MCNC: 79 MG/DL — SIGNIFICANT CHANGE UP (ref 70–99)
GLUCOSE SERPL-MCNC: 82 MG/DL — SIGNIFICANT CHANGE UP (ref 70–99)
GLUCOSE SERPL-MCNC: 83 MG/DL — SIGNIFICANT CHANGE UP (ref 70–99)
GLUCOSE SERPL-MCNC: 87 MG/DL — SIGNIFICANT CHANGE UP (ref 70–99)
GLUCOSE SERPL-MCNC: 88 MG/DL — SIGNIFICANT CHANGE UP (ref 70–99)
GLUCOSE SERPL-MCNC: 92 MG/DL — SIGNIFICANT CHANGE UP (ref 70–99)
GLUCOSE SERPL-MCNC: 97 MG/DL — SIGNIFICANT CHANGE UP (ref 70–99)
GLUCOSE SERPL-MCNC: SIGNIFICANT CHANGE UP MG/DL (ref 70–99)
GLUCOSE UR QL: NEGATIVE — SIGNIFICANT CHANGE UP
HADV DNA SPEC QL NAA+PROBE: SIGNIFICANT CHANGE UP
HADV DNA SPEC QL NAA+PROBE: SIGNIFICANT CHANGE UP
HAV IGM SER-ACNC: SIGNIFICANT CHANGE UP
HBV CORE IGM SER-ACNC: SIGNIFICANT CHANGE UP
HBV DNA # SERPL NAA+PROBE: SIGNIFICANT CHANGE UP IU/ML
HBV DNA SERPL NAA+PROBE-LOG#: SIGNIFICANT CHANGE UP LOG10IU/ML
HBV SURFACE AG SER-ACNC: SIGNIFICANT CHANGE UP
HCO3 BLDV-SCNC: 26 MMOL/L — SIGNIFICANT CHANGE UP (ref 20–27)
HCOV 229E RNA SPEC QL NAA+PROBE: SIGNIFICANT CHANGE UP
HCOV HKU1 RNA SPEC QL NAA+PROBE: SIGNIFICANT CHANGE UP
HCOV NL63 RNA SPEC QL NAA+PROBE: SIGNIFICANT CHANGE UP
HCOV OC43 RNA SPEC QL NAA+PROBE: SIGNIFICANT CHANGE UP
HCOV PNL SPEC NAA+PROBE: SIGNIFICANT CHANGE UP
HCT VFR BLD CALC: 14 % — CRITICAL LOW (ref 39–50)
HCT VFR BLD CALC: 26.3 % — LOW (ref 39–50)
HCT VFR BLD CALC: 26.8 % — LOW (ref 39–50)
HCT VFR BLD CALC: 27.5 % — LOW (ref 39–50)
HCT VFR BLD CALC: 29.1 % — LOW (ref 39–50)
HCT VFR BLD CALC: 30.6 % — LOW (ref 39–50)
HCT VFR BLD CALC: 33.8 % — LOW (ref 39–50)
HCT VFR BLD CALC: 34.1 % — LOW (ref 39–50)
HCT VFR BLD CALC: 34.4 % — LOW (ref 39–50)
HCT VFR BLD CALC: 34.5 % — LOW (ref 39–50)
HCT VFR BLD CALC: 35 % — LOW (ref 39–50)
HCT VFR BLD CALC: 35.1 % — LOW (ref 39–50)
HCT VFR BLD CALC: 35.3 % — LOW (ref 39–50)
HCT VFR BLD CALC: 35.6 % — LOW (ref 39–50)
HCT VFR BLD CALC: 35.7 % — LOW (ref 39–50)
HCT VFR BLD CALC: 35.8 % — LOW (ref 39–50)
HCT VFR BLD CALC: 35.9 % — LOW (ref 39–50)
HCT VFR BLD CALC: 36.1 % — LOW (ref 39–50)
HCT VFR BLD CALC: 36.1 % — LOW (ref 39–50)
HCT VFR BLD CALC: 36.2 % — LOW (ref 39–50)
HCT VFR BLD CALC: 36.8 % — LOW (ref 39–50)
HCT VFR BLD CALC: 37.4 % — LOW (ref 39–50)
HCT VFR BLD CALC: 38.7 % — LOW (ref 39–50)
HCT VFR BLD CALC: 39.5 % — SIGNIFICANT CHANGE UP (ref 39–50)
HCT VFR BLD CALC: 39.9 % — SIGNIFICANT CHANGE UP (ref 39–50)
HCT VFR BLD CALC: 40.2 % — SIGNIFICANT CHANGE UP (ref 39–50)
HCT VFR BLD CALC: 41.6 % — SIGNIFICANT CHANGE UP (ref 39–50)
HCT VFR BLD CALC: 7.4 % — CRITICAL LOW (ref 39–50)
HCT VFR BLDA CALC: 38.1 % — LOW (ref 39–51)
HCV AB S/CO SERPL IA: 0.17 S/CO — SIGNIFICANT CHANGE UP (ref 0–0.99)
HCV AB S/CO SERPL IA: 0.19 S/CO — SIGNIFICANT CHANGE UP (ref 0–0.99)
HCV AB SERPL-IMP: SIGNIFICANT CHANGE UP
HCV AB SERPL-IMP: SIGNIFICANT CHANGE UP
HCV RNA SERPL NAA DL=5-ACNC: SIGNIFICANT CHANGE UP IU/ML
HCV RNA SPEC NAA+PROBE-LOG IU: SIGNIFICANT CHANGE UP LOG10IU/ML
HDLC SERPL-MCNC: 49 MG/DL — SIGNIFICANT CHANGE UP
HGB BLD CALC-MCNC: 12.4 G/DL — LOW (ref 13–17)
HGB BLD-MCNC: 10.8 G/DL — LOW (ref 13–17)
HGB BLD-MCNC: 10.9 G/DL — LOW (ref 13–17)
HGB BLD-MCNC: 11 G/DL — LOW (ref 13–17)
HGB BLD-MCNC: 11 G/DL — LOW (ref 13–17)
HGB BLD-MCNC: 11.1 G/DL — LOW (ref 13–17)
HGB BLD-MCNC: 11.1 G/DL — LOW (ref 13–17)
HGB BLD-MCNC: 11.2 G/DL — LOW (ref 13–17)
HGB BLD-MCNC: 11.3 G/DL — LOW (ref 13–17)
HGB BLD-MCNC: 11.3 G/DL — LOW (ref 13–17)
HGB BLD-MCNC: 11.6 G/DL — LOW (ref 13–17)
HGB BLD-MCNC: 11.6 G/DL — LOW (ref 13–17)
HGB BLD-MCNC: 11.7 G/DL — LOW (ref 13–17)
HGB BLD-MCNC: 11.7 G/DL — LOW (ref 13–17)
HGB BLD-MCNC: 11.8 G/DL — LOW (ref 13–17)
HGB BLD-MCNC: 11.8 G/DL — LOW (ref 13–17)
HGB BLD-MCNC: 11.9 G/DL — LOW (ref 13–17)
HGB BLD-MCNC: 12.3 G/DL — LOW (ref 13–17)
HGB BLD-MCNC: 12.5 G/DL — LOW (ref 13–17)
HGB BLD-MCNC: 12.5 G/DL — LOW (ref 13–17)
HGB BLD-MCNC: 12.9 G/DL — LOW (ref 13–17)
HGB BLD-MCNC: 13.4 G/DL — SIGNIFICANT CHANGE UP (ref 13–17)
HGB BLD-MCNC: 2.3 G/DL — CRITICAL LOW (ref 13–17)
HGB BLD-MCNC: 4.4 G/DL — CRITICAL LOW (ref 13–17)
HGB BLD-MCNC: 8.3 G/DL — LOW (ref 13–17)
HGB BLD-MCNC: 8.6 G/DL — LOW (ref 13–17)
HGB BLD-MCNC: 8.7 G/DL — LOW (ref 13–17)
HGB BLD-MCNC: 9.4 G/DL — LOW (ref 13–17)
HGB BLD-MCNC: 9.6 G/DL — LOW (ref 13–17)
HMPV RNA SPEC QL NAA+PROBE: SIGNIFICANT CHANGE UP
HMPV RNA SPEC QL NAA+PROBE: SIGNIFICANT CHANGE UP
HPIV1 RNA SPEC QL NAA+PROBE: SIGNIFICANT CHANGE UP
HPIV1 RNA SPEC QL NAA+PROBE: SIGNIFICANT CHANGE UP
HPIV2 RNA SPEC QL NAA+PROBE: SIGNIFICANT CHANGE UP
HPIV2 RNA SPEC QL NAA+PROBE: SIGNIFICANT CHANGE UP
HPIV3 RNA SPEC QL NAA+PROBE: SIGNIFICANT CHANGE UP
HPIV3 RNA SPEC QL NAA+PROBE: SIGNIFICANT CHANGE UP
HPIV4 RNA SPEC QL NAA+PROBE: SIGNIFICANT CHANGE UP
HPIV4 RNA SPEC QL NAA+PROBE: SIGNIFICANT CHANGE UP
IANC: 2.23 K/UL — SIGNIFICANT CHANGE UP (ref 1.5–8.5)
IANC: 2.25 K/UL — SIGNIFICANT CHANGE UP (ref 1.5–8.5)
IANC: 2.67 K/UL — SIGNIFICANT CHANGE UP (ref 1.5–8.5)
IANC: 2.77 K/UL — SIGNIFICANT CHANGE UP (ref 1.5–8.5)
IANC: 3.04 K/UL — SIGNIFICANT CHANGE UP (ref 1.5–8.5)
IANC: 3.18 K/UL — SIGNIFICANT CHANGE UP (ref 1.5–8.5)
IANC: 3.29 K/UL — SIGNIFICANT CHANGE UP (ref 1.5–8.5)
IANC: 5.18 K/UL — SIGNIFICANT CHANGE UP (ref 1.5–8.5)
IANC: 5.71 K/UL — SIGNIFICANT CHANGE UP (ref 1.5–8.5)
IANC: 6.62 K/UL — SIGNIFICANT CHANGE UP (ref 1.5–8.5)
IGG SERPL-MCNC: 1353 MG/DL — SIGNIFICANT CHANGE UP (ref 603–1613)
IGG1 SER-MCNC: 867 MG/DL — HIGH (ref 248–810)
IGG2 SER-MCNC: 229 MG/DL — SIGNIFICANT CHANGE UP (ref 130–555)
IGG3 SER-MCNC: 35 MG/DL — SIGNIFICANT CHANGE UP (ref 15–102)
IGG4 SER-MCNC: 18 MG/DL — SIGNIFICANT CHANGE UP (ref 2–96)
IMM GRANULOCYTES NFR BLD AUTO: 0.2 % — SIGNIFICANT CHANGE UP (ref 0–1.5)
IMM GRANULOCYTES NFR BLD AUTO: 0.3 % — SIGNIFICANT CHANGE UP (ref 0–1.5)
IMM GRANULOCYTES NFR BLD AUTO: 0.4 % — SIGNIFICANT CHANGE UP (ref 0–1.5)
IMM GRANULOCYTES NFR BLD AUTO: 0.4 % — SIGNIFICANT CHANGE UP (ref 0–1.5)
IMM GRANULOCYTES NFR BLD AUTO: 0.5 % — SIGNIFICANT CHANGE UP (ref 0–1.5)
IMM GRANULOCYTES NFR BLD AUTO: 0.6 % — SIGNIFICANT CHANGE UP (ref 0–1.5)
INR BLD: 1.39 RATIO — HIGH (ref 0.88–1.16)
INR BLD: 1.56 RATIO — HIGH (ref 0.88–1.16)
INR BLD: 1.67 RATIO — HIGH (ref 0.88–1.16)
INR BLD: 1.95 RATIO — HIGH (ref 0.88–1.16)
INR BLD: 2.97 RATIO — HIGH (ref 0.88–1.16)
INR BLD: 2.98 RATIO — HIGH (ref 0.88–1.16)
INSULIN FREE SERPL-ACNC: ABNORMAL
INSULIN SERPL-MCNC: 1.2 UU/ML — LOW (ref 2.6–24.9)
KETONES UR-MCNC: NEGATIVE — SIGNIFICANT CHANGE UP
LACTATE BLDV-MCNC: 2 MMOL/L — SIGNIFICANT CHANGE UP (ref 0.5–2)
LACTATE SERPL-SCNC: 1.3 MMOL/L — SIGNIFICANT CHANGE UP (ref 0.5–2)
LACTATE SERPL-SCNC: 1.5 MMOL/L — SIGNIFICANT CHANGE UP (ref 0.5–2)
LACTATE SERPL-SCNC: 2 MMOL/L — SIGNIFICANT CHANGE UP (ref 0.5–2)
LACTATE SERPL-SCNC: 2.3 MMOL/L — HIGH (ref 0.5–2)
LEUKOCYTE ESTERASE UR-ACNC: ABNORMAL
LIPID PNL WITH DIRECT LDL SERPL: SIGNIFICANT CHANGE UP MG/DL
LKM AB SER-ACNC: <20.1 UNITS — SIGNIFICANT CHANGE UP (ref 0–20)
LYMPHOCYTES # BLD AUTO: 0.29 K/UL — LOW (ref 1–3.3)
LYMPHOCYTES # BLD AUTO: 0.48 K/UL — LOW (ref 1–3.3)
LYMPHOCYTES # BLD AUTO: 0.74 K/UL — LOW (ref 1–3.3)
LYMPHOCYTES # BLD AUTO: 0.8 K/UL — LOW (ref 1–3.3)
LYMPHOCYTES # BLD AUTO: 0.91 K/UL — LOW (ref 1–3.3)
LYMPHOCYTES # BLD AUTO: 0.94 K/UL — LOW (ref 1–3.3)
LYMPHOCYTES # BLD AUTO: 0.94 K/UL — LOW (ref 1–3.3)
LYMPHOCYTES # BLD AUTO: 0.96 K/UL — LOW (ref 1–3.3)
LYMPHOCYTES # BLD AUTO: 0.97 K/UL — LOW (ref 1–3.3)
LYMPHOCYTES # BLD AUTO: 1.2 K/UL — SIGNIFICANT CHANGE UP (ref 1–3.3)
LYMPHOCYTES # BLD AUTO: 12.2 % — LOW (ref 13–44)
LYMPHOCYTES # BLD AUTO: 13.6 % — SIGNIFICANT CHANGE UP (ref 13–44)
LYMPHOCYTES # BLD AUTO: 14.8 % — SIGNIFICANT CHANGE UP (ref 13–44)
LYMPHOCYTES # BLD AUTO: 18.7 % — SIGNIFICANT CHANGE UP (ref 13–44)
LYMPHOCYTES # BLD AUTO: 19.7 % — SIGNIFICANT CHANGE UP (ref 13–44)
LYMPHOCYTES # BLD AUTO: 19.8 % — SIGNIFICANT CHANGE UP (ref 13–44)
LYMPHOCYTES # BLD AUTO: 20.2 % — SIGNIFICANT CHANGE UP (ref 13–44)
LYMPHOCYTES # BLD AUTO: 22.8 % — SIGNIFICANT CHANGE UP (ref 13–44)
LYMPHOCYTES # BLD AUTO: 23.3 % — SIGNIFICANT CHANGE UP (ref 13–44)
LYMPHOCYTES # BLD AUTO: 3.5 % — LOW (ref 13–44)
MAGNESIUM SERPL-MCNC: 1.2 MG/DL — LOW (ref 1.6–2.6)
MAGNESIUM SERPL-MCNC: 2.1 MG/DL — SIGNIFICANT CHANGE UP (ref 1.6–2.6)
MAGNESIUM SERPL-MCNC: 2.2 MG/DL — SIGNIFICANT CHANGE UP (ref 1.6–2.6)
MAGNESIUM SERPL-MCNC: 2.3 MG/DL — SIGNIFICANT CHANGE UP (ref 1.6–2.6)
MAGNESIUM SERPL-MCNC: 2.4 MG/DL — SIGNIFICANT CHANGE UP (ref 1.6–2.6)
MAGNESIUM SERPL-MCNC: 2.5 MG/DL — SIGNIFICANT CHANGE UP (ref 1.6–2.6)
MAGNESIUM SERPL-MCNC: 2.6 MG/DL — SIGNIFICANT CHANGE UP (ref 1.6–2.6)
MAGNESIUM SERPL-MCNC: 2.7 MG/DL — HIGH (ref 1.6–2.6)
MAGNESIUM SERPL-MCNC: 2.7 MG/DL — HIGH (ref 1.6–2.6)
MAGNESIUM SERPL-MCNC: 2.8 MG/DL — HIGH (ref 1.6–2.6)
MAGNESIUM SERPL-MCNC: 2.9 MG/DL — HIGH (ref 1.6–2.6)
MCHC RBC-ENTMCNC: 28.4 PG — SIGNIFICANT CHANGE UP (ref 27–34)
MCHC RBC-ENTMCNC: 28.5 PG — SIGNIFICANT CHANGE UP (ref 27–34)
MCHC RBC-ENTMCNC: 28.6 PG — SIGNIFICANT CHANGE UP (ref 27–34)
MCHC RBC-ENTMCNC: 28.6 PG — SIGNIFICANT CHANGE UP (ref 27–34)
MCHC RBC-ENTMCNC: 28.7 PG — SIGNIFICANT CHANGE UP (ref 27–34)
MCHC RBC-ENTMCNC: 28.8 PG — SIGNIFICANT CHANGE UP (ref 27–34)
MCHC RBC-ENTMCNC: 28.8 PG — SIGNIFICANT CHANGE UP (ref 27–34)
MCHC RBC-ENTMCNC: 29 PG — SIGNIFICANT CHANGE UP (ref 27–34)
MCHC RBC-ENTMCNC: 29.3 PG — SIGNIFICANT CHANGE UP (ref 27–34)
MCHC RBC-ENTMCNC: 29.3 PG — SIGNIFICANT CHANGE UP (ref 27–34)
MCHC RBC-ENTMCNC: 29.4 PG — SIGNIFICANT CHANGE UP (ref 27–34)
MCHC RBC-ENTMCNC: 29.4 PG — SIGNIFICANT CHANGE UP (ref 27–34)
MCHC RBC-ENTMCNC: 29.5 PG — SIGNIFICANT CHANGE UP (ref 27–34)
MCHC RBC-ENTMCNC: 29.6 PG — SIGNIFICANT CHANGE UP (ref 27–34)
MCHC RBC-ENTMCNC: 29.7 PG — SIGNIFICANT CHANGE UP (ref 27–34)
MCHC RBC-ENTMCNC: 29.8 PG — SIGNIFICANT CHANGE UP (ref 27–34)
MCHC RBC-ENTMCNC: 30.1 GM/DL — LOW (ref 32–36)
MCHC RBC-ENTMCNC: 30.2 GM/DL — LOW (ref 32–36)
MCHC RBC-ENTMCNC: 30.2 PG — SIGNIFICANT CHANGE UP (ref 27–34)
MCHC RBC-ENTMCNC: 30.4 PG — SIGNIFICANT CHANGE UP (ref 27–34)
MCHC RBC-ENTMCNC: 30.4 PG — SIGNIFICANT CHANGE UP (ref 27–34)
MCHC RBC-ENTMCNC: 30.5 PG — SIGNIFICANT CHANGE UP (ref 27–34)
MCHC RBC-ENTMCNC: 30.6 PG — SIGNIFICANT CHANGE UP (ref 27–34)
MCHC RBC-ENTMCNC: 30.6 PG — SIGNIFICANT CHANGE UP (ref 27–34)
MCHC RBC-ENTMCNC: 30.7 PG — SIGNIFICANT CHANGE UP (ref 27–34)
MCHC RBC-ENTMCNC: 30.7 PG — SIGNIFICANT CHANGE UP (ref 27–34)
MCHC RBC-ENTMCNC: 30.8 PG — SIGNIFICANT CHANGE UP (ref 27–34)
MCHC RBC-ENTMCNC: 31 GM/DL — LOW (ref 32–36)
MCHC RBC-ENTMCNC: 31.1 GM/DL — LOW (ref 32–36)
MCHC RBC-ENTMCNC: 31.2 GM/DL — LOW (ref 32–36)
MCHC RBC-ENTMCNC: 31.2 PG — SIGNIFICANT CHANGE UP (ref 27–34)
MCHC RBC-ENTMCNC: 31.3 GM/DL — LOW (ref 32–36)
MCHC RBC-ENTMCNC: 31.4 GM/DL — LOW (ref 32–36)
MCHC RBC-ENTMCNC: 31.4 GM/DL — LOW (ref 32–36)
MCHC RBC-ENTMCNC: 31.5 GM/DL — LOW (ref 32–36)
MCHC RBC-ENTMCNC: 31.7 GM/DL — LOW (ref 32–36)
MCHC RBC-ENTMCNC: 32 GM/DL — SIGNIFICANT CHANGE UP (ref 32–36)
MCHC RBC-ENTMCNC: 32.1 GM/DL — SIGNIFICANT CHANGE UP (ref 32–36)
MCHC RBC-ENTMCNC: 32.2 GM/DL — SIGNIFICANT CHANGE UP (ref 32–36)
MCHC RBC-ENTMCNC: 32.3 GM/DL — SIGNIFICANT CHANGE UP (ref 32–36)
MCHC RBC-ENTMCNC: 32.4 GM/DL — SIGNIFICANT CHANGE UP (ref 32–36)
MCHC RBC-ENTMCNC: 32.6 GM/DL — SIGNIFICANT CHANGE UP (ref 32–36)
MCHC RBC-ENTMCNC: 32.6 GM/DL — SIGNIFICANT CHANGE UP (ref 32–36)
MCHC RBC-ENTMCNC: 32.8 GM/DL — SIGNIFICANT CHANGE UP (ref 32–36)
MCHC RBC-ENTMCNC: 32.9 GM/DL — SIGNIFICANT CHANGE UP (ref 32–36)
MCHC RBC-ENTMCNC: 33 GM/DL — SIGNIFICANT CHANGE UP (ref 32–36)
MCHC RBC-ENTMCNC: 33.1 GM/DL — SIGNIFICANT CHANGE UP (ref 32–36)
MCHC RBC-ENTMCNC: 33.1 GM/DL — SIGNIFICANT CHANGE UP (ref 32–36)
MCHC RBC-ENTMCNC: 33.4 GM/DL — SIGNIFICANT CHANGE UP (ref 32–36)
MCV RBC AUTO: 88.5 FL — SIGNIFICANT CHANGE UP (ref 80–100)
MCV RBC AUTO: 89.3 FL — SIGNIFICANT CHANGE UP (ref 80–100)
MCV RBC AUTO: 89.4 FL — SIGNIFICANT CHANGE UP (ref 80–100)
MCV RBC AUTO: 90.6 FL — SIGNIFICANT CHANGE UP (ref 80–100)
MCV RBC AUTO: 90.7 FL — SIGNIFICANT CHANGE UP (ref 80–100)
MCV RBC AUTO: 91 FL — SIGNIFICANT CHANGE UP (ref 80–100)
MCV RBC AUTO: 91.3 FL — SIGNIFICANT CHANGE UP (ref 80–100)
MCV RBC AUTO: 91.4 FL — SIGNIFICANT CHANGE UP (ref 80–100)
MCV RBC AUTO: 91.6 FL — SIGNIFICANT CHANGE UP (ref 80–100)
MCV RBC AUTO: 91.6 FL — SIGNIFICANT CHANGE UP (ref 80–100)
MCV RBC AUTO: 91.7 FL — SIGNIFICANT CHANGE UP (ref 80–100)
MCV RBC AUTO: 91.9 FL — SIGNIFICANT CHANGE UP (ref 80–100)
MCV RBC AUTO: 92.3 FL — SIGNIFICANT CHANGE UP (ref 80–100)
MCV RBC AUTO: 92.4 FL — SIGNIFICANT CHANGE UP (ref 80–100)
MCV RBC AUTO: 92.8 FL — SIGNIFICANT CHANGE UP (ref 80–100)
MCV RBC AUTO: 93.4 FL — SIGNIFICANT CHANGE UP (ref 80–100)
MCV RBC AUTO: 93.6 FL — SIGNIFICANT CHANGE UP (ref 80–100)
MCV RBC AUTO: 93.8 FL — SIGNIFICANT CHANGE UP (ref 80–100)
MCV RBC AUTO: 94.3 FL — SIGNIFICANT CHANGE UP (ref 80–100)
MCV RBC AUTO: 94.5 FL — SIGNIFICANT CHANGE UP (ref 80–100)
MCV RBC AUTO: 94.6 FL — SIGNIFICANT CHANGE UP (ref 80–100)
MCV RBC AUTO: 95.2 FL — SIGNIFICANT CHANGE UP (ref 80–100)
MCV RBC AUTO: 95.4 FL — SIGNIFICANT CHANGE UP (ref 80–100)
MCV RBC AUTO: 95.7 FL — SIGNIFICANT CHANGE UP (ref 80–100)
MCV RBC AUTO: 96.1 FL — SIGNIFICANT CHANGE UP (ref 80–100)
MCV RBC AUTO: 96.8 FL — SIGNIFICANT CHANGE UP (ref 80–100)
MCV RBC AUTO: 97.2 FL — SIGNIFICANT CHANGE UP (ref 80–100)
MCV RBC AUTO: 98.7 FL — SIGNIFICANT CHANGE UP (ref 80–100)
MITOCHONDRIA AB SER-ACNC: SIGNIFICANT CHANGE UP
MONOCYTES # BLD AUTO: 0.3 K/UL — SIGNIFICANT CHANGE UP (ref 0–0.9)
MONOCYTES # BLD AUTO: 0.34 K/UL — SIGNIFICANT CHANGE UP (ref 0–0.9)
MONOCYTES # BLD AUTO: 0.35 K/UL — SIGNIFICANT CHANGE UP (ref 0–0.9)
MONOCYTES # BLD AUTO: 0.39 K/UL — SIGNIFICANT CHANGE UP (ref 0–0.9)
MONOCYTES # BLD AUTO: 0.49 K/UL — SIGNIFICANT CHANGE UP (ref 0–0.9)
MONOCYTES # BLD AUTO: 0.51 K/UL — SIGNIFICANT CHANGE UP (ref 0–0.9)
MONOCYTES # BLD AUTO: 0.53 K/UL — SIGNIFICANT CHANGE UP (ref 0–0.9)
MONOCYTES # BLD AUTO: 0.61 K/UL — SIGNIFICANT CHANGE UP (ref 0–0.9)
MONOCYTES # BLD AUTO: 0.75 K/UL — SIGNIFICANT CHANGE UP (ref 0–0.9)
MONOCYTES # BLD AUTO: 0.77 K/UL — SIGNIFICANT CHANGE UP (ref 0–0.9)
MONOCYTES NFR BLD AUTO: 10.3 % — SIGNIFICANT CHANGE UP (ref 2–14)
MONOCYTES NFR BLD AUTO: 10.8 % — SIGNIFICANT CHANGE UP (ref 2–14)
MONOCYTES NFR BLD AUTO: 12.4 % — SIGNIFICANT CHANGE UP (ref 2–14)
MONOCYTES NFR BLD AUTO: 6.1 % — SIGNIFICANT CHANGE UP (ref 2–14)
MONOCYTES NFR BLD AUTO: 8.6 % — SIGNIFICANT CHANGE UP (ref 2–14)
MONOCYTES NFR BLD AUTO: 9.2 % — SIGNIFICANT CHANGE UP (ref 2–14)
MONOCYTES NFR BLD AUTO: 9.4 % — SIGNIFICANT CHANGE UP (ref 2–14)
MONOCYTES NFR BLD AUTO: 9.7 % — SIGNIFICANT CHANGE UP (ref 2–14)
MONOCYTES NFR BLD AUTO: 9.8 % — SIGNIFICANT CHANGE UP (ref 2–14)
MONOCYTES NFR BLD AUTO: 9.8 % — SIGNIFICANT CHANGE UP (ref 2–14)
MRSA PCR RESULT.: DETECTED
MRSA PCR RESULT.: SIGNIFICANT CHANGE UP
NEUTROPHILS # BLD AUTO: 2.23 K/UL — SIGNIFICANT CHANGE UP (ref 1.8–7.4)
NEUTROPHILS # BLD AUTO: 2.25 K/UL — SIGNIFICANT CHANGE UP (ref 1.8–7.4)
NEUTROPHILS # BLD AUTO: 2.67 K/UL — SIGNIFICANT CHANGE UP (ref 1.8–7.4)
NEUTROPHILS # BLD AUTO: 2.77 K/UL — SIGNIFICANT CHANGE UP (ref 1.8–7.4)
NEUTROPHILS # BLD AUTO: 3.04 K/UL — SIGNIFICANT CHANGE UP (ref 1.8–7.4)
NEUTROPHILS # BLD AUTO: 3.18 K/UL — SIGNIFICANT CHANGE UP (ref 1.8–7.4)
NEUTROPHILS # BLD AUTO: 3.29 K/UL — SIGNIFICANT CHANGE UP (ref 1.8–7.4)
NEUTROPHILS # BLD AUTO: 5.18 K/UL — SIGNIFICANT CHANGE UP (ref 1.8–7.4)
NEUTROPHILS # BLD AUTO: 5.71 K/UL — SIGNIFICANT CHANGE UP (ref 1.8–7.4)
NEUTROPHILS # BLD AUTO: 7.24 K/UL — SIGNIFICANT CHANGE UP (ref 1.8–7.4)
NEUTROPHILS NFR BLD AUTO: 59.2 % — SIGNIFICANT CHANGE UP (ref 43–77)
NEUTROPHILS NFR BLD AUTO: 60.2 % — SIGNIFICANT CHANGE UP (ref 43–77)
NEUTROPHILS NFR BLD AUTO: 63.2 % — SIGNIFICANT CHANGE UP (ref 43–77)
NEUTROPHILS NFR BLD AUTO: 66.6 % — SIGNIFICANT CHANGE UP (ref 43–77)
NEUTROPHILS NFR BLD AUTO: 66.7 % — SIGNIFICANT CHANGE UP (ref 43–77)
NEUTROPHILS NFR BLD AUTO: 68.6 % — SIGNIFICANT CHANGE UP (ref 43–77)
NEUTROPHILS NFR BLD AUTO: 69.6 % — SIGNIFICANT CHANGE UP (ref 43–77)
NEUTROPHILS NFR BLD AUTO: 72.9 % — SIGNIFICANT CHANGE UP (ref 43–77)
NEUTROPHILS NFR BLD AUTO: 74.7 % — SIGNIFICANT CHANGE UP (ref 43–77)
NEUTROPHILS NFR BLD AUTO: 80.7 % — HIGH (ref 43–77)
NITRITE UR-MCNC: NEGATIVE — SIGNIFICANT CHANGE UP
NON HDL CHOLESTEROL: 84 MG/DL — SIGNIFICANT CHANGE UP
NRBC # BLD: 0 /100 WBCS — SIGNIFICANT CHANGE UP
NRBC # BLD: 1 /100 WBCS — SIGNIFICANT CHANGE UP
NRBC # BLD: 2 /100 WBCS — SIGNIFICANT CHANGE UP
NRBC # BLD: 3 /100 WBCS — SIGNIFICANT CHANGE UP
NRBC # FLD: 0 K/UL — SIGNIFICANT CHANGE UP
NRBC # FLD: 0.02 K/UL — HIGH
NRBC # FLD: 0.04 K/UL — HIGH
NRBC # FLD: 0.06 K/UL — HIGH
NRBC # FLD: 0.1 K/UL — HIGH
NRBC # FLD: 0.13 K/UL — HIGH
NRBC # FLD: 0.18 K/UL — HIGH
NRBC # FLD: 0.18 K/UL — HIGH
NRBC # FLD: 0.2 K/UL — HIGH
NRBC # FLD: 0.25 K/UL — HIGH
OB PNL STL: POSITIVE
PCO2 BLDV: 32 MMHG — LOW (ref 41–51)
PH BLDV: 7.5 — HIGH (ref 7.32–7.43)
PH UR: 8 — SIGNIFICANT CHANGE UP (ref 5–8)
PHOSPHATE SERPL-MCNC: 1.7 MG/DL — LOW (ref 2.5–4.5)
PHOSPHATE SERPL-MCNC: 2.5 MG/DL — SIGNIFICANT CHANGE UP (ref 2.5–4.5)
PHOSPHATE SERPL-MCNC: 2.5 MG/DL — SIGNIFICANT CHANGE UP (ref 2.5–4.5)
PHOSPHATE SERPL-MCNC: 3.2 MG/DL — SIGNIFICANT CHANGE UP (ref 2.5–4.5)
PHOSPHATE SERPL-MCNC: 3.3 MG/DL — SIGNIFICANT CHANGE UP (ref 2.5–4.5)
PHOSPHATE SERPL-MCNC: 3.6 MG/DL — SIGNIFICANT CHANGE UP (ref 2.5–4.5)
PHOSPHATE SERPL-MCNC: 4.2 MG/DL — SIGNIFICANT CHANGE UP (ref 2.5–4.5)
PHOSPHATE SERPL-MCNC: 4.3 MG/DL — SIGNIFICANT CHANGE UP (ref 2.5–4.5)
PHOSPHATE SERPL-MCNC: 4.4 MG/DL — SIGNIFICANT CHANGE UP (ref 2.5–4.5)
PHOSPHATE SERPL-MCNC: 4.5 MG/DL — SIGNIFICANT CHANGE UP (ref 2.5–4.5)
PHOSPHATE SERPL-MCNC: 4.7 MG/DL — HIGH (ref 2.5–4.5)
PHOSPHATE SERPL-MCNC: 5.2 MG/DL — HIGH (ref 2.5–4.5)
PHOSPHATE SERPL-MCNC: 5.5 MG/DL — HIGH (ref 2.5–4.5)
PHOSPHATE SERPL-MCNC: 5.8 MG/DL — HIGH (ref 2.5–4.5)
PHOSPHATE SERPL-MCNC: 5.8 MG/DL — HIGH (ref 2.5–4.5)
PHOSPHATE SERPL-MCNC: 6.4 MG/DL — HIGH (ref 2.5–4.5)
PHOSPHATE SERPL-MCNC: 6.4 MG/DL — HIGH (ref 2.5–4.5)
PHOSPHATE SERPL-MCNC: 7.1 MG/DL — HIGH (ref 2.5–4.5)
PHOSPHATE SERPL-MCNC: 7.2 MG/DL — HIGH (ref 2.5–4.5)
PHOSPHATE SERPL-MCNC: 7.5 MG/DL — HIGH (ref 2.5–4.5)
PHOSPHATE SERPL-MCNC: 7.5 MG/DL — HIGH (ref 2.5–4.5)
PHOSPHATE SERPL-MCNC: 7.7 MG/DL — HIGH (ref 2.5–4.5)
PHOSPHATE SERPL-MCNC: SIGNIFICANT CHANGE UP MG/DL (ref 2.5–4.5)
PLATELET # BLD AUTO: 101 K/UL — LOW (ref 150–400)
PLATELET # BLD AUTO: 102 K/UL — LOW (ref 150–400)
PLATELET # BLD AUTO: 103 K/UL — LOW (ref 150–400)
PLATELET # BLD AUTO: 105 K/UL — LOW (ref 150–400)
PLATELET # BLD AUTO: 131 K/UL — LOW (ref 150–400)
PLATELET # BLD AUTO: 28 K/UL — LOW (ref 150–400)
PLATELET # BLD AUTO: 29 K/UL — LOW (ref 150–400)
PLATELET # BLD AUTO: 31 K/UL — LOW (ref 150–400)
PLATELET # BLD AUTO: 32 K/UL — LOW (ref 150–400)
PLATELET # BLD AUTO: 37 K/UL — LOW (ref 150–400)
PLATELET # BLD AUTO: 4 K/UL — CRITICAL LOW (ref 150–400)
PLATELET # BLD AUTO: 43 K/UL — LOW (ref 150–400)
PLATELET # BLD AUTO: 45 K/UL — LOW (ref 150–400)
PLATELET # BLD AUTO: 46 K/UL — LOW (ref 150–400)
PLATELET # BLD AUTO: 48 K/UL — LOW (ref 150–400)
PLATELET # BLD AUTO: 55 K/UL — LOW (ref 150–400)
PLATELET # BLD AUTO: 56 K/UL — LOW (ref 150–400)
PLATELET # BLD AUTO: 62 K/UL — LOW (ref 150–400)
PLATELET # BLD AUTO: 64 K/UL — LOW (ref 150–400)
PLATELET # BLD AUTO: 65 K/UL — LOW (ref 150–400)
PLATELET # BLD AUTO: 74 K/UL — LOW (ref 150–400)
PLATELET # BLD AUTO: 74 K/UL — LOW (ref 150–400)
PLATELET # BLD AUTO: 75 K/UL — LOW (ref 150–400)
PLATELET # BLD AUTO: 78 K/UL — LOW (ref 150–400)
PLATELET # BLD AUTO: 78 K/UL — LOW (ref 150–400)
PLATELET # BLD AUTO: 83 K/UL — LOW (ref 150–400)
PLATELET # BLD AUTO: 93 K/UL — LOW (ref 150–400)
PLATELET # BLD AUTO: 96 K/UL — LOW (ref 150–400)
PO2 BLDV: 155 MMHG — HIGH (ref 35–40)
POTASSIUM BLDV-SCNC: 4.2 MMOL/L — SIGNIFICANT CHANGE UP (ref 3.4–4.5)
POTASSIUM SERPL-MCNC: 3.3 MMOL/L — LOW (ref 3.5–5.3)
POTASSIUM SERPL-MCNC: 3.6 MMOL/L — SIGNIFICANT CHANGE UP (ref 3.5–5.3)
POTASSIUM SERPL-MCNC: 3.7 MMOL/L — SIGNIFICANT CHANGE UP (ref 3.5–5.3)
POTASSIUM SERPL-MCNC: 3.7 MMOL/L — SIGNIFICANT CHANGE UP (ref 3.5–5.3)
POTASSIUM SERPL-MCNC: 3.8 MMOL/L — SIGNIFICANT CHANGE UP (ref 3.5–5.3)
POTASSIUM SERPL-MCNC: 3.9 MMOL/L — SIGNIFICANT CHANGE UP (ref 3.5–5.3)
POTASSIUM SERPL-MCNC: 3.9 MMOL/L — SIGNIFICANT CHANGE UP (ref 3.5–5.3)
POTASSIUM SERPL-MCNC: 4 MMOL/L — SIGNIFICANT CHANGE UP (ref 3.5–5.3)
POTASSIUM SERPL-MCNC: 4.2 MMOL/L — SIGNIFICANT CHANGE UP (ref 3.5–5.3)
POTASSIUM SERPL-MCNC: 4.4 MMOL/L — SIGNIFICANT CHANGE UP (ref 3.5–5.3)
POTASSIUM SERPL-MCNC: 4.5 MMOL/L — SIGNIFICANT CHANGE UP (ref 3.5–5.3)
POTASSIUM SERPL-MCNC: 4.6 MMOL/L — SIGNIFICANT CHANGE UP (ref 3.5–5.3)
POTASSIUM SERPL-MCNC: 4.6 MMOL/L — SIGNIFICANT CHANGE UP (ref 3.5–5.3)
POTASSIUM SERPL-MCNC: 4.7 MMOL/L — SIGNIFICANT CHANGE UP (ref 3.5–5.3)
POTASSIUM SERPL-MCNC: 4.9 MMOL/L — SIGNIFICANT CHANGE UP (ref 3.5–5.3)
POTASSIUM SERPL-MCNC: 5 MMOL/L — SIGNIFICANT CHANGE UP (ref 3.5–5.3)
POTASSIUM SERPL-MCNC: 5 MMOL/L — SIGNIFICANT CHANGE UP (ref 3.5–5.3)
POTASSIUM SERPL-MCNC: 5.1 MMOL/L — SIGNIFICANT CHANGE UP (ref 3.5–5.3)
POTASSIUM SERPL-MCNC: 5.1 MMOL/L — SIGNIFICANT CHANGE UP (ref 3.5–5.3)
POTASSIUM SERPL-MCNC: 5.2 MMOL/L — SIGNIFICANT CHANGE UP (ref 3.5–5.3)
POTASSIUM SERPL-MCNC: 5.3 MMOL/L — SIGNIFICANT CHANGE UP (ref 3.5–5.3)
POTASSIUM SERPL-MCNC: 5.4 MMOL/L — HIGH (ref 3.5–5.3)
POTASSIUM SERPL-MCNC: 5.5 MMOL/L — HIGH (ref 3.5–5.3)
POTASSIUM SERPL-MCNC: 5.6 MMOL/L — HIGH (ref 3.5–5.3)
POTASSIUM SERPL-MCNC: 5.6 MMOL/L — HIGH (ref 3.5–5.3)
POTASSIUM SERPL-MCNC: 5.8 MMOL/L — HIGH (ref 3.5–5.3)
POTASSIUM SERPL-MCNC: 5.9 MMOL/L — HIGH (ref 3.5–5.3)
POTASSIUM SERPL-MCNC: 6.1 MMOL/L — HIGH (ref 3.5–5.3)
POTASSIUM SERPL-MCNC: 6.2 MMOL/L — CRITICAL HIGH (ref 3.5–5.3)
POTASSIUM SERPL-MCNC: 6.5 MMOL/L — CRITICAL HIGH (ref 3.5–5.3)
POTASSIUM SERPL-MCNC: SIGNIFICANT CHANGE UP MMOL/L (ref 3.5–5.3)
POTASSIUM SERPL-SCNC: 3.3 MMOL/L — LOW (ref 3.5–5.3)
POTASSIUM SERPL-SCNC: 3.6 MMOL/L — SIGNIFICANT CHANGE UP (ref 3.5–5.3)
POTASSIUM SERPL-SCNC: 3.7 MMOL/L — SIGNIFICANT CHANGE UP (ref 3.5–5.3)
POTASSIUM SERPL-SCNC: 3.7 MMOL/L — SIGNIFICANT CHANGE UP (ref 3.5–5.3)
POTASSIUM SERPL-SCNC: 3.8 MMOL/L — SIGNIFICANT CHANGE UP (ref 3.5–5.3)
POTASSIUM SERPL-SCNC: 3.9 MMOL/L — SIGNIFICANT CHANGE UP (ref 3.5–5.3)
POTASSIUM SERPL-SCNC: 3.9 MMOL/L — SIGNIFICANT CHANGE UP (ref 3.5–5.3)
POTASSIUM SERPL-SCNC: 4 MMOL/L — SIGNIFICANT CHANGE UP (ref 3.5–5.3)
POTASSIUM SERPL-SCNC: 4.2 MMOL/L — SIGNIFICANT CHANGE UP (ref 3.5–5.3)
POTASSIUM SERPL-SCNC: 4.4 MMOL/L — SIGNIFICANT CHANGE UP (ref 3.5–5.3)
POTASSIUM SERPL-SCNC: 4.5 MMOL/L — SIGNIFICANT CHANGE UP (ref 3.5–5.3)
POTASSIUM SERPL-SCNC: 4.6 MMOL/L — SIGNIFICANT CHANGE UP (ref 3.5–5.3)
POTASSIUM SERPL-SCNC: 4.6 MMOL/L — SIGNIFICANT CHANGE UP (ref 3.5–5.3)
POTASSIUM SERPL-SCNC: 4.7 MMOL/L — SIGNIFICANT CHANGE UP (ref 3.5–5.3)
POTASSIUM SERPL-SCNC: 4.9 MMOL/L — SIGNIFICANT CHANGE UP (ref 3.5–5.3)
POTASSIUM SERPL-SCNC: 5 MMOL/L — SIGNIFICANT CHANGE UP (ref 3.5–5.3)
POTASSIUM SERPL-SCNC: 5 MMOL/L — SIGNIFICANT CHANGE UP (ref 3.5–5.3)
POTASSIUM SERPL-SCNC: 5.1 MMOL/L — SIGNIFICANT CHANGE UP (ref 3.5–5.3)
POTASSIUM SERPL-SCNC: 5.1 MMOL/L — SIGNIFICANT CHANGE UP (ref 3.5–5.3)
POTASSIUM SERPL-SCNC: 5.2 MMOL/L — SIGNIFICANT CHANGE UP (ref 3.5–5.3)
POTASSIUM SERPL-SCNC: 5.3 MMOL/L — SIGNIFICANT CHANGE UP (ref 3.5–5.3)
POTASSIUM SERPL-SCNC: 5.4 MMOL/L — HIGH (ref 3.5–5.3)
POTASSIUM SERPL-SCNC: 5.5 MMOL/L — HIGH (ref 3.5–5.3)
POTASSIUM SERPL-SCNC: 5.6 MMOL/L — HIGH (ref 3.5–5.3)
POTASSIUM SERPL-SCNC: 5.6 MMOL/L — HIGH (ref 3.5–5.3)
POTASSIUM SERPL-SCNC: 5.8 MMOL/L — HIGH (ref 3.5–5.3)
POTASSIUM SERPL-SCNC: 5.9 MMOL/L — HIGH (ref 3.5–5.3)
POTASSIUM SERPL-SCNC: 6.1 MMOL/L — HIGH (ref 3.5–5.3)
POTASSIUM SERPL-SCNC: 6.2 MMOL/L — CRITICAL HIGH (ref 3.5–5.3)
POTASSIUM SERPL-SCNC: 6.5 MMOL/L — CRITICAL HIGH (ref 3.5–5.3)
POTASSIUM SERPL-SCNC: SIGNIFICANT CHANGE UP MMOL/L (ref 3.5–5.3)
PROCALCITONIN SERPL-MCNC: 0.56 NG/ML — HIGH (ref 0.02–0.1)
PROT SERPL-MCNC: 4.2 G/DL — LOW (ref 6–8.3)
PROT SERPL-MCNC: 5.5 G/DL — LOW (ref 6–8.3)
PROT SERPL-MCNC: 5.6 G/DL — LOW (ref 6–8.3)
PROT SERPL-MCNC: 5.8 G/DL — LOW (ref 6–8.3)
PROT SERPL-MCNC: 5.9 G/DL — LOW (ref 6–8.3)
PROT SERPL-MCNC: 6 G/DL — SIGNIFICANT CHANGE UP (ref 6–8.3)
PROT SERPL-MCNC: 6.1 G/DL — SIGNIFICANT CHANGE UP (ref 6–8.3)
PROT SERPL-MCNC: 6.2 G/DL — SIGNIFICANT CHANGE UP (ref 6–8.3)
PROT SERPL-MCNC: 6.3 G/DL — SIGNIFICANT CHANGE UP (ref 6–8.3)
PROT SERPL-MCNC: 6.5 G/DL — SIGNIFICANT CHANGE UP (ref 6–8.3)
PROT SERPL-MCNC: 6.7 G/DL — SIGNIFICANT CHANGE UP (ref 6–8.3)
PROT SERPL-MCNC: 7 G/DL — SIGNIFICANT CHANGE UP (ref 6–8.3)
PROT SERPL-MCNC: 7.3 G/DL — SIGNIFICANT CHANGE UP (ref 6–8.3)
PROT SERPL-MCNC: 7.3 G/DL — SIGNIFICANT CHANGE UP (ref 6–8.3)
PROT SERPL-MCNC: 7.7 G/DL — SIGNIFICANT CHANGE UP (ref 6–8.3)
PROT SERPL-MCNC: SIGNIFICANT CHANGE UP G/DL (ref 6–8.3)
PROT UR-MCNC: ABNORMAL
PROTHROM AB SERPL-ACNC: 15.7 SEC — HIGH (ref 10.6–13.6)
PROTHROM AB SERPL-ACNC: 17.6 SEC — HIGH (ref 10.6–13.6)
PROTHROM AB SERPL-ACNC: 18.7 SEC — HIGH (ref 10.6–13.6)
PROTHROM AB SERPL-ACNC: 21.5 SEC — HIGH (ref 10.6–13.6)
PROTHROM AB SERPL-ACNC: 32.4 SEC — HIGH (ref 10.6–13.6)
PROTHROM AB SERPL-ACNC: 32.5 SEC — HIGH (ref 10.6–13.6)
RAPID RVP RESULT: SIGNIFICANT CHANGE UP
RAPID RVP RESULT: SIGNIFICANT CHANGE UP
RBC # BLD: 0.75 M/UL — LOW (ref 4.2–5.8)
RBC # BLD: 1.44 M/UL — LOW (ref 4.2–5.8)
RBC # BLD: 2.79 M/UL — LOW (ref 4.2–5.8)
RBC # BLD: 2.81 M/UL — LOW (ref 4.2–5.8)
RBC # BLD: 2.89 M/UL — LOW (ref 4.2–5.8)
RBC # BLD: 3.08 M/UL — LOW (ref 4.2–5.8)
RBC # BLD: 3.16 M/UL — LOW (ref 4.2–5.8)
RBC # BLD: 3.58 M/UL — LOW (ref 4.2–5.8)
RBC # BLD: 3.65 M/UL — LOW (ref 4.2–5.8)
RBC # BLD: 3.74 M/UL — LOW (ref 4.2–5.8)
RBC # BLD: 3.75 M/UL — LOW (ref 4.2–5.8)
RBC # BLD: 3.82 M/UL — LOW (ref 4.2–5.8)
RBC # BLD: 3.84 M/UL — LOW (ref 4.2–5.8)
RBC # BLD: 3.84 M/UL — LOW (ref 4.2–5.8)
RBC # BLD: 3.86 M/UL — LOW (ref 4.2–5.8)
RBC # BLD: 3.86 M/UL — LOW (ref 4.2–5.8)
RBC # BLD: 3.91 M/UL — LOW (ref 4.2–5.8)
RBC # BLD: 3.98 M/UL — LOW (ref 4.2–5.8)
RBC # BLD: 4 M/UL — LOW (ref 4.2–5.8)
RBC # BLD: 4.03 M/UL — LOW (ref 4.2–5.8)
RBC # BLD: 4.03 M/UL — LOW (ref 4.2–5.8)
RBC # BLD: 4.25 M/UL — SIGNIFICANT CHANGE UP (ref 4.2–5.8)
RBC # BLD: 4.27 M/UL — SIGNIFICANT CHANGE UP (ref 4.2–5.8)
RBC # BLD: 4.31 M/UL — SIGNIFICANT CHANGE UP (ref 4.2–5.8)
RBC # BLD: 4.35 M/UL — SIGNIFICANT CHANGE UP (ref 4.2–5.8)
RBC # BLD: 4.5 M/UL — SIGNIFICANT CHANGE UP (ref 4.2–5.8)
RBC # FLD: 15.8 % — HIGH (ref 10.3–14.5)
RBC # FLD: 16 % — HIGH (ref 10.3–14.5)
RBC # FLD: 16.3 % — HIGH (ref 10.3–14.5)
RBC # FLD: 16.7 % — HIGH (ref 10.3–14.5)
RBC # FLD: 16.8 % — HIGH (ref 10.3–14.5)
RBC # FLD: 17.1 % — HIGH (ref 10.3–14.5)
RBC # FLD: 17.3 % — HIGH (ref 10.3–14.5)
RBC # FLD: 17.3 % — HIGH (ref 10.3–14.5)
RBC # FLD: 17.6 % — HIGH (ref 10.3–14.5)
RBC # FLD: 18.6 % — HIGH (ref 10.3–14.5)
RBC # FLD: 18.8 % — HIGH (ref 10.3–14.5)
RBC # FLD: 19.7 % — HIGH (ref 10.3–14.5)
RBC # FLD: 19.9 % — HIGH (ref 10.3–14.5)
RBC # FLD: 19.9 % — HIGH (ref 10.3–14.5)
RBC # FLD: 20.2 % — HIGH (ref 10.3–14.5)
RBC # FLD: 20.3 % — HIGH (ref 10.3–14.5)
RBC # FLD: 20.6 % — HIGH (ref 10.3–14.5)
RBC # FLD: 20.7 % — HIGH (ref 10.3–14.5)
RBC # FLD: 20.8 % — HIGH (ref 10.3–14.5)
RBC # FLD: 20.9 % — HIGH (ref 10.3–14.5)
RBC # FLD: 21.2 % — HIGH (ref 10.3–14.5)
RBC # FLD: 21.2 % — HIGH (ref 10.3–14.5)
RH IG SCN BLD-IMP: POSITIVE — SIGNIFICANT CHANGE UP
RSV RNA SPEC QL NAA+PROBE: SIGNIFICANT CHANGE UP
RSV RNA SPEC QL NAA+PROBE: SIGNIFICANT CHANGE UP
RV+EV RNA SPEC QL NAA+PROBE: SIGNIFICANT CHANGE UP
RV+EV RNA SPEC QL NAA+PROBE: SIGNIFICANT CHANGE UP
S AUREUS DNA NOSE QL NAA+PROBE: DETECTED
S AUREUS DNA NOSE QL NAA+PROBE: DETECTED
SAO2 % BLDV: 99 % — HIGH (ref 60–85)
SARS-COV-2 RNA SPEC QL NAA+PROBE: SIGNIFICANT CHANGE UP
SMOOTH MUSCLE AB SER-ACNC: SIGNIFICANT CHANGE UP
SODIUM SERPL-SCNC: 128 MMOL/L — LOW (ref 135–145)
SODIUM SERPL-SCNC: 129 MMOL/L — LOW (ref 135–145)
SODIUM SERPL-SCNC: 130 MMOL/L — LOW (ref 135–145)
SODIUM SERPL-SCNC: 131 MMOL/L — LOW (ref 135–145)
SODIUM SERPL-SCNC: 132 MMOL/L — LOW (ref 135–145)
SODIUM SERPL-SCNC: 132 MMOL/L — LOW (ref 135–145)
SODIUM SERPL-SCNC: 133 MMOL/L — LOW (ref 135–145)
SODIUM SERPL-SCNC: 134 MMOL/L — LOW (ref 135–145)
SODIUM SERPL-SCNC: 135 MMOL/L — SIGNIFICANT CHANGE UP (ref 135–145)
SODIUM SERPL-SCNC: 136 MMOL/L — SIGNIFICANT CHANGE UP (ref 135–145)
SODIUM SERPL-SCNC: 137 MMOL/L — SIGNIFICANT CHANGE UP (ref 135–145)
SODIUM SERPL-SCNC: 138 MMOL/L — SIGNIFICANT CHANGE UP (ref 135–145)
SODIUM SERPL-SCNC: 138 MMOL/L — SIGNIFICANT CHANGE UP (ref 135–145)
SODIUM SERPL-SCNC: 139 MMOL/L — SIGNIFICANT CHANGE UP (ref 135–145)
SODIUM SERPL-SCNC: 140 MMOL/L — SIGNIFICANT CHANGE UP (ref 135–145)
SODIUM SERPL-SCNC: 143 MMOL/L — SIGNIFICANT CHANGE UP (ref 135–145)
SODIUM SERPL-SCNC: 143 MMOL/L — SIGNIFICANT CHANGE UP (ref 135–145)
SODIUM SERPL-SCNC: 144 MMOL/L — SIGNIFICANT CHANGE UP (ref 135–145)
SODIUM SERPL-SCNC: 145 MMOL/L — SIGNIFICANT CHANGE UP (ref 135–145)
SODIUM SERPL-SCNC: 147 MMOL/L — HIGH (ref 135–145)
SODIUM SERPL-SCNC: 148 MMOL/L — HIGH (ref 135–145)
SP GR SPEC: 1.01 — SIGNIFICANT CHANGE UP (ref 1.01–1.02)
SPECIMEN SOURCE: SIGNIFICANT CHANGE UP
TRIGL SERPL-MCNC: SIGNIFICANT CHANGE UP MG/DL
TROPONIN T, HIGH SENSITIVITY RESULT: 276 NG/L — CRITICAL HIGH
UROBILINOGEN FLD QL: SIGNIFICANT CHANGE UP
VANCOMYCIN FLD-MCNC: 10.1 UG/ML — SIGNIFICANT CHANGE UP
VANCOMYCIN FLD-MCNC: 10.6 UG/ML — SIGNIFICANT CHANGE UP
VANCOMYCIN FLD-MCNC: 15.4 UG/ML — SIGNIFICANT CHANGE UP
VANCOMYCIN FLD-MCNC: 16.1 UG/ML — SIGNIFICANT CHANGE UP
VANCOMYCIN FLD-MCNC: 16.7 UG/ML — SIGNIFICANT CHANGE UP
VANCOMYCIN FLD-MCNC: 17.3 UG/ML — SIGNIFICANT CHANGE UP
VANCOMYCIN FLD-MCNC: 18.2 UG/ML — SIGNIFICANT CHANGE UP
VANCOMYCIN FLD-MCNC: 22.2 UG/ML — SIGNIFICANT CHANGE UP
VANCOMYCIN FLD-MCNC: 23 UG/ML — SIGNIFICANT CHANGE UP
VANCOMYCIN FLD-MCNC: 28.6 UG/ML
WBC # BLD: 0.58 K/UL — CRITICAL LOW (ref 3.8–10.5)
WBC # BLD: 13.66 K/UL — HIGH (ref 3.8–10.5)
WBC # BLD: 3.25 K/UL — LOW (ref 3.8–10.5)
WBC # BLD: 3.3 K/UL — LOW (ref 3.8–10.5)
WBC # BLD: 3.74 K/UL — LOW (ref 3.8–10.5)
WBC # BLD: 3.97 K/UL — SIGNIFICANT CHANGE UP (ref 3.8–10.5)
WBC # BLD: 4 K/UL — SIGNIFICANT CHANGE UP (ref 3.8–10.5)
WBC # BLD: 4.93 K/UL — SIGNIFICANT CHANGE UP (ref 3.8–10.5)
WBC # BLD: 4.96 K/UL — SIGNIFICANT CHANGE UP (ref 3.8–10.5)
WBC # BLD: 5.03 K/UL — SIGNIFICANT CHANGE UP (ref 3.8–10.5)
WBC # BLD: 5.14 K/UL — SIGNIFICANT CHANGE UP (ref 3.8–10.5)
WBC # BLD: 5.55 K/UL — SIGNIFICANT CHANGE UP (ref 3.8–10.5)
WBC # BLD: 5.56 K/UL — SIGNIFICANT CHANGE UP (ref 3.8–10.5)
WBC # BLD: 5.84 K/UL — SIGNIFICANT CHANGE UP (ref 3.8–10.5)
WBC # BLD: 5.98 K/UL — SIGNIFICANT CHANGE UP (ref 3.8–10.5)
WBC # BLD: 5.99 K/UL — SIGNIFICANT CHANGE UP (ref 3.8–10.5)
WBC # BLD: 6.33 K/UL — SIGNIFICANT CHANGE UP (ref 3.8–10.5)
WBC # BLD: 6.37 K/UL — SIGNIFICANT CHANGE UP (ref 3.8–10.5)
WBC # BLD: 6.51 K/UL — SIGNIFICANT CHANGE UP (ref 3.8–10.5)
WBC # BLD: 6.67 K/UL — SIGNIFICANT CHANGE UP (ref 3.8–10.5)
WBC # BLD: 6.93 K/UL — SIGNIFICANT CHANGE UP (ref 3.8–10.5)
WBC # BLD: 7.11 K/UL — SIGNIFICANT CHANGE UP (ref 3.8–10.5)
WBC # BLD: 7.31 K/UL — SIGNIFICANT CHANGE UP (ref 3.8–10.5)
WBC # BLD: 7.42 K/UL — SIGNIFICANT CHANGE UP (ref 3.8–10.5)
WBC # BLD: 7.75 K/UL — SIGNIFICANT CHANGE UP (ref 3.8–10.5)
WBC # BLD: 7.84 K/UL — SIGNIFICANT CHANGE UP (ref 3.8–10.5)
WBC # BLD: 7.98 K/UL — SIGNIFICANT CHANGE UP (ref 3.8–10.5)
WBC # BLD: 8.34 K/UL — SIGNIFICANT CHANGE UP (ref 3.8–10.5)
WBC # FLD AUTO: 0.58 K/UL — CRITICAL LOW (ref 3.8–10.5)
WBC # FLD AUTO: 13.66 K/UL — HIGH (ref 3.8–10.5)
WBC # FLD AUTO: 3.25 K/UL — LOW (ref 3.8–10.5)
WBC # FLD AUTO: 3.3 K/UL — LOW (ref 3.8–10.5)
WBC # FLD AUTO: 3.74 K/UL — LOW (ref 3.8–10.5)
WBC # FLD AUTO: 3.97 K/UL — SIGNIFICANT CHANGE UP (ref 3.8–10.5)
WBC # FLD AUTO: 4 K/UL — SIGNIFICANT CHANGE UP (ref 3.8–10.5)
WBC # FLD AUTO: 4.93 K/UL — SIGNIFICANT CHANGE UP (ref 3.8–10.5)
WBC # FLD AUTO: 4.96 K/UL — SIGNIFICANT CHANGE UP (ref 3.8–10.5)
WBC # FLD AUTO: 5.03 K/UL — SIGNIFICANT CHANGE UP (ref 3.8–10.5)
WBC # FLD AUTO: 5.14 K/UL — SIGNIFICANT CHANGE UP (ref 3.8–10.5)
WBC # FLD AUTO: 5.55 K/UL — SIGNIFICANT CHANGE UP (ref 3.8–10.5)
WBC # FLD AUTO: 5.56 K/UL — SIGNIFICANT CHANGE UP (ref 3.8–10.5)
WBC # FLD AUTO: 5.84 K/UL — SIGNIFICANT CHANGE UP (ref 3.8–10.5)
WBC # FLD AUTO: 5.98 K/UL — SIGNIFICANT CHANGE UP (ref 3.8–10.5)
WBC # FLD AUTO: 5.99 K/UL — SIGNIFICANT CHANGE UP (ref 3.8–10.5)
WBC # FLD AUTO: 6.33 K/UL — SIGNIFICANT CHANGE UP (ref 3.8–10.5)
WBC # FLD AUTO: 6.37 K/UL — SIGNIFICANT CHANGE UP (ref 3.8–10.5)
WBC # FLD AUTO: 6.51 K/UL — SIGNIFICANT CHANGE UP (ref 3.8–10.5)
WBC # FLD AUTO: 6.67 K/UL — SIGNIFICANT CHANGE UP (ref 3.8–10.5)
WBC # FLD AUTO: 6.93 K/UL — SIGNIFICANT CHANGE UP (ref 3.8–10.5)
WBC # FLD AUTO: 7.11 K/UL — SIGNIFICANT CHANGE UP (ref 3.8–10.5)
WBC # FLD AUTO: 7.31 K/UL — SIGNIFICANT CHANGE UP (ref 3.8–10.5)
WBC # FLD AUTO: 7.42 K/UL — SIGNIFICANT CHANGE UP (ref 3.8–10.5)
WBC # FLD AUTO: 7.75 K/UL — SIGNIFICANT CHANGE UP (ref 3.8–10.5)
WBC # FLD AUTO: 7.84 K/UL — SIGNIFICANT CHANGE UP (ref 3.8–10.5)
WBC # FLD AUTO: 7.98 K/UL — SIGNIFICANT CHANGE UP (ref 3.8–10.5)
WBC # FLD AUTO: 8.34 K/UL — SIGNIFICANT CHANGE UP (ref 3.8–10.5)

## 2021-01-01 PROCEDURE — 76937 US GUIDE VASCULAR ACCESS: CPT | Mod: 26

## 2021-01-01 PROCEDURE — 99239 HOSP IP/OBS DSCHRG MGMT >30: CPT

## 2021-01-01 PROCEDURE — 99233 SBSQ HOSP IP/OBS HIGH 50: CPT

## 2021-01-01 PROCEDURE — 76705 ECHO EXAM OF ABDOMEN: CPT | Mod: 26

## 2021-01-01 PROCEDURE — 36600 WITHDRAWAL OF ARTERIAL BLOOD: CPT

## 2021-01-01 PROCEDURE — 99223 1ST HOSP IP/OBS HIGH 75: CPT | Mod: GC

## 2021-01-01 PROCEDURE — 99233 SBSQ HOSP IP/OBS HIGH 50: CPT | Mod: GC

## 2021-01-01 PROCEDURE — 93975 VASCULAR STUDY: CPT | Mod: 26

## 2021-01-01 PROCEDURE — 99232 SBSQ HOSP IP/OBS MODERATE 35: CPT

## 2021-01-01 PROCEDURE — 99291 CRITICAL CARE FIRST HOUR: CPT

## 2021-01-01 PROCEDURE — 99222 1ST HOSP IP/OBS MODERATE 55: CPT | Mod: GC

## 2021-01-01 PROCEDURE — 99232 SBSQ HOSP IP/OBS MODERATE 35: CPT | Mod: GC

## 2021-01-01 PROCEDURE — 99223 1ST HOSP IP/OBS HIGH 75: CPT | Mod: GC,AI

## 2021-01-01 PROCEDURE — 70551 MRI BRAIN STEM W/O DYE: CPT | Mod: 26

## 2021-01-01 PROCEDURE — 99291 CRITICAL CARE FIRST HOUR: CPT | Mod: 25

## 2021-01-01 PROCEDURE — 99497 ADVNCD CARE PLAN 30 MIN: CPT

## 2021-01-01 PROCEDURE — 71250 CT THORAX DX C-: CPT | Mod: 26

## 2021-01-01 PROCEDURE — 71045 X-RAY EXAM CHEST 1 VIEW: CPT | Mod: 26

## 2021-01-01 PROCEDURE — 76604 US EXAM CHEST: CPT | Mod: 26,GC

## 2021-01-01 PROCEDURE — 93306 TTE W/DOPPLER COMPLETE: CPT | Mod: 26

## 2021-01-01 PROCEDURE — 99223 1ST HOSP IP/OBS HIGH 75: CPT | Mod: GC,25

## 2021-01-01 PROCEDURE — 99292 CRITICAL CARE ADDL 30 MIN: CPT | Mod: 25

## 2021-01-01 PROCEDURE — 70450 CT HEAD/BRAIN W/O DYE: CPT | Mod: 26

## 2021-01-01 RX ORDER — DEXTROSE 50 % IN WATER 50 %
25 SYRINGE (ML) INTRAVENOUS ONCE
Refills: 0 | Status: COMPLETED | OUTPATIENT
Start: 2021-01-01 | End: 2021-01-01

## 2021-01-01 RX ORDER — CITALOPRAM 10 MG/1
20 TABLET, FILM COATED ORAL DAILY
Refills: 0 | Status: DISCONTINUED | OUTPATIENT
Start: 2021-01-01 | End: 2021-01-01

## 2021-01-01 RX ORDER — VANCOMYCIN HCL 1 G
1000 VIAL (EA) INTRAVENOUS ONCE
Refills: 0 | Status: COMPLETED | OUTPATIENT
Start: 2021-01-01 | End: 2021-01-01

## 2021-01-01 RX ORDER — DEXTROSE 50 % IN WATER 50 %
12.5 SYRINGE (ML) INTRAVENOUS ONCE
Refills: 0 | Status: COMPLETED | OUTPATIENT
Start: 2021-01-01 | End: 2021-01-01

## 2021-01-01 RX ORDER — SODIUM CHLORIDE 9 MG/ML
1000 INJECTION INTRAMUSCULAR; INTRAVENOUS; SUBCUTANEOUS ONCE
Refills: 0 | Status: COMPLETED | OUTPATIENT
Start: 2021-01-01 | End: 2021-01-01

## 2021-01-01 RX ORDER — CINACALCET 30 MG/1
60 TABLET, FILM COATED ORAL DAILY
Refills: 0 | Status: DISCONTINUED | OUTPATIENT
Start: 2021-01-01 | End: 2021-01-01

## 2021-01-01 RX ORDER — POTASSIUM CHLORIDE 20 MEQ
40 PACKET (EA) ORAL ONCE
Refills: 0 | Status: COMPLETED | OUTPATIENT
Start: 2021-01-01 | End: 2021-01-01

## 2021-01-01 RX ORDER — CALCIUM GLUCONATE 100 MG/ML
1 VIAL (ML) INTRAVENOUS ONCE
Refills: 0 | Status: COMPLETED | OUTPATIENT
Start: 2021-01-01 | End: 2021-01-01

## 2021-01-01 RX ORDER — SODIUM CHLORIDE 9 MG/ML
1000 INJECTION, SOLUTION INTRAVENOUS
Refills: 0 | Status: DISCONTINUED | OUTPATIENT
Start: 2021-01-01 | End: 2021-01-01

## 2021-01-01 RX ORDER — MIDODRINE HYDROCHLORIDE 2.5 MG/1
10 TABLET ORAL THREE TIMES A DAY
Refills: 0 | Status: DISCONTINUED | OUTPATIENT
Start: 2021-01-01 | End: 2021-01-01

## 2021-01-01 RX ORDER — MUPIROCIN 20 MG/G
1 OINTMENT TOPICAL
Refills: 0 | Status: DISCONTINUED | OUTPATIENT
Start: 2021-01-01 | End: 2021-01-01

## 2021-01-01 RX ORDER — INSULIN HUMAN 100 [IU]/ML
5 INJECTION, SOLUTION SUBCUTANEOUS ONCE
Refills: 0 | Status: COMPLETED | OUTPATIENT
Start: 2021-01-01 | End: 2021-01-01

## 2021-01-01 RX ORDER — SENNA PLUS 8.6 MG/1
2 TABLET ORAL AT BEDTIME
Refills: 0 | Status: DISCONTINUED | OUTPATIENT
Start: 2021-01-01 | End: 2021-01-01

## 2021-01-01 RX ORDER — SODIUM CHLORIDE 9 MG/ML
250 INJECTION, SOLUTION INTRAVENOUS ONCE
Refills: 0 | Status: DISCONTINUED | OUTPATIENT
Start: 2021-01-01 | End: 2021-01-01

## 2021-01-01 RX ORDER — POLYETHYLENE GLYCOL 3350 17 G/17G
17 POWDER, FOR SOLUTION ORAL DAILY
Refills: 0 | Status: DISCONTINUED | OUTPATIENT
Start: 2021-01-01 | End: 2021-01-01

## 2021-01-01 RX ORDER — SENNA PLUS 8.6 MG/1
2 TABLET ORAL
Qty: 0 | Refills: 0 | DISCHARGE

## 2021-01-01 RX ORDER — CHLORHEXIDINE GLUCONATE 213 G/1000ML
1 SOLUTION TOPICAL DAILY
Refills: 0 | Status: DISCONTINUED | OUTPATIENT
Start: 2021-01-01 | End: 2021-01-01

## 2021-01-01 RX ORDER — DEXTROSE 50 % IN WATER 50 %
15 SYRINGE (ML) INTRAVENOUS ONCE
Refills: 0 | Status: COMPLETED | OUTPATIENT
Start: 2021-01-01 | End: 2021-01-01

## 2021-01-01 RX ORDER — LANOLIN/MINERAL OIL
1 LOTION (ML) TOPICAL
Qty: 0 | Refills: 0 | DISCHARGE

## 2021-01-01 RX ORDER — SODIUM ZIRCONIUM CYCLOSILICATE 10 G/10G
10 POWDER, FOR SUSPENSION ORAL
Refills: 0 | Status: DISCONTINUED | OUTPATIENT
Start: 2021-01-01 | End: 2021-01-01

## 2021-01-01 RX ORDER — METOPROLOL TARTRATE 50 MG
12.5 TABLET ORAL
Refills: 0 | Status: DISCONTINUED | OUTPATIENT
Start: 2021-01-01 | End: 2021-01-01

## 2021-01-01 RX ORDER — NOREPINEPHRINE BITARTRATE/D5W 8 MG/250ML
0.1 PLASTIC BAG, INJECTION (ML) INTRAVENOUS
Qty: 8 | Refills: 0 | Status: DISCONTINUED | OUTPATIENT
Start: 2021-01-01 | End: 2021-01-01

## 2021-01-01 RX ORDER — SODIUM CHLORIDE 9 MG/ML
1000 INJECTION INTRAMUSCULAR; INTRAVENOUS; SUBCUTANEOUS ONCE
Refills: 0 | Status: DISCONTINUED | OUTPATIENT
Start: 2021-01-01 | End: 2021-01-01

## 2021-01-01 RX ORDER — MIDODRINE HYDROCHLORIDE 2.5 MG/1
10 TABLET ORAL
Refills: 0 | Status: DISCONTINUED | OUTPATIENT
Start: 2021-01-01 | End: 2021-01-01

## 2021-01-01 RX ORDER — TAMSULOSIN HYDROCHLORIDE 0.4 MG/1
0.4 CAPSULE ORAL AT BEDTIME
Refills: 0 | Status: DISCONTINUED | OUTPATIENT
Start: 2021-01-01 | End: 2021-01-01

## 2021-01-01 RX ORDER — CITALOPRAM 10 MG/1
1 TABLET, FILM COATED ORAL
Qty: 0 | Refills: 0 | DISCHARGE

## 2021-01-01 RX ORDER — SODIUM ZIRCONIUM CYCLOSILICATE 10 G/10G
5 POWDER, FOR SUSPENSION ORAL ONCE
Refills: 0 | Status: COMPLETED | OUTPATIENT
Start: 2021-01-01 | End: 2021-01-01

## 2021-01-01 RX ORDER — DEXTROSE 50 % IN WATER 50 %
50 SYRINGE (ML) INTRAVENOUS ONCE
Refills: 0 | Status: COMPLETED | OUTPATIENT
Start: 2021-01-01 | End: 2021-01-01

## 2021-01-01 RX ORDER — CINACALCET 30 MG/1
60 TABLET, FILM COATED ORAL
Refills: 0 | Status: DISCONTINUED | OUTPATIENT
Start: 2021-01-01 | End: 2021-01-01

## 2021-01-01 RX ORDER — ATORVASTATIN CALCIUM 80 MG/1
1 TABLET, FILM COATED ORAL
Qty: 0 | Refills: 0 | DISCHARGE

## 2021-01-01 RX ORDER — PIPERACILLIN AND TAZOBACTAM 4; .5 G/20ML; G/20ML
3.38 INJECTION, POWDER, LYOPHILIZED, FOR SOLUTION INTRAVENOUS ONCE
Refills: 0 | Status: COMPLETED | OUTPATIENT
Start: 2021-01-01 | End: 2021-01-01

## 2021-01-01 RX ORDER — FAMOTIDINE 10 MG/ML
1 INJECTION INTRAVENOUS
Qty: 0 | Refills: 0 | DISCHARGE

## 2021-01-01 RX ORDER — ASPIRIN/CALCIUM CARB/MAGNESIUM 324 MG
81 TABLET ORAL DAILY
Refills: 0 | Status: DISCONTINUED | OUTPATIENT
Start: 2021-01-01 | End: 2021-01-01

## 2021-01-01 RX ORDER — PIPERACILLIN AND TAZOBACTAM 4; .5 G/20ML; G/20ML
3.34 INJECTION, POWDER, LYOPHILIZED, FOR SOLUTION INTRAVENOUS EVERY 12 HOURS
Refills: 0 | Status: DISCONTINUED | OUTPATIENT
Start: 2021-01-01 | End: 2021-01-01

## 2021-01-01 RX ORDER — ACETAMINOPHEN 500 MG
650 TABLET ORAL ONCE
Refills: 0 | Status: COMPLETED | OUTPATIENT
Start: 2021-01-01 | End: 2021-01-01

## 2021-01-01 RX ORDER — DEXTROSE 50 % IN WATER 50 %
25 SYRINGE (ML) INTRAVENOUS ONCE
Refills: 0 | Status: DISCONTINUED | OUTPATIENT
Start: 2021-01-01 | End: 2021-01-01

## 2021-01-01 RX ORDER — METOPROLOL TARTRATE 50 MG
25 TABLET ORAL
Refills: 0 | Status: DISCONTINUED | OUTPATIENT
Start: 2021-01-01 | End: 2021-01-01

## 2021-01-01 RX ORDER — METOPROLOL TARTRATE 50 MG
25 TABLET ORAL ONCE
Refills: 0 | Status: COMPLETED | OUTPATIENT
Start: 2021-01-01 | End: 2021-01-01

## 2021-01-01 RX ORDER — MIDODRINE HYDROCHLORIDE 2.5 MG/1
5 TABLET ORAL ONCE
Refills: 0 | Status: COMPLETED | OUTPATIENT
Start: 2021-01-01 | End: 2021-01-01

## 2021-01-01 RX ORDER — LORATADINE 10 MG/1
1 TABLET ORAL
Qty: 0 | Refills: 0 | DISCHARGE

## 2021-01-01 RX ORDER — SODIUM CHLORIDE 9 MG/ML
250 INJECTION, SOLUTION INTRAVENOUS ONCE
Refills: 0 | Status: COMPLETED | OUTPATIENT
Start: 2021-01-01 | End: 2021-01-01

## 2021-01-01 RX ORDER — PIPERACILLIN AND TAZOBACTAM 4; .5 G/20ML; G/20ML
3.38 INJECTION, POWDER, LYOPHILIZED, FOR SOLUTION INTRAVENOUS EVERY 12 HOURS
Refills: 0 | Status: DISCONTINUED | OUTPATIENT
Start: 2021-01-01 | End: 2021-01-01

## 2021-01-01 RX ORDER — SENNA PLUS 8.6 MG/1
10 TABLET ORAL DAILY
Refills: 0 | Status: DISCONTINUED | OUTPATIENT
Start: 2021-01-01 | End: 2021-01-01

## 2021-01-01 RX ORDER — HEPARIN SODIUM 5000 [USP'U]/ML
5000 INJECTION INTRAVENOUS; SUBCUTANEOUS EVERY 8 HOURS
Refills: 0 | Status: DISCONTINUED | OUTPATIENT
Start: 2021-01-01 | End: 2021-01-01

## 2021-01-01 RX ORDER — LACTOBACILLUS ACIDOPHILUS 100MM CELL
1 CAPSULE ORAL DAILY
Refills: 0 | Status: DISCONTINUED | OUTPATIENT
Start: 2021-01-01 | End: 2021-01-01

## 2021-01-01 RX ORDER — MAGNESIUM SULFATE 500 MG/ML
1 VIAL (ML) INJECTION ONCE
Refills: 0 | Status: COMPLETED | OUTPATIENT
Start: 2021-01-01 | End: 2021-01-01

## 2021-01-01 RX ORDER — MIDODRINE HYDROCHLORIDE 2.5 MG/1
10 TABLET ORAL ONCE
Refills: 0 | Status: COMPLETED | OUTPATIENT
Start: 2021-01-01 | End: 2021-01-01

## 2021-01-01 RX ORDER — GLUCAGON INJECTION, SOLUTION 0.5 MG/.1ML
1 INJECTION, SOLUTION SUBCUTANEOUS ONCE
Refills: 0 | Status: DISCONTINUED | OUTPATIENT
Start: 2021-01-01 | End: 2021-01-01

## 2021-01-01 RX ORDER — TAMSULOSIN HYDROCHLORIDE 0.4 MG/1
1 CAPSULE ORAL
Qty: 0 | Refills: 0 | DISCHARGE

## 2021-01-01 RX ORDER — PANTOPRAZOLE SODIUM 20 MG/1
80 TABLET, DELAYED RELEASE ORAL ONCE
Refills: 0 | Status: COMPLETED | OUTPATIENT
Start: 2021-01-01 | End: 2021-01-01

## 2021-01-01 RX ORDER — ATORVASTATIN CALCIUM 80 MG/1
10 TABLET, FILM COATED ORAL AT BEDTIME
Refills: 0 | Status: DISCONTINUED | OUTPATIENT
Start: 2021-01-01 | End: 2021-01-01

## 2021-01-01 RX ORDER — SODIUM CHLORIDE 9 MG/ML
500 INJECTION INTRAMUSCULAR; INTRAVENOUS; SUBCUTANEOUS ONCE
Refills: 0 | Status: COMPLETED | OUTPATIENT
Start: 2021-01-01 | End: 2021-01-01

## 2021-01-01 RX ORDER — DEXTROSE 10 % IN WATER 10 %
1000 INTRAVENOUS SOLUTION INTRAVENOUS
Refills: 0 | Status: DISCONTINUED | OUTPATIENT
Start: 2021-01-01 | End: 2021-01-01

## 2021-01-01 RX ORDER — LORATADINE 10 MG/1
10 TABLET ORAL DAILY
Refills: 0 | Status: DISCONTINUED | OUTPATIENT
Start: 2021-01-01 | End: 2021-01-01

## 2021-01-01 RX ORDER — SODIUM CHLORIDE 9 MG/ML
250 INJECTION INTRAMUSCULAR; INTRAVENOUS; SUBCUTANEOUS ONCE
Refills: 0 | Status: COMPLETED | OUTPATIENT
Start: 2021-01-01 | End: 2021-01-01

## 2021-01-01 RX ORDER — LANOLIN/MINERAL OIL
1 LOTION (ML) TOPICAL
Refills: 0 | Status: DISCONTINUED | OUTPATIENT
Start: 2021-01-01 | End: 2021-01-01

## 2021-01-01 RX ORDER — DOXAZOSIN MESYLATE 4 MG
1 TABLET ORAL AT BEDTIME
Refills: 0 | Status: DISCONTINUED | OUTPATIENT
Start: 2021-01-01 | End: 2021-01-01

## 2021-01-01 RX ORDER — SODIUM POLYSTYRENE SULFONATE 4.1 MEQ/G
15 POWDER, FOR SUSPENSION ORAL ONCE
Refills: 0 | Status: COMPLETED | OUTPATIENT
Start: 2021-01-01 | End: 2021-01-01

## 2021-01-01 RX ORDER — DEXTROSE 50 % IN WATER 50 %
12.5 SYRINGE (ML) INTRAVENOUS ONCE
Refills: 0 | Status: DISCONTINUED | OUTPATIENT
Start: 2021-01-01 | End: 2021-01-01

## 2021-01-01 RX ORDER — PANTOPRAZOLE SODIUM 20 MG/1
8 TABLET, DELAYED RELEASE ORAL
Qty: 80 | Refills: 0 | Status: DISCONTINUED | OUTPATIENT
Start: 2021-01-01 | End: 2021-01-01

## 2021-01-01 RX ORDER — PANTOPRAZOLE SODIUM 20 MG/1
40 TABLET, DELAYED RELEASE ORAL
Refills: 0 | Status: DISCONTINUED | OUTPATIENT
Start: 2021-01-01 | End: 2021-01-01

## 2021-01-01 RX ORDER — HYDROMORPHONE HYDROCHLORIDE 2 MG/ML
1 INJECTION INTRAMUSCULAR; INTRAVENOUS; SUBCUTANEOUS EVERY 4 HOURS
Refills: 0 | Status: DISCONTINUED | OUTPATIENT
Start: 2021-01-01 | End: 2021-01-01

## 2021-01-01 RX ORDER — SODIUM CHLORIDE 9 MG/ML
500 INJECTION INTRAMUSCULAR; INTRAVENOUS; SUBCUTANEOUS
Refills: 0 | Status: DISCONTINUED | OUTPATIENT
Start: 2021-01-01 | End: 2021-01-01

## 2021-01-01 RX ORDER — SODIUM BICARBONATE 1 MEQ/ML
50 SYRINGE (ML) INTRAVENOUS ONCE
Refills: 0 | Status: COMPLETED | OUTPATIENT
Start: 2021-01-01 | End: 2021-01-01

## 2021-01-01 RX ORDER — METOPROLOL TARTRATE 50 MG
12.5 TABLET ORAL
Qty: 750 | Refills: 1
Start: 2021-01-01 | End: 2021-04-03

## 2021-01-01 RX ORDER — CINACALCET 30 MG/1
90 TABLET, FILM COATED ORAL DAILY
Refills: 0 | Status: DISCONTINUED | OUTPATIENT
Start: 2021-01-01 | End: 2021-01-01

## 2021-01-01 RX ORDER — MIDODRINE HYDROCHLORIDE 2.5 MG/1
20 TABLET ORAL THREE TIMES A DAY
Refills: 0 | Status: DISCONTINUED | OUTPATIENT
Start: 2021-01-01 | End: 2021-01-01

## 2021-01-01 RX ORDER — VANCOMYCIN HCL 1 G
750 VIAL (EA) INTRAVENOUS ONCE
Refills: 0 | Status: COMPLETED | OUTPATIENT
Start: 2021-01-01 | End: 2021-01-01

## 2021-01-01 RX ORDER — LACTOBACILLUS ACIDOPHILUS 100MM CELL
1 CAPSULE ORAL
Qty: 0 | Refills: 0 | DISCHARGE

## 2021-01-01 RX ORDER — MIDODRINE HYDROCHLORIDE 2.5 MG/1
1 TABLET ORAL
Qty: 0 | Refills: 0 | DISCHARGE

## 2021-01-01 RX ORDER — PIPERACILLIN AND TAZOBACTAM 4; .5 G/20ML; G/20ML
3.38 INJECTION, POWDER, LYOPHILIZED, FOR SOLUTION INTRAVENOUS EVERY 12 HOURS
Refills: 0 | Status: COMPLETED | OUTPATIENT
Start: 2021-01-01 | End: 2021-01-01

## 2021-01-01 RX ORDER — DEXTROSE 50 % IN WATER 50 %
15 SYRINGE (ML) INTRAVENOUS ONCE
Refills: 0 | Status: DISCONTINUED | OUTPATIENT
Start: 2021-01-01 | End: 2021-01-01

## 2021-01-01 RX ORDER — PANTOPRAZOLE SODIUM 20 MG/1
1 TABLET, DELAYED RELEASE ORAL
Qty: 0 | Refills: 0 | DISCHARGE

## 2021-01-01 RX ORDER — POLYETHYLENE GLYCOL 3350 17 G/17G
0 POWDER, FOR SOLUTION ORAL
Qty: 0 | Refills: 0 | DISCHARGE

## 2021-01-01 RX ORDER — MIDODRINE HYDROCHLORIDE 2.5 MG/1
30 TABLET ORAL ONCE
Refills: 0 | Status: COMPLETED | OUTPATIENT
Start: 2021-01-01 | End: 2021-01-01

## 2021-01-01 RX ORDER — METOPROLOL TARTRATE 50 MG
25 TABLET ORAL DAILY
Refills: 0 | Status: DISCONTINUED | OUTPATIENT
Start: 2021-01-01 | End: 2021-01-01

## 2021-01-01 RX ORDER — VANCOMYCIN HCL 1 G
250 VIAL (EA) INTRAVENOUS ONCE
Refills: 0 | Status: COMPLETED | OUTPATIENT
Start: 2021-01-01 | End: 2021-01-01

## 2021-01-01 RX ORDER — SODIUM ZIRCONIUM CYCLOSILICATE 10 G/10G
10 POWDER, FOR SUSPENSION ORAL ONCE
Refills: 0 | Status: DISCONTINUED | OUTPATIENT
Start: 2021-01-01 | End: 2021-01-01

## 2021-01-01 RX ORDER — PANTOPRAZOLE SODIUM 20 MG/1
20 TABLET, DELAYED RELEASE ORAL
Refills: 0 | Status: DISCONTINUED | OUTPATIENT
Start: 2021-01-01 | End: 2021-01-01

## 2021-01-01 RX ADMIN — Medication 25 GRAM(S): at 01:10

## 2021-01-01 RX ADMIN — Medication 25 MILLILITER(S): at 07:52

## 2021-01-01 RX ADMIN — CITALOPRAM 20 MILLIGRAM(S): 10 TABLET, FILM COATED ORAL at 15:13

## 2021-01-01 RX ADMIN — Medication 12.5 GRAM(S): at 14:48

## 2021-01-01 RX ADMIN — Medication 100 MILLIGRAM(S): at 20:23

## 2021-01-01 RX ADMIN — Medication 1 TABLET(S): at 15:24

## 2021-01-01 RX ADMIN — CHLORHEXIDINE GLUCONATE 1 APPLICATION(S): 213 SOLUTION TOPICAL at 10:00

## 2021-01-01 RX ADMIN — TAMSULOSIN HYDROCHLORIDE 0.4 MILLIGRAM(S): 0.4 CAPSULE ORAL at 20:47

## 2021-01-01 RX ADMIN — CINACALCET 90 MILLIGRAM(S): 30 TABLET, FILM COATED ORAL at 12:19

## 2021-01-01 RX ADMIN — PIPERACILLIN AND TAZOBACTAM 25 GRAM(S): 4; .5 INJECTION, POWDER, LYOPHILIZED, FOR SOLUTION INTRAVENOUS at 04:51

## 2021-01-01 RX ADMIN — MIDODRINE HYDROCHLORIDE 10 MILLIGRAM(S): 2.5 TABLET ORAL at 17:34

## 2021-01-01 RX ADMIN — Medication 100 MILLIGRAM(S): at 20:47

## 2021-01-01 RX ADMIN — CHLORHEXIDINE GLUCONATE 1 APPLICATION(S): 213 SOLUTION TOPICAL at 12:47

## 2021-01-01 RX ADMIN — ATORVASTATIN CALCIUM 10 MILLIGRAM(S): 80 TABLET, FILM COATED ORAL at 21:38

## 2021-01-01 RX ADMIN — TAMSULOSIN HYDROCHLORIDE 0.4 MILLIGRAM(S): 0.4 CAPSULE ORAL at 20:23

## 2021-01-01 RX ADMIN — CINACALCET 90 MILLIGRAM(S): 30 TABLET, FILM COATED ORAL at 11:09

## 2021-01-01 RX ADMIN — Medication 1 APPLICATION(S): at 17:54

## 2021-01-01 RX ADMIN — Medication 81 MILLIGRAM(S): at 13:01

## 2021-01-01 RX ADMIN — Medication 250 MILLIGRAM(S): at 21:57

## 2021-01-01 RX ADMIN — Medication 81 MILLIGRAM(S): at 13:17

## 2021-01-01 RX ADMIN — CINACALCET 90 MILLIGRAM(S): 30 TABLET, FILM COATED ORAL at 11:06

## 2021-01-01 RX ADMIN — MIDODRINE HYDROCHLORIDE 10 MILLIGRAM(S): 2.5 TABLET ORAL at 12:09

## 2021-01-01 RX ADMIN — Medication 1 TABLET(S): at 12:31

## 2021-01-01 RX ADMIN — Medication 1 TABLET(S): at 21:09

## 2021-01-01 RX ADMIN — Medication 1 TABLET(S): at 13:14

## 2021-01-01 RX ADMIN — CINACALCET 90 MILLIGRAM(S): 30 TABLET, FILM COATED ORAL at 13:14

## 2021-01-01 RX ADMIN — SODIUM ZIRCONIUM CYCLOSILICATE 10 GRAM(S): 10 POWDER, FOR SUSPENSION ORAL at 08:25

## 2021-01-01 RX ADMIN — MUPIROCIN 1 APPLICATION(S): 20 OINTMENT TOPICAL at 05:32

## 2021-01-01 RX ADMIN — MUPIROCIN 1 APPLICATION(S): 20 OINTMENT TOPICAL at 05:54

## 2021-01-01 RX ADMIN — MUPIROCIN 1 APPLICATION(S): 20 OINTMENT TOPICAL at 05:51

## 2021-01-01 RX ADMIN — Medication 81 MILLIGRAM(S): at 12:59

## 2021-01-01 RX ADMIN — CHLORHEXIDINE GLUCONATE 1 APPLICATION(S): 213 SOLUTION TOPICAL at 11:08

## 2021-01-01 RX ADMIN — MUPIROCIN 1 APPLICATION(S): 20 OINTMENT TOPICAL at 06:22

## 2021-01-01 RX ADMIN — PIPERACILLIN AND TAZOBACTAM 25 GRAM(S): 4; .5 INJECTION, POWDER, LYOPHILIZED, FOR SOLUTION INTRAVENOUS at 05:57

## 2021-01-01 RX ADMIN — CINACALCET 60 MILLIGRAM(S): 30 TABLET, FILM COATED ORAL at 05:11

## 2021-01-01 RX ADMIN — Medication 12.5 GRAM(S): at 11:10

## 2021-01-01 RX ADMIN — PIPERACILLIN AND TAZOBACTAM 25 GRAM(S): 4; .5 INJECTION, POWDER, LYOPHILIZED, FOR SOLUTION INTRAVENOUS at 05:13

## 2021-01-01 RX ADMIN — Medication 1 APPLICATION(S): at 05:27

## 2021-01-01 RX ADMIN — PANTOPRAZOLE SODIUM 40 MILLIGRAM(S): 20 TABLET, DELAYED RELEASE ORAL at 05:55

## 2021-01-01 RX ADMIN — Medication 1 APPLICATION(S): at 12:24

## 2021-01-01 RX ADMIN — HEPARIN SODIUM 5000 UNIT(S): 5000 INJECTION INTRAVENOUS; SUBCUTANEOUS at 05:14

## 2021-01-01 RX ADMIN — CHLORHEXIDINE GLUCONATE 1 APPLICATION(S): 213 SOLUTION TOPICAL at 12:00

## 2021-01-01 RX ADMIN — TAMSULOSIN HYDROCHLORIDE 0.4 MILLIGRAM(S): 0.4 CAPSULE ORAL at 20:59

## 2021-01-01 RX ADMIN — SODIUM CHLORIDE 500 MILLILITER(S): 9 INJECTION INTRAMUSCULAR; INTRAVENOUS; SUBCUTANEOUS at 11:21

## 2021-01-01 RX ADMIN — HEPARIN SODIUM 5000 UNIT(S): 5000 INJECTION INTRAVENOUS; SUBCUTANEOUS at 15:07

## 2021-01-01 RX ADMIN — CINACALCET 60 MILLIGRAM(S): 30 TABLET, FILM COATED ORAL at 06:17

## 2021-01-01 RX ADMIN — CINACALCET 60 MILLIGRAM(S): 30 TABLET, FILM COATED ORAL at 16:23

## 2021-01-01 RX ADMIN — PIPERACILLIN AND TAZOBACTAM 25 GRAM(S): 4; .5 INJECTION, POWDER, LYOPHILIZED, FOR SOLUTION INTRAVENOUS at 04:55

## 2021-01-01 RX ADMIN — PIPERACILLIN AND TAZOBACTAM 25 GRAM(S): 4; .5 INJECTION, POWDER, LYOPHILIZED, FOR SOLUTION INTRAVENOUS at 05:11

## 2021-01-01 RX ADMIN — PANTOPRAZOLE SODIUM 80 MILLIGRAM(S): 20 TABLET, DELAYED RELEASE ORAL at 00:40

## 2021-01-01 RX ADMIN — SODIUM ZIRCONIUM CYCLOSILICATE 10 GRAM(S): 10 POWDER, FOR SUSPENSION ORAL at 14:48

## 2021-01-01 RX ADMIN — PIPERACILLIN AND TAZOBACTAM 25 GRAM(S): 4; .5 INJECTION, POWDER, LYOPHILIZED, FOR SOLUTION INTRAVENOUS at 17:18

## 2021-01-01 RX ADMIN — MUPIROCIN 1 APPLICATION(S): 20 OINTMENT TOPICAL at 06:21

## 2021-01-01 RX ADMIN — SODIUM CHLORIDE 50 MILLILITER(S): 9 INJECTION, SOLUTION INTRAVENOUS at 16:20

## 2021-01-01 RX ADMIN — POLYETHYLENE GLYCOL 3350 17 GRAM(S): 17 POWDER, FOR SOLUTION ORAL at 11:12

## 2021-01-01 RX ADMIN — MUPIROCIN 1 APPLICATION(S): 20 OINTMENT TOPICAL at 17:19

## 2021-01-01 RX ADMIN — LORATADINE 10 MILLIGRAM(S): 10 TABLET ORAL at 17:12

## 2021-01-01 RX ADMIN — ATORVASTATIN CALCIUM 10 MILLIGRAM(S): 80 TABLET, FILM COATED ORAL at 22:13

## 2021-01-01 RX ADMIN — PIPERACILLIN AND TAZOBACTAM 200 GRAM(S): 4; .5 INJECTION, POWDER, LYOPHILIZED, FOR SOLUTION INTRAVENOUS at 03:08

## 2021-01-01 RX ADMIN — SODIUM ZIRCONIUM CYCLOSILICATE 5 GRAM(S): 10 POWDER, FOR SUSPENSION ORAL at 20:42

## 2021-01-01 RX ADMIN — Medication 100 MILLIGRAM(S): at 21:09

## 2021-01-01 RX ADMIN — ATORVASTATIN CALCIUM 10 MILLIGRAM(S): 80 TABLET, FILM COATED ORAL at 21:11

## 2021-01-01 RX ADMIN — PIPERACILLIN AND TAZOBACTAM 25 GRAM(S): 4; .5 INJECTION, POWDER, LYOPHILIZED, FOR SOLUTION INTRAVENOUS at 05:15

## 2021-01-01 RX ADMIN — Medication 1 TABLET(S): at 12:59

## 2021-01-01 RX ADMIN — Medication 81 MILLIGRAM(S): at 11:12

## 2021-01-01 RX ADMIN — HEPARIN SODIUM 5000 UNIT(S): 5000 INJECTION INTRAVENOUS; SUBCUTANEOUS at 21:38

## 2021-01-01 RX ADMIN — MUPIROCIN 1 APPLICATION(S): 20 OINTMENT TOPICAL at 05:16

## 2021-01-01 RX ADMIN — TAMSULOSIN HYDROCHLORIDE 0.4 MILLIGRAM(S): 0.4 CAPSULE ORAL at 21:18

## 2021-01-01 RX ADMIN — CINACALCET 90 MILLIGRAM(S): 30 TABLET, FILM COATED ORAL at 15:13

## 2021-01-01 RX ADMIN — Medication 1 TABLET(S): at 12:34

## 2021-01-01 RX ADMIN — SODIUM CHLORIDE 250 MILLILITER(S): 9 INJECTION INTRAMUSCULAR; INTRAVENOUS; SUBCUTANEOUS at 18:46

## 2021-01-01 RX ADMIN — HYDROMORPHONE HYDROCHLORIDE 1 MILLIGRAM(S): 2 INJECTION INTRAMUSCULAR; INTRAVENOUS; SUBCUTANEOUS at 18:00

## 2021-01-01 RX ADMIN — Medication 50 MILLILITER(S): at 05:13

## 2021-01-01 RX ADMIN — Medication 25 GRAM(S): at 17:55

## 2021-01-01 RX ADMIN — HEPARIN SODIUM 5000 UNIT(S): 5000 INJECTION INTRAVENOUS; SUBCUTANEOUS at 22:13

## 2021-01-01 RX ADMIN — CINACALCET 90 MILLIGRAM(S): 30 TABLET, FILM COATED ORAL at 11:54

## 2021-01-01 RX ADMIN — CITALOPRAM 20 MILLIGRAM(S): 10 TABLET, FILM COATED ORAL at 12:09

## 2021-01-01 RX ADMIN — Medication 100 MILLIGRAM(S): at 06:19

## 2021-01-01 RX ADMIN — MIDODRINE HYDROCHLORIDE 5 MILLIGRAM(S): 2.5 TABLET ORAL at 22:37

## 2021-01-01 RX ADMIN — CITALOPRAM 20 MILLIGRAM(S): 10 TABLET, FILM COATED ORAL at 16:19

## 2021-01-01 RX ADMIN — PIPERACILLIN AND TAZOBACTAM 25 GRAM(S): 4; .5 INJECTION, POWDER, LYOPHILIZED, FOR SOLUTION INTRAVENOUS at 17:19

## 2021-01-01 RX ADMIN — Medication 25 GRAM(S): at 13:45

## 2021-01-01 RX ADMIN — SENNA PLUS 10 MILLILITER(S): 8.6 TABLET ORAL at 12:40

## 2021-01-01 RX ADMIN — PIPERACILLIN AND TAZOBACTAM 25 GRAM(S): 4; .5 INJECTION, POWDER, LYOPHILIZED, FOR SOLUTION INTRAVENOUS at 17:42

## 2021-01-01 RX ADMIN — HEPARIN SODIUM 5000 UNIT(S): 5000 INJECTION INTRAVENOUS; SUBCUTANEOUS at 23:16

## 2021-01-01 RX ADMIN — Medication 25 GRAM(S): at 17:57

## 2021-01-01 RX ADMIN — Medication 12.5 MILLIGRAM(S): at 17:35

## 2021-01-01 RX ADMIN — POLYETHYLENE GLYCOL 3350 17 GRAM(S): 17 POWDER, FOR SOLUTION ORAL at 11:08

## 2021-01-01 RX ADMIN — CINACALCET 90 MILLIGRAM(S): 30 TABLET, FILM COATED ORAL at 12:33

## 2021-01-01 RX ADMIN — TAMSULOSIN HYDROCHLORIDE 0.4 MILLIGRAM(S): 0.4 CAPSULE ORAL at 22:56

## 2021-01-01 RX ADMIN — CINACALCET 90 MILLIGRAM(S): 30 TABLET, FILM COATED ORAL at 11:12

## 2021-01-01 RX ADMIN — CITALOPRAM 20 MILLIGRAM(S): 10 TABLET, FILM COATED ORAL at 12:59

## 2021-01-01 RX ADMIN — MUPIROCIN 1 APPLICATION(S): 20 OINTMENT TOPICAL at 05:26

## 2021-01-01 RX ADMIN — CINACALCET 90 MILLIGRAM(S): 30 TABLET, FILM COATED ORAL at 15:24

## 2021-01-01 RX ADMIN — Medication 12.5 MILLIGRAM(S): at 05:13

## 2021-01-01 RX ADMIN — POLYETHYLENE GLYCOL 3350 17 GRAM(S): 17 POWDER, FOR SOLUTION ORAL at 12:32

## 2021-01-01 RX ADMIN — MUPIROCIN 1 APPLICATION(S): 20 OINTMENT TOPICAL at 18:11

## 2021-01-01 RX ADMIN — SENNA PLUS 10 MILLILITER(S): 8.6 TABLET ORAL at 11:53

## 2021-01-01 RX ADMIN — MIDODRINE HYDROCHLORIDE 20 MILLIGRAM(S): 2.5 TABLET ORAL at 04:53

## 2021-01-01 RX ADMIN — Medication 20 MILLILITER(S): at 10:00

## 2021-01-01 RX ADMIN — MUPIROCIN 1 APPLICATION(S): 20 OINTMENT TOPICAL at 17:59

## 2021-01-01 RX ADMIN — Medication 1 APPLICATION(S): at 13:02

## 2021-01-01 RX ADMIN — Medication 30 MILLILITER(S): at 22:43

## 2021-01-01 RX ADMIN — Medication 25 MILLILITER(S): at 00:07

## 2021-01-01 RX ADMIN — MIDODRINE HYDROCHLORIDE 20 MILLIGRAM(S): 2.5 TABLET ORAL at 21:38

## 2021-01-01 RX ADMIN — HEPARIN SODIUM 5000 UNIT(S): 5000 INJECTION INTRAVENOUS; SUBCUTANEOUS at 05:45

## 2021-01-01 RX ADMIN — MIDODRINE HYDROCHLORIDE 10 MILLIGRAM(S): 2.5 TABLET ORAL at 05:13

## 2021-01-01 RX ADMIN — MUPIROCIN 1 APPLICATION(S): 20 OINTMENT TOPICAL at 17:27

## 2021-01-01 RX ADMIN — HEPARIN SODIUM 5000 UNIT(S): 5000 INJECTION INTRAVENOUS; SUBCUTANEOUS at 14:33

## 2021-01-01 RX ADMIN — SENNA PLUS 2 TABLET(S): 8.6 TABLET ORAL at 20:47

## 2021-01-01 RX ADMIN — SODIUM CHLORIDE 250 MILLILITER(S): 9 INJECTION INTRAMUSCULAR; INTRAVENOUS; SUBCUTANEOUS at 22:18

## 2021-01-01 RX ADMIN — Medication 81 MILLIGRAM(S): at 12:33

## 2021-01-01 RX ADMIN — SENNA PLUS 2 TABLET(S): 8.6 TABLET ORAL at 21:09

## 2021-01-01 RX ADMIN — MUPIROCIN 1 APPLICATION(S): 20 OINTMENT TOPICAL at 17:40

## 2021-01-01 RX ADMIN — PIPERACILLIN AND TAZOBACTAM 25 GRAM(S): 4; .5 INJECTION, POWDER, LYOPHILIZED, FOR SOLUTION INTRAVENOUS at 17:53

## 2021-01-01 RX ADMIN — CINACALCET 60 MILLIGRAM(S): 30 TABLET, FILM COATED ORAL at 17:33

## 2021-01-01 RX ADMIN — SODIUM CHLORIDE 50 MILLILITER(S): 9 INJECTION, SOLUTION INTRAVENOUS at 03:53

## 2021-01-01 RX ADMIN — ATORVASTATIN CALCIUM 10 MILLIGRAM(S): 80 TABLET, FILM COATED ORAL at 21:09

## 2021-01-01 RX ADMIN — MIDODRINE HYDROCHLORIDE 5 MILLIGRAM(S): 2.5 TABLET ORAL at 00:30

## 2021-01-01 RX ADMIN — Medication 1 TABLET(S): at 13:02

## 2021-01-01 RX ADMIN — LORATADINE 10 MILLIGRAM(S): 10 TABLET ORAL at 15:08

## 2021-01-01 RX ADMIN — ATORVASTATIN CALCIUM 10 MILLIGRAM(S): 80 TABLET, FILM COATED ORAL at 20:55

## 2021-01-01 RX ADMIN — LORATADINE 10 MILLIGRAM(S): 10 TABLET ORAL at 12:11

## 2021-01-01 RX ADMIN — Medication 100 MILLIGRAM(S): at 06:20

## 2021-01-01 RX ADMIN — SENNA PLUS 10 MILLILITER(S): 8.6 TABLET ORAL at 13:14

## 2021-01-01 RX ADMIN — SODIUM CHLORIDE 30 MILLILITER(S): 9 INJECTION, SOLUTION INTRAVENOUS at 12:36

## 2021-01-01 RX ADMIN — ATORVASTATIN CALCIUM 10 MILLIGRAM(S): 80 TABLET, FILM COATED ORAL at 22:56

## 2021-01-01 RX ADMIN — ATORVASTATIN CALCIUM 10 MILLIGRAM(S): 80 TABLET, FILM COATED ORAL at 21:31

## 2021-01-01 RX ADMIN — Medication 1 TABLET(S): at 12:40

## 2021-01-01 RX ADMIN — Medication 100 MILLIGRAM(S): at 05:13

## 2021-01-01 RX ADMIN — MUPIROCIN 1 APPLICATION(S): 20 OINTMENT TOPICAL at 17:18

## 2021-01-01 RX ADMIN — CITALOPRAM 20 MILLIGRAM(S): 10 TABLET, FILM COATED ORAL at 11:58

## 2021-01-01 RX ADMIN — Medication 1 APPLICATION(S): at 13:00

## 2021-01-01 RX ADMIN — SODIUM CHLORIDE 50 MILLILITER(S): 9 INJECTION, SOLUTION INTRAVENOUS at 07:42

## 2021-01-01 RX ADMIN — Medication 1 APPLICATION(S): at 17:32

## 2021-01-01 RX ADMIN — MUPIROCIN 1 APPLICATION(S): 20 OINTMENT TOPICAL at 04:01

## 2021-01-01 RX ADMIN — HEPARIN SODIUM 5000 UNIT(S): 5000 INJECTION INTRAVENOUS; SUBCUTANEOUS at 20:23

## 2021-01-01 RX ADMIN — SODIUM CHLORIDE 250 MILLILITER(S): 9 INJECTION, SOLUTION INTRAVENOUS at 21:17

## 2021-01-01 RX ADMIN — Medication 12.5 MILLIGRAM(S): at 06:08

## 2021-01-01 RX ADMIN — ATORVASTATIN CALCIUM 10 MILLIGRAM(S): 80 TABLET, FILM COATED ORAL at 21:16

## 2021-01-01 RX ADMIN — MUPIROCIN 1 APPLICATION(S): 20 OINTMENT TOPICAL at 05:12

## 2021-01-01 RX ADMIN — CHLORHEXIDINE GLUCONATE 1 APPLICATION(S): 213 SOLUTION TOPICAL at 13:14

## 2021-01-01 RX ADMIN — Medication 12.5 GRAM(S): at 10:09

## 2021-01-01 RX ADMIN — MUPIROCIN 1 APPLICATION(S): 20 OINTMENT TOPICAL at 04:53

## 2021-01-01 RX ADMIN — MIDODRINE HYDROCHLORIDE 10 MILLIGRAM(S): 2.5 TABLET ORAL at 13:17

## 2021-01-01 RX ADMIN — SENNA PLUS 10 MILLILITER(S): 8.6 TABLET ORAL at 21:11

## 2021-01-01 RX ADMIN — CITALOPRAM 20 MILLIGRAM(S): 10 TABLET, FILM COATED ORAL at 11:06

## 2021-01-01 RX ADMIN — MUPIROCIN 1 APPLICATION(S): 20 OINTMENT TOPICAL at 17:12

## 2021-01-01 RX ADMIN — HEPARIN SODIUM 5000 UNIT(S): 5000 INJECTION INTRAVENOUS; SUBCUTANEOUS at 04:53

## 2021-01-01 RX ADMIN — MIDODRINE HYDROCHLORIDE 20 MILLIGRAM(S): 2.5 TABLET ORAL at 05:45

## 2021-01-01 RX ADMIN — ATORVASTATIN CALCIUM 10 MILLIGRAM(S): 80 TABLET, FILM COATED ORAL at 20:49

## 2021-01-01 RX ADMIN — PIPERACILLIN AND TAZOBACTAM 25 GRAM(S): 4; .5 INJECTION, POWDER, LYOPHILIZED, FOR SOLUTION INTRAVENOUS at 17:32

## 2021-01-01 RX ADMIN — Medication 81 MILLIGRAM(S): at 16:19

## 2021-01-01 RX ADMIN — Medication 20 MILLILITER(S): at 13:30

## 2021-01-01 RX ADMIN — CHLORHEXIDINE GLUCONATE 1 APPLICATION(S): 213 SOLUTION TOPICAL at 12:10

## 2021-01-01 RX ADMIN — CHLORHEXIDINE GLUCONATE 1 APPLICATION(S): 213 SOLUTION TOPICAL at 12:30

## 2021-01-01 RX ADMIN — POLYETHYLENE GLYCOL 3350 17 GRAM(S): 17 POWDER, FOR SOLUTION ORAL at 12:20

## 2021-01-01 RX ADMIN — CINACALCET 90 MILLIGRAM(S): 30 TABLET, FILM COATED ORAL at 11:17

## 2021-01-01 RX ADMIN — SODIUM ZIRCONIUM CYCLOSILICATE 10 GRAM(S): 10 POWDER, FOR SUSPENSION ORAL at 08:13

## 2021-01-01 RX ADMIN — ATORVASTATIN CALCIUM 10 MILLIGRAM(S): 80 TABLET, FILM COATED ORAL at 20:47

## 2021-01-01 RX ADMIN — SENNA PLUS 2 TABLET(S): 8.6 TABLET ORAL at 21:26

## 2021-01-01 RX ADMIN — SENNA PLUS 2 TABLET(S): 8.6 TABLET ORAL at 00:44

## 2021-01-01 RX ADMIN — ATORVASTATIN CALCIUM 10 MILLIGRAM(S): 80 TABLET, FILM COATED ORAL at 00:44

## 2021-01-01 RX ADMIN — TAMSULOSIN HYDROCHLORIDE 0.4 MILLIGRAM(S): 0.4 CAPSULE ORAL at 21:31

## 2021-01-01 RX ADMIN — HEPARIN SODIUM 5000 UNIT(S): 5000 INJECTION INTRAVENOUS; SUBCUTANEOUS at 21:23

## 2021-01-01 RX ADMIN — MUPIROCIN 1 APPLICATION(S): 20 OINTMENT TOPICAL at 05:57

## 2021-01-01 RX ADMIN — HEPARIN SODIUM 5000 UNIT(S): 5000 INJECTION INTRAVENOUS; SUBCUTANEOUS at 21:18

## 2021-01-01 RX ADMIN — PIPERACILLIN AND TAZOBACTAM 25 GRAM(S): 4; .5 INJECTION, POWDER, LYOPHILIZED, FOR SOLUTION INTRAVENOUS at 15:06

## 2021-01-01 RX ADMIN — Medication 30 MILLILITER(S): at 12:41

## 2021-01-01 RX ADMIN — MIDODRINE HYDROCHLORIDE 20 MILLIGRAM(S): 2.5 TABLET ORAL at 17:35

## 2021-01-01 RX ADMIN — MIDODRINE HYDROCHLORIDE 10 MILLIGRAM(S): 2.5 TABLET ORAL at 05:56

## 2021-01-01 RX ADMIN — PIPERACILLIN AND TAZOBACTAM 25 GRAM(S): 4; .5 INJECTION, POWDER, LYOPHILIZED, FOR SOLUTION INTRAVENOUS at 06:19

## 2021-01-01 RX ADMIN — Medication 15 GRAM(S): at 12:31

## 2021-01-01 RX ADMIN — ATORVASTATIN CALCIUM 10 MILLIGRAM(S): 80 TABLET, FILM COATED ORAL at 22:06

## 2021-01-01 RX ADMIN — MUPIROCIN 1 APPLICATION(S): 20 OINTMENT TOPICAL at 06:09

## 2021-01-01 RX ADMIN — PIPERACILLIN AND TAZOBACTAM 25 GRAM(S): 4; .5 INJECTION, POWDER, LYOPHILIZED, FOR SOLUTION INTRAVENOUS at 18:17

## 2021-01-01 RX ADMIN — MUPIROCIN 1 APPLICATION(S): 20 OINTMENT TOPICAL at 05:13

## 2021-01-01 RX ADMIN — Medication 1 APPLICATION(S): at 12:12

## 2021-01-01 RX ADMIN — PANTOPRAZOLE SODIUM 10 MG/HR: 20 TABLET, DELAYED RELEASE ORAL at 08:19

## 2021-01-01 RX ADMIN — CITALOPRAM 20 MILLIGRAM(S): 10 TABLET, FILM COATED ORAL at 11:42

## 2021-01-01 RX ADMIN — TAMSULOSIN HYDROCHLORIDE 0.4 MILLIGRAM(S): 0.4 CAPSULE ORAL at 21:09

## 2021-01-01 RX ADMIN — Medication 1 APPLICATION(S): at 12:11

## 2021-01-01 RX ADMIN — CHLORHEXIDINE GLUCONATE 1 APPLICATION(S): 213 SOLUTION TOPICAL at 16:24

## 2021-01-01 RX ADMIN — PANTOPRAZOLE SODIUM 10 MG/HR: 20 TABLET, DELAYED RELEASE ORAL at 01:53

## 2021-01-01 RX ADMIN — SODIUM CHLORIDE 50 MILLILITER(S): 9 INJECTION, SOLUTION INTRAVENOUS at 20:59

## 2021-01-01 RX ADMIN — Medication 1 APPLICATION(S): at 06:17

## 2021-01-01 RX ADMIN — HEPARIN SODIUM 5000 UNIT(S): 5000 INJECTION INTRAVENOUS; SUBCUTANEOUS at 06:09

## 2021-01-01 RX ADMIN — Medication 1 APPLICATION(S): at 17:12

## 2021-01-01 RX ADMIN — Medication 25 GRAM(S): at 21:13

## 2021-01-01 RX ADMIN — CINACALCET 60 MILLIGRAM(S): 30 TABLET, FILM COATED ORAL at 12:23

## 2021-01-01 RX ADMIN — POLYETHYLENE GLYCOL 3350 17 GRAM(S): 17 POWDER, FOR SOLUTION ORAL at 15:13

## 2021-01-01 RX ADMIN — SENNA PLUS 10 MILLILITER(S): 8.6 TABLET ORAL at 11:06

## 2021-01-01 RX ADMIN — Medication 100 MILLIGRAM(S): at 12:22

## 2021-01-01 RX ADMIN — SENNA PLUS 10 MILLILITER(S): 8.6 TABLET ORAL at 12:32

## 2021-01-01 RX ADMIN — MIDODRINE HYDROCHLORIDE 10 MILLIGRAM(S): 2.5 TABLET ORAL at 16:22

## 2021-01-01 RX ADMIN — CHLORHEXIDINE GLUCONATE 1 APPLICATION(S): 213 SOLUTION TOPICAL at 12:24

## 2021-01-01 RX ADMIN — Medication 100 MILLIGRAM(S): at 13:01

## 2021-01-01 RX ADMIN — PIPERACILLIN AND TAZOBACTAM 25 GRAM(S): 4; .5 INJECTION, POWDER, LYOPHILIZED, FOR SOLUTION INTRAVENOUS at 22:51

## 2021-01-01 RX ADMIN — Medication 12.5 GRAM(S): at 17:39

## 2021-01-01 RX ADMIN — Medication 100 GRAM(S): at 16:19

## 2021-01-01 RX ADMIN — MIDODRINE HYDROCHLORIDE 10 MILLIGRAM(S): 2.5 TABLET ORAL at 13:02

## 2021-01-01 RX ADMIN — SODIUM CHLORIDE 50 MILLILITER(S): 9 INJECTION, SOLUTION INTRAVENOUS at 20:34

## 2021-01-01 RX ADMIN — Medication 250 MILLIGRAM(S): at 20:32

## 2021-01-01 RX ADMIN — Medication 1 TABLET(S): at 13:16

## 2021-01-01 RX ADMIN — CITALOPRAM 20 MILLIGRAM(S): 10 TABLET, FILM COATED ORAL at 12:11

## 2021-01-01 RX ADMIN — SENNA PLUS 2 TABLET(S): 8.6 TABLET ORAL at 20:59

## 2021-01-01 RX ADMIN — SENNA PLUS 2 TABLET(S): 8.6 TABLET ORAL at 21:38

## 2021-01-01 RX ADMIN — POLYETHYLENE GLYCOL 3350 17 GRAM(S): 17 POWDER, FOR SOLUTION ORAL at 15:08

## 2021-01-01 RX ADMIN — MUPIROCIN 1 APPLICATION(S): 20 OINTMENT TOPICAL at 17:32

## 2021-01-01 RX ADMIN — MIDODRINE HYDROCHLORIDE 10 MILLIGRAM(S): 2.5 TABLET ORAL at 17:07

## 2021-01-01 RX ADMIN — CHLORHEXIDINE GLUCONATE 1 APPLICATION(S): 213 SOLUTION TOPICAL at 11:05

## 2021-01-01 RX ADMIN — PIPERACILLIN AND TAZOBACTAM 25 GRAM(S): 4; .5 INJECTION, POWDER, LYOPHILIZED, FOR SOLUTION INTRAVENOUS at 05:16

## 2021-01-01 RX ADMIN — MIDODRINE HYDROCHLORIDE 10 MILLIGRAM(S): 2.5 TABLET ORAL at 08:01

## 2021-01-01 RX ADMIN — MUPIROCIN 1 APPLICATION(S): 20 OINTMENT TOPICAL at 18:12

## 2021-01-01 RX ADMIN — Medication 50 MILLILITER(S): at 15:00

## 2021-01-01 RX ADMIN — MUPIROCIN 1 APPLICATION(S): 20 OINTMENT TOPICAL at 06:16

## 2021-01-01 RX ADMIN — MUPIROCIN 1 APPLICATION(S): 20 OINTMENT TOPICAL at 17:08

## 2021-01-01 RX ADMIN — MUPIROCIN 1 APPLICATION(S): 20 OINTMENT TOPICAL at 10:10

## 2021-01-01 RX ADMIN — ATORVASTATIN CALCIUM 10 MILLIGRAM(S): 80 TABLET, FILM COATED ORAL at 23:16

## 2021-01-01 RX ADMIN — Medication 12.5 GRAM(S): at 06:21

## 2021-01-01 RX ADMIN — Medication 15 GRAM(S): at 12:33

## 2021-01-01 RX ADMIN — CITALOPRAM 20 MILLIGRAM(S): 10 TABLET, FILM COATED ORAL at 12:33

## 2021-01-01 RX ADMIN — ATORVASTATIN CALCIUM 10 MILLIGRAM(S): 80 TABLET, FILM COATED ORAL at 20:59

## 2021-01-01 RX ADMIN — HEPARIN SODIUM 5000 UNIT(S): 5000 INJECTION INTRAVENOUS; SUBCUTANEOUS at 21:25

## 2021-01-01 RX ADMIN — Medication 81 MILLIGRAM(S): at 15:07

## 2021-01-01 RX ADMIN — ATORVASTATIN CALCIUM 10 MILLIGRAM(S): 80 TABLET, FILM COATED ORAL at 21:23

## 2021-01-01 RX ADMIN — Medication 25 MILLILITER(S): at 12:09

## 2021-01-01 RX ADMIN — MUPIROCIN 1 APPLICATION(S): 20 OINTMENT TOPICAL at 05:00

## 2021-01-01 RX ADMIN — MIDODRINE HYDROCHLORIDE 10 MILLIGRAM(S): 2.5 TABLET ORAL at 07:42

## 2021-01-01 RX ADMIN — HEPARIN SODIUM 5000 UNIT(S): 5000 INJECTION INTRAVENOUS; SUBCUTANEOUS at 12:34

## 2021-01-01 RX ADMIN — PIPERACILLIN AND TAZOBACTAM 25 GRAM(S): 4; .5 INJECTION, POWDER, LYOPHILIZED, FOR SOLUTION INTRAVENOUS at 03:49

## 2021-01-01 RX ADMIN — CHLORHEXIDINE GLUCONATE 1 APPLICATION(S): 213 SOLUTION TOPICAL at 14:15

## 2021-01-01 RX ADMIN — CITALOPRAM 20 MILLIGRAM(S): 10 TABLET, FILM COATED ORAL at 11:54

## 2021-01-01 RX ADMIN — Medication 25 MILLILITER(S): at 17:42

## 2021-01-01 RX ADMIN — ATORVASTATIN CALCIUM 10 MILLIGRAM(S): 80 TABLET, FILM COATED ORAL at 21:01

## 2021-01-01 RX ADMIN — CITALOPRAM 20 MILLIGRAM(S): 10 TABLET, FILM COATED ORAL at 17:37

## 2021-01-01 RX ADMIN — CINACALCET 90 MILLIGRAM(S): 30 TABLET, FILM COATED ORAL at 12:40

## 2021-01-01 RX ADMIN — PIPERACILLIN AND TAZOBACTAM 25 GRAM(S): 4; .5 INJECTION, POWDER, LYOPHILIZED, FOR SOLUTION INTRAVENOUS at 17:27

## 2021-01-01 RX ADMIN — CHLORHEXIDINE GLUCONATE 1 APPLICATION(S): 213 SOLUTION TOPICAL at 11:54

## 2021-01-01 RX ADMIN — SODIUM CHLORIDE 250 MILLILITER(S): 9 INJECTION, SOLUTION INTRAVENOUS at 06:02

## 2021-01-01 RX ADMIN — PIPERACILLIN AND TAZOBACTAM 25 GRAM(S): 4; .5 INJECTION, POWDER, LYOPHILIZED, FOR SOLUTION INTRAVENOUS at 05:55

## 2021-01-01 RX ADMIN — CHLORHEXIDINE GLUCONATE 1 APPLICATION(S): 213 SOLUTION TOPICAL at 11:40

## 2021-01-01 RX ADMIN — Medication 1 APPLICATION(S): at 06:22

## 2021-01-01 RX ADMIN — Medication 1 APPLICATION(S): at 17:44

## 2021-01-01 RX ADMIN — PIPERACILLIN AND TAZOBACTAM 25 GRAM(S): 4; .5 INJECTION, POWDER, LYOPHILIZED, FOR SOLUTION INTRAVENOUS at 17:03

## 2021-01-01 RX ADMIN — CITALOPRAM 20 MILLIGRAM(S): 10 TABLET, FILM COATED ORAL at 13:17

## 2021-01-01 RX ADMIN — Medication 30 MILLILITER(S): at 18:27

## 2021-01-01 RX ADMIN — SODIUM CHLORIDE 75 MILLILITER(S): 9 INJECTION, SOLUTION INTRAVENOUS at 17:33

## 2021-01-01 RX ADMIN — Medication 1 APPLICATION(S): at 00:43

## 2021-01-01 RX ADMIN — MIDODRINE HYDROCHLORIDE 10 MILLIGRAM(S): 2.5 TABLET ORAL at 05:11

## 2021-01-01 RX ADMIN — ATORVASTATIN CALCIUM 10 MILLIGRAM(S): 80 TABLET, FILM COATED ORAL at 22:19

## 2021-01-01 RX ADMIN — SODIUM POLYSTYRENE SULFONATE 15 GRAM(S): 4.1 POWDER, FOR SUSPENSION ORAL at 06:33

## 2021-01-01 RX ADMIN — Medication 1 TABLET(S): at 20:26

## 2021-01-01 RX ADMIN — Medication 81 MILLIGRAM(S): at 11:17

## 2021-01-01 RX ADMIN — LORATADINE 10 MILLIGRAM(S): 10 TABLET ORAL at 17:36

## 2021-01-01 RX ADMIN — MUPIROCIN 1 APPLICATION(S): 20 OINTMENT TOPICAL at 17:39

## 2021-01-01 RX ADMIN — HEPARIN SODIUM 5000 UNIT(S): 5000 INJECTION INTRAVENOUS; SUBCUTANEOUS at 13:01

## 2021-01-01 RX ADMIN — MIDODRINE HYDROCHLORIDE 20 MILLIGRAM(S): 2.5 TABLET ORAL at 05:15

## 2021-01-01 RX ADMIN — HEPARIN SODIUM 5000 UNIT(S): 5000 INJECTION INTRAVENOUS; SUBCUTANEOUS at 22:57

## 2021-01-01 RX ADMIN — PIPERACILLIN AND TAZOBACTAM 25 GRAM(S): 4; .5 INJECTION, POWDER, LYOPHILIZED, FOR SOLUTION INTRAVENOUS at 05:32

## 2021-01-01 RX ADMIN — ATORVASTATIN CALCIUM 10 MILLIGRAM(S): 80 TABLET, FILM COATED ORAL at 21:10

## 2021-01-01 RX ADMIN — HEPARIN SODIUM 5000 UNIT(S): 5000 INJECTION INTRAVENOUS; SUBCUTANEOUS at 00:44

## 2021-01-01 RX ADMIN — HEPARIN SODIUM 5000 UNIT(S): 5000 INJECTION INTRAVENOUS; SUBCUTANEOUS at 05:13

## 2021-01-01 RX ADMIN — MIDODRINE HYDROCHLORIDE 10 MILLIGRAM(S): 2.5 TABLET ORAL at 06:16

## 2021-01-01 RX ADMIN — Medication 81 MILLIGRAM(S): at 11:58

## 2021-01-01 RX ADMIN — MIDODRINE HYDROCHLORIDE 10 MILLIGRAM(S): 2.5 TABLET ORAL at 17:12

## 2021-01-01 RX ADMIN — CITALOPRAM 20 MILLIGRAM(S): 10 TABLET, FILM COATED ORAL at 12:15

## 2021-01-01 RX ADMIN — Medication 100 GRAM(S): at 04:29

## 2021-01-01 RX ADMIN — SODIUM CHLORIDE 250 MILLILITER(S): 9 INJECTION, SOLUTION INTRAVENOUS at 23:40

## 2021-01-01 RX ADMIN — SODIUM ZIRCONIUM CYCLOSILICATE 10 GRAM(S): 10 POWDER, FOR SUSPENSION ORAL at 12:19

## 2021-01-01 RX ADMIN — MUPIROCIN 1 APPLICATION(S): 20 OINTMENT TOPICAL at 04:59

## 2021-01-01 RX ADMIN — SODIUM ZIRCONIUM CYCLOSILICATE 10 GRAM(S): 10 POWDER, FOR SUSPENSION ORAL at 08:16

## 2021-01-01 RX ADMIN — CITALOPRAM 20 MILLIGRAM(S): 10 TABLET, FILM COATED ORAL at 11:12

## 2021-01-01 RX ADMIN — MIDODRINE HYDROCHLORIDE 30 MILLIGRAM(S): 2.5 TABLET ORAL at 02:15

## 2021-01-01 RX ADMIN — MIDODRINE HYDROCHLORIDE 10 MILLIGRAM(S): 2.5 TABLET ORAL at 17:44

## 2021-01-01 RX ADMIN — SODIUM CHLORIDE 500 MILLILITER(S): 9 INJECTION INTRAMUSCULAR; INTRAVENOUS; SUBCUTANEOUS at 02:16

## 2021-01-01 RX ADMIN — CITALOPRAM 20 MILLIGRAM(S): 10 TABLET, FILM COATED ORAL at 11:09

## 2021-01-01 RX ADMIN — ATORVASTATIN CALCIUM 10 MILLIGRAM(S): 80 TABLET, FILM COATED ORAL at 20:23

## 2021-01-01 RX ADMIN — CHLORHEXIDINE GLUCONATE 1 APPLICATION(S): 213 SOLUTION TOPICAL at 19:11

## 2021-01-01 RX ADMIN — CHLORHEXIDINE GLUCONATE 1 APPLICATION(S): 213 SOLUTION TOPICAL at 12:14

## 2021-01-01 RX ADMIN — Medication 50 MILLILITER(S): at 13:21

## 2021-01-01 RX ADMIN — SENNA PLUS 2 TABLET(S): 8.6 TABLET ORAL at 21:23

## 2021-01-01 RX ADMIN — CHLORHEXIDINE GLUCONATE 1 APPLICATION(S): 213 SOLUTION TOPICAL at 13:01

## 2021-01-01 RX ADMIN — MUPIROCIN 1 APPLICATION(S): 20 OINTMENT TOPICAL at 19:07

## 2021-01-01 RX ADMIN — Medication 12.5 MILLILITER(S): at 17:30

## 2021-01-01 RX ADMIN — Medication 1 APPLICATION(S): at 00:52

## 2021-01-01 RX ADMIN — CINACALCET 90 MILLIGRAM(S): 30 TABLET, FILM COATED ORAL at 11:59

## 2021-01-01 RX ADMIN — POLYETHYLENE GLYCOL 3350 17 GRAM(S): 17 POWDER, FOR SOLUTION ORAL at 12:15

## 2021-01-01 RX ADMIN — Medication 12.5 MILLIGRAM(S): at 18:17

## 2021-01-01 RX ADMIN — Medication 1 APPLICATION(S): at 06:10

## 2021-01-01 RX ADMIN — SODIUM CHLORIDE 30 MILLILITER(S): 9 INJECTION, SOLUTION INTRAVENOUS at 13:35

## 2021-01-01 RX ADMIN — MIDODRINE HYDROCHLORIDE 10 MILLIGRAM(S): 2.5 TABLET ORAL at 17:33

## 2021-01-01 RX ADMIN — HEPARIN SODIUM 5000 UNIT(S): 5000 INJECTION INTRAVENOUS; SUBCUTANEOUS at 05:52

## 2021-01-01 RX ADMIN — HEPARIN SODIUM 5000 UNIT(S): 5000 INJECTION INTRAVENOUS; SUBCUTANEOUS at 12:11

## 2021-01-01 RX ADMIN — Medication 1 APPLICATION(S): at 05:12

## 2021-01-01 RX ADMIN — TAMSULOSIN HYDROCHLORIDE 0.4 MILLIGRAM(S): 0.4 CAPSULE ORAL at 21:38

## 2021-01-01 RX ADMIN — SENNA PLUS 10 MILLILITER(S): 8.6 TABLET ORAL at 12:16

## 2021-01-01 RX ADMIN — PIPERACILLIN AND TAZOBACTAM 25 GRAM(S): 4; .5 INJECTION, POWDER, LYOPHILIZED, FOR SOLUTION INTRAVENOUS at 18:23

## 2021-01-01 RX ADMIN — CITALOPRAM 20 MILLIGRAM(S): 10 TABLET, FILM COATED ORAL at 15:07

## 2021-01-01 RX ADMIN — Medication 100 MILLIGRAM(S): at 21:25

## 2021-01-01 RX ADMIN — MUPIROCIN 1 APPLICATION(S): 20 OINTMENT TOPICAL at 05:56

## 2021-01-01 RX ADMIN — ATORVASTATIN CALCIUM 10 MILLIGRAM(S): 80 TABLET, FILM COATED ORAL at 21:56

## 2021-01-01 RX ADMIN — MIDODRINE HYDROCHLORIDE 10 MILLIGRAM(S): 2.5 TABLET ORAL at 12:22

## 2021-01-01 RX ADMIN — Medication 50 MILLILITER(S): at 17:04

## 2021-01-01 RX ADMIN — MUPIROCIN 1 APPLICATION(S): 20 OINTMENT TOPICAL at 17:15

## 2021-01-01 RX ADMIN — SODIUM CHLORIDE 500 MILLILITER(S): 9 INJECTION INTRAMUSCULAR; INTRAVENOUS; SUBCUTANEOUS at 12:50

## 2021-01-01 RX ADMIN — HEPARIN SODIUM 5000 UNIT(S): 5000 INJECTION INTRAVENOUS; SUBCUTANEOUS at 13:14

## 2021-01-01 RX ADMIN — MIDODRINE HYDROCHLORIDE 10 MILLIGRAM(S): 2.5 TABLET ORAL at 12:59

## 2021-01-01 RX ADMIN — PIPERACILLIN AND TAZOBACTAM 25 GRAM(S): 4; .5 INJECTION, POWDER, LYOPHILIZED, FOR SOLUTION INTRAVENOUS at 05:45

## 2021-01-01 RX ADMIN — LORATADINE 10 MILLIGRAM(S): 10 TABLET ORAL at 12:10

## 2021-01-01 RX ADMIN — Medication 12.5 MILLIGRAM(S): at 17:12

## 2021-01-01 RX ADMIN — HEPARIN SODIUM 5000 UNIT(S): 5000 INJECTION INTRAVENOUS; SUBCUTANEOUS at 14:47

## 2021-01-01 RX ADMIN — Medication 1 TABLET(S): at 11:53

## 2021-01-01 RX ADMIN — CITALOPRAM 20 MILLIGRAM(S): 10 TABLET, FILM COATED ORAL at 12:23

## 2021-01-01 RX ADMIN — Medication 100 MILLIGRAM(S): at 12:10

## 2021-01-01 RX ADMIN — Medication 25 MILLILITER(S): at 17:55

## 2021-01-01 RX ADMIN — HEPARIN SODIUM 5000 UNIT(S): 5000 INJECTION INTRAVENOUS; SUBCUTANEOUS at 05:15

## 2021-01-01 RX ADMIN — MIDODRINE HYDROCHLORIDE 5 MILLIGRAM(S): 2.5 TABLET ORAL at 18:33

## 2021-01-01 RX ADMIN — MIDODRINE HYDROCHLORIDE 10 MILLIGRAM(S): 2.5 TABLET ORAL at 05:55

## 2021-01-01 RX ADMIN — CINACALCET 90 MILLIGRAM(S): 30 TABLET, FILM COATED ORAL at 12:30

## 2021-01-01 RX ADMIN — Medication 1 TABLET(S): at 11:17

## 2021-01-01 RX ADMIN — Medication 81 MILLIGRAM(S): at 12:10

## 2021-01-01 RX ADMIN — Medication 100 MILLIGRAM(S): at 05:11

## 2021-01-01 RX ADMIN — CHLORHEXIDINE GLUCONATE 1 APPLICATION(S): 213 SOLUTION TOPICAL at 13:16

## 2021-01-01 RX ADMIN — POLYETHYLENE GLYCOL 3350 17 GRAM(S): 17 POWDER, FOR SOLUTION ORAL at 12:31

## 2021-01-01 RX ADMIN — Medication 1 APPLICATION(S): at 04:01

## 2021-01-01 RX ADMIN — SODIUM CHLORIDE 50 MILLILITER(S): 9 INJECTION, SOLUTION INTRAVENOUS at 17:53

## 2021-01-01 RX ADMIN — PIPERACILLIN AND TAZOBACTAM 25 GRAM(S): 4; .5 INJECTION, POWDER, LYOPHILIZED, FOR SOLUTION INTRAVENOUS at 17:35

## 2021-01-01 RX ADMIN — PIPERACILLIN AND TAZOBACTAM 25 GRAM(S): 4; .5 INJECTION, POWDER, LYOPHILIZED, FOR SOLUTION INTRAVENOUS at 17:10

## 2021-01-01 RX ADMIN — Medication 1 APPLICATION(S): at 01:13

## 2021-01-01 RX ADMIN — SENNA PLUS 2 TABLET(S): 8.6 TABLET ORAL at 21:31

## 2021-01-01 RX ADMIN — PIPERACILLIN AND TAZOBACTAM 25 GRAM(S): 4; .5 INJECTION, POWDER, LYOPHILIZED, FOR SOLUTION INTRAVENOUS at 08:05

## 2021-01-01 RX ADMIN — Medication 81 MILLIGRAM(S): at 12:22

## 2021-01-01 RX ADMIN — MUPIROCIN 1 APPLICATION(S): 20 OINTMENT TOPICAL at 05:25

## 2021-01-01 RX ADMIN — CHLORHEXIDINE GLUCONATE 1 APPLICATION(S): 213 SOLUTION TOPICAL at 15:08

## 2021-01-01 RX ADMIN — CITALOPRAM 20 MILLIGRAM(S): 10 TABLET, FILM COATED ORAL at 13:01

## 2021-01-01 RX ADMIN — MUPIROCIN 1 APPLICATION(S): 20 OINTMENT TOPICAL at 05:15

## 2021-01-01 RX ADMIN — MUPIROCIN 1 APPLICATION(S): 20 OINTMENT TOPICAL at 05:46

## 2021-01-01 RX ADMIN — MUPIROCIN 1 APPLICATION(S): 20 OINTMENT TOPICAL at 18:19

## 2021-01-01 RX ADMIN — Medication 1 APPLICATION(S): at 05:57

## 2021-01-01 RX ADMIN — Medication 1 MILLIGRAM(S): at 22:13

## 2021-01-01 RX ADMIN — HEPARIN SODIUM 5000 UNIT(S): 5000 INJECTION INTRAVENOUS; SUBCUTANEOUS at 12:59

## 2021-01-01 RX ADMIN — CITALOPRAM 20 MILLIGRAM(S): 10 TABLET, FILM COATED ORAL at 12:19

## 2021-01-01 RX ADMIN — Medication 25 MILLILITER(S): at 04:30

## 2021-01-01 RX ADMIN — Medication 12.5 GRAM(S): at 12:35

## 2021-01-01 RX ADMIN — HEPARIN SODIUM 5000 UNIT(S): 5000 INJECTION INTRAVENOUS; SUBCUTANEOUS at 13:17

## 2021-01-01 RX ADMIN — SODIUM CHLORIDE 150 MILLILITER(S): 9 INJECTION INTRAMUSCULAR; INTRAVENOUS; SUBCUTANEOUS at 00:33

## 2021-01-01 RX ADMIN — CHLORHEXIDINE GLUCONATE 1 APPLICATION(S): 213 SOLUTION TOPICAL at 12:32

## 2021-01-01 RX ADMIN — HEPARIN SODIUM 5000 UNIT(S): 5000 INJECTION INTRAVENOUS; SUBCUTANEOUS at 12:12

## 2021-01-01 RX ADMIN — SODIUM CHLORIDE 333.33 MILLILITER(S): 9 INJECTION INTRAMUSCULAR; INTRAVENOUS; SUBCUTANEOUS at 03:44

## 2021-01-01 RX ADMIN — LORATADINE 10 MILLIGRAM(S): 10 TABLET ORAL at 16:20

## 2021-01-01 RX ADMIN — MUPIROCIN 1 APPLICATION(S): 20 OINTMENT TOPICAL at 19:11

## 2021-01-01 RX ADMIN — CHLORHEXIDINE GLUCONATE 1 APPLICATION(S): 213 SOLUTION TOPICAL at 11:21

## 2021-01-01 RX ADMIN — POLYETHYLENE GLYCOL 3350 17 GRAM(S): 17 POWDER, FOR SOLUTION ORAL at 12:23

## 2021-01-01 RX ADMIN — SODIUM CHLORIDE 50 MILLILITER(S): 9 INJECTION, SOLUTION INTRAVENOUS at 12:51

## 2021-01-01 RX ADMIN — MIDODRINE HYDROCHLORIDE 5 MILLIGRAM(S): 2.5 TABLET ORAL at 20:23

## 2021-01-01 RX ADMIN — Medication 1 TABLET(S): at 11:06

## 2021-01-01 RX ADMIN — HEPARIN SODIUM 5000 UNIT(S): 5000 INJECTION INTRAVENOUS; SUBCUTANEOUS at 06:16

## 2021-01-01 RX ADMIN — CHLORHEXIDINE GLUCONATE 1 APPLICATION(S): 213 SOLUTION TOPICAL at 15:24

## 2021-01-01 RX ADMIN — CITALOPRAM 20 MILLIGRAM(S): 10 TABLET, FILM COATED ORAL at 15:24

## 2021-01-01 RX ADMIN — Medication 81 MILLIGRAM(S): at 12:09

## 2021-01-01 RX ADMIN — HEPARIN SODIUM 5000 UNIT(S): 5000 INJECTION INTRAVENOUS; SUBCUTANEOUS at 04:00

## 2021-01-01 RX ADMIN — PANTOPRAZOLE SODIUM 40 MILLIGRAM(S): 20 TABLET, DELAYED RELEASE ORAL at 05:56

## 2021-01-01 RX ADMIN — PIPERACILLIN AND TAZOBACTAM 25 GRAM(S): 4; .5 INJECTION, POWDER, LYOPHILIZED, FOR SOLUTION INTRAVENOUS at 15:52

## 2021-01-01 RX ADMIN — POLYETHYLENE GLYCOL 3350 17 GRAM(S): 17 POWDER, FOR SOLUTION ORAL at 13:15

## 2021-01-01 RX ADMIN — CHLORHEXIDINE GLUCONATE 1 APPLICATION(S): 213 SOLUTION TOPICAL at 12:19

## 2021-01-01 RX ADMIN — Medication 81 MILLIGRAM(S): at 17:34

## 2021-01-01 RX ADMIN — Medication 250 MILLIGRAM(S): at 20:30

## 2021-01-01 RX ADMIN — MUPIROCIN 1 APPLICATION(S): 20 OINTMENT TOPICAL at 17:37

## 2021-01-01 RX ADMIN — POLYETHYLENE GLYCOL 3350 17 GRAM(S): 17 POWDER, FOR SOLUTION ORAL at 11:17

## 2021-01-01 RX ADMIN — TAMSULOSIN HYDROCHLORIDE 0.4 MILLIGRAM(S): 0.4 CAPSULE ORAL at 21:25

## 2021-01-01 RX ADMIN — LORATADINE 10 MILLIGRAM(S): 10 TABLET ORAL at 12:59

## 2021-01-01 RX ADMIN — Medication 1 TABLET(S): at 11:12

## 2021-01-01 RX ADMIN — CHLORHEXIDINE GLUCONATE 1 APPLICATION(S): 213 SOLUTION TOPICAL at 11:17

## 2021-01-01 RX ADMIN — MIDODRINE HYDROCHLORIDE 10 MILLIGRAM(S): 2.5 TABLET ORAL at 17:14

## 2021-01-01 RX ADMIN — PIPERACILLIN AND TAZOBACTAM 25 GRAM(S): 4; .5 INJECTION, POWDER, LYOPHILIZED, FOR SOLUTION INTRAVENOUS at 21:38

## 2021-01-01 RX ADMIN — MUPIROCIN 1 APPLICATION(S): 20 OINTMENT TOPICAL at 17:01

## 2021-01-01 RX ADMIN — CINACALCET 60 MILLIGRAM(S): 30 TABLET, FILM COATED ORAL at 12:09

## 2021-01-01 RX ADMIN — MIDODRINE HYDROCHLORIDE 10 MILLIGRAM(S): 2.5 TABLET ORAL at 18:11

## 2021-01-01 RX ADMIN — MUPIROCIN 1 APPLICATION(S): 20 OINTMENT TOPICAL at 05:11

## 2021-01-01 RX ADMIN — HEPARIN SODIUM 5000 UNIT(S): 5000 INJECTION INTRAVENOUS; SUBCUTANEOUS at 22:52

## 2021-01-01 RX ADMIN — HEPARIN SODIUM 5000 UNIT(S): 5000 INJECTION INTRAVENOUS; SUBCUTANEOUS at 21:31

## 2021-01-01 RX ADMIN — POLYETHYLENE GLYCOL 3350 17 GRAM(S): 17 POWDER, FOR SOLUTION ORAL at 11:42

## 2021-01-01 RX ADMIN — POLYETHYLENE GLYCOL 3350 17 GRAM(S): 17 POWDER, FOR SOLUTION ORAL at 12:40

## 2021-01-01 RX ADMIN — Medication 12.5 GRAM(S): at 04:59

## 2021-01-01 RX ADMIN — POLYETHYLENE GLYCOL 3350 17 GRAM(S): 17 POWDER, FOR SOLUTION ORAL at 13:16

## 2021-01-01 RX ADMIN — HEPARIN SODIUM 5000 UNIT(S): 5000 INJECTION INTRAVENOUS; SUBCUTANEOUS at 06:19

## 2021-01-01 RX ADMIN — MUPIROCIN 1 APPLICATION(S): 20 OINTMENT TOPICAL at 17:03

## 2021-01-01 RX ADMIN — MUPIROCIN 1 APPLICATION(S): 20 OINTMENT TOPICAL at 16:23

## 2021-01-01 RX ADMIN — Medication 250 MILLIGRAM(S): at 14:52

## 2021-01-01 RX ADMIN — SENNA PLUS 2 TABLET(S): 8.6 TABLET ORAL at 21:17

## 2021-01-01 RX ADMIN — CITALOPRAM 20 MILLIGRAM(S): 10 TABLET, FILM COATED ORAL at 11:17

## 2021-01-01 RX ADMIN — PIPERACILLIN AND TAZOBACTAM 25 GRAM(S): 4; .5 INJECTION, POWDER, LYOPHILIZED, FOR SOLUTION INTRAVENOUS at 06:16

## 2021-01-01 RX ADMIN — MUPIROCIN 1 APPLICATION(S): 20 OINTMENT TOPICAL at 17:05

## 2021-01-01 RX ADMIN — Medication 10.2 MICROGRAM(S)/KG/MIN: at 04:41

## 2021-01-01 RX ADMIN — HEPARIN SODIUM 5000 UNIT(S): 5000 INJECTION INTRAVENOUS; SUBCUTANEOUS at 20:59

## 2021-01-01 RX ADMIN — MUPIROCIN 1 APPLICATION(S): 20 OINTMENT TOPICAL at 17:55

## 2021-01-01 RX ADMIN — SODIUM CHLORIDE 50 MILLILITER(S): 9 INJECTION, SOLUTION INTRAVENOUS at 05:11

## 2021-01-01 RX ADMIN — Medication 25 MILLIGRAM(S): at 11:53

## 2021-01-01 RX ADMIN — PANTOPRAZOLE SODIUM 40 MILLIGRAM(S): 20 TABLET, DELAYED RELEASE ORAL at 05:13

## 2021-01-01 RX ADMIN — Medication 1 TABLET(S): at 15:13

## 2021-01-01 RX ADMIN — SODIUM CHLORIDE 250 MILLILITER(S): 9 INJECTION INTRAMUSCULAR; INTRAVENOUS; SUBCUTANEOUS at 18:31

## 2021-01-01 RX ADMIN — Medication 1 TABLET(S): at 11:58

## 2021-01-01 RX ADMIN — PIPERACILLIN AND TAZOBACTAM 25 GRAM(S): 4; .5 INJECTION, POWDER, LYOPHILIZED, FOR SOLUTION INTRAVENOUS at 15:51

## 2021-01-01 RX ADMIN — CHLORHEXIDINE GLUCONATE 1 APPLICATION(S): 213 SOLUTION TOPICAL at 11:13

## 2021-01-01 RX ADMIN — Medication 100 MILLIGRAM(S): at 06:08

## 2021-01-01 RX ADMIN — Medication 25 GRAM(S): at 13:48

## 2021-01-01 RX ADMIN — SENNA PLUS 2 TABLET(S): 8.6 TABLET ORAL at 22:56

## 2021-01-01 RX ADMIN — HEPARIN SODIUM 5000 UNIT(S): 5000 INJECTION INTRAVENOUS; SUBCUTANEOUS at 05:56

## 2021-01-01 RX ADMIN — CINACALCET 90 MILLIGRAM(S): 30 TABLET, FILM COATED ORAL at 12:15

## 2021-01-01 RX ADMIN — Medication 650 MILLIGRAM(S): at 02:37

## 2021-01-01 RX ADMIN — Medication 1 TABLET(S): at 11:16

## 2021-01-01 RX ADMIN — HEPARIN SODIUM 5000 UNIT(S): 5000 INJECTION INTRAVENOUS; SUBCUTANEOUS at 12:24

## 2021-01-01 RX ADMIN — CINACALCET 60 MILLIGRAM(S): 30 TABLET, FILM COATED ORAL at 17:34

## 2021-01-01 RX ADMIN — CHLORHEXIDINE GLUCONATE 1 APPLICATION(S): 213 SOLUTION TOPICAL at 11:41

## 2021-01-01 RX ADMIN — Medication 10.2 MICROGRAM(S)/KG/MIN: at 08:19

## 2021-01-01 RX ADMIN — Medication 1 APPLICATION(S): at 18:18

## 2021-01-01 RX ADMIN — MUPIROCIN 1 APPLICATION(S): 20 OINTMENT TOPICAL at 17:35

## 2021-01-01 RX ADMIN — Medication 81 MILLIGRAM(S): at 15:24

## 2021-01-01 RX ADMIN — Medication 25 MILLILITER(S): at 05:37

## 2021-01-01 RX ADMIN — PANTOPRAZOLE SODIUM 40 MILLIGRAM(S): 20 TABLET, DELAYED RELEASE ORAL at 06:19

## 2021-01-01 RX ADMIN — SODIUM CHLORIDE 75 MILLILITER(S): 9 INJECTION, SOLUTION INTRAVENOUS at 08:04

## 2021-01-01 RX ADMIN — Medication 1 TABLET(S): at 15:08

## 2021-01-01 RX ADMIN — HEPARIN SODIUM 5000 UNIT(S): 5000 INJECTION INTRAVENOUS; SUBCUTANEOUS at 21:15

## 2021-01-01 RX ADMIN — Medication 1 MILLIGRAM(S): at 23:16

## 2021-01-01 RX ADMIN — Medication 100 MILLIGRAM(S): at 05:56

## 2021-01-01 RX ADMIN — Medication 250 MILLIGRAM(S): at 05:13

## 2021-01-01 RX ADMIN — Medication 81 MILLIGRAM(S): at 12:19

## 2021-01-01 RX ADMIN — Medication 25 MILLILITER(S): at 00:37

## 2021-01-01 RX ADMIN — HEPARIN SODIUM 5000 UNIT(S): 5000 INJECTION INTRAVENOUS; SUBCUTANEOUS at 14:08

## 2021-01-01 RX ADMIN — POLYETHYLENE GLYCOL 3350 17 GRAM(S): 17 POWDER, FOR SOLUTION ORAL at 11:53

## 2021-01-01 RX ADMIN — HEPARIN SODIUM 5000 UNIT(S): 5000 INJECTION INTRAVENOUS; SUBCUTANEOUS at 05:12

## 2021-01-01 RX ADMIN — Medication 63.75 MILLIMOLE(S): at 22:55

## 2021-01-01 RX ADMIN — Medication 81 MILLIGRAM(S): at 12:32

## 2021-01-01 RX ADMIN — MIDODRINE HYDROCHLORIDE 20 MILLIGRAM(S): 2.5 TABLET ORAL at 17:19

## 2021-01-01 RX ADMIN — SENNA PLUS 10 MILLILITER(S): 8.6 TABLET ORAL at 12:20

## 2021-01-01 RX ADMIN — CINACALCET 60 MILLIGRAM(S): 30 TABLET, FILM COATED ORAL at 18:01

## 2021-01-01 RX ADMIN — Medication 12.5 GRAM(S): at 12:51

## 2021-01-01 RX ADMIN — Medication 1 APPLICATION(S): at 22:56

## 2021-01-01 RX ADMIN — Medication 50 MILLILITER(S): at 12:30

## 2021-01-01 RX ADMIN — CITALOPRAM 20 MILLIGRAM(S): 10 TABLET, FILM COATED ORAL at 12:40

## 2021-01-01 RX ADMIN — MIDODRINE HYDROCHLORIDE 20 MILLIGRAM(S): 2.5 TABLET ORAL at 05:51

## 2021-01-01 RX ADMIN — ATORVASTATIN CALCIUM 10 MILLIGRAM(S): 80 TABLET, FILM COATED ORAL at 21:18

## 2021-01-01 RX ADMIN — Medication 25 GRAM(S): at 06:32

## 2021-01-01 RX ADMIN — Medication 1 TABLET(S): at 11:09

## 2021-01-01 RX ADMIN — MUPIROCIN 1 APPLICATION(S): 20 OINTMENT TOPICAL at 21:42

## 2021-01-01 RX ADMIN — PIPERACILLIN AND TAZOBACTAM 25 GRAM(S): 4; .5 INJECTION, POWDER, LYOPHILIZED, FOR SOLUTION INTRAVENOUS at 17:55

## 2021-01-01 RX ADMIN — POLYETHYLENE GLYCOL 3350 17 GRAM(S): 17 POWDER, FOR SOLUTION ORAL at 12:00

## 2021-01-01 RX ADMIN — MIDODRINE HYDROCHLORIDE 10 MILLIGRAM(S): 2.5 TABLET ORAL at 06:19

## 2021-01-01 RX ADMIN — CITALOPRAM 20 MILLIGRAM(S): 10 TABLET, FILM COATED ORAL at 12:31

## 2021-01-01 RX ADMIN — PANTOPRAZOLE SODIUM 40 MILLIGRAM(S): 20 TABLET, DELAYED RELEASE ORAL at 06:16

## 2021-01-01 RX ADMIN — SENNA PLUS 10 MILLILITER(S): 8.6 TABLET ORAL at 11:59

## 2021-01-01 RX ADMIN — PANTOPRAZOLE SODIUM 40 MILLIGRAM(S): 20 TABLET, DELAYED RELEASE ORAL at 06:08

## 2021-01-01 RX ADMIN — Medication 12.5 GRAM(S): at 09:12

## 2021-01-01 RX ADMIN — HEPARIN SODIUM 5000 UNIT(S): 5000 INJECTION INTRAVENOUS; SUBCUTANEOUS at 05:00

## 2021-01-01 RX ADMIN — CINACALCET 90 MILLIGRAM(S): 30 TABLET, FILM COATED ORAL at 13:16

## 2021-01-01 RX ADMIN — HEPARIN SODIUM 5000 UNIT(S): 5000 INJECTION INTRAVENOUS; SUBCUTANEOUS at 05:55

## 2021-01-01 RX ADMIN — Medication 1 TABLET(S): at 11:42

## 2021-01-01 RX ADMIN — Medication 1 TABLET(S): at 16:20

## 2021-01-01 RX ADMIN — Medication 1 APPLICATION(S): at 00:16

## 2021-01-01 RX ADMIN — PIPERACILLIN AND TAZOBACTAM 25 GRAM(S): 4; .5 INJECTION, POWDER, LYOPHILIZED, FOR SOLUTION INTRAVENOUS at 05:58

## 2021-01-01 RX ADMIN — PIPERACILLIN AND TAZOBACTAM 25 GRAM(S): 4; .5 INJECTION, POWDER, LYOPHILIZED, FOR SOLUTION INTRAVENOUS at 22:05

## 2021-01-01 RX ADMIN — MIDODRINE HYDROCHLORIDE 10 MILLIGRAM(S): 2.5 TABLET ORAL at 06:08

## 2021-01-01 RX ADMIN — PANTOPRAZOLE SODIUM 40 MILLIGRAM(S): 20 TABLET, DELAYED RELEASE ORAL at 05:11

## 2021-01-01 RX ADMIN — SODIUM CHLORIDE 250 MILLILITER(S): 9 INJECTION INTRAMUSCULAR; INTRAVENOUS; SUBCUTANEOUS at 22:05

## 2021-01-01 RX ADMIN — Medication 50 MILLILITER(S): at 12:16

## 2021-01-01 RX ADMIN — SODIUM ZIRCONIUM CYCLOSILICATE 10 GRAM(S): 10 POWDER, FOR SUSPENSION ORAL at 08:54

## 2021-01-01 RX ADMIN — Medication 1 TABLET(S): at 12:10

## 2021-01-01 RX ADMIN — Medication 25 GRAM(S): at 22:09

## 2021-01-01 RX ADMIN — POLYETHYLENE GLYCOL 3350 17 GRAM(S): 17 POWDER, FOR SOLUTION ORAL at 12:10

## 2021-01-01 RX ADMIN — INSULIN HUMAN 5 UNIT(S): 100 INJECTION, SOLUTION SUBCUTANEOUS at 04:30

## 2021-01-01 RX ADMIN — PIPERACILLIN AND TAZOBACTAM 200 GRAM(S): 4; .5 INJECTION, POWDER, LYOPHILIZED, FOR SOLUTION INTRAVENOUS at 05:35

## 2021-01-01 RX ADMIN — CINACALCET 90 MILLIGRAM(S): 30 TABLET, FILM COATED ORAL at 11:42

## 2021-01-01 RX ADMIN — CINACALCET 60 MILLIGRAM(S): 30 TABLET, FILM COATED ORAL at 13:01

## 2021-01-01 RX ADMIN — LORATADINE 10 MILLIGRAM(S): 10 TABLET ORAL at 13:02

## 2021-01-01 RX ADMIN — MIDODRINE HYDROCHLORIDE 5 MILLIGRAM(S): 2.5 TABLET ORAL at 22:33

## 2021-01-01 RX ADMIN — Medication 1 TABLET(S): at 12:11

## 2021-01-01 RX ADMIN — ATORVASTATIN CALCIUM 10 MILLIGRAM(S): 80 TABLET, FILM COATED ORAL at 21:19

## 2021-01-01 RX ADMIN — SODIUM CHLORIDE 50 MILLILITER(S): 9 INJECTION, SOLUTION INTRAVENOUS at 17:11

## 2021-01-01 RX ADMIN — MIDODRINE HYDROCHLORIDE 20 MILLIGRAM(S): 2.5 TABLET ORAL at 12:37

## 2021-01-01 RX ADMIN — POLYETHYLENE GLYCOL 3350 17 GRAM(S): 17 POWDER, FOR SOLUTION ORAL at 13:09

## 2021-01-01 RX ADMIN — HEPARIN SODIUM 5000 UNIT(S): 5000 INJECTION INTRAVENOUS; SUBCUTANEOUS at 20:47

## 2021-01-01 RX ADMIN — HEPARIN SODIUM 5000 UNIT(S): 5000 INJECTION INTRAVENOUS; SUBCUTANEOUS at 21:09

## 2021-01-01 RX ADMIN — Medication 100 MILLIGRAM(S): at 21:10

## 2021-01-01 RX ADMIN — Medication 1 TABLET(S): at 12:23

## 2021-01-01 RX ADMIN — ATORVASTATIN CALCIUM 10 MILLIGRAM(S): 80 TABLET, FILM COATED ORAL at 22:31

## 2021-01-01 RX ADMIN — MUPIROCIN 1 APPLICATION(S): 20 OINTMENT TOPICAL at 06:07

## 2021-01-01 RX ADMIN — Medication 1 APPLICATION(S): at 22:03

## 2021-01-01 RX ADMIN — MIDODRINE HYDROCHLORIDE 20 MILLIGRAM(S): 2.5 TABLET ORAL at 10:43

## 2021-01-01 RX ADMIN — CHLORHEXIDINE GLUCONATE 1 APPLICATION(S): 213 SOLUTION TOPICAL at 12:39

## 2021-01-01 RX ADMIN — Medication 1 APPLICATION(S): at 15:08

## 2021-01-01 RX ADMIN — TAMSULOSIN HYDROCHLORIDE 0.4 MILLIGRAM(S): 0.4 CAPSULE ORAL at 21:23

## 2021-01-01 RX ADMIN — Medication 1 TABLET(S): at 12:16

## 2021-01-01 RX ADMIN — INSULIN HUMAN 5 UNIT(S): 100 INJECTION, SOLUTION SUBCUTANEOUS at 05:14

## 2021-01-01 RX ADMIN — PIPERACILLIN AND TAZOBACTAM 25 GRAM(S): 4; .5 INJECTION, POWDER, LYOPHILIZED, FOR SOLUTION INTRAVENOUS at 03:31

## 2021-01-01 RX ADMIN — CHLORHEXIDINE GLUCONATE 1 APPLICATION(S): 213 SOLUTION TOPICAL at 12:11

## 2021-01-01 RX ADMIN — POLYETHYLENE GLYCOL 3350 17 GRAM(S): 17 POWDER, FOR SOLUTION ORAL at 11:06

## 2021-01-01 RX ADMIN — Medication 40 MILLIEQUIVALENT(S): at 17:03

## 2021-01-01 RX ADMIN — MUPIROCIN 1 APPLICATION(S): 20 OINTMENT TOPICAL at 17:21

## 2021-01-01 RX ADMIN — Medication 100 MILLIGRAM(S): at 05:55

## 2021-01-01 RX ADMIN — SODIUM CHLORIDE 75 MILLILITER(S): 9 INJECTION, SOLUTION INTRAVENOUS at 10:24

## 2021-01-01 RX ADMIN — ATORVASTATIN CALCIUM 10 MILLIGRAM(S): 80 TABLET, FILM COATED ORAL at 21:29

## 2021-01-01 RX ADMIN — HEPARIN SODIUM 5000 UNIT(S): 5000 INJECTION INTRAVENOUS; SUBCUTANEOUS at 05:11

## 2021-01-01 RX ADMIN — CINACALCET 60 MILLIGRAM(S): 30 TABLET, FILM COATED ORAL at 17:07

## 2021-01-01 RX ADMIN — MUPIROCIN 1 APPLICATION(S): 20 OINTMENT TOPICAL at 05:44

## 2021-01-01 RX ADMIN — SODIUM CHLORIDE 50 MILLILITER(S): 9 INJECTION, SOLUTION INTRAVENOUS at 08:30

## 2021-01-01 RX ADMIN — MIDODRINE HYDROCHLORIDE 10 MILLIGRAM(S): 2.5 TABLET ORAL at 12:11

## 2021-01-01 RX ADMIN — CITALOPRAM 20 MILLIGRAM(S): 10 TABLET, FILM COATED ORAL at 13:14

## 2021-01-01 RX ADMIN — MIDODRINE HYDROCHLORIDE 20 MILLIGRAM(S): 2.5 TABLET ORAL at 11:16

## 2021-01-01 RX ADMIN — HYDROMORPHONE HYDROCHLORIDE 1 MILLIGRAM(S): 2 INJECTION INTRAMUSCULAR; INTRAVENOUS; SUBCUTANEOUS at 19:17

## 2021-01-01 RX ADMIN — Medication 100 MILLIGRAM(S): at 00:16

## 2021-01-01 RX ADMIN — Medication 100 MILLIGRAM(S): at 05:57

## 2021-01-01 RX ADMIN — ATORVASTATIN CALCIUM 10 MILLIGRAM(S): 80 TABLET, FILM COATED ORAL at 21:25

## 2021-01-27 NOTE — H&P ADULT - PROBLEM SELECTOR PLAN 4
Primary hyperparathyroidism, based on serologic workup from 12/2020. Poor candidate for parathyroidectomy given patient's comorbidities.  - On Sensipar 30 mg PO BID at home. Increased Sensipar to 60 mg PO BID while inpatient.  - continue to monitor with BMP Q12h Na 147 in ED. 2/2 ESRD/poor free water intake  - HD this morning  - continue to monitor with BMP Q12h Na 147 in ED. 2/2 ESRD/poor free water intake  - HD today  - continue to monitor with BMP Q12h

## 2021-01-27 NOTE — H&P ADULT - NSHPPHYSICALEXAM_GEN_ALL_CORE
T(C): 36.4 (01-27-21 @ 11:25), Max: 39.2 (01-27-21 @ 02:22)  HR: 89 (01-27-21 @ 11:25) (89 - 120)  BP: 101/55 (01-27-21 @ 11:25) (101/55 - 131/88)  RR: 15 (01-27-21 @ 11:25) (15 - 18)  SpO2: 100% (01-27-21 @ 11:25) (98% - 100%)  Wt(kg): --Vital Signs Last 24 Hrs  T(C): 36.4 (27 Jan 2021 11:25), Max: 39.2 (27 Jan 2021 02:22)  T(F): 97.5 (27 Jan 2021 11:25), Max: 102.6 (27 Jan 2021 02:22)  HR: 89 (27 Jan 2021 11:25) (89 - 120)  BP: 101/55 (27 Jan 2021 11:25) (101/55 - 131/88)  BP(mean): --  RR: 15 (27 Jan 2021 11:25) (15 - 18)  SpO2: 100% (27 Jan 2021 11:25) (98% - 100%)    PHYSICAL EXAM:  GENERAL: NAD, well-groomed, well-developed  HEAD:  Atraumatic, Normocephalic  EYES: EOMI, PERRLA, conjunctiva and sclera clear  ENMT: No tonsillar erythema, exudates, or enlargement; Moist mucous membranes, Good dentition, No lesions  NECK: Supple, No JVD, Normal thyroid  NERVOUS SYSTEM:  Alert & Oriented X3, Good concentration; Motor Strength 5/5 B/L upper and lower extremities; DTRs 2+ intact and symmetric  CHEST/LUNG: Clear to percussion bilaterally; No rales, rhonchi, wheezing, or rubs  HEART: Regular rate and rhythm; No murmurs, rubs, or gallops  ABDOMEN: Soft, Nontender, Nondistended; Bowel sounds present  EXTREMITIES:  2+ Peripheral Pulses, No clubbing, cyanosis, or edema  LYMPH: No lymphadenopathy noted  SKIN: No rashes or lesions T(C): 36.4 (01-27-21 @ 11:25), Max: 39.2 (01-27-21 @ 02:22)  HR: 89 (01-27-21 @ 11:25) (89 - 120)  BP: 101/55 (01-27-21 @ 11:25) (101/55 - 131/88)  RR: 15 (01-27-21 @ 11:25) (15 - 18)  SpO2: 100% (01-27-21 @ 11:25) (98% - 100%)  Wt(kg): --Vital Signs Last 24 Hrs  T(C): 36.4 (27 Jan 2021 11:25), Max: 39.2 (27 Jan 2021 02:22)  T(F): 97.5 (27 Jan 2021 11:25), Max: 102.6 (27 Jan 2021 02:22)  HR: 89 (27 Jan 2021 11:25) (89 - 120)  BP: 101/55 (27 Jan 2021 11:25) (101/55 - 131/88)  BP(mean): --  RR: 15 (27 Jan 2021 11:25) (15 - 18)  SpO2: 100% (27 Jan 2021 11:25) (98% - 100%)    PHYSICAL EXAM:  GENERAL: NAD, very thin  HEAD:  Atraumatic, Normocephalic  EYES: EOMI, PERRLA, conjunctiva and sclera clear  NERVOUS SYSTEM:  Not able to answer questions. Recognizes name when called.  CHEST/LUNG: + cough, Clear to percussion bilaterally; No rales, rhonchi, wheezing, or rubs  HEART: Regular rate and rhythm; No murmurs, rubs, or gallops  ABDOMEN: Soft, Nontender, Nondistended; Bowel sounds present  EXTREMITIES:  2+ Peripheral Pulses, No clubbing, cyanosis, or edema  LYMPH: No lymphadenopathy noted  SKIN: No rashes or lesions, Left AVF T(C): 36.4 (01-27-21 @ 11:25), Max: 39.2 (01-27-21 @ 02:22)  HR: 89 (01-27-21 @ 11:25) (89 - 120)  BP: 101/55 (01-27-21 @ 11:25) (101/55 - 131/88)  RR: 15 (01-27-21 @ 11:25) (15 - 18)  SpO2: 100% (01-27-21 @ 11:25) (98% - 100%)  Wt(kg): --Vital Signs Last 24 Hrs  T(C): 36.4 (27 Jan 2021 11:25), Max: 39.2 (27 Jan 2021 02:22)  T(F): 97.5 (27 Jan 2021 11:25), Max: 102.6 (27 Jan 2021 02:22)  HR: 89 (27 Jan 2021 11:25) (89 - 120)  BP: 101/55 (27 Jan 2021 11:25) (101/55 - 131/88)  BP(mean): --  RR: 15 (27 Jan 2021 11:25) (15 - 18)  SpO2: 100% (27 Jan 2021 11:25) (98% - 100%)    PHYSICAL EXAM:  GENERAL: NAD, very thin  HEAD:  Atraumatic, Normocephalic  EYES: EOMI,conjunctiva and sclera clear  NERVOUS SYSTEM:  Not able to answer questions. Recognizes name when called.  CHEST/LUNG: + cough, Clear to percussion bilaterally; No rales, rhonchi, wheezing, or rubs  HEART: Regular rate and rhythm; No murmurs, rubs, or gallops  ABDOMEN: Soft, Nontender, Nondistended; Bowel sounds present  BACK: No TTP of spine   EXTREMITIES:  2+ Peripheral Pulses, No clubbing, cyanosis, or edema  LYMPH: No lymphadenopathy noted  SKIN: Sacral wound Left AVF T(C): 36.4 (01-27-21 @ 11:25), Max: 39.2 (01-27-21 @ 02:22)  HR: 89 (01-27-21 @ 11:25) (89 - 120)  BP: 101/55 (01-27-21 @ 11:25) (101/55 - 131/88)  RR: 15 (01-27-21 @ 11:25) (15 - 18)  SpO2: 100% (01-27-21 @ 11:25) (98% - 100%)  Wt(kg): --Vital Signs Last 24 Hrs  T(C): 36.4 (27 Jan 2021 11:25), Max: 39.2 (27 Jan 2021 02:22)  T(F): 97.5 (27 Jan 2021 11:25), Max: 102.6 (27 Jan 2021 02:22)  HR: 89 (27 Jan 2021 11:25) (89 - 120)  BP: 101/55 (27 Jan 2021 11:25) (101/55 - 131/88)  BP(mean): --  RR: 15 (27 Jan 2021 11:25) (15 - 18)  SpO2: 100% (27 Jan 2021 11:25) (98% - 100%)    PHYSICAL EXAM:  GENERAL: NAD, very thin  HEAD:  Atraumatic, Normocephalic  EYES: EOMI, conjunctiva and sclera clear  NERVOUS SYSTEM:  Able to answer yes and no questions, although may not be reliable. Recognizes name when called.  CHEST/LUNG: + cough, Clear to percussion bilaterally; No rales, rhonchi, wheezing, or rubs  HEART: Regular rate and rhythm; No murmurs, rubs, or gallops  ABDOMEN: Soft, Nontender, Nondistended; Bowel sounds present  BACK: No TTP of spine   EXTREMITIES:  2+ Peripheral Pulses, No clubbing, cyanosis, or edema  LYMPH: No lymphadenopathy noted  SKIN: Sacral wound, Left AVF

## 2021-01-27 NOTE — H&P ADULT - PROBLEM SELECTOR PROBLEM 6
Discharge planning issues HTN (hypertension) CAD (coronary artery disease) ESRD (end stage renal disease)

## 2021-01-27 NOTE — H&P ADULT - PROBLEM SELECTOR PLAN 7
Missed HD session today.  - Will get HD today - c/w home midodrine post HD  - Monitor vitals daily - restart home lipitor and aspirin - restart home lipitor and aspirin once tolerating PO

## 2021-01-27 NOTE — H&P ADULT - PROBLEM SELECTOR PLAN 2
K 6.2 in ED. s/p lokelma, insulin and D50.  - HD this morning  - continue to monitor with BMP Q12h 2/2 uremia, hypercalcemia, hypernatremia, hyperkalemia after missing HD session  - will monitor BMPs

## 2021-01-27 NOTE — ED ADULT TRIAGE NOTE - CHIEF COMPLAINT QUOTE
coming from [facility] c/o [facility]. As per EMS, pt alert and able to keep conversation at baseline. Pt lethargic in triage, responds to verbal stimuli. Pt receives dialysis Tues, Thurs, Saturday, did not received yesterday. Past medical history CAD, HTN, NSTEMI.

## 2021-01-27 NOTE — ED ADULT NURSE NOTE - CHIEF COMPLAINT QUOTE
coming from [facility] c/o [facility]. As per EMS, pt alert and able to keep conversation at baseline. Pt lethargic in triage, responds to verbal stimuli. Pt receives dialysis Tues, Thurs, Saturday, did not received yesterday. Past medical history CAD, HTN, NSTEMI.

## 2021-01-27 NOTE — H&P ADULT - PROBLEM SELECTOR PLAN 3
Na 147 in ED. 2/2 ESRD/poor free water intake  - HD this morning  - continue to monitor with BMP Q12h K 6.2 in ED. s/p lokelma, insulin and D50.  - HD this morning  - continue to monitor with BMP Q12h K 6.2 in ED. s/p kayaxelate, insulin and D50.  - HD planned for today  - continue to monitor with BMP Q12h

## 2021-01-27 NOTE — ED ADULT NURSE NOTE - OBJECTIVE STATEMENT
Break coverage- Received pt in spot 13. [id number]. Pt arrives from [place] for AMS. Unable to obtain further HPI 2/2 pt mental status. Pt warm to touch, found to be febrile 102.6 R. Skin intact. Sinus tach on cardiac monitor. Pt noted to have left arm fistula. 20G placed to right upper arm, labs sent. Pt on 3L NC with pulse ox 100% because pt was found to be 89% on RA. Will continue to monitor.

## 2021-01-27 NOTE — CONSULT NOTE ADULT - SUBJECTIVE AND OBJECTIVE BOX
HPI: Mr. Beaver is a 70 year-old man with history of multiple medical issues including dementia, hypertension, coronary artery disease, past paraspinal abscess requiring long-term IV antibiotics, and end stage renal disease on hemodialysis. He was sent in from [place] yesterday with altered mental status. He was noted to be febrile in the ER; he was given IV Vancomycin and Zosyn. He was also noted to be hyperkalemic in the ER with K of 6.2; he received IV NaHCO3, D50 + Insulin, and OH Kayexalate.  Mr. Beaver undergoes hemodialysis , , and  at [facility]. He did not get dialysis yesterday; he was last dialyzed 21.   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, implant or graft HPTH (hyperparathyroidism)  HTN (hypertension) ESRD (end stage renal disease) Cavitary lung disease CAD in native artery - stent Hypertension Kidney abscess  Allergies No Known Allergies  SOCIAL HISTORY: Denies ETOh,Smoking,   FAMILY HISTORY: No pertinent family history in first degree relatives Family history of breast cancer (Sibling)  REVIEW OF SYSTEMS: CONSTITUTIONAL: No weakness, fevers or chills EYES/ENT: No visual changes;  No vertigo or throat pain  NECK: No pain or stiffness RESPIRATORY: No cough, wheezing, hemoptysis; No shortness of breath CARDIOVASCULAR: No chest pain or palpitations GASTROINTESTINAL: No abdominal or epigastric pain. No nausea, vomiting, or hematemesis; No diarrhea or constipation. No melena or hematochezia. GENITOURINARY: No dysuria, frequency or hematuria NEUROLOGICAL: (+)confusion SKIN: No itching, burning, rashes, or lesions  All other review of systems is negative unless indicated above.  VITAL: T(C): , Max: 39.2 (21 @ 02:22) T(F): , Max: 102.6 (21 @ 02:22) HR: 99 (21 @ 07:40) BP: 110/71 (21 @ 07:40) RR: 16 (21 @ 07:40) SpO2: 100% (21 @ 07:40)  PHYSICAL EXAM: Constitutional: NAD, Alert HEENT: NCAT, MMM Neck: Supple, No JVD Respiratory: CTA-b/l Cardiovascular: RRR s1s2, no m/r/g Gastrointestinal: BS+, soft, NT/ND Extremities: No peripheral edema b/l Neurological: no focal deficits; strength grossly intact Back: no CVAT b/l Skin: No rashes, no nevi  LABS:                      12.3  6.93  )-----------( 131      ( 2021 03:16 )            39.5   Na(147)/K(5.5)/Cl(99)/HCO3(32)/BUN(94)/Cr(8.72)Glu(188)/Ca(13.8)/Mg(--)/PO4(--)     @ 06:50 Na(148)/K(6.2)/Cl(99)/HCO3(30)/BUN(91)/Cr(8.64)Glu(118)/Ca(14.2)/Mg(--)/PO4(--)     @ 03:18  Urinalysis Basic - ( 2021 04:05 ) Color: Brown / Appearance: Turbid / S.015 / pH: x Gluc: x / Ketone: Negative  / Bili: Negative / Urobili: <2 mg/dL  Blood: x / Protein: 100 mg/dL / Nitrite: Negative  Leuk Esterase: Large / RBC: many /HPF / WBC many /HPF  Sq Epi: x / Non Sq Epi: 0-2 /HPF / Bacteria: Few   IMAGING: < from: Xray Chest 1 View AP/PA (21 @ 02:49) > Unchanged loculated left pleural effusion. No acute pulmonary disease.  ASSESSMENT: (1)Renal - ESRD - due for HD  (2)Hyperkalemia - warranting HD this a.m. for management of the hyperkalemia  (3)Hypernatremia - in setting of ESRD/poor free water intake - will improve to some extent with HD  (4)Hypercalcemia - primary hyperparathyroidism, based on serologic workup from 2020. On sensipar 30mg po bid as outpatient. Given his comorbidities/functional status, he would be a poor candidate for parathyroidectomy.   RECOMMEND: (1)HD this a.m. - 3 hours - 2k for first 2 hours; 1k for last hour; low calcium bath; 1kg UF as able (2)Increase Sensipar to 60mg po bid (3)Abx for GFR<10/HD  Thank you for involving [facility] in this patient's care.  With warm regards,  Heath Huff MD  [facility] Office: [phone] Cell: [phone]          HPI: Mr. Beaver is a 70 year-old man with history of multiple medical issues including dementia, hypertension, coronary artery disease, past paraspinal abscess requiring long-term IV antibiotics, and end stage renal disease on hemodialysis. He was sent in from [place] yesterday with altered mental status. He was noted to be febrile in the ER; he was given IV Vancomycin and Zosyn. He was also noted to be hyperkalemic in the ER with K of 6.2; he received IV NaHCO3, D50 + Insulin, and VT Kayexalate.  Mr. Beaver undergoes hemodialysis , , and  at [facility]. He did not get dialysis yesterday; he was last dialyzed 21.   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, implant or graft HPTH (hyperparathyroidism)  HTN (hypertension) ESRD (end stage renal disease) Cavitary lung disease CAD in native artery - stent Hypertension Kidney abscess  Allergies No Known Allergies  SOCIAL HISTORY: Denies ETOh,Smoking,   FAMILY HISTORY: No pertinent family history in first degree relatives Family history of breast cancer (Sibling)  REVIEW OF SYSTEMS: unable to obtain - minimally communicative  VITAL: T(C): , Max: 39.2 (21 @ 02:22) T(F): , Max: 102.6 (21 @ 02:22) HR: 99 (21 @ 07:40) BP: 110/71 (21 @ 07:40) RR: 16 (21 @ 07:40) SpO2: 100% (21 @ 07:40)  PHYSICAL EXAM: Constitutional: minimally communicative; NAD HEENT: NCAT, DMM Neck: Supple, No JVD Respiratory: CTA-b/l Cardiovascular: RRR s1s2, no m/r/g Gastrointestinal: BS+, soft, NT/ND Extremities: No peripheral edema b/l Neurological: no focal deficits; strength grossly intact Back: no CVAT b/l Skin: No rashes, no nevi Access: LUE AVF (+)thrill  LABS:                      12.3  6.93  )-----------( 131      ( 2021 03:16 )            39.5   Na(147)/K(5.5)/Cl(99)/HCO3(32)/BUN(94)/Cr(8.72)Glu(188)/Ca(13.8)/Mg(--)/PO4(--)     @ 06:50 Na(148)/K(6.2)/Cl(99)/HCO3(30)/BUN(91)/Cr(8.64)Glu(118)/Ca(14.2)/Mg(--)/PO4(--)     @ 03:18  Urinalysis Basic - ( 2021 04:05 ) Color: Brown / Appearance: Turbid / S.015 / pH: x Gluc: x / Ketone: Negative  / Bili: Negative / Urobili: <2 mg/dL  Blood: x / Protein: 100 mg/dL / Nitrite: Negative  Leuk Esterase: Large / RBC: many /HPF / WBC many /HPF  Sq Epi: x / Non Sq Epi: 0-2 /HPF / Bacteria: Few   IMAGING: < from: Xray Chest 1 View AP/PA (21 @ 02:49) > Unchanged loculated left pleural effusion. No acute pulmonary disease.  ASSESSMENT: (1)Renal - ESRD - due for HD  (2)Hyperkalemia - warranting HD this a.m. for management of the hyperkalemia  (3)Hypernatremia - in setting of ESRD/poor free water intake - will improve to some extent with HD  (4)Hypercalcemia - primary hyperparathyroidism, based on serologic workup from 2020. On sensipar 30mg po bid as outpatient. Given his comorbidities/functional status, he would be a poor candidate for parathyroidectomy.   RECOMMEND: (1)HD this a.m. - 3 hours - 2k for first 2 hours; 1k for last hour; low calcium bath; 1kg UF as able (2)Increase Sensipar to 60mg po bid (3)Abx for GFR<10/HD  Thank you for involving [facility] in this patient's care.  With warm regards,  Heath Huff MD  [facility] Office: [phone] Cell: [phone]

## 2021-01-27 NOTE — H&P ADULT - NSHPSOCIALHISTORY_GEN_ALL_CORE
Came from [facility] Came from [facility] where he is a long term resident.  Non-ambulatory at baseline but Ax3.

## 2021-01-27 NOTE — H&P ADULT - PROBLEM SELECTOR PLAN 8
-c/w home protonix - c/w home midodrine post HD  - Monitor vitals daily - c/w home midodrine post HD once tolerating PO  - Monitor vitals daily

## 2021-01-27 NOTE — ED PROVIDER NOTE - PHYSICAL EXAMINATION
General: chronically ill appearing man  Head: normocephalic, atraumatic  Eyes: clear eyes  Mouth: dry mucous membranes  Neck: supple neck  CV: tachycardia, peripheral pulses 2+ bilateral UE and LE  Respiratory: clear to auscultation bilaterally, hypoxic to 88% on room air with good response on 3L NC, no crackles  Abdomen: soft, nontender, nondistended  MSK: no obvious joint deformities or swelling  Neuro: awake and alert but not verbally responsive, moving all extremities spontaneously  Skin: no rash, no ulcers, warm to touch

## 2021-01-27 NOTE — H&P ADULT - ATTENDING COMMENTS
70 year old man with PMHx as stated above a/w metabolic encephalopathy in the setting of sepsis, hyperkalemia from missed HD , mild hypernatremia and hypercalcemia. Now s/p medical management for hyperkalemia with improvement.     Pt seen and examined. Currently getting HD. Alert and answering some questions with yes or no but answers not reliable.     -monitor mental status and hemodynamics  -suspect sepsis likely in the setting of UTI - given +u/a and his hx of straight cathing. No prior +urine cx noted in sunrise. At bedside pt w/ diarrhea however likely from kayaxelate received. CXR reviewed and noted effusion and possible opacities but appears unchanged compared to previous CXR on my read. Findings may be related to need of HD. However will obtain CT chest w/o con for further evaluation. F/u blood cx and urine cx. Treating w/ vanc and zosyn for now but will dc vanc if blood cx negative. If CT suggestive of PNA will cover for CAP- however low suspicion at this time.   -continue home midodrine given soft BPs if able to tolerate PO  -F/U BMP and calcium levels post HD. Renal following and input appreciated. Will increase Sensipar to 60mg BID  -SandS eval prior to starting diet. Cautious ivfs while NPO 70 year old man with PMHx as stated above a/w metabolic encephalopathy in the setting of sepsis, hyperkalemia from missed HD , mild hypernatremia and hypercalcemia. Now s/p medical management for hyperkalemia with improvement.     Pt seen and examined. Currently getting HD. Alert and answering some questions with yes or no but answers not reliable.     -monitor mental status and hemodynamics  -suspect sepsis likely in the setting of UTI - given +u/a and his hx of straight cathing. No prior +urine cx noted in sunrise. At bedside pt w/ diarrhea however likely from kayaxelate received. CXR reviewed and noted effusion and possible opacities but appears unchanged compared to previous CXR on my read. Findings may be related to need of HD. However will obtain CT chest w/o con for further evaluation. F/u blood cx and urine cx. COVID neg. Treating w/ vanc and zosyn for now but will dc vanc if blood cx negative. If CT suggestive of PNA will cover for CAP- however low suspicion at this time.   -continue home midodrine given soft BPs if able to tolerate PO  -F/U BMP and calcium levels post HD. Renal following and input appreciated. Will increase Sensipar to 60mg BID  -SandS eval prior to starting diet. Cautious ivfs while NPO

## 2021-01-27 NOTE — H&P ADULT - PROBLEM SELECTOR PLAN 10
DVT ppx: hep SQ  Diet: renal diet  Dispo: back to [facility]? PT eval DVT ppx: hep SQ  Diet: renal diet  Dispo: back to [facility] where he is a long-term resident. PT eval pending. At baseline, non-ambulatory.     Rena (cousin): [phone] (mobile)  Taryn Mccormack (cousin): [phone] (home), [phone] (mobile) DVT ppx: hep SQ  Diet: renal diet  Dispo: back to [facility] where he is a long-term resident. PT eval pending. At baseline, non-ambulatory.   # neurogenic bladder  - straight cath 2x daily    #GERD  -c/w home protonix  Rena (cousin): [phone] (mobile)  Taryn Mccormack (cousin): [phone] (home), [phone] (mobile)

## 2021-01-27 NOTE — H&P ADULT - HISTORY OF PRESENT ILLNESS
Patient is a 70 year old man with history of multiple medical issues including ESRD (HD T/Th/S), CAD, HTN, past paraspinal abscess requiring long-term IV abx presenting with altered mental status. On arrival he was febrile, tachycardic, lethargic and found to have CXR in the ED concerning for pneumonia as well as a K of 6.2.     In the ED he received vancomycin, zosynm,     He also missed HD today.    ***************************************************************  Kourtney Simone, PGY1  Internal Medicine   pager: NS: [phone] [facility]: [phone]  ***************************************************************    [employer]SON STEEL  70y  Male      Patient is a 70y old  Male who presents with a chief complaint of sepsis concerning for pneumonia (27 Jan 2021 11:40)    HPI:  Patient is a 70 year old man with history of multiple medical issues including ESRD (HD T/Th/S), CAD, HTN, past paraspinal abscess requiring long-term IV abx presenting with altered mental status. On arrival he was febrile, tachycardic, lethargic and found to have CXR in the ED concerning for pneumonia as well as a SCr 8.6 (6.2 in Dec 2020), K of 6.2 and Ca 14.2.     Patient undergoes HD T/Th/Sat at [facility]. He missed hist HD session yesterday. He was last dialyzed 1/23.     ED Course:   In the ED he received vancomycin 1 g, zosyn, 5 U insulin and D50, sodium bicarbonate 50 mL, Kayexalate 15 g.    ***************************************************************  Kourtney Simone, PGY1  Internal Medicine   pager: NS: [phone] [facility]: [phone]  ***************************************************************    [employer]SON [employer]EL  70y  Male      Patient is a 70y old  Male who presents with a chief complaint of sepsis concerning for pneumonia (27 Jan 2021 11:40)    HPI:  Patient is a 70 year old man with history of multiple medical issues including ESRD (HD T/Th/S), CAD s/p stent, past paraspinal abscess requiring long-term IV abx presenting, hyperparathyroidism, neurogenic bladder (straight cath'd 2x daily), hypotension (on midodrine) with altered mental status. On arrival he was febrile, tachycardic, lethargic and found to have CXR in the ED concerning for pneumonia as well as a SCr 8.6 (6.2 in Dec 2020), K of 6.2 and Ca 14.2.     Patient undergoes HD T/Th/Sat at [facility]. He missed hist HD session yesterday. He was last dialyzed 1/23. At baseline, he is non-ambulatory and needs 2 assistants to get him OOB to wheelchair.    ED Course:   In the ED he received vancomycin 1 g, zosyn, 5 U insulin and D50, sodium bicarbonate 50 mL, Kayexalate 15 g.    ***************************************************************  Kourtney Simone, PGY1  Internal Medicine   pager: NS: [phone] [facility]: [phone]  ***************************************************************    [employer]  70y  Male      Patient is a 70y old  Male who presents with a chief complaint of sepsis concerning for pneumonia (27 Jan 2021 11:40)    HPI:  Patient is a 70 year old man with history of multiple medical issues including ESRD (HD T/Th/S), CAD s/p stent, past paraspinal abscess requiring long-term IV abx presenting, hyperparathyroidism, neurogenic bladder (straight cath'd 2x daily), hypotension (on midodrine) with altered mental status. On arrival he was febrile, tachycardic, lethargic and found to have CXR in the ED concerning for pneumonia as well as a SCr 8.6 (6.2 in Dec 2020), K of 6.2 and Ca 14.2.     Patient undergoes HD T/Th/Sat at [facility]. He missed hist HD session yesterday. He was last dialyzed 1/23. At baseline, he is non-ambulatory and needs 2 assistants to get him OOB to wheelchair. Straight cath'd 2x daily from neurogenic bladder.    ED Course:   In the ED he received vancomycin 1 g, zosyn, 5 U insulin and D50, sodium bicarbonate 50 mL, Kayexalate 15 g.

## 2021-01-27 NOTE — H&P ADULT - PROBLEM SELECTOR PLAN 5
Missed HD session today.  - Will get HD today Primary hyperparathyroidism, based on serologic workup from 12/2020. Poor candidate for parathyroidectomy given patient's comorbidities.  - On Sensipar 30 mg PO BID at home. Increased Sensipar to 60 mg PO BID while inpatient.  - continue to monitor with BMP Q12h Primary hyperparathyroidism, based on serologic workup from 12/2020. Poor candidate for parathyroidectomy given patient's comorbidities.  - On Sensipar 30 mg PO BID at home. Increased Sensipar to 60 mg PO BID while inpatient.  - continue to monitor with BMP Q12h  -HD today

## 2021-01-27 NOTE — CHART NOTE - NSCHARTNOTEFT_GEN_A_CORE
Anibal Mahoney PGY-1  NIGHT FLOAT  Pager [phone]    Called for hypotension in 69yo M with ESRD, CAD, HTN, p/w urosepsis. Patient had missed HD the day PTA and was dialysis this evening. Takes midodrine at home but is currently not taking PO; confirmed with CHIO Red. BPs soft after dialysis 85/52-89/53. Assessed patient at bedside; he is AAOx0 but responding to voice, minimally answering yes/no questions (appears at baseline compared to admission). Lungs clear to auscultations, no evidence of fluid overload, no LE edema. No urine output since admission but per RN patient has had 1-2 loose stools.    Plan:  -Concern for mild hypovolemia s/p HD with loose BMs  -Currently on gentle maintenance fluids  -Patient not taking PO midodrine due to AMS  -Will give additional small 250cc bolus, c/w mIVF and reassess blood pressure  -Continue abx for urosepsis; cultures pending  -Vitals signs otherwise stable; afebrile and saturating well on RA  -Discussed plan with CHIO Red Anibal Mahoney PGY-1  NIGHT FLOAT  Pager [phone]    Called for hypotension in 71yo M with ESRD, CAD, HTN, p/w urosepsis. Patient had missed HD the day PTA and was dialysis this evening. Takes midodrine at home but is currently not taking PO; confirmed with CHIO Red. BPs soft after dialysis 85/52-89/53. Assessed patient at bedside; he is AAOx0 but responding to voice, minimally answering yes/no questions (appears at baseline compared to admission). Lungs clear to auscultations, no evidence of fluid overload, no LE edema. No urine output since admission but per RN patient has had 1-2 loose stools.    Plan:  -Concern for mild hypovolemia s/p HD with loose BMs  -Currently on gentle maintenance fluids  -Patient not taking PO midodrine due to AMS  -Will give additional small 250cc bolus, c/w mIVF and reassess blood pressure  -Continue abx for urosepsis; cultures pending  -Vitals signs otherwise stable; afebrile and saturating well on RA  -Electrolytes improving, recheck BMP in AM  -Discussed plan with CHIO Red Anibal Mahoney PGY-1  NIGHT FLOAT  Pager [phone]    Called for hypotension in 69yo M with ESRD, CAD, HTN, p/w urosepsis. Patient had missed HD the day PTA and was dialysis this evening. Takes midodrine at home but is currently not taking PO; confirmed with CHIO Red. BPs soft after dialysis 85/52-89/53. Assessed patient at bedside; he is AAOx0 but responding to voice, minimally answering yes/no questions (appears at baseline compared to admission). Lungs clear to auscultations, no evidence of fluid overload, no LE edema. No urine output since admission but per RN patient has had 1-2 loose stools.    Plan:  -Concern for mild hypovolemia s/p HD with loose BMs  -Currently on gentle maintenance fluids  -Patient not taking PO midodrine due to AMS  -Will give additional small 250cc bolus, c/w mIVF and reassess blood pressure  -Continue abx for urosepsis; cultures pending  -Vitals signs otherwise stable; afebrile and saturating well on RA  -Electrolytes improving, recheck CMP in AM  -Discussed plan with CHIO Red Anibal Mahoney PGY-1  NIGHT FLOAT  Pager [phone]    Called for hypotension in 71yo M with ESRD, CAD, HTN, p/w urosepsis. Patient had missed HD the day PTA and was dialysis this evening. Takes midodrine at home but is currently not taking PO; confirmed with CHIO Red. BPs soft after dialysis 85/52-89/53. Assessed patient at bedside; he is AAOx0 but responding to voice, minimally answering yes/no questions (appears at baseline compared to admission). Lungs clear to auscultations, no evidence of fluid overload, no LE edema. No urine output since admission but per RN patient has had 1-2 loose stools.    Plan:  -Concern for mild hypovolemia s/p HD with loose BMs  -Currently on gentle maintenance fluids  -Patient not taking PO midodrine due to AMS  -Will give additional small 250cc bolus, c/w mIVF and reassess blood pressure  -Continue abx for urosepsis; cultures pending  -Vitals signs otherwise stable; afebrile and saturating well on RA  -Electrolytes improving, recheck CMP in AM  -Discussed plan with CHIO Red    Update 0600:  Patient continues to have multiple loose bowel movements. BP initially improved with small bolus, then subsequently dropped back into 80s systolic. Patient also hypoglycemic despite switching fluids to D5 @ 50/hr.  Examined at bedside, patient remains lethargic but nods yes/no to questions. Denies shortness of breath, discomfort. No crackles on lung auscultation, no signs of fluid overload on exam.  -Hx of hypotension though likely exacerbated with multiple watery bowel movements  -Administer amp D50 and recheck FSBG  -Additional small 250cc bolus over 1hr and reassess BP; caution with additional fluids given ESRD hx. Does not appear that any fluid was removed during HD yesterday  -Discussed plan with RN; will notify primary team

## 2021-01-27 NOTE — ED PROVIDER NOTE - OBJECTIVE STATEMENT
71yo man PMH ESRD with HD (t/th/sa), CAD s/p stent, HLD, hyperparathyroidism was sent in with AMS and was unable to get dialysis yesterday. Pt is awake and responsive to touch but is not verbally responsive to questions.

## 2021-01-27 NOTE — H&P ADULT - PROBLEM SELECTOR PLAN 1
p/w fever, tachycardia, lethargy. cxr c/w PNA  - started vanc and zosyn p/w fever, tachycardia, lethargy. cxr c/w PNA  - started vanc and zosyn, abx for GFR < 10/HD p/w fever, tachycardia, lethargy. cxr c/w PNA  - started vanc and zosyn, abx for GFR < 10/HD  - + MRSA/MSSA swab last admission (12/2020) treated with bactroban x 5 days  - c/w home vibramycin 100mg for MRSA  - f/u MRSA/MSSA  - f/u BCx, UCx p/w fever, tachycardia, lethargy. cxr c/w PNA  - started vanc and zosyn, abx for GFR < 10/HD  - + MRSA/MSSA swab last admission (12/2020) treated with bactroban x 5 days  - c/w home vibramycin 100mg for MRSA  - f/u MRSA/MSSA  - f/u BCx, UCx  - c/w vancomycin by level, and zosyn  - started LR @ 50cc/hr p/w fever, tachycardia, lethargy. U/A shows large leuk est, turbid  - started vanc and zosyn, abx for GFR < 10/HD  - + MRSA/MSSA swab last admission (12/2020) treated with bactroban x 5 days  - c/w home vibramycin 100mg for MRSA  - f/u MRSA/MSSA  - f/u BCx, UCx  - c/w vancomycin by level, and zosyn renally dosed  - started LR @ 50cc/hr  - d/c nasal cannula with goal SpO2 >93%  - wound care consult for sacral wound p/w fever, tachycardia, lethargy. U/A shows large leuk est, turbid  - started vanc and zosyn, abx for GFR < 10/HD  - + MRSA/MSSA swab last admission (12/2020) treated with bactroban x 5 days  - c/w home vibramycin 100mg for MRSA  - f/u MRSA/MSSA  - f/u BCx, UCx  - c/w vancomycin by level, and zosyn renally dosed  - started LR @ 50cc/hr  - d/c nasal cannula with goal SpO2 >93%  - wound care consult for sacral wound  - f/u CT chest noncontrast p/w fever, tachycardia, lethargy. U/A shows large leuk est, turbid  - started vanc and zosyn, abx for GFR < 10/HD  - + MRSA/MSSA swab last admission (12/2020) treated with bactroban x 5 days. 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

## 2021-01-27 NOTE — ED PROVIDER NOTE - PROGRESS NOTE DETAILS
Celine Daniel, resident MD: spoke with CHIO Cespedes at [facility] who states that he refused dialysis today and had an cxr which showed PNA and was given levaquin. He was febrile and tachycardia and given a dose of tylenol around 12am. He is normally A&Ox3 but was lethargic and she called the doctor who recommended evaluation in the ED. Celine Daniel, resident MD:  bloodwork reveals elevated K, no EKG changes at this time and pt has elevated ca likely 2/2 hyperPTH. will give shifting medications. spoke with Dr. Huff who recommends adding kayexalate NV due to AMS and will set up dialysis. ANNIKA Mantilla- Repeat potassium 5.5, will admit to medicine for continued treatment of undifferentiated fever and hyperkalemia. Pt did not receive fluids in the ER due to concerns for fluid overload and ESRD.

## 2021-01-27 NOTE — H&P ADULT - PROBLEM SELECTOR PLAN 6
- restart home lipitor and aspirin Missed HD session today.  - Will get HD today Missed HD session today.  - Will get HD today  -renal recs appreciated

## 2021-01-27 NOTE — H&P ADULT - NSHPLABSRESULTS_GEN_ALL_CORE
LABS:      The Labs were reviewed by me   The Radiology was reviewed by me    EKG tracing reviewed by me        147<H>  |  99  |  94<H>  ----------------------------<  188<H>  5.5<H>   |  32<H>  |  8.72<H>      148<H>  |  99  |  91<H>  ----------------------------<  118<H>  6.2<HH>   |  30  |  8.64<H>    Ca    13.8<HH>      2021 06:50  Ca    14.2<HH>      2021 03:18    TPro  7.7  /  Alb  3.8  /  TBili  0.8  /  DBili  x   /  AST  23  /  ALT  39  /  AlkPhos  156<H>            PT/INR - ( 2021 03:16 )   PT: 15.7 sec;   INR: 1.39 ratio         PTT - ( 2021 03:16 )  PTT:33.5 sec              Urinalysis Basic - ( 2021 04:05 )    Color: Brown / Appearance: Turbid / S.015 / pH: x  Gluc: x / Ketone: Negative  / Bili: Negative / Urobili: <2 mg/dL   Blood: x / Protein: 100 mg/dL / Nitrite: Negative   Leuk Esterase: Large / RBC: many /HPF / WBC many /HPF   Sq Epi: x / Non Sq Epi: 0-2 /HPF / Bacteria: Few                              12.3   6.93  )-----------( 131      ( 2021 03:16 )             39.5     CAPILLARY BLOOD GLUCOSE      POCT Blood Glucose.: 110 mg/dL (2021 09:29)  POCT Blood Glucose.: 222 mg/dL (2021 05:48)  POCT Blood Glucose.: 120 mg/dL (2021 05:04)  POCT Blood Glucose.: 114 mg/dL (2021 01:25)        Blood Gas Venous - Lactate: 3.6 mmol/L (21 @ 06:50)  Blood Gas Venous - Lactate: 4.4 mmol/L (21 @ 03:11)      RADIOLOGY & ADDITIONAL TESTS:    Imaging Personally Reviewed:  [ ] YES  [ ] NO    < from: Xray Chest 1 View AP/PA (21 @ 02:49) >    IMPRESSION:  Unchanged loculated left pleural effusion.  No acute pulmonary disease.    < end of copied text > LABS:      The Labs were reviewed by me   The Radiology was reviewed by me    EKG tracing reviewed by me        147<H>  |  99  |  94<H>  ----------------------------<  188<H>  5.5<H>   |  32<H>  |  8.72<H>      148<H>  |  99  |  91<H>  ----------------------------<  118<H>  6.2<HH>   |  30  |  8.64<H>    Ca    13.8<HH>      2021 06:50  Ca    14.2<HH>      2021 03:18    TPro  7.7  /  Alb  3.8  /  TBili  0.8  /  DBili  x   /  AST  23  /  ALT  39  /  AlkPhos  156<H>            PT/INR - ( 2021 03:16 )   PT: 15.7 sec;   INR: 1.39 ratio         PTT - ( 2021 03:16 )  PTT:33.5 sec              Urinalysis Basic - ( 2021 04:05 )    Color: Brown / Appearance: Turbid / S.015 / pH: x  Gluc: x / Ketone: Negative  / Bili: Negative / Urobili: <2 mg/dL   Blood: x / Protein: 100 mg/dL / Nitrite: Negative   Leuk Esterase: Large / RBC: many /HPF / WBC many /HPF   Sq Epi: x / Non Sq Epi: 0-2 /HPF / Bacteria: Few                              12.3   6.93  )-----------( 131      ( 2021 03:16 )             39.5     CAPILLARY BLOOD GLUCOSE      POCT Blood Glucose.: 110 mg/dL (2021 09:29)  POCT Blood Glucose.: 222 mg/dL (2021 05:48)  POCT Blood Glucose.: 120 mg/dL (2021 05:04)  POCT Blood Glucose.: 114 mg/dL (2021 01:25)        Blood Gas Venous - Lactate: 3.6 mmol/L (21 @ 06:50)  Blood Gas Venous - Lactate: 4.4 mmol/L (21 @ 03:11)      RADIOLOGY & ADDITIONAL TESTS:    Imaging Personally Reviewed:  [ x] YES  [ ] NO    < from: Xray Chest 1 View AP/PA (21 @ 02:49) >    IMPRESSION:  Unchanged loculated left pleural effusion.  No acute pulmonary disease.    < end of copied text >

## 2021-01-27 NOTE — ED PROVIDER NOTE - CLINICAL SUMMARY MEDICAL DECISION MAKING FREE TEXT BOX
69yo man was sent from NH for fever, tachycardia, lethargy in setting of cxr concerning of PNA as well as missed dialysis session today. Pt with hypoxia on exam and no other localizing findings, concern for sepsis 2/2 respiratory etiology. Will obtain blood work, UA, cxr. Will give empiric abx. Low suspicion for cardiac etiology of lethargy with current presentation and EKG with no change from recent EKG. Will likely require admission for further treatment of sepsis. 69yo man was sent from NH for fever, tachycardia, lethargy in setting of cxr concerning of PNA as well as missed dialysis session today. Pt with hypoxia on exam and no other localizing findings, concern for sepsis 2/2 respiratory etiology. Will obtain blood work, UA, cxr. Will give empiric abx. Low suspicion for cardiac etiology of lethargy with current presentation and EKG with no change from recent EKG. Will hold fluids for now as pt had ESRD and has missed dialysis session. Will likely require admission for further treatment of sepsis.

## 2021-01-27 NOTE — H&P ADULT - ASSESSMENT
Patient is a 70 year old man with history of multiple medical issues including ESRD (HD T/Th/S), CAD, HTN, past paraspinal abscess requiring long-term IV abx presenting with altered mental status, fever, tachycardia likely 2/2 sepsis due to pneumonia. Also missed HD the day prior to arrival with elevated K, SCr, Na, and Ca, getting HD on arrival.   Patient is a 70 year old man with history of multiple medical issues including ESRD (HD T/Th/S), CAD, HTN, past paraspinal abscess requiring long-term IV abx presenting with altered mental status, fever, tachycardia likely 2/2 sepsis due to UTI. Also missed HD the day prior to arrival with elevated K, SCr, Na, and Ca, getting HD on arrival.

## 2021-01-28 NOTE — PROGRESS NOTE ADULT - ATTENDING COMMENTS
Pt seen and examined o  1/28 at 945 am.    70 year old man with PMHx as stated above a/w metabolic encephalopathy in the setting of sepsis, hyperkalemia from missed HD , mild hypernatremia and hypercalcemia. Now s/p medical management for hyperkalemia/hypercalcemia with improvement.     Pt seen and examined. Metabolic encephalopathy improved.    Suspect sepsis likely in the setting of UTI - given +u/a and his hx of straight cathing. No prior +urine cx noted in sunrise. Pt had brief episode of diarrhea on admission however was likely in setting of kayaxelate received. CXR and CT chest personally reviewed and w/o evidence of pulmonary infection.  -On vanc/zosyn. Will dc vanc if Blood cx neg. Urine cx was not sent on admission and likely will be unrevealing.   Will treat empirically for UTI.   -Continue to monitor BMP and calcium levels. Renal following and input appreciated. HD per renal. C/w increased dose of Sensipar to 60mg BID  -pending wound care consult

## 2021-01-28 NOTE — PROGRESS NOTE ADULT - SUBJECTIVE AND OBJECTIVE BOX
***************************************************************  Kourtney Simone, PGY1  Internal Medicine   pager: NS: [phone] [facility]: [phone]  ***************************************************************    PROGRESS NOTE:     Patient is a 70y old  Male who presents with a chief complaint of sepsis concerning for urosepsis (2021 11:40)      SUBJECTIVE / OVERNIGHT EVENTS:    ADDITIONAL REVIEW OF SYSTEMS: 10 point ROS negative except per HPI    MEDICATIONS  (STANDING):  aspirin enteric coated 81 milliGRAM(s) Oral daily  atorvastatin 10 milliGRAM(s) Oral at bedtime  cinacalcet 60 milliGRAM(s) Oral two times a day  citalopram 20 milliGRAM(s) Oral daily  dextrose 5%. 1000 milliLiter(s) (50 mL/Hr) IV Continuous <Continuous>  heparin   Injectable 5000 Unit(s) SubCutaneous every 8 hours  lactobacillus acidophilus 1 Tablet(s) Oral daily  loratadine 10 milliGRAM(s) Oral daily  midodrine. 10 milliGRAM(s) Oral three times a day  mineral oil/petrolatum Hydrophilic Ointment 1 Application(s) Topical four times a day  mupirocin 2% Ointment 1 Application(s) Topical two times a day  pantoprazole    Tablet 40 milliGRAM(s) Oral before breakfast  piperacillin/tazobactam IVPB.. 3.375 Gram(s) IV Intermittent every 12 hours  polyethylene glycol 3350 17 Gram(s) Oral daily  senna 2 Tablet(s) Oral at bedtime  tamsulosin 0.4 milliGRAM(s) Oral at bedtime    MEDICATIONS  (PRN):      CAPILLARY BLOOD GLUCOSE      POCT Blood Glucose.: 100 mg/dL (2021 05:54)  POCT Blood Glucose.: 73 mg/dL (2021 05:22)  POCT Blood Glucose.: 76 mg/dL (2021 02:14)  POCT Blood Glucose.: 89 mg/dL (2021 21:05)  POCT Blood Glucose.: 109 mg/dL (2021 18:42)  POCT Blood Glucose.: 111 mg/dL (2021 18:21)  POCT Blood Glucose.: 93 mg/dL (2021 18:01)  POCT Blood Glucose.: 59 mg/dL (2021 17:42)  POCT Blood Glucose.: 56 mg/dL (2021 17:12)  POCT Blood Glucose.: 110 mg/dL (2021 09:29)    I&O's Summary    2021 07:01  -  2021 06:52  --------------------------------------------------------  IN: 800 mL / OUT: 460 mL / NET: 340 mL        PHYSICAL EXAM:  Vital Signs Last 24 Hrs  T(C): 36.4 (2021 20:27), Max: 36.8 (2021 19:39)  T(F): 97.6 (2021 20:27), Max: 98.2 (2021 19:39)  HR: 77 (2021 05:23) (77 - 99)  BP: 86/53 (2021 05:23) (85/52 - 110/71)  BP(mean): --  RR: 18 (2021 05:23) (15 - 18)  SpO2: 98% (2021 05:23) (96% - 100%)    PHYSICAL EXAM:  GENERAL: NAD, very thin  HEAD:  Atraumatic, Normocephalic  EYES: EOMI, conjunctiva and sclera clear  NERVOUS SYSTEM:  Able to answer yes and no questions, although may not be reliable. Recognizes name when called.  CHEST/LUNG: + cough, Clear to percussion bilaterally; No rales, rhonchi, wheezing, or rubs  HEART: Regular rate and rhythm; No murmurs, rubs, or gallops  ABDOMEN: Soft, Nontender, Nondistended; Bowel sounds present  BACK: No TTP of spine   EXTREMITIES:  2+ Peripheral Pulses, No clubbing, cyanosis, or edema  LYMPH: No lymphadenopathy noted  SKIN: Sacral wound, Left AVF    LABS:                        12.3   6.93  )-----------( 131      ( 2021 03:16 )             39.5         143  |  100  |  54<H>  ----------------------------<  204<H>  4.6   |  31  |  5.13<H>    Ca    11.1<H>      2021 18:26    TPro  6.0  /  Alb  3.2<L>  /  TBili  0.8  /  DBili  x   /  AST  25  /  ALT  34  /  AlkPhos  133<H>      PT/INR - ( 2021 03:16 )   PT: 15.7 sec;   INR: 1.39 ratio         PTT - ( 2021 03:16 )  PTT:33.5 sec      Urinalysis Basic - ( 2021 04:05 )    Color: Brown / Appearance: Turbid / S.015 / pH: x  Gluc: x / Ketone: Negative  / Bili: Negative / Urobili: <2 mg/dL   Blood: x / Protein: 100 mg/dL / Nitrite: Negative   Leuk Esterase: Large / RBC: many /HPF / WBC many /HPF   Sq Epi: x / Non Sq Epi: 0-2 /HPF / Bacteria: Few          RADIOLOGY & ADDITIONAL TESTS:  Results Reviewed:   Imaging Personally Reviewed:  Electrocardiogram Personally Reviewed:    COORDINATION OF CARE:  Care Discussed with Consultants/Other Providers [Y/N]:  Prior or Outpatient Records Reviewed [Y/N]:   ***************************************************************  Kourtney Nichols, PGY1  Internal Medicine   pager: NS: [phone] [facility]: [phone]  ***************************************************************    PROGRESS NOTE:     Patient is a 70y old  Male who presents with a chief complaint of sepsis concerning for urosepsis (2021 11:40)      SUBJECTIVE / OVERNIGHT EVENTS:  Patient was hypotensive after HD and unable to take midodrine PO overnight. He was given 250 cc LR bolus x 2 with continued hypotension SBP high 8s. He was also hypoglycemic and was given 1 amp dextrose and started on D5 maintenance fluid. This morning, he was able to take his midodrine, and his SBP is now 90s. He is pending S&S evaluation today.    ADDITIONAL REVIEW OF SYSTEMS: 10 point ROS negative except per HPI    MEDICATIONS  (STANDING):  aspirin enteric coated 81 milliGRAM(s) Oral daily  atorvastatin 10 milliGRAM(s) Oral at bedtime  cinacalcet 60 milliGRAM(s) Oral two times a day  citalopram 20 milliGRAM(s) Oral daily  dextrose 5%. 1000 milliLiter(s) (50 mL/Hr) IV Continuous <Continuous>  heparin   Injectable 5000 Unit(s) SubCutaneous every 8 hours  lactobacillus acidophilus 1 Tablet(s) Oral daily  loratadine 10 milliGRAM(s) Oral daily  midodrine. 10 milliGRAM(s) Oral three times a day  mineral oil/petrolatum Hydrophilic Ointment 1 Application(s) Topical four times a day  mupirocin 2% Ointment 1 Application(s) Topical two times a day  pantoprazole    Tablet 40 milliGRAM(s) Oral before breakfast  piperacillin/tazobactam IVPB.. 3.375 Gram(s) IV Intermittent every 12 hours  polyethylene glycol 3350 17 Gram(s) Oral daily  senna 2 Tablet(s) Oral at bedtime  tamsulosin 0.4 milliGRAM(s) Oral at bedtime    MEDICATIONS  (PRN):      CAPILLARY BLOOD GLUCOSE      POCT Blood Glucose.: 100 mg/dL (2021 05:54)  POCT Blood Glucose.: 73 mg/dL (2021 05:22)  POCT Blood Glucose.: 76 mg/dL (2021 02:14)  POCT Blood Glucose.: 89 mg/dL (2021 21:05)  POCT Blood Glucose.: 109 mg/dL (2021 18:42)  POCT Blood Glucose.: 111 mg/dL (2021 18:21)  POCT Blood Glucose.: 93 mg/dL (2021 18:01)  POCT Blood Glucose.: 59 mg/dL (2021 17:42)  POCT Blood Glucose.: 56 mg/dL (2021 17:12)  POCT Blood Glucose.: 110 mg/dL (2021 09:29)    I&O's Summary    2021 07:01  -  2021 06:52  --------------------------------------------------------  IN: 800 mL / OUT: 460 mL / NET: 340 mL        PHYSICAL EXAM:  Vital Signs Last 24 Hrs  T(C): 36.4 (2021 20:27), Max: 36.8 (2021 19:39)  T(F): 97.6 (2021 20:27), Max: 98.2 (2021 19:39)  HR: 77 (2021 05:23) (77 - 99)  BP: 86/53 (2021 05:23) (85/52 - 110/71)  BP(mean): --  RR: 18 (2021 05:23) (15 - 18)  SpO2: 98% (2021 05:23) (96% - 100%)    PHYSICAL EXAM:  GENERAL: NAD, very thin  HEAD:  Atraumatic, Normocephalic  EYES: EOMI, conjunctiva and sclera clear  NERVOUS SYSTEM:  Able to answer yes and no questions, although may not be reliable. Recognizes name when called.  CHEST/LUNG: + cough, Clear to percussion bilaterally; No rales, rhonchi, wheezing, or rubs  HEART: Regular rate and rhythm; No murmurs, rubs, or gallops  ABDOMEN: Soft, Nontender, Nondistended; Bowel sounds present  BACK: No TTP of spine   EXTREMITIES:  2+ Peripheral Pulses, No clubbing, cyanosis, or edema  LYMPH: No lymphadenopathy noted  SKIN: Sacral wound, Left AVF    LABS:                        12.3   6.93  )-----------( 131      ( 2021 03:16 )             39.5         143  |  100  |  54<H>  ----------------------------<  204<H>  4.6   |  31  |  5.13<H>    Ca    11.1<H>      2021 18:26    TPro  6.0  /  Alb  3.2<L>  /  TBili  0.8  /  DBili  x   /  AST  25  /  ALT  34  /  AlkPhos  133<H>      PT/INR - ( 2021 03:16 )   PT: 15.7 sec;   INR: 1.39 ratio         PTT - ( 2021 03:16 )  PTT:33.5 sec      Urinalysis Basic - ( 2021 04:05 )    Color: Brown / Appearance: Turbid / S.015 / pH: x  Gluc: x / Ketone: Negative  / Bili: Negative / Urobili: <2 mg/dL   Blood: x / Protein: 100 mg/dL / Nitrite: Negative   Leuk Esterase: Large / RBC: many /HPF / WBC many /HPF   Sq Epi: x / Non Sq Epi: 0-2 /HPF / Bacteria: Few          RADIOLOGY & ADDITIONAL TESTS:  Results Reviewed:   Imaging Personally Reviewed:  Electrocardiogram Personally Reviewed:    COORDINATION OF CARE:  Care Discussed with Consultants/Other Providers [Y/N]:  Prior or Outpatient Records Reviewed [Y/N]:

## 2021-01-28 NOTE — PHYSICAL THERAPY INITIAL EVALUATION ADULT - PATIENT PROFILE REVIEW, REHAB EVAL
No Formal Activity Order in the Computer; Spoke with RN Kristie Paulino prior to PT evaluation--> Pt OK for PT consult, ROM, and dangling; hold OOB activity 2/2 low BP./yes

## 2021-01-28 NOTE — PROGRESS NOTE ADULT - PROBLEM SELECTOR PLAN 3
- Nephro consulted, appreciate recs  - on midodrine 10 TID    #hypercalcemia  - previous history of primary hyperparathyroidism  - PTH elevated to 515, started on cinacalcet 30mg BID, Ca improved  - f/u PTHrP, Vit D 25, 1-25

## 2021-01-28 NOTE — PROGRESS NOTE ADULT - PROBLEM SELECTOR PLAN 1
Meets SIRS criteria with fever 101.5, , lactate 4.2, likely urosepsis. Now resolved with empiric CTX  - UA +for large leuk est, mod bacteria, 15-25 WBC  - on CTX (12/8-)  - Ucx contaminated (>3 organisms)  - f/u bcx, NGTD

## 2021-01-28 NOTE — DISCHARGE NOTE PROVIDER - PROVIDER TOKENS
PROVIDER:[TOKEN:[[id number]],FOLLOWUP:[2 weeks]] PROVIDER:[TOKEN:[[id number]],FOLLOWUP:[2 weeks]],FREE:[LAST:[Rajan],FIRST:[Bria],PHONE:[phone]:[(   )    -],FOLLOWUP:[2 weeks]] PROVIDER:[TOKEN:[[id number]],FOLLOWUP:[2 weeks]],FREE:[LAST:[Rajan],FIRST:[Bria],PHONE:[phone]],FAX:[(   )    -],FOLLOWUP:[2 weeks]],PROVIDER:[TOKEN:[[id number]],FOLLOWUP:[1 week],ESTABLISHEDPATIENT:[T]]

## 2021-01-28 NOTE — PROGRESS NOTE ADULT - ASSESSMENT
Patient is a 70 year old man with history of multiple medical issues including ESRD (HD T/Th/S), CAD, HTN, past paraspinal abscess requiring long-term IV abx presenting with altered mental status, fever, tachycardia likely 2/2 sepsis due to UTI. Also missed HD the day prior to arrival with elevated K, SCr, Na, and Ca, getting HD on arrival.

## 2021-01-28 NOTE — SWALLOW BEDSIDE ASSESSMENT ADULT - COMMENTS
As per Internal Medicine Note: Patient is a 70 year old man with history of multiple medical issues including ESRD (HD T/Th/S), CAD, HTN, past paraspinal abscess requiring long-term IV abx presenting with altered mental status, fever, tachycardia likely 2/2 sepsis due to UTI. Also missed HD the day prior to arrival with elevated K, SCr, Na, and Ca, getting HD on arrival.     SLP received patient sitting upright in bed, awake, alert, able to follow directions and answer simple questions, but generally confused. Patient denies pain.

## 2021-01-28 NOTE — PROGRESS NOTE ADULT - PROBLEM SELECTOR PLAN 1
p/w fever, tachycardia, lethargy. U/A shows large leuk est, turbid  - started vanc and zosyn, abx for GFR < 10/HD  - + MRSA/MSSA swab last admission (12/2020) treated with bactroban x 5 days. 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

## 2021-01-28 NOTE — PROGRESS NOTE ADULT - PROBLEM SELECTOR PLAN 3
K 6.2 in ED. s/p kayaxelate, insulin and D50.  - HD planned for today  - continue to monitor with BMP Q12h

## 2021-01-28 NOTE — DISCHARGE NOTE PROVIDER - CARE PROVIDER_API CALL
Jaime Jung (DO)  Critical Care Medicine; Internal Medicine; Pulmonary Disease  [address] [place] [address]  Phone: [phone]  Fax: [phone]  Follow Up Time: 2 weeks   Jaime Jung (DO)  Critical Care Medicine; Internal Medicine; Pulmonary Disease  [address] [place] [address]  Phone: [phone]  Fax: [phone]  Follow Up Time: 2 weeks    Bria Tolentino  Phone: [phone]  Fax: (   )    -  Follow Up Time: 2 weeks   Jaime Jung (DO)  Critical Care Medicine; Internal Medicine; Pulmonary Disease  [address], [address]  [place] [address]  Phone: [phone]  Fax: [phone]  Follow Up Time: 2 weeks    Bria Tolentino  Phone: [phone]  Fax: (   )    -  Follow Up Time: 2 weeks    Kamari Galloway)  Cardiovascular Disease; Internal Medicine; Interventional Cardiology  [address] [place] [address]  Phone: [phone]  Fax: [phone]  Established Patient  Follow Up Time: 1 week

## 2021-01-28 NOTE — DISCHARGE NOTE PROVIDER - NSDCCPCAREPLAN_GEN_ALL_CORE_FT
PRINCIPAL DISCHARGE DIAGNOSIS  Diagnosis: Sepsis  Assessment and Plan of Treatment:       SECONDARY DISCHARGE DIAGNOSES  Diagnosis: Nocturnal oxygen desaturation  Assessment and Plan of Treatment: You were noted on telemetry to be desaturating at night to low 80s. Recommend outpatient sleep study, and continue to monitor while you are in the rehabilition facility.    Diagnosis: Hypercalcemia  Assessment and Plan of Treatment: Hypercalcemia    Diagnosis: Nonsustained ventricular tachycardia  Assessment and Plan of Treatment: Nonsustained ventricular tachycardia     PRINCIPAL DISCHARGE DIAGNOSIS  Diagnosis: Sepsis  Assessment and Plan of Treatment: When you arrived in the hospital      SECONDARY DISCHARGE DIAGNOSES  Diagnosis: Nocturnal oxygen desaturation  Assessment and Plan of Treatment: You were noted on telemetry to be desaturating at night to low 80s. Recommend outpatient sleep study, and continue to monitor while you are in the rehabilition facility.    Diagnosis: Hypercalcemia  Assessment and Plan of Treatment: Hypercalcemia    Diagnosis: Nonsustained ventricular tachycardia  Assessment and Plan of Treatment: Nonsustained ventricular tachycardia     PRINCIPAL DISCHARGE DIAGNOSIS  Diagnosis: Sepsis  Assessment and Plan of Treatment: When you arrived in the hospital you had altered mental status, hypotension, and were in septic shock likely due to urinary tract infection. We had started antibiotics in the hospital before taking urine cultures; therefore, your urine cultures were negative. Your hypotension improved after re-starting your home midodrine as did your mental status. You were treated for 7 days of antibiotics. Please continue to take your antibiotics as detailed in the prescription.  We recommend you follow up with your primary care physician within 2 weeks of leaving the hospital.      SECONDARY DISCHARGE DIAGNOSES  Diagnosis: Nocturnal oxygen desaturation  Assessment and Plan of Treatment: You were noted on telemetry to be desaturating at night to low 80s. Recommend outpatient sleep study, and continue to monitor while you are in the rehabilition facility.    Diagnosis: Hypercalcemia  Assessment and Plan of Treatment: You have known hyperparathyroidism causing hypercalcemia. You were on cincalcet 30 mg daily. Due to missing dialysis, you had elevated calcium and cincalcet was increased to 60 mg daily. Please follow up with your endocrine doctor within two weeks of discharge to determine which dose would be best for you outpatient.    Diagnosis: Nonsustained ventricular tachycardia  Assessment and Plan of Treatment: You were on telemetry and found to have periods of ventricular tachycardia that self-resolved while you slept. You were placed on metoprolol and had no more episodes at night. Please continue to take your metoprolol when you leave the hospital, and follow up with your primary care provider within 2 weeks of leaving the hospital.     PRINCIPAL DISCHARGE DIAGNOSIS  Diagnosis: Sepsis  Assessment and Plan of Treatment: When you arrived in the hospital you had altered mental status, hypotension, and were in septic shock likely due to urinary tract infection. We had started antibiotics in the hospital before taking urine cultures; therefore, your urine cultures were negative. Your hypotension improved after re-starting your home midodrine as did your mental status. You were treated for 7 days of antibiotics. Please continue to take your antibiotics as detailed in the prescription.  We recommend you follow up with your primary care physician within 2 weeks of leaving the hospital.      SECONDARY DISCHARGE DIAGNOSES  Diagnosis: Chronic HF (heart failure)  Assessment and Plan of Treatment: While you were in the hospital, you were placed on telemetry and found to have an arrhythmia. We performed an echocardiogram and found that you have severe diastolic and systolic dysfunction. Cardiology was consulted and recommended continuing your current medications of aspirin, atorvastatin, and metoprolol as prescribed. Please follow up with your cardiologist to discuss placement of AICD and to perform a nuclear stress test.    Diagnosis: Nocturnal oxygen desaturation  Assessment and Plan of Treatment: You were noted on telemetry to be desaturating at night to low 80s. Recommend outpatient sleep study, and continue to monitor while you are in the rehabilition facility.    Diagnosis: Hypercalcemia  Assessment and Plan of Treatment: You have known hyperparathyroidism causing hypercalcemia. You were on cincalcet 30 mg daily. Due to missing dialysis, you had elevated calcium and cincalcet was increased to 60 mg daily. Please follow up with your endocrine doctor within two weeks of discharge to determine which dose would be best for you outpatient.    Diagnosis: Nonsustained ventricular tachycardia  Assessment and Plan of Treatment: You were on telemetry and found to have periods of ventricular tachycardia that self-resolved while you slept. You were placed on metoprolol and had no more episodes at night. Please continue to take your metoprolol when you leave the hospital, and follow up with your primary care provider within 2 weeks of leaving the hospital.

## 2021-01-28 NOTE — PROGRESS NOTE ADULT - PROBLEM SELECTOR PLAN 5
Primary hyperparathyroidism, based on serologic workup from 12/2020. Poor candidate for parathyroidectomy given patient's comorbidities.  - On Sensipar 30 mg PO BID at home. Increased Sensipar to 60 mg PO BID while inpatient.  - continue to monitor with BMP Q12h  -HD today

## 2021-01-28 NOTE — PROGRESS NOTE ADULT - SUBJECTIVE AND OBJECTIVE BOX
***************************************************************  Kourtney Simone, PGY1  Internal Medicine   pager: NS: [phone] [facility]: [phone]  ***************************************************************    PROGRESS NOTE:     Patient is a 70y old  Male who presents with a chief complaint of sepsis concerning for urosepsis (2021 11:40)      SUBJECTIVE / OVERNIGHT EVENTS:    ADDITIONAL REVIEW OF SYSTEMS: 10 point ROS negative except per HPI    MEDICATIONS  (STANDING):  aspirin enteric coated 81 milliGRAM(s) Oral daily  atorvastatin 10 milliGRAM(s) Oral at bedtime  cinacalcet 60 milliGRAM(s) Oral two times a day  citalopram 20 milliGRAM(s) Oral daily  dextrose 5%. 1000 milliLiter(s) (50 mL/Hr) IV Continuous <Continuous>  heparin   Injectable 5000 Unit(s) SubCutaneous every 8 hours  lactobacillus acidophilus 1 Tablet(s) Oral daily  loratadine 10 milliGRAM(s) Oral daily  midodrine. 10 milliGRAM(s) Oral three times a day  mineral oil/petrolatum Hydrophilic Ointment 1 Application(s) Topical four times a day  mupirocin 2% Ointment 1 Application(s) Topical two times a day  pantoprazole    Tablet 40 milliGRAM(s) Oral before breakfast  piperacillin/tazobactam IVPB.. 3.375 Gram(s) IV Intermittent every 12 hours  polyethylene glycol 3350 17 Gram(s) Oral daily  senna 2 Tablet(s) Oral at bedtime  tamsulosin 0.4 milliGRAM(s) Oral at bedtime    MEDICATIONS  (PRN):      CAPILLARY BLOOD GLUCOSE      POCT Blood Glucose.: 100 mg/dL (2021 05:54)  POCT Blood Glucose.: 73 mg/dL (2021 05:22)  POCT Blood Glucose.: 76 mg/dL (2021 02:14)  POCT Blood Glucose.: 89 mg/dL (2021 21:05)  POCT Blood Glucose.: 109 mg/dL (2021 18:42)  POCT Blood Glucose.: 111 mg/dL (2021 18:21)  POCT Blood Glucose.: 93 mg/dL (2021 18:01)  POCT Blood Glucose.: 59 mg/dL (2021 17:42)  POCT Blood Glucose.: 56 mg/dL (2021 17:12)  POCT Blood Glucose.: 110 mg/dL (2021 09:29)    I&O's Summary    2021 07:01  -  2021 06:09  --------------------------------------------------------  IN: 800 mL / OUT: 460 mL / NET: 340 mL        PHYSICAL EXAM:  Vital Signs Last 24 Hrs  T(C): 36.4 (2021 20:27), Max: 36.8 (2021 19:39)  T(F): 97.6 (2021 20:27), Max: 98.2 (2021 19:39)  HR: 77 (2021 05:23) (77 - 99)  BP: 86/53 (2021 05:23) (85/52 - 110/71)  BP(mean): --  RR: 18 (2021 05:23) (15 - 18)  SpO2: 98% (2021 05:23) (96% - 100%)      PHYSICAL EXAM:  GENERAL: NAD, very thin  HEAD:  Atraumatic, Normocephalic  EYES: EOMI, conjunctiva and sclera clear  NERVOUS SYSTEM:  Able to answer yes and no questions, although may not be reliable. Recognizes name when called.  CHEST/LUNG: + cough, Clear to percussion bilaterally; No rales, rhonchi, wheezing, or rubs  HEART: Regular rate and rhythm; No murmurs, rubs, or gallops  ABDOMEN: Soft, Nontender, Nondistended; Bowel sounds present  BACK: No TTP of spine   EXTREMITIES:  2+ Peripheral Pulses, No clubbing, cyanosis, or edema  LYMPH: No lymphadenopathy note    LABS:                        12.3   6.93  )-----------( 131      ( 2021 03:16 )             39.5         143  |  100  |  54<H>  ----------------------------<  204<H>  4.6   |  31  |  5.13<H>    Ca    11.1<H>      2021 18:26    TPro  6.0  /  Alb  3.2<L>  /  TBili  0.8  /  DBili  x   /  AST  25  /  ALT  34  /  AlkPhos  133<H>      PT/INR - ( 2021 03:16 )   PT: 15.7 sec;   INR: 1.39 ratio         PTT - ( 2021 03:16 )  PTT:33.5 sec      Urinalysis Basic - ( 2021 04:05 )    Color: Brown / Appearance: Turbid / S.015 / pH: x  Gluc: x / Ketone: Negative  / Bili: Negative / Urobili: <2 mg/dL   Blood: x / Protein: 100 mg/dL / Nitrite: Negative   Leuk Esterase: Large / RBC: many /HPF / WBC many /HPF   Sq Epi: x / Non Sq Epi: 0-2 /HPF / Bacteria: Few          RADIOLOGY & ADDITIONAL TESTS:  Results Reviewed:   Imaging Personally Reviewed:  Electrocardiogram Personally Reviewed:    COORDINATION OF CARE:  Care Discussed with Consultants/Other Providers [Y/N]:  Prior or Outpatient Records Reviewed [Y/N]:

## 2021-01-28 NOTE — PROGRESS NOTE ADULT - PROBLEM SELECTOR PLAN 2
2/2 uremia, hypercalcemia, hypernatremia, hyperkalemia after missing HD session  - will monitor BMPs

## 2021-01-28 NOTE — DISCHARGE NOTE PROVIDER - HOSPITAL COURSE
Patient is a 70 year old man with history of multiple medical issues including ESRD (HD T/Th/S), CAD s/p stent, past paraspinal abscess requiring long-term IV abx presenting, hyperparathyroidism, neurogenic bladder (straight cath'd 2x daily), hypotension (on midodrine) with altered mental status. On arrival he was febrile, tachycardic, lethargic and found to have CXR in the ED concerning for pneumonia as well as a SCr 8.6 (6.2 in Dec 2020), K of 6.2 and Ca 14.2. Patient undergoes HD T/Th/Sat at [facility]. He missed hist HD session yesterday. Prior to admission, he was last dialyzed 1/23. At baseline, he is non-ambulatory and needs 2 assistants to get him OOB to wheelchair. Straight cath'd 2x daily from neurogenic bladder.       Patient is a 70 year old man with history of multiple medical issues including ESRD (HD T/Th/S), CAD s/p stent, past paraspinal abscess requiring long-term IV abx presenting, hyperparathyroidism, neurogenic bladder (straight cath'd 2x daily), hypotension (on midodrine) with altered mental status. On arrival he was febrile, tachycardic, lethargic and found to have CXR in the ED concerning for pneumonia as well as a SCr 8.6 (6.2 in Dec 2020), K of 6.2 and Ca 14.2. Patient undergoes HD T/Th/Sat at [facility]. He missed his HD session yesterday. Prior to admission, he was last dialyzed 1/23. At baseline, he is non-ambulatory and needs 2 assistants to get him OOB to wheelchair. Straight cath'd 2x daily from neurogenic bladder.     Patient is a 70 year old man with history of multiple medical issues including ESRD (HD T/Th/S), CAD s/p stent, past paraspinal abscess requiring long-term IV abx presenting, hyperparathyroidism, neurogenic bladder (straight cath'd 2x daily), hypotension (on midodrine) with altered mental status. On arrival he was febrile, tachycardic, lethargic and found to have CXR in the ED concerning for pneumonia as well as a SCr 8.6 (6.2 in Dec 2020), K of 6.2 and Ca 14.2. Patient undergoes HD T/Th/Sat at [facility]. He missed his HD session yesterday. Prior to admission, he was last dialyzed 1/23. At baseline, he is non-ambulatory and needs 2 assistants to get him OOB to wheelchair.     Patient was hypotensive after his first session of HD and was unable to tolerate midodrine PO. He was given 250 cc LR bolus x 2 with continued hypotension SBP high 80s. Of note, post-HD he has been hypoglycemic and given 1 amp dextrose. The morning after arrival, patient was able to take his PO midodrine with improved SBP in 90s. BMP resolved with K normalizing after HD. Ca initially stayed elevated but downtrended to 10s during the hospital course and he was placed on cincalcet 60 mg from 30 mg home dose. S&S assessed pt and he was started on dysphagia diet. Blood cultures were NGTD, as were Urine cultures. MRSA nares was negative and MSSA positive and vancomycin was stopped and pt was restarted on home doxycycline for his chronic MSSA nares infection. Patient had persistent cough which was treated with tessalon perles and chest PT. He continued to receive HD T/Th/Sat during his hospital course. Zosyn was continued and switched to augmentin for a total of 7 days of antibiotics. Of note, patient was placed on telemetry due to hypokalemia on admission. He had periods at night of non-sustained V tach 6-8 beats. He was started on metoprolol 12.5 BID with resolution of V tach episodes overnight. He was noted to have desaturations at night to low 80s. Recommend sleep study outpatient.     Patient's mentation improved and he is now back to baseline orientation Ax2-3 and ready to be discharged back to [facility].    Patient is a 70 year old man with history of multiple medical issues including ESRD (HD T/Th/S), CAD s/p stent, past paraspinal abscess requiring long-term IV abx presenting, hyperparathyroidism, neurogenic bladder (straight cath'd 2x daily), hypotension (on midodrine) with altered mental status. On arrival he was febrile, tachycardic, lethargic and found to have CXR in the ED concerning for pneumonia as well as a SCr 8.6 (6.2 in Dec 2020), K of 6.2 and Ca 14.2. Patient undergoes HD T/Th/Sat at [facility] [place]. He missed his HD session yesterday. Prior to admission, he was last dialyzed 1/23. At baseline, he is non-ambulatory and needs 2 assistants to get him OOB to wheelchair.     Patient was hypotensive after his first session of HD and was unable to tolerate midodrine PO. He was given 250 cc LR bolus x 2 with continued hypotension SBP high 80s. Of note, post-HD he has been hypoglycemic and given 1 amp dextrose. The morning after arrival, patient was able to take his PO midodrine with improved SBP in 90s. BMP resolved with K normalizing after HD. Ca initially stayed elevated but downtrended to 10s during the hospital course and he was placed on cincalcet 60 mg from 30 mg home dose. S&S assessed pt and he was started on dysphagia diet. Blood cultures were NGTD, as were Urine cultures. MRSA nares was negative and MSSA positive and vancomycin was stopped and pt was restarted on home doxycycline for his chronic MSSA nares infection. Patient had persistent cough which was treated with tessalon perles and chest PT. He continued to receive HD T/Th/Sat during his hospital course. Zosyn was continued and switched to augmentin for a total of 7 days of antibiotics. Of note, patient was placed on telemetry due to hypokalemia on admission. He had periods at night of non-sustained V tach 6-8 beats. He was started on metoprolol 12.5 BID with resolution of V tach episodes overnight. He was noted to have desaturations at night to low 80s. Recommend sleep study outpatient. He was found to have EF 12% on TTE (2/3). Cardiology saw patient and recommended continuing with aspirin 81 mg, atorvastatin 10 mg and metoprolol 12.5 BID. Patient cannot get ACE/ARB due to ESRD and hypotension requiring midodrine. Recommended outpatient followup with nucealr stress test and evaluation for AICD.    Patient's mentation improved and he is now back to baseline orientation Ax2-3 and ready to be discharged back to [facility].    Patient is a 70 year old man with history of multiple medical issues including ESRD (HD T/Th/S), CAD s/p stent, past paraspinal abscess requiring long-term IV abx presenting, hyperparathyroidism, neurogenic bladder (straight cath'd 2x daily), hypotension (on midodrine) with altered mental status. On arrival he was febrile, tachycardic, lethargic and found to have CXR in the ED concerning for pneumonia as well as a SCr 8.6 (6.2 in Dec 2020), K of 6.2 and Ca 14.2. Patient undergoes HD T/Th/Sat at [facility] He missed his HD session yesterday. Prior to admission, he was last dialyzed 1/23. At baseline, he is non-ambulatory and needs 2 assistants to get him OOB to wheelchair.     Patient was hypotensive after his first session of HD and was unable to tolerate midodrine PO. He was given 250 cc LR bolus x 2 with continued hypotension SBP high 80s. Of note, post-HD he has been hypoglycemic and given 1 amp dextrose. The morning after arrival, patient was able to take his PO midodrine with improved SBP in 90s. BMP resolved with K normalizing after HD. Ca initially stayed elevated but downtrended to 10s during the hospital course and he was placed on cincalcet 60 mg from 30 mg home dose. S&S assessed pt and he was started on dysphagia diet. Blood cultures were NGTD, as were Urine cultures. MRSA nares was negative and MSSA positive and vancomycin was stopped and pt was restarted on home doxycycline for his chronic MSSA nares infection. Patient had persistent cough which was treated with tessalon perles and chest PT. He continued to receive HD T/Th/Sat during his hospital course. Zosyn was continued and switched to augmentin for a total of 7 days of antibiotics. Of note, patient was placed on telemetry due to hypokalemia on admission. He had periods at night of non-sustained V tach 6-8 beats. He was started on metoprolol 12.5 BID with resolution of V tach episodes overnight. He was noted to have desaturations at night to low 80s. Recommend sleep study outpatient. He was found to have EF 12% on TTE (2/3). Cardiology saw patient and recommended continuing with aspirin 81 mg, atorvastatin 10 mg and metoprolol 12.5 BID. Patient cannot get ACE/ARB due to ESRD and hypotension requiring midodrine. Recommended outpatient followup with nucealr stress test and evaluation for AICD..    Patient's mentation improved and he is now back to baseline orientation Ax2-3 and ready to be discharged back to [facility].

## 2021-01-28 NOTE — DISCHARGE NOTE PROVIDER - NSFOLLOWUPCLINICS_GEN_ALL_ED_FT
[facility] Pulmonolgy and Sleep Medicine  Pulmonology  [address] [place] [address]  Phone: [phone]  Fax:   Follow Up Time:      [facility] Pulmonolgy and Sleep Medicine  Pulmonology  [address] [facility] [facility] [address]  [place] [address]  Phone: [phone]  Fax:     [facility]  Cardiology  [address] [place] [address]  Phone: [phone]  Fax:   Follow Up Time:

## 2021-01-28 NOTE — PHYSICAL THERAPY INITIAL EVALUATION ADULT - PERTINENT HX OF CURRENT PROBLEM, REHAB EVAL
Patient is a 70 year old man with history of multiple medical issues including ESRD (HD T/Th/S), CAD, HTN, past paraspinal abscess requiring long-term IV abx presenting with altered mental status, fever, tachycardia likely 2/2 sepsis due to UTI. Also missed HD the day prior to arrival with elevated K, SCr, Na, and Ca, getting HD on arrival

## 2021-01-28 NOTE — DISCHARGE NOTE PROVIDER - CARE PROVIDERS DIRECT ADDRESSES
,DirectAddress_Unknown ,DirectAddress_Unknown,DirectAddress_Unknown ,DirectAddress_Unknown,DirectAddress_Unknown,[email or web address]

## 2021-01-28 NOTE — PROGRESS NOTE ADULT - SUBJECTIVE AND OBJECTIVE BOX
Overnight events noted   VITAL: T(C): , Max: 36.8 (21 @ 19:39) T(F): , Max: 98.2 (21 @ 19:39) HR: 78 (21 @ 07:49) BP: 94/56 (21 @ 07:49) RR: 18 (21 @ 05:23) SpO2: 98% (21 @ 05:23)   PHYSICAL EXAM: Constitutional: minimally communicative; NAD HEENT: NCAT, DMM Neck: Supple, No JVD Respiratory: CTA-b/l Cardiovascular: RRR s1s2, no m/r/g Gastrointestinal: BS+, soft, NT/ND Extremities: No peripheral edema b/l Neurological: no focal deficits; strength grossly intact Back: no CVAT b/l Skin: No rashes, no nevi Access: LUE AVF (+)thrill  LABS:                      12.3  6.93  )-----------( 131      ( 2021 03:16 )            39.5   Na(143)/K(4.6)/Cl(100)/HCO3(31)/BUN(54)/Cr(5.13)Glu(204)/Ca(11.1)/Mg(--)/PO4(--)     @ 18:26 Na(147)/K(5.5)/Cl(99)/HCO3(32)/BUN(94)/Cr(8.72)Glu(188)/Ca(13.8)/Mg(--)/PO4(--)     @ 06:50 Na(148)/K(6.2)/Cl(99)/HCO3(30)/BUN(91)/Cr(8.64)Glu(118)/Ca(14.2)/Mg(--)/PO4(--)     @ 03:18  Urinalysis Basic - ( 2021 04:05 ) Color: Brown / Appearance: Turbid / S.015 / pH: x Gluc: x / Ketone: Negative  / Bili: Negative / Urobili: <2 mg/dL  Blood: x / Protein: 100 mg/dL / Nitrite: Negative  Leuk Esterase: Large / RBC: many /HPF / WBC many /HPF  Sq Epi: x / Non Sq Epi: 0-2 /HPF / Bacteria: Few   IMPRESSION: 70M w/ dementia, HTN, CAD,  past paraspinal abscess requiring long-term IV antibiotics, and ESRD-HD, 21 from [facility] with AMS/fever/electrolyte derangements  (1)Renal - ESRD - last dialyzed yesterday; we can plan for next HD tomorrow  (2)Hyperkalemia - improved s/p HD yesterday  (3)Hypernatremia - improved s/p HD yesterday  (4)Hypercalcemia - primary hyperparathyroidism - improved, s/p HD yesterday and s/p increase in Sensipar  (5)ID - febrile illness - on Zosyn   RECOMMEND: (1)Next HD tomorrow - 3 hours; 2k/2.25ca bath (2)Sensipar as ordered (3)Continue abx for GFR<10/HD    Heath Huff MD [facility] Office: [phone] Cell: [phone]        minimally communicative   VITAL: T(C): , Max: 36.8 (21 @ 19:39) T(F): , Max: 98.2 (21 @ 19:39) HR: 78 (21 @ 07:49) BP: 94/56 (21 @ 07:49) RR: 18 (21 @ 05:23) SpO2: 98% (21 @ 05:23)   PHYSICAL EXAM: Constitutional: minimally communicative; NAD HEENT: NCAT, DMM Neck: Supple, No JVD Respiratory: CTA-b/l Cardiovascular: RRR s1s2, no m/r/g Gastrointestinal: BS+, soft, NT/ND Extremities: No peripheral edema b/l Neurological: no focal deficits; strength grossly intact Back: no CVAT b/l Skin: No rashes, no nevi Access: LUE AVF (+)thrill  LABS:                      12.3  6.93  )-----------( 131      ( 2021 03:16 )            39.5   Na(143)/K(4.6)/Cl(100)/HCO3(31)/BUN(54)/Cr(5.13)Glu(204)/Ca(11.1)/Mg(--)/PO4(--)     @ 18:26 Na(147)/K(5.5)/Cl(99)/HCO3(32)/BUN(94)/Cr(8.72)Glu(188)/Ca(13.8)/Mg(--)/PO4(--)     @ 06:50 Na(148)/K(6.2)/Cl(99)/HCO3(30)/BUN(91)/Cr(8.64)Glu(118)/Ca(14.2)/Mg(--)/PO4(--)     @ 03:18  Urinalysis Basic - ( 2021 04:05 ) Color: Brown / Appearance: Turbid / S.015 / pH: x Gluc: x / Ketone: Negative  / Bili: Negative / Urobili: <2 mg/dL  Blood: x / Protein: 100 mg/dL / Nitrite: Negative  Leuk Esterase: Large / RBC: many /HPF / WBC many /HPF  Sq Epi: x / Non Sq Epi: 0-2 /HPF / Bacteria: Few   IMPRESSION: 70M w/ dementia, HTN, CAD,  past paraspinal abscess requiring long-term IV antibiotics, and ESRD-HD, 21 from [facility] with AMS/fever/electrolyte derangements  (1)Renal - ESRD - last dialyzed yesterday; we can plan for next HD tomorrow  (2)Hyperkalemia - improved s/p HD yesterday  (3)Hypernatremia - improved s/p HD yesterday  (4)Hypercalcemia - primary hyperparathyroidism - improved, s/p HD yesterday and s/p increase in Sensipar  (5)ID - febrile illness - on Zosyn   RECOMMEND: (1)Next HD tomorrow - 3 hours; 2k/2.25ca bath (2)Sensipar as ordered (3)Continue abx for GFR<10/HD    Heath Huff MD [facility] Office: [phone] Cell: [phone]

## 2021-01-28 NOTE — PROGRESS NOTE ADULT - PROBLEM SELECTOR PLAN 9
Dispo: likely to [facility]    1.  Name of PCP:   2.  PCP Contacted on Admission: [ ] Y    [ ] N    3.  PCP contacted at Discharge: [ ] Y    [ ] N    [ ] N/A  4.  Post-Discharge Appointment Date and Location:  5.  Summary of Handoff given to PCP:

## 2021-01-28 NOTE — SWALLOW BEDSIDE ASSESSMENT ADULT - SWALLOW EVAL: DIAGNOSIS
Patient consumed trials puree, mechanical soft solids, solids, thin liquids, nectar thick liquids and honey thick liquids presenting with mild oral dysphagia. Oral phase characterized by adequate acceptance, adequate anterior bolus containment, prolonged mastication, inconsistent bolus holding/delayed anterior to posterior transport. Pharyngeal phase characterized by initiation of the pharyngeal swallow and hyolaryngeal elevation and excursion noted upon palpation. No clinically overt signs or symptoms of aspiration across all trials.

## 2021-01-28 NOTE — DISCHARGE NOTE PROVIDER - NSDCFUADDAPPT_GEN_ALL_CORE_FT
Please schedule an appointment with Dr. Sandoval for a sleep study within 2 weeks for nocturnal desaturation. Please schedule an appointment with Dr. Sandoval for a sleep study within 2 weeks for nocturnal desaturation.    Please schedule an appointment with Dr. Galloway (cardiologist) for nuclear stress test for HF. Please schedule an appointment with Dr. Sandoval for a sleep study within 2 weeks for nocturnal desaturation.    Please schedule an appointment with Dr. Galloway (cardiologist) or with the cardiology health associates for nuclear stress test for HF.

## 2021-01-28 NOTE — DISCHARGE NOTE PROVIDER - NSDCMRMEDTOKEN_GEN_ALL_CORE_FT
Acidophilus oral tablet: 1 tab(s) orally once a day  Aquaphor topical ointment: Apply topically to affected area 4 times a day  aspirin 81 mg oral tablet: 1 tab(s) orally once a day  cinacalcet 30 mg oral tablet: 1 tab(s) orally 2 times a day  citalopram 20 mg oral tablet: 1 tab(s) orally once a day  Claritin 5 mg oral tablet, chewable: 1 tab(s) orally every 12 hours  Lipitor 10 mg oral tablet: 1 tab(s) orally once a day  midodrine 10 mg oral tablet: 1 tab(s) orally 3 times a day  MiraLax oral powder for reconstitution:   mupirocin 2% topical ointment: 1 application topically 2 times a day   Protonix 20 mg oral delayed release tablet: 1 tab(s) orally once a day  Remedy Skin Repair lotion: Apply topically to affected area 2 times a day prn   Senna 8.6 mg oral tablet: 2 tab(s) orally once a day (at bedtime)  tamsulosin 0.4 mg oral capsule: 1 cap(s) orally once a day  Vibramycin 100 mg oral capsule: 1 cap(s) orally a day   Acidophilus oral tablet: 1 tab(s) orally once a day  amoxicillin-clavulanate 500 mg-125 mg oral tablet: 1 tab(s) orally once a day (at bedtime)  Aquaphor topical ointment: Apply topically to affected area 4 times a day  aspirin 81 mg oral tablet: 1 tab(s) orally once a day  cinacalcet 30 mg oral tablet: 1 tab(s) orally 2 times a day  citalopram 20 mg oral tablet: 1 tab(s) orally once a day  Claritin 5 mg oral tablet, chewable: 1 tab(s) orally every 12 hours  Lipitor 10 mg oral tablet: 1 tab(s) orally once a day  metoprolol: 12.5 milligram(s) orally 2 times a day MDD:25 mg  midodrine 10 mg oral tablet: 1 tab(s) orally 3 times a day  MiraLax oral powder for reconstitution:   mupirocin 2% topical ointment: 1 application topically 2 times a day   Protonix 20 mg oral delayed release tablet: 1 tab(s) orally once a day  Remedy Skin Repair lotion: Apply topically to affected area 2 times a day prn   Senna 8.6 mg oral tablet: 2 tab(s) orally once a day (at bedtime)  tamsulosin 0.4 mg oral capsule: 1 cap(s) orally once a day  Vibramycin 100 mg oral capsule: 1 cap(s) orally a day

## 2021-01-28 NOTE — PROGRESS NOTE ADULT - PROBLEM SELECTOR PLAN 10
DVT ppx: hep SQ  Diet: renal diet  Dispo: back to [facility] where he is a long-term resident. PT eval pending. At baseline, non-ambulatory.   # neurogenic bladder  - straight cath 2x daily    #GERD  -c/w home protonix  Rena (cousin): [phone] (mobile)  Taryn Mccormack (cousin): [phone] (home), [phone] (mobile) DVT ppx: hep SQ  Diet: dysphagia 2 mechanical soft diet  Dispo: back to [facility] where he is a long-term resident. PT eval pending. At baseline, non-ambulatory.   # neurogenic bladder  - straight cath 2x daily    #GERD  -c/w home protonix  Rena (cousin): [phone] (mobile)  Taryn Mccormack (cousin): [phone] (home), [phone] (mobile)

## 2021-01-28 NOTE — PHYSICAL THERAPY INITIAL EVALUATION ADULT - ADDITIONAL COMMENTS
Pt is a poor historian; as per care coordinator note on December 12/09/2020; pt resides at [facility] as a long term care resident and required assistance with bed<->wheelchair transfers.    Pt left comfortable in bed, NAD, all lines intact, all precautions maintained, with call bell in reach, bed alarm on, and RN aware of PT evaluation.

## 2021-01-29 NOTE — PROGRESS NOTE ADULT - ASSESSMENT
IMPRESSION: 70M w/ dementia, HTN, CAD,  past paraspinal abscess requiring long-term IV antibiotics, and ESRD-HD, 1/26/21 from [facility] with AMS/fever/electrolyte derangements    (1)Renal - ESRD - HD today    (2)Hyperkalemia - improved compared to yesterday, HD today     (3)Hypernatremia - improved     (4)Hypercalcemia - primary hyperparathyroidism - improved, s/p HD and s/p increase in Sensipar    (5)ID - febrile illness - on IV Zosyn and po Vibramycin     RECOMMEND:  (1)Next HD Monday  - 3 hours; 2k/2.25ca bath  (2)Sensipar as ordered  (3)Continue abx for GFR<10/HD    D/w Dr. Gregg Du NP-C  [facility]  [phone]           IMPRESSION: 70M w/ dementia, HTN, CAD,  past paraspinal abscess requiring long-term IV antibiotics, and ESRD-HD, 1/26/21 from [facility] with AMS/fever/electrolyte derangements    (1)Renal - ESRD - HD today    (2)Hyperkalemia - improved compared to yesterday, HD today     (3)Hypernatremia - improved     (4)Hypercalcemia - primary hyperparathyroidism - improved, s/p HD and s/p increase in Sensipar    (5)ID - febrile illness - on IV Zosyn and po Vibramycin     RECOMMEND:  (1)HD tomorrow - 2 hours; 2k/2ca bath  (2)Sensipar as ordered  (3)Continue abx for GFR<10/HD    D/w Dr. Gregg Du NP-C  [facility]  [phone]

## 2021-01-29 NOTE — PROGRESS NOTE ADULT - PROBLEM SELECTOR PLAN 8
- c/w home midodrine post HD once tolerating PO  - Monitor vitals daily - c/w home midodrine 10 mg TID  - Monitor vitals daily - c/w home citalopram once tolerating PO

## 2021-01-29 NOTE — PROGRESS NOTE ADULT - PROBLEM SELECTOR PLAN 7
- restart home lipitor and aspirin once tolerating PO - c/w home lipitor and aspirin once tolerating PO - c/w home midodrine 10 mg TID  - Monitor vitals daily

## 2021-01-29 NOTE — PROGRESS NOTE ADULT - SUBJECTIVE AND OBJECTIVE BOX
***************************************************************  Kourtney Simone, PGY1  Internal Medicine   pager: NS: [phone] [facility]: [phone]  ***************************************************************    PROGRESS NOTE:     Patient is a 70y old  Male who presents with a chief complaint of sepsis concerning for urosepsis (28 Jan 2021 14:20)      SUBJECTIVE / OVERNIGHT EVENTS:    ADDITIONAL REVIEW OF SYSTEMS: 10 point ROS negative except per HPI    MEDICATIONS  (STANDING):  aspirin enteric coated 81 milliGRAM(s) Oral daily  atorvastatin 10 milliGRAM(s) Oral at bedtime  chlorhexidine 4% Liquid 1 Application(s) Topical daily  cinacalcet 60 milliGRAM(s) Oral two times a day  citalopram 20 milliGRAM(s) Oral daily  dextrose 5% + lactated ringers. 1000 milliLiter(s) (75 mL/Hr) IV Continuous <Continuous>  heparin   Injectable 5000 Unit(s) SubCutaneous every 8 hours  lactobacillus acidophilus 1 Tablet(s) Oral daily  loratadine 10 milliGRAM(s) Oral daily  midodrine. 10 milliGRAM(s) Oral three times a day  mineral oil/petrolatum Hydrophilic Ointment 1 Application(s) Topical four times a day  mupirocin 2% Ointment 1 Application(s) Topical two times a day  pantoprazole    Tablet 40 milliGRAM(s) Oral before breakfast  piperacillin/tazobactam IVPB.. 3.375 Gram(s) IV Intermittent every 12 hours  polyethylene glycol 3350 17 Gram(s) Oral daily  senna 2 Tablet(s) Oral at bedtime  tamsulosin 0.4 milliGRAM(s) Oral at bedtime    MEDICATIONS  (PRN):      CAPILLARY BLOOD GLUCOSE      POCT Blood Glucose.: 103 mg/dL (28 Jan 2021 20:48)  POCT Blood Glucose.: 83 mg/dL (28 Jan 2021 16:56)  POCT Blood Glucose.: 74 mg/dL (28 Jan 2021 11:54)  POCT Blood Glucose.: 66 mg/dL (28 Jan 2021 11:53)  POCT Blood Glucose.: 78 mg/dL (28 Jan 2021 07:52)  POCT Blood Glucose.: 100 mg/dL (28 Jan 2021 05:54)    I&O's Summary    27 Jan 2021 07:01  -  28 Jan 2021 07:00  --------------------------------------------------------  IN: 800 mL / OUT: 460 mL / NET: 340 mL    28 Jan 2021 07:01  -  29 Jan 2021 05:41  --------------------------------------------------------  IN: 1704 mL / OUT: 200 mL / NET: 1504 mL        PHYSICAL EXAM:  Vital Signs Last 24 Hrs  T(C): 36.3 (28 Jan 2021 21:19), Max: 36.3 (28 Jan 2021 12:24)  T(F): 97.3 (28 Jan 2021 21:19), Max: 97.4 (28 Jan 2021 12:24)  HR: 82 (28 Jan 2021 21:19) (77 - 86)  BP: 107/54 (28 Jan 2021 21:19) (88/60 - 115/76)  BP(mean): --  RR: 18 (28 Jan 2021 21:19) (18 - 18)  SpO2: 97% (28 Jan 2021 21:19) (97% - 97%)    PHYSICAL EXAM:  GENERAL: NAD, very thin  HEAD:  Atraumatic, Normocephalic  EYES: EOMI, conjunctiva and sclera clear  NERVOUS SYSTEM:  Able to answer yes and no questions, although may not be reliable. Recognizes name when called.  CHEST/LUNG: + cough, Clear to percussion bilaterally; No rales, rhonchi, wheezing, or rubs  HEART: Regular rate and rhythm; No murmurs, rubs, or gallops  ABDOMEN: Soft, Nontender, Nondistended; Bowel sounds present  BACK: No TTP of spine   EXTREMITIES:  2+ Peripheral Pulses, No clubbing, cyanosis, or edema  LYMPH: No lymphadenopathy noted  SKIN: Sacral wound, Left AVF    LABS:                        10.8   8.34  )-----------( 74       ( 28 Jan 2021 07:59 )             35.9     01-28    143  |  101  |  64<H>  ----------------------------<  104<H>  5.6<H>   |  29  |  5.71<H>    Ca    12.2<H>      28 Jan 2021 07:59    TPro  6.1  /  Alb  2.9<L>  /  TBili  0.7  /  DBili  x   /  AST  26  /  ALT  30  /  AlkPhos  134<H>  01-28              Culture - Blood (collected 27 Jan 2021 08:26)  Source: .Blood Blood-Peripheral  Preliminary Report (28 Jan 2021 09:01):    No growth to date.    Culture - Blood (collected 27 Jan 2021 08:26)  Source: .Blood Blood-Peripheral  Preliminary Report (28 Jan 2021 09:01):    No growth to date.        RADIOLOGY & ADDITIONAL TESTS:  Results Reviewed:   Imaging Personally Reviewed:  Electrocardiogram Personally Reviewed:    COORDINATION OF CARE:  Care Discussed with Consultants/Other Providers [Y/N]:  Prior or Outpatient Records Reviewed [Y/N]:   ***************************************************************  Kourtney Nichols, PGY1  Internal Medicine   pager: NS: [phone] [facility]: [phone]  ***************************************************************    PROGRESS NOTE:     Patient is a 70y old  Male who presents with a chief complaint of sepsis concerning for urosepsis (28 Jan 2021 14:20)      SUBJECTIVE / OVERNIGHT EVENTS:  Patient had speech and swallow exam yesterday and was placed on mechanical soft diet with thin liquids. PT saw patient and he will return to [facility] on discharge. He received HD T/Th/Sat but will receive HD today, per nephro note. He is more alert and conversant today, endorses tolerating diet. However, he said he had received HD already this morning, and did not so may still be somewhat confused. Denies SOB, CP, abd pain, fevers/chills.    ADDITIONAL REVIEW OF SYSTEMS: 10 point ROS negative except per HPI    MEDICATIONS  (STANDING):  aspirin enteric coated 81 milliGRAM(s) Oral daily  atorvastatin 10 milliGRAM(s) Oral at bedtime  chlorhexidine 4% Liquid 1 Application(s) Topical daily  cinacalcet 60 milliGRAM(s) Oral two times a day  citalopram 20 milliGRAM(s) Oral daily  dextrose 5% + lactated ringers. 1000 milliLiter(s) (75 mL/Hr) IV Continuous <Continuous>  heparin   Injectable 5000 Unit(s) SubCutaneous every 8 hours  lactobacillus acidophilus 1 Tablet(s) Oral daily  loratadine 10 milliGRAM(s) Oral daily  midodrine. 10 milliGRAM(s) Oral three times a day  mineral oil/petrolatum Hydrophilic Ointment 1 Application(s) Topical four times a day  mupirocin 2% Ointment 1 Application(s) Topical two times a day  pantoprazole    Tablet 40 milliGRAM(s) Oral before breakfast  piperacillin/tazobactam IVPB.. 3.375 Gram(s) IV Intermittent every 12 hours  polyethylene glycol 3350 17 Gram(s) Oral daily  senna 2 Tablet(s) Oral at bedtime  tamsulosin 0.4 milliGRAM(s) Oral at bedtime    MEDICATIONS  (PRN):      CAPILLARY BLOOD GLUCOSE      POCT Blood Glucose.: 103 mg/dL (28 Jan 2021 20:48)  POCT Blood Glucose.: 83 mg/dL (28 Jan 2021 16:56)  POCT Blood Glucose.: 74 mg/dL (28 Jan 2021 11:54)  POCT Blood Glucose.: 66 mg/dL (28 Jan 2021 11:53)  POCT Blood Glucose.: 78 mg/dL (28 Jan 2021 07:52)  POCT Blood Glucose.: 100 mg/dL (28 Jan 2021 05:54)    I&O's Summary    27 Jan 2021 07:01  -  28 Jan 2021 07:00  --------------------------------------------------------  IN: 800 mL / OUT: 460 mL / NET: 340 mL    28 Jan 2021 07:01  -  29 Jan 2021 05:41  --------------------------------------------------------  IN: 1704 mL / OUT: 200 mL / NET: 1504 mL        PHYSICAL EXAM:  Vital Signs Last 24 Hrs  T(C): 36.3 (28 Jan 2021 21:19), Max: 36.3 (28 Jan 2021 12:24)  T(F): 97.3 (28 Jan 2021 21:19), Max: 97.4 (28 Jan 2021 12:24)  HR: 82 (28 Jan 2021 21:19) (77 - 86)  BP: 107/54 (28 Jan 2021 21:19) (88/60 - 115/76)  BP(mean): --  RR: 18 (28 Jan 2021 21:19) (18 - 18)  SpO2: 97% (28 Jan 2021 21:19) (97% - 97%)    PHYSICAL EXAM:  GENERAL: NAD, very thin  HEAD:  Atraumatic, Normocephalic  EYES: EOMI, conjunctiva and sclera clear  NERVOUS SYSTEM:  Able to answer yes and no questions, although may not be reliable. Recognizes name when called.  CHEST/LUNG: + cough, Clear to percussion bilaterally; No rales, rhonchi, wheezing, or rubs  HEART: Regular rate and rhythm; No murmurs, rubs, or gallops  ABDOMEN: Soft, Nontender, Nondistended; Bowel sounds present  BACK: No TTP of spine   EXTREMITIES:  2+ Peripheral Pulses, No clubbing, cyanosis, or edema  LYMPH: No lymphadenopathy noted  SKIN: Sacral wound, Left AVF    LABS:                        10.8   8.34  )-----------( 74       ( 28 Jan 2021 07:59 )             35.9     01-28    143  |  101  |  64<H>  ----------------------------<  104<H>  5.6<H>   |  29  |  5.71<H>    Ca    12.2<H>      28 Jan 2021 07:59    TPro  6.1  /  Alb  2.9<L>  /  TBili  0.7  /  DBili  x   /  AST  26  /  ALT  30  /  AlkPhos  134<H>  01-28              Culture - Blood (collected 27 Jan 2021 08:26)  Source: .Blood Blood-Peripheral  Preliminary Report (28 Jan 2021 09:01):    No growth to date.    Culture - Blood (collected 27 Jan 2021 08:26)  Source: .Blood Blood-Peripheral  Preliminary Report (28 Jan 2021 09:01):    No growth to date.        RADIOLOGY & ADDITIONAL TESTS:  Results Reviewed:   Imaging Personally Reviewed:  Electrocardiogram Personally Reviewed:    COORDINATION OF CARE:  Care Discussed with Consultants/Other Providers [Y/N]:  Prior or Outpatient Records Reviewed [Y/N]:   ***************************************************************  Kourtneykori Nichols, PGY1  Internal Medicine   pager: NS: [phone] [facility]: [phone]  ***************************************************************    PROGRESS NOTE:     Patient is a 70y old  Male who presents with a chief complaint of sepsis concerning for urosepsis (28 Jan 2021 14:20)      SUBJECTIVE / OVERNIGHT EVENTS:  Patient had speech and swallow exam yesterday and was placed on mechanical soft diet with thin liquids. PT saw patient and he will return to [facility] on discharge. He received HD T/Th/Sat but will receive HD today, per nephro note. He is more alert and conversant today, endorses tolerating diet. However, he said he had received HD already this morning, and did not so may still be somewhat confused. Denies SOB, CP, abd pain, fevers/chills.      MEDICATIONS  (STANDING):  aspirin enteric coated 81 milliGRAM(s) Oral daily  atorvastatin 10 milliGRAM(s) Oral at bedtime  chlorhexidine 4% Liquid 1 Application(s) Topical daily  cinacalcet 60 milliGRAM(s) Oral two times a day  citalopram 20 milliGRAM(s) Oral daily  dextrose 5% + lactated ringers. 1000 milliLiter(s) (75 mL/Hr) IV Continuous <Continuous>  heparin   Injectable 5000 Unit(s) SubCutaneous every 8 hours  lactobacillus acidophilus 1 Tablet(s) Oral daily  loratadine 10 milliGRAM(s) Oral daily  midodrine. 10 milliGRAM(s) Oral three times a day  mineral oil/petrolatum Hydrophilic Ointment 1 Application(s) Topical four times a day  mupirocin 2% Ointment 1 Application(s) Topical two times a day  pantoprazole    Tablet 40 milliGRAM(s) Oral before breakfast  piperacillin/tazobactam IVPB.. 3.375 Gram(s) IV Intermittent every 12 hours  polyethylene glycol 3350 17 Gram(s) Oral daily  senna 2 Tablet(s) Oral at bedtime  tamsulosin 0.4 milliGRAM(s) Oral at bedtime    MEDICATIONS  (PRN):      CAPILLARY BLOOD GLUCOSE      POCT Blood Glucose.: 103 mg/dL (28 Jan 2021 20:48)  POCT Blood Glucose.: 83 mg/dL (28 Jan 2021 16:56)  POCT Blood Glucose.: 74 mg/dL (28 Jan 2021 11:54)  POCT Blood Glucose.: 66 mg/dL (28 Jan 2021 11:53)  POCT Blood Glucose.: 78 mg/dL (28 Jan 2021 07:52)  POCT Blood Glucose.: 100 mg/dL (28 Jan 2021 05:54)    I&O's Summary    27 Jan 2021 07:01  -  28 Jan 2021 07:00  --------------------------------------------------------  IN: 800 mL / OUT: 460 mL / NET: 340 mL    28 Jan 2021 07:01  -  29 Jan 2021 05:41  --------------------------------------------------------  IN: 1704 mL / OUT: 200 mL / NET: 1504 mL        PHYSICAL EXAM:  Vital Signs Last 24 Hrs  T(C): 36.3 (28 Jan 2021 21:19), Max: 36.3 (28 Jan 2021 12:24)  T(F): 97.3 (28 Jan 2021 21:19), Max: 97.4 (28 Jan 2021 12:24)  HR: 82 (28 Jan 2021 21:19) (77 - 86)  BP: 107/54 (28 Jan 2021 21:19) (88/60 - 115/76)  BP(mean): --  RR: 18 (28 Jan 2021 21:19) (18 - 18)  SpO2: 97% (28 Jan 2021 21:19) (97% - 97%)    PHYSICAL EXAM:  GENERAL: NAD, very thin  HEAD:  Atraumatic, Normocephalic  EYES: EOMI, conjunctiva and sclera clear  NERVOUS SYSTEM:  Able to answer yes and no questions, although may not be reliable. Recognizes name when called.  CHEST/LUNG: + cough, Clear to percussion bilaterally; No rales, rhonchi, wheezing, or rubs  HEART: Regular rate and rhythm; No murmurs, rubs, or gallops  ABDOMEN: Soft, Nontender, Nondistended; Bowel sounds present  BACK: No TTP of spine   EXTREMITIES:  2+ Peripheral Pulses, No clubbing, cyanosis, or edema  LYMPH: No lymphadenopathy noted  SKIN: Sacral wound, Left AVF    LABS:                        10.8   8.34  )-----------( 74       ( 28 Jan 2021 07:59 )             35.9     01-28    143  |  101  |  64<H>  ----------------------------<  104<H>  5.6<H>   |  29  |  5.71<H>    Ca    12.2<H>      28 Jan 2021 07:59    TPro  6.1  /  Alb  2.9<L>  /  TBili  0.7  /  DBili  x   /  AST  26  /  ALT  30  /  AlkPhos  134<H>  01-28              Culture - Blood (collected 27 Jan 2021 08:26)  Source: .Blood Blood-Peripheral  Preliminary Report (28 Jan 2021 09:01):    No growth to date.    Culture - Blood (collected 27 Jan 2021 08:26)  Source: .Blood Blood-Peripheral  Preliminary Report (28 Jan 2021 09:01):    No growth to date.        RADIOLOGY & ADDITIONAL TESTS:  Results Reviewed:   Imaging Personally Reviewed:  Electrocardiogram Personally Reviewed:    COORDINATION OF CARE:  Care Discussed with Consultants/Other Providers [Y/N]:  Prior or Outpatient Records Reviewed [Y/N]:

## 2021-01-29 NOTE — PROGRESS NOTE ADULT - PROBLEM SELECTOR PLAN 3
K 6.2 in ED. s/p kayaxelate, insulin and D50.  - HD planned for today  - continue to monitor with BMP Q12h K 6.2 in ED. s/p kayaxelate, insulin and D50.  - HD planned for today  - continue to monitor with BMP Q12h    Hypernatrema - Now resolved. Na 147 in ED. 2/2 ESRD/poor free water intake  - HD today  - continue to monitor with BMP Q12h Primary hyperparathyroidism, based on serologic workup from 12/2020. Poor candidate for parathyroidectomy given patient's comorbidities.  - On Sensipar 30 mg PO BID at home. Increased Sensipar to 60 mg PO BID while inpatient.  - continue to monitor with BMP Q12h  -HD, per above

## 2021-01-29 NOTE — PROGRESS NOTE ADULT - ATTENDING COMMENTS
70 year old man with PMHx as stated above a/w metabolic encephalopathy in the setting of sepsis, hyperkalemia from missed HD , mild hypernatremia and hypercalcemia. Now s/p medical management for hyperkalemia/hypercalcemia with improvement.     Pt seen and examined. Metabolic encephalopathy improved.    Suspect sepsis likely in the setting of UTI - given +u/a and his hx of straight cathing. No prior +urine cx noted in sunrise. Pt had brief episode of diarrhea on admission however was likely in setting of  kayaxelate received. CXR and CT chest persoanlly reviewed and w/o evidence of pulmonary infection.  -was on vanc/zosyn now deescalated to just zosyn. Blood cx NTD. Urine cx unremarkable however, urine cx obtained late. Will treat empirically for UTI.   -can resume home vibramycin MRSA suppressive therapy.   -Continue to monitor BMP and calcium levels. Renal following and input appreciated. HD per renal. C/w increased dose of Sensipar to 60mg BID  -pending wound care consult    Dc planning back to long term care facility if pt remains stable.

## 2021-01-29 NOTE — PROGRESS NOTE ADULT - PROBLEM SELECTOR PLAN 4
Na 147 in ED. 2/2 ESRD/poor free water intake  - HD today  - continue to monitor with BMP Q12h Primary hyperparathyroidism, based on serologic workup from 12/2020. Poor candidate for parathyroidectomy given patient's comorbidities.  - On Sensipar 30 mg PO BID at home. Increased Sensipar to 60 mg PO BID while inpatient.  - continue to monitor with BMP Q12h  -HD, per above Noted intermittently since 2018. Unclear etiology. Possible ?Bone Marrow component. Current sepsis and abx may be playing a role.  Noted anemia as well likely in the setting of ESRD. No clinical /active signs of bleeding   -will continue to monitor and tranfuse PRN based on transfusion goals.  -will review all of his home meds to see if contributing

## 2021-01-29 NOTE — PROGRESS NOTE ADULT - ASSESSMENT
Patient is a 70 year old man with history of multiple medical issues including ESRD (HD T/Th/S), CAD, HTN, past paraspinal abscess requiring long-term IV abx presenting with altered mental status, fever, tachycardia likely 2/2 sepsis due to UTI. Also missed HD the day prior to arrival with elevated K, SCr, Na, and Ca, getting HD on arrival.   Patient is a 70 year old man with history of multiple medical issues including ESRD (HD T/Th/S), CAD, HTN, past paraspinal abscess requiring long-term IV abx presenting with metabolic encephalopathy, fever, tachycardia likely 2/2 sepsis due to UTI. Also missed HD the day prior to arrival with elevated K, SCr, Na, and Ca,l.   Patient is a 70 year old man with history of multiple medical issues including ESRD (HD T/Th/S), CAD, HTN, past paraspinal abscess requiring long-term IV abx presenting with metabolic encephalopathy, fever, tachycardia likely 2/2 sepsis due to UTI. Also missed HD the day prior to arrival with elevated K, SCr, Na, and Ca. Hyperkalemia is now resolved.

## 2021-01-29 NOTE — PROGRESS NOTE ADULT - PROBLEM SELECTOR PLAN 6
Missed HD session today.  - Will get HD today  -renal recs appreciated Missed HD session day of admission  - HD, per above  -renal recs appreciated - c/w home lipitor and aspirin once tolerating PO

## 2021-01-29 NOTE — PROGRESS NOTE ADULT - PROBLEM SELECTOR PLAN 10
DVT ppx: hep SQ  Diet: dysphagia 2 mechanical soft diet  Dispo: back to [facility] where he is a long-term resident. PT eval pending. At baseline, non-ambulatory.   # neurogenic bladder  - straight cath 2x daily    #GERD  -c/w home protonix  Rena (cousin): [phone] (mobile)  Taryn Mccormack (cousin): [phone] (home), [phone] (mobile)

## 2021-01-29 NOTE — PROGRESS NOTE ADULT - PROBLEM SELECTOR PLAN 9
- c/w home citalopram once tolerating PO DVT ppx: hep SQ  Diet: dysphagia 2 mechanical soft diet  Dispo: back to [facility] where he is a long-term resident. PT eval pending. At baseline, non-ambulatory.   # neurogenic bladder  - straight cath 2x daily    #GERD  -c/w home protonix  Rena (cousin): [phone] (mobile)  Taryn Mccormack (cousin): [phone] (home), [phone] (mobile)

## 2021-01-29 NOTE — PROGRESS NOTE ADULT - SUBJECTIVE AND OBJECTIVE BOX
NEPHROLOGY      Patient seen and examined at HD. Pt awake, without complaints, no pain or sob, in no acute distress. No overnight events noted.   MEDICATIONS  (STANDING): aspirin enteric coated 81 milliGRAM(s) Oral daily atorvastatin 10 milliGRAM(s) Oral at bedtime chlorhexidine 4% Liquid 1 Application(s) Topical daily cinacalcet 60 milliGRAM(s) Oral two times a day citalopram 20 milliGRAM(s) Oral daily dextrose 5% + lactated ringers. 1000 milliLiter(s) (50 mL/Hr) IV Continuous <Continuous> doxycycline hyclate Capsule 100 milliGRAM(s) Oral <User Schedule> heparin   Injectable 5000 Unit(s) SubCutaneous every 8 hours lactobacillus acidophilus 1 Tablet(s) Oral daily loratadine 10 milliGRAM(s) Oral daily midodrine. 10 milliGRAM(s) Oral three times a day mineral oil/petrolatum Hydrophilic Ointment 1 Application(s) Topical four times a day mupirocin 2% Ointment 1 Application(s) Topical two times a day pantoprazole    Tablet 40 milliGRAM(s) Oral before breakfast piperacillin/tazobactam IVPB.. 3.375 Gram(s) IV Intermittent every 12 hours polyethylene glycol 3350 17 Gram(s) Oral daily senna 2 Tablet(s) Oral at bedtime tamsulosin 0.4 milliGRAM(s) Oral at bedtime  VITALS: T(C): , Max: 36.3 (01-28-21 @ 21:19) T(F): , Max: 97.4 (01-29-21 @ 06:09) HR: 93 (01-29-21 @ 14:42) BP: 106/64 (01-29-21 @ 14:42) RR: 17 (01-29-21 @ 14:42) SpO2: 96% (01-29-21 @ 14:42)  I and O's:  01-28 @ 07:01  -  01-29 @ 07:00 -------------------------------------------------------- IN: 1822 mL / OUT: 200 mL / NET: 1622 mL  01-29 @ 07:01  -  01-29 @ 16:49 -------------------------------------------------------- IN: 400 mL / OUT: 1200 mL / NET: -800 mL  PHYSICAL EXAM: Constitutional: minimally communicative; NAD HEENT: NCAT, DMM Neck: Supple, No JVD Respiratory: CTA-b/l Cardiovascular: RRR s1s2, no m/r/g Gastrointestinal: BS+, soft, NT/ND Extremities: No peripheral edema b/l Neurological: no focal deficits; strength grossly intact Back: no CVAT b/l Skin: No rashes, no nevi Access: RACHELLE MORAN (+)thrill- accessed   LABS:                      11.0  7.84  )-----------( 83       ( 29 Jan 2021 07:01 )            35.3   01-29  139  |  99  |  49<H> ----------------------------<  88 5.0   |  29  |  4.51<H>  Ca    10.6<H>      29 Jan 2021 15:41 Phos  4.2     01-29 Mg     2.5     01-29  TPro  6.1  /  Alb  2.9<L>  /  TBili  0.7  /  DBili  x   /  AST  26  /  ALT  30  /  AlkPhos  134<H>  01-28

## 2021-01-29 NOTE — PROGRESS NOTE ADULT - PROBLEM SELECTOR PLAN 2
2/2 uremia, hypercalcemia, hypernatremia, hyperkalemia after missing HD session  - will monitor BMPs 2/2 uremia, hypercalcemia, hypernatremia, hyperkalemia after missing HD session  - will monitor BMPs  -will treat underlying conditions as stated 2/2 uremia, hypercalcemia, hypernatremia, hyperkalemia after missing HD session  - will monitor BMPs  - will treat underlying conditions as stated

## 2021-01-29 NOTE — PROGRESS NOTE ADULT - PROBLEM SELECTOR PLAN 5
Primary hyperparathyroidism, based on serologic workup from 12/2020. Poor candidate for parathyroidectomy given patient's comorbidities.  - On Sensipar 30 mg PO BID at home. Increased Sensipar to 60 mg PO BID while inpatient.  - continue to monitor with BMP Q12h  -HD today Primary hyperparathyroidism, based on serologic workup from 12/2020. Poor candidate for parathyroidectomy given patient's comorbidities.  - On Sensipar 30 mg PO BID at home. Increased Sensipar to 60 mg PO BID while inpatient.  - continue to monitor with BMP Q12h  -HD, per above Noted intermittently since 2018. Unclear etiology. Possible ?Bone Marrow component. Current sepsis and abx may be playing a role.  Noted anemia as well likely in the setting of ESRD. No clinical /active signs of bleeding   -will continue to monitor and tranfuse PRN based on transfusion goals.  -will review all of his home meds to see if contributing Missed HD session day of admission  - HD, per above  -renal recs appreciated

## 2021-01-29 NOTE — PROGRESS NOTE ADULT - PROBLEM SELECTOR PLAN 1
p/w fever, tachycardia, lethargy. U/A shows large leuk est, turbid  - started vanc and zosyn, abx for GFR < 10/HD  - + MRSA/MSSA swab last admission (12/2020) treated with bactroban x 5 days. On chronic home vibramycin 100mg for MRSA-holding for now while on vanc   - f/u MRSA/MSSA  - f/u BCx, UCx  - c/w vancomycin by level, and zosyn renally dosed  - started LR @ 50cc/hr  - assess o2 sat on RA with goal SpO2 >93%  - wound care consult for sacral wound  - f/u CT chest noncontrast p/w fever, tachycardia, lethargy. U/A shows large leuk est, turbid  - started vanc and zosyn, abx for GFR < 10/HD  - + MRSA/MSSA swab last admission (12/2020) treated with bactroban x 5 days. On chronic home vibramycin 100mg for MRSA-holding for now while on vanc   - MRSA/MSSA (1/29) MRSA negative, S. aureus positive  - BCx NGTD  - f/u UCx  - c/w vancomycin by level, and zosyn renally dosed  - started LR @ 50cc/hr  - assess o2 sat on RA with goal SpO2 >93%  - wound care consult for sacral wound  - f/u CT chest noncontrast p/w fever, tachycardia, lethargy. U/A shows large leuk est, turbid  - c/w zosyn x 7 days (1/27 - 2/3)  - MRSA/MSSA (1/29) MRSA negative, S. 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

## 2021-01-29 NOTE — PROVIDER CONTACT NOTE (OTHER) - ACTION/TREATMENT ORDERED:
Apple juice given. Dr. Martins made aware. 1 amp D50 to be ordered. Will continue to monitor. Apple juice given. Dr. Martins made aware. 1 amp Dextrose 50% to be ordered. Will continue to monitor.

## 2021-01-30 NOTE — PROGRESS NOTE ADULT - ATTENDING COMMENTS
Patient seen and examined. Agree with above note by resident.    70 year old man a/w metabolic encephalopathy in the setting of sepsis, hyperkalemia from missed HD , mild hypernatremia and hypercalcemia. Now s/p medical management for hyperkalemia/hypercalcemia with improvement.     #Metabolic encephalopathy improved. Patient is AAOx3 today  #Suspect sepsis likely in the setting of UTI -was on vanc/zosyn now deescalated to just zosyn. Blood cx NTD. Urine cx unremarkable however, urine cx obtained late. Will treat empirically for UTI. C/w Zosyn. Resume home vibramycin MRSA suppressive therapy.   # ESRD on HD - Plan for HD today. Continue to monitor BMP and calcium levels. Renal following and input appreciated. HD per renal. C/w increased dose of Sensipar to 60mg BID  #Hypoglycemia - c/w D5+LR. Hopefully FS will improve with better PO intake. Monitor FS.     Dc planning back to long term care facility if pt remains stable on Monday    Plan discussed with Team 3 covering resident and RN

## 2021-01-30 NOTE — PROGRESS NOTE ADULT - SUBJECTIVE AND OBJECTIVE BOX
Patient seen and examined.  no new complaints and NAD noted.  Last HD done today- 1L removed    REVIEW OF SYSTEMS:  As per HPI, otherwise 8 full 10 ROS were unremarkable    MEDICATIONS  (STANDING):  aspirin enteric coated 81 milliGRAM(s) Oral daily  atorvastatin 10 milliGRAM(s) Oral at bedtime  chlorhexidine 4% Liquid 1 Application(s) Topical daily  cinacalcet 60 milliGRAM(s) Oral two times a day  citalopram 20 milliGRAM(s) Oral daily  dextrose 5% + lactated ringers. 1000 milliLiter(s) (50 mL/Hr) IV Continuous <Continuous>  doxycycline hyclate Capsule 100 milliGRAM(s) Oral <User Schedule>  heparin   Injectable 5000 Unit(s) SubCutaneous every 8 hours  lactobacillus acidophilus 1 Tablet(s) Oral daily  loratadine 10 milliGRAM(s) Oral daily  midodrine. 10 milliGRAM(s) Oral three times a day  mineral oil/petrolatum Hydrophilic Ointment 1 Application(s) Topical four times a day  mupirocin 2% Ointment 1 Application(s) Topical two times a day  pantoprazole    Tablet 40 milliGRAM(s) Oral before breakfast  piperacillin/tazobactam IVPB.. 3.375 Gram(s) IV Intermittent every 12 hours  polyethylene glycol 3350 17 Gram(s) Oral daily  senna 2 Tablet(s) Oral at bedtime  sodium phosphate IVPB 15 milliMole(s) IV Intermittent once  tamsulosin 0.4 milliGRAM(s) Oral at bedtime      VITAL:  T(C): , Max: 36.7 (01-30-21 @ 16:11)  T(F): , Max: 98.1 (01-30-21 @ 16:11)  HR: 86 (01-30-21 @ 16:11)  BP: 105/62 (01-30-21 @ 16:11)  BP(mean): --  RR: 17 (01-30-21 @ 16:11)  SpO2: 95% (01-30-21 @ 16:11)  Wt(kg): --    I and O's:    01-29 @ 07:01  -  01-30 @ 07:00  --------------------------------------------------------  IN: 1116 mL / OUT: 1200 mL / NET: -84 mL    01-30 @ 07:01  -  01-30 @ 19:50  --------------------------------------------------------  IN: 636 mL / OUT: 1400 mL / NET: -764 mL          PHYSICAL EXAM:    Constitutional: minimally communicative; NAD  Neck: Supple, No JVD  Respiratory: CTA-b/l  Cardiovascular: RRR s1s2, no m/r/g  Gastrointestinal: BS+, soft, NT/ND  Extremities: No peripheral edema b/l  Neurological: no focal deficits; strength grossly intact  Access: RACHELLE MORAN (+)[facility]- accessed     LABS:                        8.3    3.25  )-----------( 78       ( 30 Jan 2021 07:09 )             27.5     01-30    135  |  99  |  28<H>  ----------------------------<  TNP  4.2   |  16<L>  |  2.80<H>    Ca    8.4      30 Jan 2021 07:09  Phos  1.7     01-30  Mg     1.2     01-30

## 2021-01-30 NOTE — PROGRESS NOTE ADULT - ASSESSMENT
Patient is a 70 year old man with history of multiple medical issues including ESRD (HD T/Th/S), CAD, HTN, past paraspinal abscess requiring long-term IV abx presenting with metabolic encephalopathy, fever, tachycardia likely 2/2 sepsis due to UTI. Also missed HD the day prior to arrival with elevated K, SCr, Na, and Ca. Hyperkalemia is now resolved.     Problem/Plan - 1:  ·  Problem: Sepsis.  Plan: p/w fever, tachycardia, lethargy. U/A shows large leuk est, turbid  - c/w zosyn x 7 days (1/27 - 2/3)  - MRSA/MSSA (1/29) MRSA negative, S. aureus positive  - Stopped vancomycin, and restarted home vibramycin 100mg for MSSA chronic infection  - BCx NGTD, UCx negative  - wound care consult for sacral wound  - CT chest noncontrast (1/27) - atelectasis and moderate pl effusions bilateral.     Problem/Plan - 2:  ·  Problem: Metabolic encephalopathy.  Plan: 2/2 uremia, hypercalcemia, hypernatremia, hyperkalemia after missing HD session  - will monitor BMPs  - will treat underlying conditions as stated.     Problem/Plan - 3:  ·  Problem: Hypercalcemia. Plan: Primary hyperparathyroidism, based on serologic workup from 12/2020. Poor candidate for parathyroidectomy given patient's comorbidities.  - On Sensipar 30 mg PO BID at home. Increased Sensipar to 60 mg PO BID while inpatient.  - continue to monitor with BMP Q12h  -HD, per above.     Problem/Plan - 4:  ·  Problem: Thrombocytopenia. Plan: Noted intermittently since 2018. Unclear etiology. Possible ?Bone Marrow component. Current sepsis and abx may be playing a role.  Noted anemia as well likely in the setting of ESRD. No clinical /active signs of bleeding   -will continue to monitor and tranfuse PRN based on transfusion goals.  -will review all of his home meds to see if contributing.     Problem/Plan - 5:  ·  Problem: ESRD (end stage renal disease). Plan: Missed HD session day of admission  - HD, per above  -renal recs appreciated.     Problem/Plan - 6:  Problem: CAD (coronary artery disease).Plan: - c/w home lipitor and aspirin once tolerating PO.     Problem/Plan - 7:  ·  Problem: Hypotension. Plan: - c/w home midodrine 10 mg TID  - Monitor vitals daily.     Problem/Plan - 8:  ·  Problem: Major depression. Plan: - c/w home citalopram once tolerating PO.     Problem/Plan - 9:  ·  Problem: Discharge planning issues. Plan: DVT ppx: hep SQ  Diet: dysphagia 2 mechanical soft diet  Dispo: back to [facility] where he is a long-term resident. PT eval pending. At baseline, non-ambulatory.   # neurogenic bladder  - straight cath 2x daily    #GERD  -c/w home protonix  Rena (cousin): [phone] (mobile)  Taryn Mccormack (cousin): [phone] (home), [phone] (mobile).

## 2021-01-30 NOTE — PROGRESS NOTE ADULT - ASSESSMENT
70M w/ dementia, HTN, CAD,  past paraspinal abscess requiring long-term IV antibiotics, and ESRD-HD, 1/26/21 from [facility] with AMS/fever/electrolyte derangements    (1)Renal - ESRD on HD (TTS outpat)- completed HD today     (2)Hyperkalemia - can be managed with dialysis and renal diet    (3) Hypercalcemia - primary hyperparathyroidism:  on Sensipar 60mg bid since 1/27/21-- this am Ca 8.4    (4) hypophosphotemia 1.7: Na-Phos IV 15mmol x1 dose     (5) ID  - on IV Zosyn and po Vibramycin     RECOMMEND:  (1) Next HD will be on Tuesday  (2) hold Sensipar tomorrow, and restart with 60 mg daily on Monday: if Ca <9.5, would hold  (3)Continue abx for GFR<10/HD  (4) a/w giving Na-Phos IV 15 mmol per primary team

## 2021-01-30 NOTE — PROGRESS NOTE ADULT - SUBJECTIVE AND OBJECTIVE BOX
PROGRESS NOTE:   Authored by Denice Cabrera MD      Patient is a 70y old  Male who presents with a chief complaint of sepsis concerning for urosepsis (29 Jan 2021 14:45)      SUBJECTIVE / OVERNIGHT EVENTS:    ADDITIONAL REVIEW OF SYSTEMS:    MEDICATIONS  (STANDING):  aspirin enteric coated 81 milliGRAM(s) Oral daily  atorvastatin 10 milliGRAM(s) Oral at bedtime  chlorhexidine 4% Liquid 1 Application(s) Topical daily  cinacalcet 60 milliGRAM(s) Oral two times a day  citalopram 20 milliGRAM(s) Oral daily  dextrose 5% + lactated ringers. 1000 milliLiter(s) (50 mL/Hr) IV Continuous <Continuous>  doxycycline hyclate Capsule 100 milliGRAM(s) Oral <User Schedule>  heparin   Injectable 5000 Unit(s) SubCutaneous every 8 hours  lactobacillus acidophilus 1 Tablet(s) Oral daily  loratadine 10 milliGRAM(s) Oral daily  midodrine. 10 milliGRAM(s) Oral three times a day  mineral oil/petrolatum Hydrophilic Ointment 1 Application(s) Topical four times a day  mupirocin 2% Ointment 1 Application(s) Topical two times a day  pantoprazole    Tablet 40 milliGRAM(s) Oral before breakfast  piperacillin/tazobactam IVPB.. 3.375 Gram(s) IV Intermittent every 12 hours  polyethylene glycol 3350 17 Gram(s) Oral daily  senna 2 Tablet(s) Oral at bedtime  tamsulosin 0.4 milliGRAM(s) Oral at bedtime    MEDICATIONS  (PRN):      CAPILLARY BLOOD GLUCOSE      POCT Blood Glucose.: 117 mg/dL (30 Jan 2021 07:45)  POCT Blood Glucose.: 87 mg/dL (29 Jan 2021 20:40)  POCT Blood Glucose.: 113 mg/dL (29 Jan 2021 17:26)  POCT Blood Glucose.: 74 mg/dL (29 Jan 2021 16:50)  POCT Blood Glucose.: 52 mg/dL (29 Jan 2021 16:37)  POCT Blood Glucose.: 61 mg/dL (29 Jan 2021 16:35)  POCT Blood Glucose.: 90 mg/dL (29 Jan 2021 12:20)    I&O's Summary    29 Jan 2021 07:01  -  30 Jan 2021 07:00  --------------------------------------------------------  IN: 1116 mL / OUT: 1200 mL / NET: -84 mL        PHYSICAL EXAM:  Vital Signs Last 24 Hrs  T(C): 36.3 (30 Jan 2021 05:09), Max: 36.3 (29 Jan 2021 20:48)  T(F): 97.3 (30 Jan 2021 05:09), Max: 97.3 (29 Jan 2021 20:48)  HR: 88 (30 Jan 2021 05:09) (56 - 93)  BP: 109/56 (30 Jan 2021 05:09) (91/52 - 151/68)  BP(mean): --  RR: 17 (30 Jan 2021 05:09) (16 - 18)  SpO2: 100% (30 Jan 2021 05:09) (96% - 100%)    GENERAL: No acute distress, well-developed  HEAD:  Atraumatic, Normocephalic  EYES: EOMI, PERRLA, conjunctiva and sclera clear  NECK: Supple, no lymphadenopathy, no JVD  CHEST/LUNG: CTAB; No wheezes, rales, or rhonchi  HEART: Regular rate and rhythm; No murmurs, rubs, or gallops  ABDOMEN: Soft, non-tender, non-distended; normal bowel sounds, no organomegaly  EXTREMITIES:  2+ peripheral pulses b/l, No clubbing, cyanosis, or edema  NEUROLOGY: A&O x 3, no focal deficits  SKIN: No rashes or lesions    LABS:                        8.3    3.25  )-----------( 78       ( 30 Jan 2021 07:09 )             27.5     01-30    135  |  99  |  28<H>  ----------------------------<  TNP  4.2   |  16<L>  |  2.80<H>    Ca    8.4      30 Jan 2021 07:09  Phos  1.7     01-30  Mg     1.2     01-30                Culture - Urine (collected 28 Jan 2021 12:33)  Source: .Urine Clean Catch (Midstream)  Final Report (29 Jan 2021 11:33):    <10,000 CFU/mL Normal Urogenital Jessica        RADIOLOGY & ADDITIONAL TESTS:  Results Reviewed:   Imaging Personally Reviewed:  Electrocardiogram Personally Reviewed:    COORDINATION OF CARE:  Care Discussed with Consultants/Other Providers [Y/N]:  Prior or Outpatient Records Reviewed [Y/N]:   PROGRESS NOTE:   Authored by Denice Cabrera MD      Patient is a 70y old  Male who presents with a chief complaint of sepsis concerning for urosepsis (29 Jan 2021 14:45)      SUBJECTIVE / OVERNIGHT EVENTS:    Patient got 1 bolus of LR overnight. This morning, he denied any pain or discomfort. No nausea, vomiting.     ADDITIONAL REVIEW OF SYSTEMS:    MEDICATIONS  (STANDING):  aspirin enteric coated 81 milliGRAM(s) Oral daily  atorvastatin 10 milliGRAM(s) Oral at bedtime  chlorhexidine 4% Liquid 1 Application(s) Topical daily  cinacalcet 60 milliGRAM(s) Oral two times a day  citalopram 20 milliGRAM(s) Oral daily  dextrose 5% + lactated ringers. 1000 milliLiter(s) (50 mL/Hr) IV Continuous <Continuous>  doxycycline hyclate Capsule 100 milliGRAM(s) Oral <User Schedule>  heparin   Injectable 5000 Unit(s) SubCutaneous every 8 hours  lactobacillus acidophilus 1 Tablet(s) Oral daily  loratadine 10 milliGRAM(s) Oral daily  midodrine. 10 milliGRAM(s) Oral three times a day  mineral oil/petrolatum Hydrophilic Ointment 1 Application(s) Topical four times a day  mupirocin 2% Ointment 1 Application(s) Topical two times a day  pantoprazole    Tablet 40 milliGRAM(s) Oral before breakfast  piperacillin/tazobactam IVPB.. 3.375 Gram(s) IV Intermittent every 12 hours  polyethylene glycol 3350 17 Gram(s) Oral daily  senna 2 Tablet(s) Oral at bedtime  tamsulosin 0.4 milliGRAM(s) Oral at bedtime    MEDICATIONS  (PRN):      CAPILLARY BLOOD GLUCOSE      POCT Blood Glucose.: 117 mg/dL (30 Jan 2021 07:45)  POCT Blood Glucose.: 87 mg/dL (29 Jan 2021 20:40)  POCT Blood Glucose.: 113 mg/dL (29 Jan 2021 17:26)  POCT Blood Glucose.: 74 mg/dL (29 Jan 2021 16:50)  POCT Blood Glucose.: 52 mg/dL (29 Jan 2021 16:37)  POCT Blood Glucose.: 61 mg/dL (29 Jan 2021 16:35)  POCT Blood Glucose.: 90 mg/dL (29 Jan 2021 12:20)    I&O's Summary    29 Jan 2021 07:01  -  30 Jan 2021 07:00  --------------------------------------------------------  IN: 1116 mL / OUT: 1200 mL / NET: -84 mL        PHYSICAL EXAM:  Vital Signs Last 24 Hrs  T(C): 36.3 (30 Jan 2021 05:09), Max: 36.3 (29 Jan 2021 20:48)  T(F): 97.3 (30 Jan 2021 05:09), Max: 97.3 (29 Jan 2021 20:48)  HR: 88 (30 Jan 2021 05:09) (56 - 93)  BP: 109/56 (30 Jan 2021 05:09) (91/52 - 151/68)  BP(mean): --  RR: 17 (30 Jan 2021 05:09) (16 - 18)  SpO2: 100% (30 Jan 2021 05:09) (96% - 100%)    GENERAL: No acute distress, well-developed  HEAD:  Atraumatic, Normocephalic  EYES: EOMI, PERRLA, conjunctiva and sclera clear  NECK: Supple, no lymphadenopathy, no JVD  CHEST/LUNG: CTAB; No wheezes, rales, or rhonchi  HEART: Regular rate and rhythm; No murmurs, rubs, or gallops  ABDOMEN: Soft, non-tender, non-distended; normal bowel sounds, no organomegaly  EXTREMITIES:  2+ peripheral pulses b/l, No clubbing, cyanosis, or edema  NEUROLOGY: A&O x 3, no focal deficits  SKIN: No rashes or lesions    LABS:                        8.3    3.25  )-----------( 78       ( 30 Jan 2021 07:09 )             27.5     01-30    135  |  99  |  28<H>  ----------------------------<  TNP  4.2   |  16<L>  |  2.80<H>    Ca    8.4      30 Jan 2021 07:09  Phos  1.7     01-30  Mg     1.2     01-30                Culture - Urine (collected 28 Jan 2021 12:33)  Source: .Urine Clean Catch (Midstream)  Final Report (29 Jan 2021 11:33):    <10,000 CFU/mL Normal Urogenital Jessica        RADIOLOGY & ADDITIONAL TESTS:  Results Reviewed:   Imaging Personally Reviewed:  Electrocardiogram Personally Reviewed:    COORDINATION OF CARE:  Care Discussed with Consultants/Other Providers [Y/N]:  Prior or Outpatient Records Reviewed [Y/N]:

## 2021-01-31 NOTE — PROGRESS NOTE ADULT - PROBLEM SELECTOR PLAN 1
p/w fever, tachycardia, lethargy. U/A shows large leuk est, turbid  - c/w zosyn x 7 days (1/27 - 2/3)  - MRSA/MSSA (1/29) MRSA negative, S. 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

## 2021-01-31 NOTE — PROGRESS NOTE ADULT - SUBJECTIVE AND OBJECTIVE BOX
***************************************************************  Kourtney Simone, PGY1  Internal Medicine   pager: NS: [phone] [facility]: [phone]  ***************************************************************    PROGRESS NOTE:     Patient is a 70y old  Male who presents with a chief complaint of sepsis concerning for urosepsis (30 Jan 2021 19:49)      SUBJECTIVE / OVERNIGHT EVENTS:      MEDICATIONS  (STANDING):  aspirin enteric coated 81 milliGRAM(s) Oral daily  atorvastatin 10 milliGRAM(s) Oral at bedtime  chlorhexidine 4% Liquid 1 Application(s) Topical daily  citalopram 20 milliGRAM(s) Oral daily  doxycycline hyclate Capsule 100 milliGRAM(s) Oral <User Schedule>  heparin   Injectable 5000 Unit(s) SubCutaneous every 8 hours  lactobacillus acidophilus 1 Tablet(s) Oral daily  loratadine 10 milliGRAM(s) Oral daily  midodrine. 10 milliGRAM(s) Oral three times a day  mineral oil/petrolatum Hydrophilic Ointment 1 Application(s) Topical four times a day  mupirocin 2% Ointment 1 Application(s) Topical two times a day  pantoprazole    Tablet 40 milliGRAM(s) Oral before breakfast  piperacillin/tazobactam IVPB.. 3.375 Gram(s) IV Intermittent every 12 hours  polyethylene glycol 3350 17 Gram(s) Oral daily  senna 2 Tablet(s) Oral at bedtime  tamsulosin 0.4 milliGRAM(s) Oral at bedtime    MEDICATIONS  (PRN):      CAPILLARY BLOOD GLUCOSE      POCT Blood Glucose.: 132 mg/dL (30 Jan 2021 21:14)  POCT Blood Glucose.: 101 mg/dL (30 Jan 2021 17:39)  POCT Blood Glucose.: 101 mg/dL (30 Jan 2021 14:43)  POCT Blood Glucose.: 117 mg/dL (30 Jan 2021 07:45)    I&O's Summary    29 Jan 2021 07:01  -  30 Jan 2021 07:00  --------------------------------------------------------  IN: 1116 mL / OUT: 1200 mL / NET: -84 mL    30 Jan 2021 07:01  -  31 Jan 2021 05:38  --------------------------------------------------------  IN: 904 mL / OUT: 1400 mL / NET: -496 mL        PHYSICAL EXAM:  Vital Signs Last 24 Hrs  T(C): 36.6 (30 Jan 2021 22:53), Max: 36.7 (30 Jan 2021 16:11)  T(F): 97.8 (30 Jan 2021 22:53), Max: 98.1 (30 Jan 2021 16:11)  HR: 88 (30 Jan 2021 22:53) (79 - 89)  BP: 117/58 (30 Jan 2021 22:53) (98/66 - 117/58)  BP(mean): --  RR: 17 (30 Jan 2021 22:53) (17 - 17)  SpO2: 96% (30 Jan 2021 22:53) (95% - 96%)    PHYSICAL EXAM:  GENERAL: NAD, very thin  HEAD:  Atraumatic, Normocephalic  EYES: EOMI, conjunctiva and sclera clear  NERVOUS SYSTEM:  Able to answer yes and no questions, although may not be reliable. Recognizes name when called.  CHEST/LUNG: + cough, Clear to percussion bilaterally; No rales, rhonchi, wheezing, or rubs  HEART: Regular rate and rhythm; No murmurs, rubs, or gallops  ABDOMEN: Soft, Nontender, Nondistended; Bowel sounds present  BACK: No TTP of spine   EXTREMITIES:  2+ Peripheral Pulses, No clubbing, cyanosis, or edema  LYMPH: No lymphadenopathy noted  SKIN: Sacral wound, Left AVF    LABS:                        8.3    3.25  )-----------( 78       ( 30 Jan 2021 07:09 )             27.5     01-30    135  |  99  |  28<H>  ----------------------------<  TNP  4.2   |  16<L>  |  2.80<H>    Ca    8.4      30 Jan 2021 07:09  Phos  1.7     01-30  Mg     1.2     01-30                Culture - Urine (collected 28 Jan 2021 12:33)  Source: .Urine Clean Catch (Midstream)  Final Report (29 Jan 2021 11:33):    <10,000 CFU/mL Normal Urogenital Jessica        RADIOLOGY & ADDITIONAL TESTS:  Results Reviewed:   Imaging Personally Reviewed:  Electrocardiogram Personally Reviewed:    COORDINATION OF CARE:  Care Discussed with Consultants/Other Providers [Y/N]:  Prior or Outpatient Records Reviewed [Y/N]:   ***************************************************************  Kourtneykori Nichols, PGY1  Internal Medicine   pager: NS: [phone] [facility]: [phone]  ***************************************************************    PROGRESS NOTE:     Patient is a 70y old  Male who presents with a chief complaint of sepsis concerning for urosepsis (30 Jan 2021 19:49)      SUBJECTIVE / OVERNIGHT EVENTS:  Patient received HD yesterday and his next HD session is Tuesday. Sensipar was held since Ca 8.4 but restarted today with Ca 10.5. Patient is much more oriented and conversant today. He is Ax2 (not to date). He appears back to his baseline. Coughing today (endorses started a week ago) and started tessalon perels.    MEDICATIONS  (STANDING):  aspirin enteric coated 81 milliGRAM(s) Oral daily  atorvastatin 10 milliGRAM(s) Oral at bedtime  chlorhexidine 4% Liquid 1 Application(s) Topical daily  citalopram 20 milliGRAM(s) Oral daily  doxycycline hyclate Capsule 100 milliGRAM(s) Oral <User Schedule>  heparin   Injectable 5000 Unit(s) SubCutaneous every 8 hours  lactobacillus acidophilus 1 Tablet(s) Oral daily  loratadine 10 milliGRAM(s) Oral daily  midodrine. 10 milliGRAM(s) Oral three times a day  mineral oil/petrolatum Hydrophilic Ointment 1 Application(s) Topical four times a day  mupirocin 2% Ointment 1 Application(s) Topical two times a day  pantoprazole    Tablet 40 milliGRAM(s) Oral before breakfast  piperacillin/tazobactam IVPB.. 3.375 Gram(s) IV Intermittent every 12 hours  polyethylene glycol 3350 17 Gram(s) Oral daily  senna 2 Tablet(s) Oral at bedtime  tamsulosin 0.4 milliGRAM(s) Oral at bedtime    MEDICATIONS  (PRN):      CAPILLARY BLOOD GLUCOSE      POCT Blood Glucose.: 132 mg/dL (30 Jan 2021 21:14)  POCT Blood Glucose.: 101 mg/dL (30 Jan 2021 17:39)  POCT Blood Glucose.: 101 mg/dL (30 Jan 2021 14:43)  POCT Blood Glucose.: 117 mg/dL (30 Jan 2021 07:45)    I&O's Summary    29 Jan 2021 07:01  -  30 Jan 2021 07:00  --------------------------------------------------------  IN: 1116 mL / OUT: 1200 mL / NET: -84 mL    30 Jan 2021 07:01  -  31 Jan 2021 05:38  --------------------------------------------------------  IN: 904 mL / OUT: 1400 mL / NET: -496 mL        PHYSICAL EXAM:  Vital Signs Last 24 Hrs  T(C): 36.6 (30 Jan 2021 22:53), Max: 36.7 (30 Jan 2021 16:11)  T(F): 97.8 (30 Jan 2021 22:53), Max: 98.1 (30 Jan 2021 16:11)  HR: 88 (30 Jan 2021 22:53) (79 - 89)  BP: 117/58 (30 Jan 2021 22:53) (98/66 - 117/58)  BP(mean): --  RR: 17 (30 Jan 2021 22:53) (17 - 17)  SpO2: 96% (30 Jan 2021 22:53) (95% - 96%)    PHYSICAL EXAM:  GENERAL: NAD, very thin  HEAD:  Atraumatic, Normocephalic  EYES: EOMI, conjunctiva and sclera clear  NERVOUS SYSTEM:  Ax2 (not to date), conversant, making eyes contact, answering questions appropriately.  CHEST/LUNG: + cough, Clear to percussion bilaterally; No rales, rhonchi, wheezing, or rubs  HEART: Regular rate and rhythm; No murmurs, rubs, or gallops  ABDOMEN: Soft, Nontender, Nondistended; Bowel sounds present  BACK: No TTP of spine   EXTREMITIES:  2+ Peripheral Pulses, No clubbing, cyanosis, or edema  LYMPH: No lymphadenopathy noted  SKIN: Sacral wound, Left AVF    LABS:                        8.3    3.25  )-----------( 78       ( 30 Jan 2021 07:09 )             27.5     01-30    135  |  99  |  28<H>  ----------------------------<  TNP  4.2   |  16<L>  |  2.80<H>    Ca    8.4      30 Jan 2021 07:09  Phos  1.7     01-30  Mg     1.2     01-30                Culture - Urine (collected 28 Jan 2021 12:33)  Source: .Urine Clean Catch (Midstream)  Final Report (29 Jan 2021 11:33):    <10,000 CFU/mL Normal Urogenital Jessica        RADIOLOGY & ADDITIONAL TESTS:  Results Reviewed:   Imaging Personally Reviewed:  Electrocardiogram Personally Reviewed:    COORDINATION OF CARE:  Care Discussed with Consultants/Other Providers [Y/N]:  Prior or Outpatient Records Reviewed [Y/N]:

## 2021-01-31 NOTE — PROGRESS NOTE ADULT - PROBLEM SELECTOR PLAN 9
DVT ppx: hep SQ  Diet: dysphagia 2 mechanical soft diet  Dispo: back to [facility] where he is a long-term resident. PT eval pending. At baseline, non-ambulatory.   # neurogenic bladder  - straight cath 2x daily    #GERD  -c/w home protonix  Rena (cousin): [phone] (mobile)  Taryn Mccormack (cousin): [phone] (home), [phone] (mobile)

## 2021-01-31 NOTE — PROGRESS NOTE ADULT - ASSESSMENT
Patient is a 70 year old man with history of multiple medical issues including ESRD (HD T/Th/S), CAD, HTN, past paraspinal abscess requiring long-term IV abx presenting with metabolic encephalopathy, fever, tachycardia likely 2/2 sepsis due to UTI. Also missed HD the day prior to arrival with elevated K, SCr, Na, and Ca. Hyperkalemia is now resolved.     Problem/Plan - 1:  ·  Problem: Sepsis.  Plan: p/w fever, tachycardia, lethargy. U/A shows large leuk est, turbid  - c/w zosyn x 7 days (1/27 - 2/3)  - MRSA/MSSA (1/29) MRSA negative, S. aureus positive  - Stopped vancomycin, and restarted home vibramycin 100mg for MSSA chronic infection  - BCx NGTD, UCx negative  - wound care consult for sacral wound  - CT chest noncontrast (1/27) - atelectasis and moderate pl effusions bilateral.     Problem/Plan - 2:  ·  Problem: Metabolic encephalopathy.  Plan: 2/2 uremia, hypercalcemia, hypernatremia, hyperkalemia after missing HD session  - will monitor BMPs  - will treat underlying conditions as stated.     Problem/Plan - 3:  ·  Problem: Hypercalcemia. Plan: Primary hyperparathyroidism, based on serologic workup from 12/2020. Poor candidate for parathyroidectomy given patient's comorbidities.  - On Sensipar 30 mg PO BID at home. Increased Sensipar to 60 mg PO BID while inpatient.  - continue to monitor with BMP Q12h  -HD, per above.     Problem/Plan - 4:  ·  Problem: Thrombocytopenia. Plan: Noted intermittently since 2018. Unclear etiology. Possible ?Bone Marrow component. Current sepsis and abx may be playing a role.  Noted anemia as well likely in the setting of ESRD. No clinical /active signs of bleeding   -will continue to monitor and tranfuse PRN based on transfusion goals.  -will review all of his home meds to see if contributing.     Problem/Plan - 5:  ·  Problem: ESRD (end stage renal disease). Plan: Missed HD session day of admission  - HD, per above  -renal recs appreciated.     Problem/Plan - 6:  Problem: CAD (coronary artery disease).Plan: - c/w home lipitor and aspirin once tolerating PO.     Problem/Plan - 7:  ·  Problem: Hypotension. Plan: - c/w home midodrine 10 mg TID  - Monitor vitals daily.     Problem/Plan - 8:  ·  Problem: Major depression. Plan: - c/w home citalopram once tolerating PO.     Problem/Plan - 9:  ·  Problem: Discharge planning issues. Plan: DVT ppx: hep SQ  Diet: dysphagia 2 mechanical soft diet  Dispo: back to [facility] where he is a long-term resident. PT eval pending. At baseline, non-ambulatory.   # neurogenic bladder  - straight cath 2x daily    #GERD  -c/w home protonix  Rena (cousin): [phone] (mobile)  Taryn Mccormack (cousin): [phone] (home), [phone] (mobile).     Patient is a 70 year old man with history of multiple medical issues including ESRD (HD T/Th/S), CAD, HTN, past paraspinal abscess requiring long-term IV abx presenting with metabolic encephalopathy, fever, tachycardia likely 2/2 sepsis due to UTI. Also missed HD the day prior to arrival with elevated K, SCr, Na, and Ca. Hyperkalemia is now resolved.

## 2021-01-31 NOTE — PROGRESS NOTE ADULT - PROBLEM SELECTOR PLAN 4
Noted intermittently since 2018. Unclear etiology. Possible ?Bone Marrow component. Current sepsis and abx may be playing a role.  Noted anemia as well likely in the setting of ESRD. No clinical /active signs of bleeding   -will continue to monitor and tranfuse PRN based on transfusion goals.  -will review all of his home meds to see if contributing

## 2021-01-31 NOTE — PROGRESS NOTE ADULT - PROBLEM SELECTOR PLAN 3
Primary hyperparathyroidism, based on serologic workup from 12/2020. Poor candidate for parathyroidectomy given patient's comorbidities.  - On Sensipar 30 mg PO BID at home. Increased Sensipar to 60 mg PO BID while inpatient.  - continue to monitor with BMP Q12h  -HD, per above

## 2021-01-31 NOTE — PROGRESS NOTE ADULT - ATTENDING COMMENTS
Patient seen and examined. Agree with above note by resident.    70 year old man a/w metabolic encephalopathy in the setting of sepsis, hyperkalemia from missed HD , mild hypernatremia and hypercalcemia. Now s/p medical management for hyperkalemia/hypercalcemia with improvement.     #Metabolic encephalopathy improved. Patient is AAOx3 today  #Suspect sepsis likely in the setting of UTI -was on vanc/zosyn now deescalated to just zosyn. Blood cx NTD. Urine cx unremarkable however, urine cx obtained late. Will treat empirically for UTI. C/w Zosyn. Resume home vibramycin MRSA suppressive therapy.   # ESRD on HD - HD per renal. Continue to monitor BMP and calcium levels. Renal following and input appreciated. C/w increased dose of Sensipar to 60mg BID  #Hypoglycemia - now that mental status improved, FS should improve with better Po intake. Monitor FS off of IVF    Dc planning back to long term care facility if pt remains stable on Monday    Plan discussed with Team 3

## 2021-01-31 NOTE — PROGRESS NOTE ADULT - PROBLEM SELECTOR PLAN 2
2/2 uremia, hypercalcemia, hypernatremia, hyperkalemia after missing HD session  - will monitor BMPs  - will treat underlying conditions as stated

## 2021-02-01 NOTE — PROGRESS NOTE ADULT - SUBJECTIVE AND OBJECTIVE BOX
NEPHROLOGY      Patient seen and examined. Pt without complaints, in no acute distress. Last HD Saturday removed 1L.   MEDICATIONS  (STANDING): aspirin enteric coated 81 milliGRAM(s) Oral daily atorvastatin 10 milliGRAM(s) Oral at bedtime benzonatate 100 milliGRAM(s) Oral every 8 hours chlorhexidine 4% Liquid 1 Application(s) Topical daily cinacalcet 60 milliGRAM(s) Oral daily citalopram 20 milliGRAM(s) Oral daily doxycycline hyclate Capsule 100 milliGRAM(s) Oral <User Schedule> heparin   Injectable 5000 Unit(s) SubCutaneous every 8 hours lactobacillus acidophilus 1 Tablet(s) Oral daily loratadine 10 milliGRAM(s) Oral daily metoprolol tartrate 12.5 milliGRAM(s) Oral two times a day midodrine. 10 milliGRAM(s) Oral three times a day mineral oil/petrolatum Hydrophilic Ointment 1 Application(s) Topical four times a day mupirocin 2% Ointment 1 Application(s) Topical two times a day pantoprazole    Tablet 40 milliGRAM(s) Oral before breakfast piperacillin/tazobactam IVPB.. 3.375 Gram(s) IV Intermittent every 12 hours polyethylene glycol 3350 17 Gram(s) Oral daily senna 2 Tablet(s) Oral at bedtime tamsulosin 0.4 milliGRAM(s) Oral at bedtime  VITALS: T(C): , Max: 36.9 (01-31-21 @ 23:27) T(F): , Max: 98.4 (01-31-21 @ 23:27) HR: 87 (02-01-21 @ 12:27) BP: 107/77 (02-01-21 @ 12:27) RR: 17 (02-01-21 @ 12:27) SpO2: 100% (02-01-21 @ 12:27)  I and O's:  01-31 @ 07:01  -  02-01 @ 07:00 -------------------------------------------------------- IN: 750 mL / OUT: 0 mL / NET: 750 mL  PHYSICAL EXAM: nstitutional: minimally communicative; NAD HEENT: NCAT, DMM Neck: Supple, No JVD Respiratory: CTA-b/l Cardiovascular: RRR s1s2, no m/r/g Gastrointestinal: BS+, soft, NT/ND Extremities: No peripheral edema b/l Neurological: no focal deficits; strength grossly intact Back: no CVAT b/l Skin: No rashes, no nevi Access: RACHELLE MORAN (+)thrill- accessed   LABS:                      10.9  5.03  )-----------( 101      ( 01 Feb 2021 07:54 )            35.1   02-01  137  |  97<L>  |  41<H> ----------------------------<  87 4.7   |  26  |  5.18<H>  Ca    10.9<H>      01 Feb 2021 07:54 Phos  4.2     02-01 Mg     2.4     02-01  TPro  7.0  /  Alb  3.4  /  TBili  0.6  /  DBili  x   /  AST  33  /  ALT  56<H>  /  AlkPhos  125<H>  02-01

## 2021-02-01 NOTE — PROGRESS NOTE ADULT - PROBLEM SELECTOR PLAN 9
DVT ppx: hep SQ  Diet: dysphagia 2 mechanical soft diet  Dispo: back to [facility] where he is a long-term resident. PT eval pending. At baseline, non-ambulatory.   # neurogenic bladder  - straight cath 2x daily    #GERD  -c/w home protonix  Rena (cousin): [phone] (mobile)  Taryn Mccormack (cousin): [phone] (home), [phone] (mobile) - c/w home citalopram once tolerating PO 2/2 uremia, hypercalcemia, hypernatremia, hyperkalemia after missing HD session  - will monitor BMPs  - will treat underlying conditions as stated  - now resolved

## 2021-02-01 NOTE — PROGRESS NOTE ADULT - PROBLEM SELECTOR PLAN 4
Noted intermittently since 2018. Unclear etiology. Possible ?Bone Marrow component. Current sepsis and abx may be playing a role.  Noted anemia as well likely in the setting of ESRD. No clinical /active signs of bleeding   -will continue to monitor and tranfuse PRN based on transfusion goals.  -will review all of his home meds to see if contributing Noted intermittently since 2018. Unclear etiology. Possible ?Bone Marrow component. Sepsis and abx may be playing a role.  Noted anemia as well likely in the setting of ESRD. No clinical /active signs of bleeding   -will continue to monitor and tranfuse PRN based on transfusion goals.  -will review all of his home meds to see if contributing

## 2021-02-01 NOTE — PROGRESS NOTE ADULT - ASSESSMENT
IMPRESSION: 70M w/ dementia, HTN, CAD,  past paraspinal abscess requiring long-term IV antibiotics, and ESRD-HD, 1/26/21 from [facility] with AMS/fever/electrolyte derangements    (1)Renal - ESRD on HD (TTS outpat)- HD due tomorrow     (2)Hyperkalemia - stable, can be managed with dialysis and renal diet    (3) Hypercalcemia - primary hyperparathyroidism:  on Sensipar 60mg bid since 1/27/21    (4) hypophosphatemia - improved      (5) ID  - on IV Zosyn and po Vibramycin     RECOMMEND:  (1)Next HD tomorrow   (2)Continue Sensipar as ordered   (3)Continue abx for GFR<10/HD    Nicole Du NP-C  [facility]  [phone]

## 2021-02-01 NOTE — PROGRESS NOTE ADULT - PROBLEM SELECTOR PLAN 5
Missed HD session day of admission  - HD, per above  -renal recs appreciated Pt on telemetry, and has had PVCs with 6-8 beats of V tach  - started metoprolol 12.5 mg BID  - monitor electolytes  - f/u TTE Pt on telemetry, and has had PVCs with 6-8 beats of V tach  - started metoprolol 12.5 mg BID  - monitor electrolytes  - f/u TTE

## 2021-02-01 NOTE — PROGRESS NOTE ADULT - PROBLEM SELECTOR PLAN 2
2/2 uremia, hypercalcemia, hypernatremia, hyperkalemia after missing HD session  - will monitor BMPs  - will treat underlying conditions as stated 6.6% eosinophils (2/1) rising for the past few days, possibly 2/2 AIN  - f/u urine eosinophila 6.6% eosinophils (2/1) rising for the past few days, possibly 2/2 drug induced  - f/u urine eosinophila  - continue to trend
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
## 2021-02-01 NOTE — PROGRESS NOTE ADULT - PROBLEM SELECTOR PLAN 8
- c/w home citalopram once tolerating PO 2/2 uremia, hypercalcemia, hypernatremia, hyperkalemia after missing HD session  - will monitor BMPs  - will treat underlying conditions as stated  - now resolved - c/w home midodrine 10 mg TID  - Monitor vitals daily

## 2021-02-01 NOTE — PROGRESS NOTE ADULT - ASSESSMENT
Patient is a 70 year old man with history of multiple medical issues including ESRD (HD T/Th/S), CAD, HTN, past paraspinal abscess requiring long-term IV abx presenting with metabolic encephalopathy, fever, tachycardia likely 2/2 sepsis due to UTI. Also missed HD the day prior to arrival with elevated K, SCr, Na, and Ca. Hyperkalemia is now resolved.

## 2021-02-01 NOTE — PROGRESS NOTE ADULT - SUBJECTIVE AND OBJECTIVE BOX
***************************************************************  Kourtney Simone, PGY1  Internal Medicine   pager: NS: [phone] [facility]: [phone]  ***************************************************************    PROGRESS NOTE:     Patient is a 70y old  Male who presents with a chief complaint of sepsis concerning for urosepsis (31 Jan 2021 05:38)      SUBJECTIVE / OVERNIGHT EVENTS:      MEDICATIONS  (STANDING):  aspirin enteric coated 81 milliGRAM(s) Oral daily  atorvastatin 10 milliGRAM(s) Oral at bedtime  benzonatate 100 milliGRAM(s) Oral every 8 hours  chlorhexidine 4% Liquid 1 Application(s) Topical daily  cinacalcet 60 milliGRAM(s) Oral daily  citalopram 20 milliGRAM(s) Oral daily  doxycycline hyclate Capsule 100 milliGRAM(s) Oral <User Schedule>  heparin   Injectable 5000 Unit(s) SubCutaneous every 8 hours  lactobacillus acidophilus 1 Tablet(s) Oral daily  loratadine 10 milliGRAM(s) Oral daily  midodrine. 10 milliGRAM(s) Oral three times a day  mineral oil/petrolatum Hydrophilic Ointment 1 Application(s) Topical four times a day  mupirocin 2% Ointment 1 Application(s) Topical two times a day  pantoprazole    Tablet 40 milliGRAM(s) Oral before breakfast  piperacillin/tazobactam IVPB.. 3.375 Gram(s) IV Intermittent every 12 hours  polyethylene glycol 3350 17 Gram(s) Oral daily  senna 2 Tablet(s) Oral at bedtime  tamsulosin 0.4 milliGRAM(s) Oral at bedtime    MEDICATIONS  (PRN):      CAPILLARY BLOOD GLUCOSE      POCT Blood Glucose.: 100 mg/dL (31 Jan 2021 21:28)  POCT Blood Glucose.: 107 mg/dL (31 Jan 2021 17:35)  POCT Blood Glucose.: 120 mg/dL (31 Jan 2021 11:56)  POCT Blood Glucose.: 106 mg/dL (31 Jan 2021 07:45)    I&O's Summary    30 Jan 2021 07:01  -  31 Jan 2021 07:00  --------------------------------------------------------  IN: 1054 mL / OUT: 1400 mL / NET: -346 mL    31 Jan 2021 07:01  -  01 Feb 2021 06:01  --------------------------------------------------------  IN: 750 mL / OUT: 0 mL / NET: 750 mL        PHYSICAL EXAM:  Vital Signs Last 24 Hrs  T(C): 36.7 (01 Feb 2021 05:57), Max: 36.9 (31 Jan 2021 23:27)  T(F): 98 (01 Feb 2021 05:57), Max: 98.4 (31 Jan 2021 23:27)  HR: 89 (01 Feb 2021 05:57) (89 - 96)  BP: 110/74 (01 Feb 2021 05:57) (93/63 - 120/81)  BP(mean): --  RR: 18 (01 Feb 2021 05:57) (18 - 18)  SpO2: 100% (01 Feb 2021 05:57) (99% - 100%)    PHYSICAL EXAM:  GENERAL: NAD, very thin  HEAD:  Atraumatic, Normocephalic  EYES: EOMI, conjunctiva and sclera clear  NERVOUS SYSTEM:  Ax2 (not to date), conversant, making eyes contact, answering questions appropriately.  CHEST/LUNG: + cough, Clear to percussion bilaterally; No rales, rhonchi, wheezing, or rubs  HEART: Regular rate and rhythm; No murmurs, rubs, or gallops  ABDOMEN: Soft, Nontender, Nondistended; Bowel sounds present  BACK: No TTP of spine   EXTREMITIES:  2+ Peripheral Pulses, No clubbing, cyanosis, or edema  LYMPH: No lymphadenopathy noted  SKIN: Sacral wound, Left AVF    LABS:                        11.2   3.74  )-----------( 93       ( 31 Jan 2021 07:50 )             36.1     01-31    137  |  97<L>  |  33<H>  ----------------------------<  76  4.0   |  28  |  4.16<H>    Ca    10.5      31 Jan 2021 07:50  Phos  3.2     01-31  Mg     2.3     01-31                  RADIOLOGY & ADDITIONAL TESTS:  Results Reviewed:   Imaging Personally Reviewed:  Electrocardiogram Personally Reviewed:    COORDINATION OF CARE:  Care Discussed with Consultants/Other Providers [Y/N]:  Prior or Outpatient Records Reviewed [Y/N]:   ***************************************************************  Kourtneykori Nichols, PGY1  Internal Medicine   pager: NS: [phone] [facility]: [id number]  ***************************************************************    PROGRESS NOTE:     Patient is a 70y old  Male who presents with a chief complaint of sepsis concerning for urosepsis (31 Jan 2021 05:38)      SUBJECTIVE / OVERNIGHT EVENTS:  No acute events overnight. Patient will receive HD tomorrow. Yesterday, patient had a few runs of non-sustained V tach, the last episode at 6pm with 6 beats. He feels well, and is more oriented, likely back to his baseline mental status.    MEDICATIONS  (STANDING):  aspirin enteric coated 81 milliGRAM(s) Oral daily  atorvastatin 10 milliGRAM(s) Oral at bedtime  benzonatate 100 milliGRAM(s) Oral every 8 hours  chlorhexidine 4% Liquid 1 Application(s) Topical daily  cinacalcet 60 milliGRAM(s) Oral daily  citalopram 20 milliGRAM(s) Oral daily  doxycycline hyclate Capsule 100 milliGRAM(s) Oral <User Schedule>  heparin   Injectable 5000 Unit(s) SubCutaneous every 8 hours  lactobacillus acidophilus 1 Tablet(s) Oral daily  loratadine 10 milliGRAM(s) Oral daily  midodrine. 10 milliGRAM(s) Oral three times a day  mineral oil/petrolatum Hydrophilic Ointment 1 Application(s) Topical four times a day  mupirocin 2% Ointment 1 Application(s) Topical two times a day  pantoprazole    Tablet 40 milliGRAM(s) Oral before breakfast  piperacillin/tazobactam IVPB.. 3.375 Gram(s) IV Intermittent every 12 hours  polyethylene glycol 3350 17 Gram(s) Oral daily  senna 2 Tablet(s) Oral at bedtime  tamsulosin 0.4 milliGRAM(s) Oral at bedtime    MEDICATIONS  (PRN):      CAPILLARY BLOOD GLUCOSE      POCT Blood Glucose.: 100 mg/dL (31 Jan 2021 21:28)  POCT Blood Glucose.: 107 mg/dL (31 Jan 2021 17:35)  POCT Blood Glucose.: 120 mg/dL (31 Jan 2021 11:56)  POCT Blood Glucose.: 106 mg/dL (31 Jan 2021 07:45)    I&O's Summary    30 Jan 2021 07:01  -  31 Jan 2021 07:00  --------------------------------------------------------  IN: 1054 mL / OUT: 1400 mL / NET: -346 mL    31 Jan 2021 07:01  -  01 Feb 2021 06:01  --------------------------------------------------------  IN: 750 mL / OUT: 0 mL / NET: 750 mL        PHYSICAL EXAM:  Vital Signs Last 24 Hrs  T(C): 36.7 (01 Feb 2021 05:57), Max: 36.9 (31 Jan 2021 23:27)  T(F): 98 (01 Feb 2021 05:57), Max: 98.4 (31 Jan 2021 23:27)  HR: 89 (01 Feb 2021 05:57) (89 - 96)  BP: 110/74 (01 Feb 2021 05:57) (93/63 - 120/81)  BP(mean): --  RR: 18 (01 Feb 2021 05:57) (18 - 18)  SpO2: 100% (01 Feb 2021 05:57) (99% - 100%)    PHYSICAL EXAM:  GENERAL: NAD, very thin  HEAD:  Atraumatic, Normocephalic  EYES: EOMI, conjunctiva and sclera clear  NERVOUS SYSTEM:  Ax2 (not to date), conversant, making eyes contact, answering questions appropriately.  CHEST/LUNG: + cough, Clear to percussion bilaterally; No rales, rhonchi, wheezing, or rubs  HEART: Regular rate and rhythm; No murmurs, rubs, or gallops  ABDOMEN: Soft, Nontender, Nondistended; Bowel sounds present  BACK: No TTP of spine   EXTREMITIES:  2+ Peripheral Pulses, No clubbing, cyanosis, or edema  LYMPH: No lymphadenopathy noted  SKIN: Sacral wound, Left AVF    LABS:                        11.2   3.74  )-----------( 93       ( 31 Jan 2021 07:50 )             36.1     01-31    137  |  97<L>  |  33<H>  ----------------------------<  76  4.0   |  28  |  4.16<H>    Ca    10.5      31 Jan 2021 07:50  Phos  3.2     01-31  Mg     2.3     01-31                  RADIOLOGY & ADDITIONAL TESTS:  Results Reviewed:   Imaging Personally Reviewed:  Electrocardiogram Personally Reviewed:    COORDINATION OF CARE:  Care Discussed with Consultants/Other Providers [Y/N]:  Prior or Outpatient Records Reviewed [Y/N]:

## 2021-02-01 NOTE — PROGRESS NOTE ADULT - PROBLEM SELECTOR PLAN 10
DVT ppx: hep SQ  Diet: dysphagia 2 mechanical soft diet  Dispo: back to [facility] where he is a long-term resident. PT eval pending. At baseline, non-ambulatory.   # neurogenic bladder  - straight cath 2x daily    #GERD  -c/w home protonix  Rena (cousin): [phone] (mobile)  Taryn Mccormack (cousin): [phone] (home), [phone] (mobile) DVT ppx: hep SQ  Diet: dysphagia 2 mechanical soft diet  Dispo: back to [facility] where he is a long-term resident. PT eval pending. At baseline, non-ambulatory.   # neurogenic bladder  - straight cath 2x daily    #GERD  -c/w home protonix    Major depression  - c/w home citalopram once tolerating PO  Rena (cousin): [phone] (mobile)  Taryn Mccormack (cousin): [phone] (home), [phone] (mobile)

## 2021-02-01 NOTE — PROGRESS NOTE ADULT - PROBLEM SELECTOR PLAN 6
- c/w home lipitor and aspirin once tolerating PO Missed HD session day of admission  - HD, per above  -renal recs appreciated

## 2021-02-01 NOTE — PROGRESS NOTE ADULT - PROBLEM SELECTOR PLAN 7
- c/w home midodrine 10 mg TID  - Monitor vitals daily - c/w home lipitor and aspirin once tolerating PO

## 2021-02-02 NOTE — PROGRESS NOTE ADULT - PROBLEM SELECTOR PLAN 1
p/w fever, tachycardia, lethargy. U/A shows large leuk est, turbid  - c/w zosyn x 7 days (1/27 - 2/3)  - MRSA/MSSA (1/29) MRSA negative, S. aureus positive  - Stopped vancomycin, and restarted home vibramycin 100mg for MSSA chronic infection  - BCx NGTD, UCx negative  - wound care consult for sacral wound  - CT chest noncontrast (1/27) - atelectasis and moderate pl effusions bilateral p/w fever, tachycardia, lethargy. U/A shows large leuk est, turbid  - s/p zosyn (1/27 - 2/2 am dose)  - started augmentin (2/2 - 2/3)  - MRSA/MSSA (1/29) MRSA negative, S. 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

## 2021-02-02 NOTE — PROGRESS NOTE ADULT - PROBLEM SELECTOR PLAN 3
Primary hyperparathyroidism, based on serologic workup from 12/2020. Poor candidate for parathyroidectomy given patient's comorbidities.  - On Sensipar 30 mg PO BID at home. Increased Sensipar to 60 mg PO BID while inpatient.  - continue to monitor with BMP Q12h  -HD, per above Primary hyperparathyroidism, based on serologic workup from 12/2020. Poor candidate for parathyroidectomy given patient's comorbidities.  - On Sensipar 30 mg PO BID at home. Increased Sensipar to 60 mg PO BID while inpatient.  - continue to monitor with BMP Qd  -HD, per below

## 2021-02-02 NOTE — PROGRESS NOTE ADULT - SUBJECTIVE AND OBJECTIVE BOX
Overnight events noted   VITAL: T(C): , Max: 36.8 (02-01-21 @ 17:04) T(F): , Max: 98.2 (02-01-21 @ 17:04) HR: 87 (02-02-21 @ 05:09) BP: 109/73 (02-02-21 @ 05:09) BP(mean): -- RR: 17 (02-02-21 @ 05:09) SpO2: 100% (02-02-21 @ 05:09)   PHYSICAL EXAM: Constitutional: minimally communicative; NAD HEENT: NCAT, DMM Neck: Supple, No JVD Respiratory: CTA-b/l Cardiovascular: RRR s1s2, no m/r/g Gastrointestinal: BS+, soft, NT/ND Extremities: No peripheral edema b/l Neurological: no focal deficits; strength grossly intact Back: no CVAT b/l Skin: No rashes, no nevi Access: LUE AVF (+)thrill  LABS:                      11.6  5.14  )-----------( 105      ( 02 Feb 2021 07:16 )            36.8   Na(139)/K(5.3)/Cl(95)/HCO3(25)/BUN(58)/Cr(6.39)Glu(77)/Ca(10.9)/Mg(2.5)/PO4(5.2)    02-02 @ 07:16 Na(137)/K(4.7)/Cl(97)/HCO3(26)/BUN(41)/Cr(5.18)Glu(87)/Ca(10.9)/Mg(2.4)/PO4(4.2)    02-01 @ 07:54 Na(137)/K(4.0)/Cl(97)/HCO3(28)/BUN(33)/Cr(4.16)Glu(76)/Ca(10.5)/Mg(2.3)/PO4(3.2)    01-31 @ 07:50   IMPRESSION: 70M w/ dementia, HTN, CAD,  past paraspinal abscess requiring long-term IV antibiotics, and ESRD-HD, 1/26/21 from [facility] with AMS/fever/electrolyte derangements  (2)Hypercalcemia - primary hyperparathyroidism - Ca++ high but acceptable for now, on Sensipar  (3)ID - febrile illness - on IV Zosyn and po Vibramycin    RECOMMEND: (1)HD today as ordered (2)Sensipar as ordered (3)Continue abx for GFR<10/HD      Heath Huff MD [facility] Office: [phone] Cell: [phone]        No pain, no sob Seen on HD   VITAL: T(C): , Max: 36.8 (02-01-21 @ 17:04) T(F): , Max: 98.2 (02-01-21 @ 17:04) HR: 87 (02-02-21 @ 05:09) BP: 109/73 (02-02-21 @ 05:09) BP(mean): -- RR: 17 (02-02-21 @ 05:09) SpO2: 100% (02-02-21 @ 05:09)   PHYSICAL EXAM: Constitutional: following simple commands/communicative; NAD HEENT: NCAT, DMM Neck: Supple, No JVD Respiratory: CTA-b/l Cardiovascular: RRR s1s2, no m/r/g Gastrointestinal: BS+, soft, NT/ND Extremities: No peripheral edema b/l Neurological: no focal deficits; strength grossly intact Back: no CVAT b/l Skin: No rashes, no nevi Access: LUE AVF-accessed  LABS:                      11.6  5.14  )-----------( 105      ( 02 Feb 2021 07:16 )            36.8   Na(139)/K(5.3)/Cl(95)/HCO3(25)/BUN(58)/Cr(6.39)Glu(77)/Ca(10.9)/Mg(2.5)/PO4(5.2)    02-02 @ 07:16 Na(137)/K(4.7)/Cl(97)/HCO3(26)/BUN(41)/Cr(5.18)Glu(87)/Ca(10.9)/Mg(2.4)/PO4(4.2)    02-01 @ 07:54 Na(137)/K(4.0)/Cl(97)/HCO3(28)/BUN(33)/Cr(4.16)Glu(76)/Ca(10.5)/Mg(2.3)/PO4(3.2)    01-31 @ 07:50   IMPRESSION: 70M w/ dementia, HTN, CAD,  past paraspinal abscess requiring long-term IV antibiotics, and ESRD-HD, 1/26/21 from [facility] with AMS/fever/electrolyte derangements  (1)Renal - ESRD - HD TTS - on HD now, tolerating  (2)Hypercalcemia - primary hyperparathyroidism - Ca++ high but acceptable for now, on Sensipar  (3)ID - febrile illness - on IV Zosyn and po Vibramycin   (4)AMS - sepsis-associated - now improved   RECOMMEND: (1)HD today as ordered (2)Sensipar as ordered (3)Continue abx for GFR<10/HD      Heath Huff MD [facility] Office: [phone] Cell: [phone]

## 2021-02-02 NOTE — PROGRESS NOTE ADULT - SUBJECTIVE AND OBJECTIVE BOX
***************************************************************  Kourtney Simone, PGY1  Internal Medicine   pager: NS: [phone] [facility]: [phone]  ***************************************************************    PROGRESS NOTE:     Patient is a 70y old  Male who presents with a chief complaint of sepsis concerning for urosepsis (01 Feb 2021 13:30)      SUBJECTIVE / OVERNIGHT EVENTS:      MEDICATIONS  (STANDING):  aspirin enteric coated 81 milliGRAM(s) Oral daily  atorvastatin 10 milliGRAM(s) Oral at bedtime  benzonatate 100 milliGRAM(s) Oral every 8 hours  chlorhexidine 4% Liquid 1 Application(s) Topical daily  cinacalcet 60 milliGRAM(s) Oral daily  citalopram 20 milliGRAM(s) Oral daily  doxycycline hyclate Capsule 100 milliGRAM(s) Oral <User Schedule>  heparin   Injectable 5000 Unit(s) SubCutaneous every 8 hours  lactobacillus acidophilus 1 Tablet(s) Oral daily  loratadine 10 milliGRAM(s) Oral daily  metoprolol tartrate 12.5 milliGRAM(s) Oral two times a day  midodrine. 10 milliGRAM(s) Oral three times a day  mineral oil/petrolatum Hydrophilic Ointment 1 Application(s) Topical four times a day  mupirocin 2% Ointment 1 Application(s) Topical two times a day  pantoprazole    Tablet 40 milliGRAM(s) Oral before breakfast  piperacillin/tazobactam IVPB.. 3.375 Gram(s) IV Intermittent every 12 hours  polyethylene glycol 3350 17 Gram(s) Oral daily  senna 2 Tablet(s) Oral at bedtime  tamsulosin 0.4 milliGRAM(s) Oral at bedtime    MEDICATIONS  (PRN):      CAPILLARY BLOOD GLUCOSE      POCT Blood Glucose.: 111 mg/dL (01 Feb 2021 20:58)  POCT Blood Glucose.: 97 mg/dL (01 Feb 2021 17:31)  POCT Blood Glucose.: 82 mg/dL (01 Feb 2021 11:57)  POCT Blood Glucose.: 85 mg/dL (01 Feb 2021 08:05)    I&O's Summary    31 Jan 2021 07:01  -  01 Feb 2021 07:00  --------------------------------------------------------  IN: 750 mL / OUT: 0 mL / NET: 750 mL    01 Feb 2021 07:01  -  02 Feb 2021 06:00  --------------------------------------------------------  IN: 885 mL / OUT: 75 mL / NET: 810 mL        PHYSICAL EXAM:  Vital Signs Last 24 Hrs  T(C): 36.4 (02 Feb 2021 05:09), Max: 36.8 (01 Feb 2021 17:04)  T(F): 97.6 (02 Feb 2021 05:09), Max: 98.2 (01 Feb 2021 17:04)  HR: 87 (02 Feb 2021 05:09) (85 - 98)  BP: 109/73 (02 Feb 2021 05:09) (107/77 - 124/83)  BP(mean): --  RR: 17 (02 Feb 2021 05:09) (17 - 17)  SpO2: 100% (02 Feb 2021 05:09) (100% - 100%)    PHYSICAL EXAM:  Vital Signs Last 24 Hrs  T(C): 36.7 (01 Feb 2021 05:57), Max: 36.9 (31 Jan 2021 23:27)  T(F): 98 (01 Feb 2021 05:57), Max: 98.4 (31 Jan 2021 23:27)  HR: 89 (01 Feb 2021 05:57) (89 - 96)  BP: 110/74 (01 Feb 2021 05:57) (93/63 - 120/81)  BP(mean): --  RR: 18 (01 Feb 2021 05:57) (18 - 18)  SpO2: 100% (01 Feb 2021 05:57) (99% - 100%)    PHYSICAL EXAM:  GENERAL: NAD, very thin  HEAD:  Atraumatic, Normocephalic  EYES: EOMI, conjunctiva and sclera clear  NERVOUS SYSTEM:  Ax2 (not to date), conversant, making eyes contact, answering questions appropriately.  CHEST/LUNG: + cough, Clear to percussion bilaterally; No rales, rhonchi, wheezing, or rubs  HEART: Regular rate and rhythm; No murmurs, rubs, or gallops  ABDOMEN: Soft, Nontender, Nondistended; Bowel sounds present  BACK: No TTP of spine   EXTREMITIES:  2+ Peripheral Pulses, No clubbing, cyanosis, or edema  LYMPH: No lymphadenopathy noted  SKIN: Sacral wound, Left AVF    LABS:                        10.9   5.03  )-----------( 101      ( 01 Feb 2021 07:54 )             35.1     02-01    137  |  97<L>  |  41<H>  ----------------------------<  87  4.7   |  26  |  5.18<H>    Ca    10.9<H>      01 Feb 2021 07:54  Phos  4.2     02-01  Mg     2.4     02-01    TPro  7.0  /  Alb  3.4  /  TBili  0.6  /  DBili  x   /  AST  33  /  ALT  56<H>  /  AlkPhos  125<H>  02-01                RADIOLOGY & ADDITIONAL TESTS:  Results Reviewed:   Imaging Personally Reviewed:  Electrocardiogram Personally Reviewed:    COORDINATION OF CARE:  Care Discussed with Consultants/Other Providers [Y/N]:  Prior or Outpatient Records Reviewed [Y/N]:   ***************************************************************  Kourtneykori Nichols, PGY1  Internal Medicine   pager: [phone]: [phone] [facility]: [phone]  ***************************************************************    PROGRESS NOTE:     Patient is a 70y old  Male who presents with a chief complaint of sepsis concerning for urosepsis (01 Feb 2021 13:30)      SUBJECTIVE / OVERNIGHT EVENTS:  No acute events overnight. He is receiving HD today. On tele, he had no arrhythmias, and desaturated to the low 80s while asleep. Planning for TTE today. He continues to have a cough and is receiving chest PT and tessalon perle. COVID swab will be performed pending discharge today/tomorrow.    MEDICATIONS  (STANDING):  aspirin enteric coated 81 milliGRAM(s) Oral daily  atorvastatin 10 milliGRAM(s) Oral at bedtime  benzonatate 100 milliGRAM(s) Oral every 8 hours  chlorhexidine 4% Liquid 1 Application(s) Topical daily  cinacalcet 60 milliGRAM(s) Oral daily  citalopram 20 milliGRAM(s) Oral daily  doxycycline hyclate Capsule 100 milliGRAM(s) Oral <User Schedule>  heparin   Injectable 5000 Unit(s) SubCutaneous every 8 hours  lactobacillus acidophilus 1 Tablet(s) Oral daily  loratadine 10 milliGRAM(s) Oral daily  metoprolol tartrate 12.5 milliGRAM(s) Oral two times a day  midodrine. 10 milliGRAM(s) Oral three times a day  mineral oil/petrolatum Hydrophilic Ointment 1 Application(s) Topical four times a day  mupirocin 2% Ointment 1 Application(s) Topical two times a day  pantoprazole    Tablet 40 milliGRAM(s) Oral before breakfast  piperacillin/tazobactam IVPB.. 3.375 Gram(s) IV Intermittent every 12 hours  polyethylene glycol 3350 17 Gram(s) Oral daily  senna 2 Tablet(s) Oral at bedtime  tamsulosin 0.4 milliGRAM(s) Oral at bedtime    MEDICATIONS  (PRN):      CAPILLARY BLOOD GLUCOSE      POCT Blood Glucose.: 111 mg/dL (01 Feb 2021 20:58)  POCT Blood Glucose.: 97 mg/dL (01 Feb 2021 17:31)  POCT Blood Glucose.: 82 mg/dL (01 Feb 2021 11:57)  POCT Blood Glucose.: 85 mg/dL (01 Feb 2021 08:05)    I&O's Summary    31 Jan 2021 07:01  -  01 Feb 2021 07:00  --------------------------------------------------------  IN: 750 mL / OUT: 0 mL / NET: 750 mL    01 Feb 2021 07:01  -  02 Feb 2021 06:00  --------------------------------------------------------  IN: 885 mL / OUT: 75 mL / NET: 810 mL        PHYSICAL EXAM:  Vital Signs Last 24 Hrs  T(C): 36.4 (02 Feb 2021 05:09), Max: 36.8 (01 Feb 2021 17:04)  T(F): 97.6 (02 Feb 2021 05:09), Max: 98.2 (01 Feb 2021 17:04)  HR: 87 (02 Feb 2021 05:09) (85 - 98)  BP: 109/73 (02 Feb 2021 05:09) (107/77 - 124/83)  BP(mean): --  RR: 17 (02 Feb 2021 05:09) (17 - 17)  SpO2: 100% (02 Feb 2021 05:09) (100% - 100%)    PHYSICAL EXAM:  Vital Signs Last 24 Hrs  T(C): 36.7 (01 Feb 2021 05:57), Max: 36.9 (31 Jan 2021 23:27)  T(F): 98 (01 Feb 2021 05:57), Max: 98.4 (31 Jan 2021 23:27)  HR: 89 (01 Feb 2021 05:57) (89 - 96)  BP: 110/74 (01 Feb 2021 05:57) (93/63 - 120/81)  BP(mean): --  RR: 18 (01 Feb 2021 05:57) (18 - 18)  SpO2: 100% (01 Feb 2021 05:57) (99% - 100%)    PHYSICAL EXAM:  GENERAL: NAD, very thin  HEAD:  Atraumatic, Normocephalic  EYES: EOMI, conjunctiva and sclera clear  NERVOUS SYSTEM:  Ax2 (not to date), conversant, making eyes contact, answering questions appropriately.  CHEST/LUNG: + cough, Clear to percussion bilaterally; No rales, rhonchi, wheezing, or rubs  HEART: Regular rate and rhythm; No murmurs, rubs, or gallops  ABDOMEN: Soft, Nontender, Nondistended; Bowel sounds present  BACK: No TTP of spine   EXTREMITIES:  2+ Peripheral Pulses, No clubbing, cyanosis, or edema  LYMPH: No lymphadenopathy noted  SKIN: Sacral wound, Left AVF    LABS:                        10.9   5.03  )-----------( 101      ( 01 Feb 2021 07:54 )             35.1     02-01    137  |  97<L>  |  41<H>  ----------------------------<  87  4.7   |  26  |  5.18<H>    Ca    10.9<H>      01 Feb 2021 07:54  Phos  4.2     02-01  Mg     2.4     02-01    TPro  7.0  /  Alb  3.4  /  TBili  0.6  /  DBili  x   /  AST  33  /  ALT  56<H>  /  AlkPhos  125<H>  02-01                RADIOLOGY & ADDITIONAL TESTS:  Results Reviewed:   Imaging Personally Reviewed:  Electrocardiogram Personally Reviewed:    COORDINATION OF CARE:  Care Discussed with Consultants/Other Providers [Y/N]:  Prior or Outpatient Records Reviewed [Y/N]:

## 2021-02-02 NOTE — PROGRESS NOTE ADULT - PROBLEM SELECTOR PLAN 9
2/2 uremia, hypercalcemia, hypernatremia, hyperkalemia after missing HD session  - will monitor BMPs  - will treat underlying conditions as stated  - now resolved

## 2021-02-02 NOTE — PROGRESS NOTE ADULT - PROBLEM SELECTOR PLAN 4
Noted intermittently since 2018. Unclear etiology. Possible ?Bone Marrow component. Sepsis and abx may be playing a role.  Noted anemia as well likely in the setting of ESRD. No clinical /active signs of bleeding   -will continue to monitor and tranfuse PRN based on transfusion goals.  -will review all of his home meds to see if contributing

## 2021-02-02 NOTE — PROGRESS NOTE ADULT - PROBLEM SELECTOR PLAN 10
DVT ppx: hep SQ  Diet: dysphagia 2 mechanical soft diet  Dispo: back to [facility] where he is a long-term resident. PT eval pending. At baseline, non-ambulatory.   # neurogenic bladder  - straight cath 2x daily    #GERD  -c/w home protonix    Major depression  - c/w home citalopram once tolerating PO  Rena (cousin): [phone] (mobile)  Taryn Mccormack (cousin): [phone] (home), [phone] (mobile) DVT ppx: hep SQ  Diet: dysphagia 2 mechanical soft diet  Dispo: back to [facility] where he is a long-term resident. At baseline, non-ambulatory.     # neurogenic bladder  - straight cath 2x daily    #GERD  -c/w home protonix    Major depression  - c/w home citalopram once tolerating PO  Rena (cousin): [phone] (mobile)  Taryn Mccormack (cousin): [phone] (home), [phone] (mobile)

## 2021-02-02 NOTE — ED ADULT NURSE NOTE - CHIEF COMPLAINT QUOTE
pt brought from [facility] for lethargy x1 day and felt warm. Pt AOX1 at baseline and at present, as per EMS staff states pt sometimes more conversational. Pt hx ESRD with L AV fistula, as per paperwork goes for dialysis TTS but unsure last treatment. Pt does not verbalize any complaints. Uses 3 L nasal cannula chronically Never

## 2021-02-02 NOTE — PROGRESS NOTE ADULT - ATTENDING COMMENTS
70 year old man with PMHx as stated above a/w metabolic encephalopathy in the setting of sepsis, hyperkalemia from missed HD , mild hypernatremia and hypercalcemia. Now s/p medical management for hyperkalemia/hypercalcemia with improvement. Course c/b asymptomatic NSVT  now on BB.     Suspect sepsis was likely in the setting of UTI - given +u/a and his hx of straight cathing. No prior +urine cx noted in sunrise. Pt had brief episode of diarrhea on admission however was likely in setting of  kayaxelate received. CXR and CT chest personally reviewed and w/o evidence of pulmonary infection.      -C/w tele for now. C/w metoprolol 12.5 mg BID and titrating up as needed. Pt w/ TTE from 2017 that showed global systolic dysfunction. Will repeat TTE. Monitor electrolytes.   -C/w zosyn w/ plan to treat empirically for UTI for 7 days. Will switch to PO abx to complete course if being discharged. Blood cx NTD. Urine cx unremarkable however, urine cx obtained late.  -Continue to monitor BMP and calcium levels. Renal following and input appreciated. HD per renal. C/w increased dose of Sensipar to 60mg BID    Dc planning back to long term care facility once arrangements are made. Will f/u SW/CM daily in regards to progress.

## 2021-02-02 NOTE — PROGRESS NOTE ADULT - PROBLEM SELECTOR PLAN 5
Pt on telemetry, and has had PVCs with 6-8 beats of V tach  - started metoprolol 12.5 mg BID  - monitor electrolytes  - f/u TTE

## 2021-02-02 NOTE — PROGRESS NOTE ADULT - ASSESSMENT
Patient is a 70 year old man with history of multiple medical issues including ESRD (HD T/Th/S), CAD, HTN, past paraspinal abscess requiring long-term IV abx presenting with metabolic encephalopathy, fever, tachycardia likely 2/2 sepsis due to UTI. Also missed HD the day prior to arrival with elevated K, SCr, Na, and Ca. Hyperkalemia is now resolved. Patient is a 70 year old man with history of multiple medical issues including ESRD (HD T/Th/S), CAD, HTN, past paraspinal abscess requiring long-term IV abx presenting with metabolic encephalopathy, fever, tachycardia likely 2/2 sepsis due to UTI. Also missed HD the day prior to arrival with elevated K, SCr, Na, and Ca. Hyperkalemia is now resolved. Found incidentally to have runs of Vtach on tele (initially placed no tele for hyperkalemia), now on lopressor pending TTE.

## 2021-02-02 NOTE — PROGRESS NOTE ADULT - PROBLEM SELECTOR PLAN 2
6.6% eosinophils (2/1) rising for the past few days, possibly 2/2 drug induced  - f/u urine eosinophila  - continue to trend 6.6% eosinophils (2/1) rising for the past few days, possibly 2/2 drug induced. Now improved  -no further w/u warranted at this time

## 2021-02-03 NOTE — PROGRESS NOTE ADULT - PROBLEM SELECTOR PLAN 9
2/2 uremia, hypercalcemia, hypernatremia, hyperkalemia after missing HD session  - will monitor BMPs  - will treat underlying conditions as stated  - now resolved DVT ppx: hep SQ  Diet: dysphagia 2 mechanical soft diet  Dispo: back to [facility] where he is a long-term resident. At baseline, non-ambulatory.     # neurogenic bladder  - straight cath 2x daily    #GERD  -c/w home protonix    Major depression  - c/w home citalopram once tolerating PO  Rena (cousin): [phone] (mobile)  Taryn Mccormack (cousin): [phone] (home), [phone] (mobile)

## 2021-02-03 NOTE — PROGRESS NOTE ADULT - ATTENDING COMMENTS
70 year old man with PMHx as stated above a/w metabolic encephalopathy in the setting of sepsis, hyperkalemia from missed HD , mild hypernatremia and hypercalcemia. Now s/p medical management for hyperkalemia/hypercalcemia with improvement. Course c/b asymptomatic NSVT now on BB.     Suspect sepsis was likely in the setting of UTI - given +u/a and his hx of straight cathing. No prior +urine cx noted in sunrise. Pt had brief episode of diarrhea on admission however was likely in setting of  kayaxelate received. CXR and CT chest personally reviewed and w/o evidence of pulmonary infection.      -C/w metoprolol 12.5 mg BID -HR has been controlled. Pt w/ TTE from 2017 that showed global systolic dysfunction. Pending TTE. Monitor electrolytes.   -Switched to augmentin in anticipation of discharge to complete a 7 day course of abx for UTI. Blood cx NTD. Urine cx unremarkable however, urine cx obtained late.  -Continue to monitor BMP and calcium levels. Renal following and input appreciated. HD per renal. C/w increased dose of Sensipar to 60mg BID    Dc planning back to [facility]. Anticipated for today. Will f/u SW/CM daily in regards to progress. Further details outlined in discharge documentation. Spent 38 min in discharge planning and coordination. 70 year old man with PMHx as stated above a/w metabolic encephalopathy in the setting of sepsis, hyperkalemia from missed HD , mild hypernatremia and hypercalcemia. Now s/p medical management for hyperkalemia/hypercalcemia with improvement. Course c/b asymptomatic NSVT now on BB.     Suspect sepsis was likely in the setting of UTI - given +u/a and his hx of straight cathing. No prior +urine cx noted in sunrise. Pt had brief episode of diarrhea on admission however was likely in setting of  kayaxelate received. CXR and CT chest personally reviewed and w/o evidence of pulmonary infection.      -C/w metoprolol 12.5 mg BID -HR has been controlled. Pt w/ TTE from 2017 that showed global systolic dysfunction. Pending TTE. Monitor electrolytes.   -Switched to augmentin in anticipation of discharge to complete a 7 day course of abx for UTI. Blood cx NTD. Urine cx unremarkable however, urine cx obtained late.  -Continue to monitor BMP and calcium levels. Renal following and input appreciated. HD per renal. C/w increased dose of Sensipar to 60mg BID  -Noted hypoglycemia yesterday. Pt not receiving insulin. Improved today. Encourage PO. Monitor FS for now     Dc planning back to [facility]. Anticipated for today. Will f/u SW/CM daily in regards to progress. Further details outlined in discharge documentation. Spent 38 min in discharge planning and coordination.

## 2021-02-03 NOTE — DISCHARGE NOTE NURSING/CASE MANAGEMENT/SOCIAL WORK - PATIENT PORTAL LINK FT
You can access the FollowMyHealth Patient Portal offered by [facility] by registering at the following website: [email or web address]. By joining [facility]’s FollowMyHealth portal, you will also be able to view your health information using other applications (apps) compatible with our system.

## 2021-02-03 NOTE — DISCHARGE NOTE NURSING/CASE MANAGEMENT/SOCIAL WORK - NSDCFUADDAPPT_GEN_ALL_CORE_FT
Please schedule an appointment with Dr. Sandoval for a sleep study within 2 weeks for nocturnal desaturation.

## 2021-02-03 NOTE — PROGRESS NOTE ADULT - PROBLEM SELECTOR PLAN 4
Noted intermittently since 2018. Unclear etiology. Possible ?Bone Marrow component. Sepsis and abx may be playing a role.  Noted anemia as well likely in the setting of ESRD. No clinical /active signs of bleeding   -will continue to monitor and tranfuse PRN based on transfusion goals.  -will review all of his home meds to see if contributing Pt on telemetry, and has had PVCs with 6-8 beats of V tach  - started metoprolol 12.5 mg BID, now resolved  - monitor electrolytes  - f/u TTE

## 2021-02-03 NOTE — DISCHARGE NOTE NURSING/CASE MANAGEMENT/SOCIAL WORK - NSDCCRNAME_GEN_ALL_CORE_FT
[facility] room # [address] address: [phone] [place] [address], [place] [address] with onsite HD next treatment 2/4/21, 3pm  via [facility] ambulance  [facility] room # [address] address: [phone] [place] [address], [place] [address] with onsite HD next treatment 2/6/21, 3pm  via [facility] ambulance

## 2021-02-03 NOTE — PROGRESS NOTE ADULT - ASSESSMENT
Patient is a 70 year old man with history of multiple medical issues including ESRD (HD T/Th/S), CAD, HTN, past paraspinal abscess requiring long-term IV abx presenting with metabolic encephalopathy, fever, tachycardia likely 2/2 sepsis due to UTI. Also missed HD the day prior to arrival with elevated K, SCr, Na, and Ca. Hyperkalemia is now resolved. Found incidentally to have runs of Vtach on tele (initially placed no tele for hyperkalemia), now on lopressor pending TTE.

## 2021-02-03 NOTE — PROGRESS NOTE ADULT - PROBLEM SELECTOR PLAN 2
6.6% eosinophils (2/1) rising for the past few days, possibly 2/2 drug induced. Now improved  -no further w/u warranted at this time Primary hyperparathyroidism, based on serologic workup from 12/2020. Poor candidate for parathyroidectomy given patient's comorbidities.  - On Sensipar 30 mg PO BID at home. Increased Sensipar to 60 mg PO BID while inpatient.  - continue to monitor with BMP Qd  -HD, per below

## 2021-02-03 NOTE — PROGRESS NOTE ADULT - PROBLEM SELECTOR PLAN 5
Pt on telemetry, and has had PVCs with 6-8 beats of V tach  - started metoprolol 12.5 mg BID  - monitor electrolytes  - f/u TTE Missed HD session day of admission  - HD, per above  -renal recs appreciated

## 2021-02-03 NOTE — PROGRESS NOTE ADULT - PROBLEM SELECTOR PLAN 6
Missed HD session day of admission  - HD, per above  -renal recs appreciated - c/w home lipitor and aspirin once tolerating PO

## 2021-02-03 NOTE — CONSULT NOTE ADULT - ASSESSMENT
70 M NH resident, ESRD on HD, CAD with prior stent in 2015, recurrent S aureus infections, presented with UTI, sepsis and metabolic encephalopathy. Course c/b NSVT and TTE showing worsening HFrEF. Patient is euvolemic and asymptomatic    #HFrEF: likely 2/2 ICM, prior moderate LV dysfunction, now severe  -patient compensated on HD  aspirin enteric coated 81 milliGRAM(s) Oral daily  atorvastatin 10 milliGRAM(s) Oral at bedtime  metoprolol tartrate 12.5 milliGRAM(s) Oral two times a day  Cannot get ACE/ARB due to ESRD and hypotention requiring midodrine  -patient can follow up with cardologist outpatient but does not seem like he would benefit from an inpatient ischemic eval. Can get nuclear stress test as an outpatient. Previously saw Dr. Galloway, will check with Dr. Galloway to see if he will see him again.     Kiarra Alva MD  Cardiology Fellow      70 M NH resident, ESRD on HD, CAD with prior stent in 2015, recurrent S aureus infections, presented with UTI, sepsis and metabolic encephalopathy. Course c/b NSVT and TTE showing worsening HFrEF. Patient is euvolemic and asymptomatic    #HFrEF: likely 2/2 ICM, prior moderate LV dysfunction, now severe  -patient compensated on HD  aspirin enteric coated 81 milliGRAM(s) Oral daily  atorvastatin 10 milliGRAM(s) Oral at bedtime  metoprolol tartrate 12.5 milliGRAM(s) Oral two times a day  Cannot get ACE/ARB due to ESRD and hypotention requiring midodrine  -patient can follow up with cardologist outpatient but does not seem like he would benefit from an inpatient ischemic eval. Can get nuclear stress test as an outpatient. Can see Cardiology as an outpatient, either fellows clinic or any other cardiologist    Kiarra Alva MD  Cardiology Fellow

## 2021-02-03 NOTE — PROGRESS NOTE ADULT - SUBJECTIVE AND OBJECTIVE BOX
***************************************************************  Kourtney Simone, PGY1  Internal Medicine   pager: NS: [phone] [facility]: [phone]  ***************************************************************    PROGRESS NOTE:     Patient is a 70y old  Male who presents with a chief complaint of sepsis concerning for urosepsis (02 Feb 2021 11:18)      SUBJECTIVE / OVERNIGHT EVENTS:      MEDICATIONS  (STANDING):  amoxicillin  500 milliGRAM(s)/clavulanate 1 Tablet(s) Oral at bedtime  aspirin enteric coated 81 milliGRAM(s) Oral daily  atorvastatin 10 milliGRAM(s) Oral at bedtime  benzonatate 100 milliGRAM(s) Oral every 8 hours  chlorhexidine 4% Liquid 1 Application(s) Topical daily  cinacalcet 60 milliGRAM(s) Oral daily  citalopram 20 milliGRAM(s) Oral daily  doxycycline hyclate Capsule 100 milliGRAM(s) Oral <User Schedule>  heparin   Injectable 5000 Unit(s) SubCutaneous every 8 hours  lactobacillus acidophilus 1 Tablet(s) Oral daily  loratadine 10 milliGRAM(s) Oral daily  metoprolol tartrate 12.5 milliGRAM(s) Oral two times a day  midodrine. 10 milliGRAM(s) Oral three times a day  mineral oil/petrolatum Hydrophilic Ointment 1 Application(s) Topical four times a day  mupirocin 2% Ointment 1 Application(s) Topical two times a day  pantoprazole    Tablet 40 milliGRAM(s) Oral before breakfast  polyethylene glycol 3350 17 Gram(s) Oral daily  senna 2 Tablet(s) Oral at bedtime  tamsulosin 0.4 milliGRAM(s) Oral at bedtime    MEDICATIONS  (PRN):      CAPILLARY BLOOD GLUCOSE      POCT Blood Glucose.: 113 mg/dL (02 Feb 2021 21:32)  POCT Blood Glucose.: 103 mg/dL (02 Feb 2021 18:34)  POCT Blood Glucose.: 158 mg/dL (02 Feb 2021 18:12)  POCT Blood Glucose.: 57 mg/dL (02 Feb 2021 17:52)  POCT Blood Glucose.: 69 mg/dL (02 Feb 2021 17:18)  POCT Blood Glucose.: 47 mg/dL (02 Feb 2021 17:17)  POCT Blood Glucose.: 77 mg/dL (02 Feb 2021 14:51)  POCT Blood Glucose.: 83 mg/dL (02 Feb 2021 11:18)  POCT Blood Glucose.: 84 mg/dL (02 Feb 2021 07:46)    I&O's Summary    01 Feb 2021 07:01  -  02 Feb 2021 07:00  --------------------------------------------------------  IN: 885 mL / OUT: 75 mL / NET: 810 mL    02 Feb 2021 07:01  -  03 Feb 2021 06:00  --------------------------------------------------------  IN: 1090 mL / OUT: 866 mL / NET: 224 mL        PHYSICAL EXAM:  Vital Signs Last 24 Hrs  T(C): 36.8 (02 Feb 2021 20:14), Max: 36.8 (02 Feb 2021 15:37)  T(F): 98.2 (02 Feb 2021 20:14), Max: 98.2 (02 Feb 2021 15:37)  HR: 80 (02 Feb 2021 20:14) (70 - 82)  BP: 98/67 (02 Feb 2021 20:14) (98/67 - 118/58)  BP(mean): --  RR: 18 (02 Feb 2021 20:14) (18 - 18)  SpO2: 100% (02 Feb 2021 20:14) (97% - 100%)    PHYSICAL EXAM:  GENERAL: NAD, very thin  HEAD:  Atraumatic, Normocephalic  EYES: EOMI, conjunctiva and sclera clear  NERVOUS SYSTEM:  Ax2 (not to date), conversant, making eyes contact, answering questions appropriately.  CHEST/LUNG: + cough, Clear to percussion bilaterally; No rales, rhonchi, wheezing, or rubs  HEART: Regular rate and rhythm; No murmurs, rubs, or gallops  ABDOMEN: Soft, Nontender, Nondistended; Bowel sounds present  BACK: No TTP of spine   EXTREMITIES:  2+ Peripheral Pulses, No clubbing, cyanosis, or edema  LYMPH: No lymphadenopathy noted  SKIN: Sacral wound, Left AVF    LABS:                        11.6   5.14  )-----------( 105      ( 02 Feb 2021 07:16 )             36.8     02-02    139  |  95<L>  |  58<H>  ----------------------------<  77  5.3   |  25  |  6.39<H>    Ca    10.9<H>      02 Feb 2021 07:16  Phos  5.2     02-02  Mg     2.5     02-02    TPro  7.0  /  Alb  3.4  /  TBili  0.6  /  DBili  x   /  AST  33  /  ALT  56<H>  /  AlkPhos  125<H>  02-01                RADIOLOGY & ADDITIONAL TESTS:  Results Reviewed:   Imaging Personally Reviewed:  Electrocardiogram Personally Reviewed:    COORDINATION OF CARE:  Care Discussed with Consultants/Other Providers [Y/N]:  Prior or Outpatient Records Reviewed [Y/N]:   ***************************************************************  Kourtneykori Nichols, PGY1  Internal Medicine   pager: NS: [phone] [facility]: [phone]  ***************************************************************    PROGRESS NOTE:     Patient is a 70y old  Male who presents with a chief complaint of sepsis concerning for urosepsis (02 Feb 2021 11:18)      SUBJECTIVE / OVERNIGHT EVENTS:  Patient received HD yesterday and doing well this morning. Changed abx to augmentin and planning to discharge today to [facility].    MEDICATIONS  (STANDING):  amoxicillin  500 milliGRAM(s)/clavulanate 1 Tablet(s) Oral at bedtime  aspirin enteric coated 81 milliGRAM(s) Oral daily  atorvastatin 10 milliGRAM(s) Oral at bedtime  benzonatate 100 milliGRAM(s) Oral every 8 hours  chlorhexidine 4% Liquid 1 Application(s) Topical daily  cinacalcet 60 milliGRAM(s) Oral daily  citalopram 20 milliGRAM(s) Oral daily  doxycycline hyclate Capsule 100 milliGRAM(s) Oral <User Schedule>  heparin   Injectable 5000 Unit(s) SubCutaneous every 8 hours  lactobacillus acidophilus 1 Tablet(s) Oral daily  loratadine 10 milliGRAM(s) Oral daily  metoprolol tartrate 12.5 milliGRAM(s) Oral two times a day  midodrine. 10 milliGRAM(s) Oral three times a day  mineral oil/petrolatum Hydrophilic Ointment 1 Application(s) Topical four times a day  mupirocin 2% Ointment 1 Application(s) Topical two times a day  pantoprazole    Tablet 40 milliGRAM(s) Oral before breakfast  polyethylene glycol 3350 17 Gram(s) Oral daily  senna 2 Tablet(s) Oral at bedtime  tamsulosin 0.4 milliGRAM(s) Oral at bedtime    MEDICATIONS  (PRN):      CAPILLARY BLOOD GLUCOSE      POCT Blood Glucose.: 113 mg/dL (02 Feb 2021 21:32)  POCT Blood Glucose.: 103 mg/dL (02 Feb 2021 18:34)  POCT Blood Glucose.: 158 mg/dL (02 Feb 2021 18:12)  POCT Blood Glucose.: 57 mg/dL (02 Feb 2021 17:52)  POCT Blood Glucose.: 69 mg/dL (02 Feb 2021 17:18)  POCT Blood Glucose.: 47 mg/dL (02 Feb 2021 17:17)  POCT Blood Glucose.: 77 mg/dL (02 Feb 2021 14:51)  POCT Blood Glucose.: 83 mg/dL (02 Feb 2021 11:18)  POCT Blood Glucose.: 84 mg/dL (02 Feb 2021 07:46)    I&O's Summary    01 Feb 2021 07:01  -  02 Feb 2021 07:00  --------------------------------------------------------  IN: 885 mL / OUT: 75 mL / NET: 810 mL    02 Feb 2021 07:01  -  03 Feb 2021 06:00  --------------------------------------------------------  IN: 1090 mL / OUT: 866 mL / NET: 224 mL        PHYSICAL EXAM:  Vital Signs Last 24 Hrs  T(C): 36.8 (02 Feb 2021 20:14), Max: 36.8 (02 Feb 2021 15:37)  T(F): 98.2 (02 Feb 2021 20:14), Max: 98.2 (02 Feb 2021 15:37)  HR: 80 (02 Feb 2021 20:14) (70 - 82)  BP: 98/67 (02 Feb 2021 20:14) (98/67 - 118/58)  BP(mean): --  RR: 18 (02 Feb 2021 20:14) (18 - 18)  SpO2: 100% (02 Feb 2021 20:14) (97% - 100%)    PHYSICAL EXAM:  GENERAL: NAD, very thin  HEAD:  Atraumatic, Normocephalic  EYES: EOMI, conjunctiva and sclera clear  NERVOUS SYSTEM:  Ax2 (not to date), conversant, making eyes contact, answering questions appropriately.  CHEST/LUNG: + cough, Clear to percussion bilaterally; No rales, rhonchi, wheezing, or rubs  HEART: Regular rate and rhythm; No murmurs, rubs, or gallops  ABDOMEN: Soft, Nontender, Nondistended; Bowel sounds present  BACK: No TTP of spine   EXTREMITIES:  2+ Peripheral Pulses, No clubbing, cyanosis, or edema  LYMPH: No lymphadenopathy noted  SKIN: Sacral wound, Left AVF    LABS:                        11.6   5.14  )-----------( 105      ( 02 Feb 2021 07:16 )             36.8     02-02    139  |  95<L>  |  58<H>  ----------------------------<  77  5.3   |  25  |  6.39<H>    Ca    10.9<H>      02 Feb 2021 07:16  Phos  5.2     02-02  Mg     2.5     02-02    TPro  7.0  /  Alb  3.4  /  TBili  0.6  /  DBili  x   /  AST  33  /  ALT  56<H>  /  AlkPhos  125<H>  02-01                RADIOLOGY & ADDITIONAL TESTS:  Results Reviewed:   Imaging Personally Reviewed:  Electrocardiogram Personally Reviewed:    COORDINATION OF CARE:  Care Discussed with Consultants/Other Providers [Y/N]:  Prior or Outpatient Records Reviewed [Y/N]:   ***************************************************************  Kourtneykori Nichols, PGY1  Internal Medicine   pager: NS: [phone] [facility]: [phone]  ***************************************************************    PROGRESS NOTE:     Patient is a 70y old  Male who presents with a chief complaint of sepsis concerning for urosepsis (02 Feb 2021 11:18)      SUBJECTIVE / OVERNIGHT EVENTS:  Patient received HD yesterday and doing well this morning. Post-HD, he had low FSG 70s and was given 1 amp dextrose. Changed abx to augmentin and planning to discharge today to [facility]. No episodes of V tach on tele overnight.    MEDICATIONS  (STANDING):  amoxicillin  500 milliGRAM(s)/clavulanate 1 Tablet(s) Oral at bedtime  aspirin enteric coated 81 milliGRAM(s) Oral daily  atorvastatin 10 milliGRAM(s) Oral at bedtime  benzonatate 100 milliGRAM(s) Oral every 8 hours  chlorhexidine 4% Liquid 1 Application(s) Topical daily  cinacalcet 60 milliGRAM(s) Oral daily  citalopram 20 milliGRAM(s) Oral daily  doxycycline hyclate Capsule 100 milliGRAM(s) Oral <User Schedule>  heparin   Injectable 5000 Unit(s) SubCutaneous every 8 hours  lactobacillus acidophilus 1 Tablet(s) Oral daily  loratadine 10 milliGRAM(s) Oral daily  metoprolol tartrate 12.5 milliGRAM(s) Oral two times a day  midodrine. 10 milliGRAM(s) Oral three times a day  mineral oil/petrolatum Hydrophilic Ointment 1 Application(s) Topical four times a day  mupirocin 2% Ointment 1 Application(s) Topical two times a day  pantoprazole    Tablet 40 milliGRAM(s) Oral before breakfast  polyethylene glycol 3350 17 Gram(s) Oral daily  senna 2 Tablet(s) Oral at bedtime  tamsulosin 0.4 milliGRAM(s) Oral at bedtime    MEDICATIONS  (PRN):      CAPILLARY BLOOD GLUCOSE      POCT Blood Glucose.: 113 mg/dL (02 Feb 2021 21:32)  POCT Blood Glucose.: 103 mg/dL (02 Feb 2021 18:34)  POCT Blood Glucose.: 158 mg/dL (02 Feb 2021 18:12)  POCT Blood Glucose.: 57 mg/dL (02 Feb 2021 17:52)  POCT Blood Glucose.: 69 mg/dL (02 Feb 2021 17:18)  POCT Blood Glucose.: 47 mg/dL (02 Feb 2021 17:17)  POCT Blood Glucose.: 77 mg/dL (02 Feb 2021 14:51)  POCT Blood Glucose.: 83 mg/dL (02 Feb 2021 11:18)  POCT Blood Glucose.: 84 mg/dL (02 Feb 2021 07:46)    I&O's Summary    01 Feb 2021 07:01  -  02 Feb 2021 07:00  --------------------------------------------------------  IN: 885 mL / OUT: 75 mL / NET: 810 mL    02 Feb 2021 07:01  -  03 Feb 2021 06:00  --------------------------------------------------------  IN: 1090 mL / OUT: 866 mL / NET: 224 mL        PHYSICAL EXAM:  Vital Signs Last 24 Hrs  T(C): 36.8 (02 Feb 2021 20:14), Max: 36.8 (02 Feb 2021 15:37)  T(F): 98.2 (02 Feb 2021 20:14), Max: 98.2 (02 Feb 2021 15:37)  HR: 80 (02 Feb 2021 20:14) (70 - 82)  BP: 98/67 (02 Feb 2021 20:14) (98/67 - 118/58)  BP(mean): --  RR: 18 (02 Feb 2021 20:14) (18 - 18)  SpO2: 100% (02 Feb 2021 20:14) (97% - 100%)    PHYSICAL EXAM:  GENERAL: NAD, very thin  HEAD:  Atraumatic, Normocephalic  EYES: EOMI, conjunctiva and sclera clear  NERVOUS SYSTEM:  Ax2 (not to date), conversant, making eyes contact, answering questions appropriately.  CHEST/LUNG: + cough, Clear to percussion bilaterally; No rales, rhonchi, wheezing, or rubs  HEART: Regular rate and rhythm; No murmurs, rubs, or gallops  ABDOMEN: Soft, Nontender, Nondistended; Bowel sounds present  BACK: No TTP of spine   EXTREMITIES:  2+ Peripheral Pulses, No clubbing, cyanosis, or edema  LYMPH: No lymphadenopathy noted  SKIN: Sacral wound, Left AVF    LABS:                        11.6   5.14  )-----------( 105      ( 02 Feb 2021 07:16 )             36.8     02-02    139  |  95<L>  |  58<H>  ----------------------------<  77  5.3   |  25  |  6.39<H>    Ca    10.9<H>      02 Feb 2021 07:16  Phos  5.2     02-02  Mg     2.5     02-02    TPro  7.0  /  Alb  3.4  /  TBili  0.6  /  DBili  x   /  AST  33  /  ALT  56<H>  /  AlkPhos  125<H>  02-01                RADIOLOGY & ADDITIONAL TESTS:  Results Reviewed:   Imaging Personally Reviewed:  Electrocardiogram Personally Reviewed:    COORDINATION OF CARE:  Care Discussed with Consultants/Other Providers [Y/N]:  Prior or Outpatient Records Reviewed [Y/N]:

## 2021-02-03 NOTE — PROGRESS NOTE ADULT - PROBLEM SELECTOR PLAN 7
- c/w home lipitor and aspirin once tolerating PO - c/w home midodrine 10 mg TID  - Monitor vitals daily DC instructions

## 2021-02-03 NOTE — PROGRESS NOTE ADULT - PROBLEM SELECTOR PLAN 3
Primary hyperparathyroidism, based on serologic workup from 12/2020. Poor candidate for parathyroidectomy given patient's comorbidities.  - On Sensipar 30 mg PO BID at home. Increased Sensipar to 60 mg PO BID while inpatient.  - continue to monitor with BMP Qd  -HD, per below Noted intermittently since 2018. Unclear etiology. Possible ?Bone Marrow component. Sepsis and abx may be playing a role.  Noted anemia as well likely in the setting of ESRD. No clinical /active signs of bleeding   -will continue to monitor and tranfuse PRN based on transfusion goals.  -will review all of his home meds to see if contributing

## 2021-02-03 NOTE — PROGRESS NOTE ADULT - SUBJECTIVE AND OBJECTIVE BOX
Overnight events noted   VITAL: T(C): , Max: 36.8 (02-02-21 @ 15:37) T(F): , Max: 98.2 (02-02-21 @ 15:37) HR: 81 (02-03-21 @ 06:07) BP: 110/70 (02-03-21 @ 06:07) BP(mean): -- RR: 18 (02-02-21 @ 20:14) SpO2: 100% (02-02-21 @ 20:14)   PHYSICAL EXAM: Constitutional: NAD HEENT: NCAT, DMM Neck: Supple, No JVD Respiratory: CTA-b/l Cardiovascular: RRR s1s2, no m/r/g Gastrointestinal: BS+, soft, NT/ND Extremities: No peripheral edema b/l Neurological: no focal deficits; strength grossly intact Back: no CVAT b/l Skin: No rashes, no nevi Access: LUE AVF-accessed  LABS:                      11.6  5.14  )-----------( 105      ( 02 Feb 2021 07:16 )            36.8   Na(138)/K(4.4)/Cl(97)/HCO3(29)/BUN(38)/Cr(4.79)Glu(73)/Ca(10.1)/Mg(2.2)/PO4(4.4)    02-03 @ 07:36 Na(139)/K(5.3)/Cl(95)/HCO3(25)/BUN(58)/Cr(6.39)Glu(77)/Ca(10.9)/Mg(2.5)/PO4(5.2)    02-02 @ 07:16 Na(137)/K(4.7)/Cl(97)/HCO3(26)/BUN(41)/Cr(5.18)Glu(87)/Ca(10.9)/Mg(2.4)/PO4(4.2)    02-01 @ 07:54   IMPRESSION: 70M w/ dementia, HTN, CAD,  past paraspinal abscess requiring long-term IV antibiotics, and ESRD-HD, 1/26/21 from [facility] with AMS/fever/electrolyte derangements  (1)Renal - ESRD - HD TTS - due for next HD tomorrow  (2)Hypercalcemia - primary hyperparathyroidism - reasonably controlled, with HD/Sensipar  (3)ID - no longer spiking fevers/advanced to oral antibiotic regimen  (4)AMS - sepsis-associated - now improved   RECOMMEND: (1)Meds as ordered/dose new meds for GFR<10/HD (2)Next HD tomorrow      Heath Huff MD [facility] Office: [phone] Cell: [phone]

## 2021-02-03 NOTE — PROGRESS NOTE ADULT - PROBLEM SELECTOR PLAN 8
- c/w home midodrine 10 mg TID  - Monitor vitals daily 2/2 uremia, hypercalcemia, hypernatremia, hyperkalemia after missing HD session  - will monitor BMPs  - will treat underlying conditions as stated  - now resolved

## 2021-02-03 NOTE — PROGRESS NOTE ADULT - PROBLEM SELECTOR PLAN 1
p/w fever, tachycardia, lethargy. U/A shows large leuk est, turbid  - s/p zosyn (1/27 - 2/2 am dose)  - started augmentin (2/2 - 2/3)  - MRSA/MSSA (1/29) MRSA negative, S. aureus positive  - Stopped vancomycin, and restarted home vibramycin 100mg for MSSA chronic infection  - BCx NGTD, UCx negative  - wound care consult for sacral wound  - CT chest noncontrast (1/27) - atelectasis and moderate pl effusions bilateral p/w fever, tachycardia, lethargy. U/A shows large leuk est, turbid  - s/p zosyn (1/27 - 2/2 am dose)  - c/w augmentin (2/2 - 2/3)  - MRSA/MSSA (1/29) MRSA negative, S. 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

## 2021-02-03 NOTE — PROGRESS NOTE ADULT - PROBLEM SELECTOR PLAN 10
DVT ppx: hep SQ  Diet: dysphagia 2 mechanical soft diet  Dispo: back to [facility] where he is a long-term resident. At baseline, non-ambulatory.     # neurogenic bladder  - straight cath 2x daily    #GERD  -c/w home protonix    Major depression  - c/w home citalopram once tolerating PO  Rena (cousin): [phone] (mobile)  Taryn Mccormack (cousin): [phone] (home), [phone] (mobile)

## 2021-02-03 NOTE — CONSULT NOTE ADULT - SUBJECTIVE AND OBJECTIVE BOX
Kiarra Alva MD  Cardiology Fellow  [phone]  All Cardiology service information can be found 24/7 on [email or web address], password: [email or web address]    Patient seen and evaluated at bedside    Chief Complaint:    HPI:  Patient is a 70 year old man with history of multiple medical issues including ESRD (HD T/Th/S), CAD s/p stent (2015 LAD and LCx), past paraspinal abscess requiring long-term IV abx presenting, hyperparathyroidism, neurogenic bladder (straight cath'd 2x daily), hypotension (on midodrine) with altered mental status. On arrival he was febrile, tachycardic, lethargic and found to have CXR in the ED concerning for pneumonia as well as a SCr 8.6 (6.2 in Dec 2020), K of 6.2 and Ca 14.2.     Patient undergoes HD T/Th/Sat at [facility]. He missed hist HD session yesterday. He was last dialyzed 1/23. At baseline, he is non-ambulatory and needs 2 assistants to get him OOB to wheelchair. Straight cath'd 2x daily from neurogenic bladder.    ED Course:   In the ED he received vancomycin 1 g, zosyn, 5 U insulin and D50, sodium bicarbonate 50 mL, Kayexalate 15 g.  (27 Jan 2021 11:40)    Hospital Course:   Metabolic encephalopathy, fever, tachycardia likely 2/2 sepsis due to UTI, S aureus positive in NARes. Also missed HD the day prior to arrival with elevated K, SCr, Na, and Ca. Hyperkalemia is now resolved. Found incidentally to have runs of Vtach on tele (initially placed no tele for hyperkalemia). TTE showed severe global LV dysfunction, prior TTE showed moderate LV dysfunction. Patient is a [facility] Resident and is AAOx2 at baseline. Unknown family support. Team getting ready for patient to be discharged but wanted cardiology consult for this new HFrEF. Patient cannot tolerated ACE/ARB due to hypotension, on metop 12.5 BID, statin.     PMHx:   Inguinal hernia    NSTEMI (non-ST elevated myocardial infarction)    HLD (hyperlipidemia)    Neurogenic bladder    Spinal abscess    Cavitary lung disease    CAD (coronary artery disease)    Abscess of sacrum    Anemia due to chronic renal failure treated with erythropoietin, stage 2 (mild)    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 device, implant or graft    HPTH (hyperparathyroidism)    HTN (hypertension)    ESRD (end stage renal disease)    Cavitary lung disease    CAD in native artery    Hypertension    ESRD (end stage renal disease)        PSHx:   S/P primary angioplasty with coronary stent    No significant past surgical history    Kidney abscess        Allergies:  No Known Allergies      Home Meds:    Current Medications:   amoxicillin  500 milliGRAM(s)/clavulanate 1 Tablet(s) Oral at bedtime  aspirin enteric coated 81 milliGRAM(s) Oral daily  atorvastatin 10 milliGRAM(s) Oral at bedtime  benzonatate 100 milliGRAM(s) Oral every 8 hours  chlorhexidine 4% Liquid 1 Application(s) Topical daily  cinacalcet 60 milliGRAM(s) Oral daily  citalopram 20 milliGRAM(s) Oral daily  doxycycline hyclate Capsule 100 milliGRAM(s) Oral <User Schedule>  heparin   Injectable 5000 Unit(s) SubCutaneous every 8 hours  lactobacillus acidophilus 1 Tablet(s) Oral daily  loratadine 10 milliGRAM(s) Oral daily  metoprolol tartrate 12.5 milliGRAM(s) Oral two times a day  midodrine. 10 milliGRAM(s) Oral three times a day  mineral oil/petrolatum Hydrophilic Ointment 1 Application(s) Topical four times a day  mupirocin 2% Ointment 1 Application(s) Topical two times a day  pantoprazole    Tablet 40 milliGRAM(s) Oral before breakfast  polyethylene glycol 3350 17 Gram(s) Oral daily  senna 2 Tablet(s) Oral at bedtime  tamsulosin 0.4 milliGRAM(s) Oral at bedtime        REVIEW OF SYSTEMS:  CONSTITUTIONAL: No weakness, fevers or chills  EYES/ENT: No visual changes;  No dysphagia  NECK: No pain or stiffness  RESPIRATORY: No cough, wheezing, hemoptysis; No shortness of breath  CARDIOVASCULAR: No chest pain or palpitations; No lower extremity edema  GASTROINTESTINAL: No abdominal or epigastric pain. No nausea, vomiting, or hematemesis; No diarrhea or constipation. No melena or hematochezia.  BACK: No back pain  GENITOURINARY: No dysuria, frequency or hematuria  NEUROLOGICAL: No numbness or weakness  SKIN: No itching, burning, rashes, or lesions   All other review of systems is negative unless indicated above.    Physical Exam:  T(F): 98.1 (02-03), Max: 98.2 (02-02)  HR: 87 (02-03) (78 - 87)  BP: 140/90 (02-03) (98/67 - 140/90)  RR: 18 (02-03)  SpO2: 99% (02-03)        Cath:    Imaging:    CXR: Personally reviewed    Labs: Personally reviewed                        11.6   5.14  )-----------( 105      ( 02 Feb 2021 07:16 )             36.8     02-03    138  |  97<L>  |  38<H>  ----------------------------<  73  4.4   |  29  |  4.79<H>    Ca    10.1      03 Feb 2021 07:36  Phos  4.4     02-03  Mg     2.2     02-03                         Kiarra Alva MD  Cardiology Fellow  [phone]  All Cardiology service information can be found 24/7 on [email or web address], password: [email or web address]    Patient seen and evaluated at bedside    Chief Complaint:    HPI:  Patient is a 70 year old man with history of multiple medical issues including ESRD (HD T/Th/S), CAD s/p stent (2015 LAD and LCx), past paraspinal abscess requiring long-term IV abx presenting, hyperparathyroidism, neurogenic bladder (straight cath'd 2x daily), hypotension (on midodrine) with altered mental status. On arrival he was febrile, tachycardic, lethargic and found to have CXR in the ED concerning for pneumonia as well as a SCr 8.6 (6.2 in Dec 2020), K of 6.2 and Ca 14.2.     Patient undergoes HD T/Th/Sat at [facility]. He missed hist HD session yesterday. He was last dialyzed 1/23. At baseline, he is non-ambulatory and needs 2 assistants to get him OOB to wheelchair. Straight cath'd 2x daily from neurogenic bladder.    ED Course:   In the ED he received vancomycin 1 g, zosyn, 5 U insulin and D50, sodium bicarbonate 50 mL, Kayexalate 15 g.  (27 Jan 2021 11:40)    Hospital Course:   Metabolic encephalopathy, fever, tachycardia likely 2/2 sepsis due to UTI, S aureus positive in NARes. Also missed HD the day prior to arrival with elevated K, SCr, Na, and Ca. Hyperkalemia is now resolved. Found incidentally to have runs of Vtach on tele (initially placed no tele for hyperkalemia). TTE showed severe global LV dysfunction, prior TTE showed moderate LV dysfunction. Patient is a [facility] Resident and is AAOx2 at baseline. Unknown family support. Team getting ready for patient to be discharged but wanted cardiology consult for this new HFrEF. Patient cannot tolerated ACE/ARB due to hypotension, on metop 12.5 BID, statin. Patient states he is feeling well, no chest pain or SOB.     PMHx:   Inguinal hernia    NSTEMI (non-ST elevated myocardial infarction)    HLD (hyperlipidemia)    Neurogenic bladder    Spinal abscess    Cavitary lung disease    CAD (coronary artery disease)    Abscess of sacrum    Anemia due to chronic renal failure treated with erythropoietin, stage 2 (mild)    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 device, implant or graft    HPTH (hyperparathyroidism)    HTN (hypertension)    ESRD (end stage renal disease)    Cavitary lung disease    CAD in native artery    Hypertension    ESRD (end stage renal disease)        PSHx:   S/P primary angioplasty with coronary stent    No significant past surgical history    Kidney abscess        Allergies:  No Known Allergies      Home Meds:    Current Medications:   amoxicillin  500 milliGRAM(s)/clavulanate 1 Tablet(s) Oral at bedtime  aspirin enteric coated 81 milliGRAM(s) Oral daily  atorvastatin 10 milliGRAM(s) Oral at bedtime  benzonatate 100 milliGRAM(s) Oral every 8 hours  chlorhexidine 4% Liquid 1 Application(s) Topical daily  cinacalcet 60 milliGRAM(s) Oral daily  citalopram 20 milliGRAM(s) Oral daily  doxycycline hyclate Capsule 100 milliGRAM(s) Oral <User Schedule>  heparin   Injectable 5000 Unit(s) SubCutaneous every 8 hours  lactobacillus acidophilus 1 Tablet(s) Oral daily  loratadine 10 milliGRAM(s) Oral daily  metoprolol tartrate 12.5 milliGRAM(s) Oral two times a day  midodrine. 10 milliGRAM(s) Oral three times a day  mineral oil/petrolatum Hydrophilic Ointment 1 Application(s) Topical four times a day  mupirocin 2% Ointment 1 Application(s) Topical two times a day  pantoprazole    Tablet 40 milliGRAM(s) Oral before breakfast  polyethylene glycol 3350 17 Gram(s) Oral daily  senna 2 Tablet(s) Oral at bedtime  tamsulosin 0.4 milliGRAM(s) Oral at bedtime        REVIEW OF SYSTEMS:  CONSTITUTIONAL: No weakness, fevers or chills  EYES/ENT: No visual changes;  No dysphagia  NECK: No pain or stiffness  RESPIRATORY: No cough, wheezing, hemoptysis; No shortness of breath  CARDIOVASCULAR: No chest pain or palpitations; No lower extremity edema  GASTROINTESTINAL: No abdominal or epigastric pain. No nausea, vomiting, or hematemesis; No diarrhea or constipation. No melena or hematochezia.  BACK: No back pain  GENITOURINARY: No dysuria, frequency or hematuria  NEUROLOGICAL: No numbness or weakness  SKIN: No itching, burning, rashes, or lesions   All other review of systems is negative unless indicated above.    Physical Exam:  T(F): 98.1 (02-03), Max: 98.2 (02-02)  HR: 87 (02-03) (78 - 87)  BP: 140/90 (02-03) (98/67 - 140/90)  RR: 18 (02-03)  SpO2: 99% (02-03)  Gen: laying flat in the bed, AAO x 2 (said year was 1988)  HEENT: EOMI, MMM  Chest; CTAB  Cardio: S1 S2 no murmurs, no HJR, JVD  Abdomen: soft nontender to palpation, BS+  Extremities: no edema, LUE AVF with good thrill       CXR: Personally reviewed    Labs: Personally reviewed                        11.6   5.14  )-----------( 105      ( 02 Feb 2021 07:16 )             36.8     02-03    138  |  97<L>  |  38<H>  ----------------------------<  73  4.4   |  29  |  4.79<H>    Ca    10.1      03 Feb 2021 07:36  Phos  4.4     02-03  Mg     2.2     02-03

## 2021-02-04 NOTE — PROGRESS NOTE ADULT - PROBLEM SELECTOR PLAN 1
p/w fever, tachycardia, lethargy. U/A shows large leuk est, turbid  - s/p zosyn (1/27 - 2/2 am dose)  - c/w augmentin (2/2 - 2/3)  - MRSA/MSSA (1/29) MRSA negative, S. aureus positive  - Stopped vancomycin, and restarted home vibramycin 100mg for MSSA chronic infection  - BCx NGTD, UCx negative  - wound care consult for sacral wound  - CT chest noncontrast (1/27) - atelectasis and moderate pl effusions bilateral On TTE (2/4) EF 12% with severe global LV dysfunction and diastolic dysfunction  - c/w lipitor, metoprolol, and aspirin  - cardiology consulted: patient is medically optimized. Will follow up outpatient for nuclear stress test and AICD consult

## 2021-02-04 NOTE — PROGRESS NOTE ADULT - PROBLEM SELECTOR PLAN 9
DVT ppx: hep SQ  Diet: dysphagia 2 mechanical soft diet  Dispo: back to [facility] where he is a long-term resident. At baseline, non-ambulatory.     # neurogenic bladder  - straight cath 2x daily    #GERD  -c/w home protonix    Major depression  - c/w home citalopram once tolerating PO  Rena (cousin): [phone] (mobile)  Taryn Mccormack (cousin): [phone] (home), [phone] (mobile) 2/2 uremia, hypercalcemia, hypernatremia, hyperkalemia after missing HD session  - will monitor BMPs  - will treat underlying conditions as stated  - now resolved

## 2021-02-04 NOTE — PROVIDER CONTACT NOTE (OTHER) - BACKGROUND
pt with no hx of DM - and pt is unsure when he begins to feel symptoms   Pt on Mechanical soft  DASH diet   not on any DM mediations
Patient admitted 1/27 with sepsis. Hx HTN, anemia, ESRD, CAD, HLD,

## 2021-02-04 NOTE — PROGRESS NOTE ADULT - REASON FOR ADMISSION
sepsis concerning for urosepsis

## 2021-02-04 NOTE — PROGRESS NOTE ADULT - SUBJECTIVE AND OBJECTIVE BOX
***************************************************************  Kourtney Simone, PGY1  Internal Medicine   pager: NS: [phone] [facility]: [phone]  ***************************************************************    PROGRESS NOTE:     Patient is a 70y old  Male who presents with a chief complaint of sepsis concerning for urosepsis (03 Feb 2021 16:11)      SUBJECTIVE / OVERNIGHT EVENTS:      MEDICATIONS  (STANDING):  aspirin enteric coated 81 milliGRAM(s) Oral daily  atorvastatin 10 milliGRAM(s) Oral at bedtime  benzonatate 100 milliGRAM(s) Oral every 8 hours  chlorhexidine 4% Liquid 1 Application(s) Topical daily  cinacalcet 60 milliGRAM(s) Oral daily  citalopram 20 milliGRAM(s) Oral daily  doxycycline hyclate Capsule 100 milliGRAM(s) Oral <User Schedule>  heparin   Injectable 5000 Unit(s) SubCutaneous every 8 hours  lactobacillus acidophilus 1 Tablet(s) Oral daily  loratadine 10 milliGRAM(s) Oral daily  metoprolol tartrate 12.5 milliGRAM(s) Oral two times a day  midodrine. 10 milliGRAM(s) Oral three times a day  mineral oil/petrolatum Hydrophilic Ointment 1 Application(s) Topical four times a day  mupirocin 2% Ointment 1 Application(s) Topical two times a day  pantoprazole    Tablet 40 milliGRAM(s) Oral before breakfast  polyethylene glycol 3350 17 Gram(s) Oral daily  senna 2 Tablet(s) Oral at bedtime  tamsulosin 0.4 milliGRAM(s) Oral at bedtime    MEDICATIONS  (PRN):      CAPILLARY BLOOD GLUCOSE  97 (03 Feb 2021 17:11)      POCT Blood Glucose.: 107 mg/dL (03 Feb 2021 21:36)  POCT Blood Glucose.: 97 mg/dL (03 Feb 2021 17:06)  POCT Blood Glucose.: 90 mg/dL (03 Feb 2021 12:13)  POCT Blood Glucose.: 85 mg/dL (03 Feb 2021 07:55)    I&O's Summary    02 Feb 2021 07:01  -  03 Feb 2021 07:00  --------------------------------------------------------  IN: 1390 mL / OUT: 866 mL / NET: 524 mL    03 Feb 2021 07:01  -  04 Feb 2021 05:51  --------------------------------------------------------  IN: 354 mL / OUT: 0 mL / NET: 354 mL        PHYSICAL EXAM:  Vital Signs Last 24 Hrs  T(C): 37 (03 Feb 2021 20:56), Max: 37 (03 Feb 2021 20:56)  T(F): 98.6 (03 Feb 2021 20:56), Max: 98.6 (03 Feb 2021 20:56)  HR: 83 (03 Feb 2021 20:56) (81 - 87)  BP: 104/68 (03 Feb 2021 20:56) (101/69 - 140/90)  BP(mean): --  RR: 18 (03 Feb 2021 20:56) (18 - 18)  SpO2: 100% (03 Feb 2021 20:56) (99% - 100%)    PHYSICAL EXAM:  GENERAL: NAD, very thin  HEAD:  Atraumatic, Normocephalic  EYES: EOMI, conjunctiva and sclera clear  NERVOUS SYSTEM:  Ax2 (not to date), conversant, making eyes contact, answering questions appropriately.  CHEST/LUNG: + cough, Clear to percussion bilaterally; No rales, rhonchi, wheezing, or rubs  HEART: Regular rate and rhythm; No murmurs, rubs, or gallops  ABDOMEN: Soft, Nontender, Nondistended; Bowel sounds present  BACK: No TTP of spine   EXTREMITIES:  2+ Peripheral Pulses, No clubbing, cyanosis, or edema  LYMPH: No lymphadenopathy noted  SKIN: Sacral wound, Left AVF    LABS:                        11.6   5.14  )-----------( 105      ( 02 Feb 2021 07:16 )             36.8     02-03    138  |  97<L>  |  38<H>  ----------------------------<  73  4.4   |  29  |  4.79<H>    Ca    10.1      03 Feb 2021 07:36  Phos  4.4     02-03  Mg     2.2     02-03                  RADIOLOGY & ADDITIONAL TESTS:  Results Reviewed:   Imaging Personally Reviewed:  Electrocardiogram Personally Reviewed:    COORDINATION OF CARE:  Care Discussed with Consultants/Other Providers [Y/N]:  Prior or Outpatient Records Reviewed [Y/N]:   ***************************************************************  Kourtney Nichols, PGY1  Internal Medicine   pager: NS: [phone] [facility]: [phone]  ***************************************************************    PROGRESS NOTE:     Patient is a 70y old  Male who presents with a chief complaint of sepsis concerning for urosepsis (03 Feb 2021 16:11)      SUBJECTIVE / OVERNIGHT EVENTS:  No acute events overnight. Patient had FSG 70 this morning and was given 1 amp dextrose. He will be getting HD today. On tele, overnight he had not episodes of v tach but desaturated periodically to the high 80s.    MEDICATIONS  (STANDING):  aspirin enteric coated 81 milliGRAM(s) Oral daily  atorvastatin 10 milliGRAM(s) Oral at bedtime  benzonatate 100 milliGRAM(s) Oral every 8 hours  chlorhexidine 4% Liquid 1 Application(s) Topical daily  cinacalcet 60 milliGRAM(s) Oral daily  citalopram 20 milliGRAM(s) Oral daily  doxycycline hyclate Capsule 100 milliGRAM(s) Oral <User Schedule>  heparin   Injectable 5000 Unit(s) SubCutaneous every 8 hours  lactobacillus acidophilus 1 Tablet(s) Oral daily  loratadine 10 milliGRAM(s) Oral daily  metoprolol tartrate 12.5 milliGRAM(s) Oral two times a day  midodrine. 10 milliGRAM(s) Oral three times a day  mineral oil/petrolatum Hydrophilic Ointment 1 Application(s) Topical four times a day  mupirocin 2% Ointment 1 Application(s) Topical two times a day  pantoprazole    Tablet 40 milliGRAM(s) Oral before breakfast  polyethylene glycol 3350 17 Gram(s) Oral daily  senna 2 Tablet(s) Oral at bedtime  tamsulosin 0.4 milliGRAM(s) Oral at bedtime    MEDICATIONS  (PRN):      CAPILLARY BLOOD GLUCOSE  97 (03 Feb 2021 17:11)      POCT Blood Glucose.: 107 mg/dL (03 Feb 2021 21:36)  POCT Blood Glucose.: 97 mg/dL (03 Feb 2021 17:06)  POCT Blood Glucose.: 90 mg/dL (03 Feb 2021 12:13)  POCT Blood Glucose.: 85 mg/dL (03 Feb 2021 07:55)    I&O's Summary    02 Feb 2021 07:01  -  03 Feb 2021 07:00  --------------------------------------------------------  IN: 1390 mL / OUT: 866 mL / NET: 524 mL    03 Feb 2021 07:01  -  04 Feb 2021 05:51  --------------------------------------------------------  IN: 354 mL / OUT: 0 mL / NET: 354 mL        PHYSICAL EXAM:  Vital Signs Last 24 Hrs  T(C): 37 (03 Feb 2021 20:56), Max: 37 (03 Feb 2021 20:56)  T(F): 98.6 (03 Feb 2021 20:56), Max: 98.6 (03 Feb 2021 20:56)  HR: 83 (03 Feb 2021 20:56) (81 - 87)  BP: 104/68 (03 Feb 2021 20:56) (101/69 - 140/90)  BP(mean): --  RR: 18 (03 Feb 2021 20:56) (18 - 18)  SpO2: 100% (03 Feb 2021 20:56) (99% - 100%)    PHYSICAL EXAM:  GENERAL: NAD, very thin  HEAD:  Atraumatic, Normocephalic  EYES: EOMI, conjunctiva and sclera clear  NERVOUS SYSTEM:  Ax2 (not to date), conversant, making eyes contact, answering questions appropriately.  CHEST/LUNG: + cough, Clear to percussion bilaterally; No rales, rhonchi, wheezing, or rubs  HEART: Regular rate and rhythm; No murmurs, rubs, or gallops  ABDOMEN: Soft, Nontender, Nondistended; Bowel sounds present  BACK: No TTP of spine   EXTREMITIES:  2+ Peripheral Pulses, No clubbing, cyanosis, or edema  LYMPH: No lymphadenopathy noted  SKIN: Sacral wound, Left AVF    LABS:                        11.6   5.14  )-----------( 105      ( 02 Feb 2021 07:16 )             36.8     02-03    138  |  97<L>  |  38<H>  ----------------------------<  73  4.4   |  29  |  4.79<H>    Ca    10.1      03 Feb 2021 07:36  Phos  4.4     02-03  Mg     2.2     02-03                  RADIOLOGY & ADDITIONAL TESTS:  Results Reviewed:   Imaging Personally Reviewed:  Electrocardiogram Personally Reviewed:    COORDINATION OF CARE:  Care Discussed with Consultants/Other Providers [Y/N]:  Prior or Outpatient Records Reviewed [Y/N]:

## 2021-02-04 NOTE — PROGRESS NOTE ADULT - PROBLEM SELECTOR PLAN 5
Missed HD session day of admission  - HD, per above  -renal recs appreciated Pt on telemetry, and has had PVCs with 6-8 beats of V tach  - started metoprolol 12.5 mg BID, now resolved  - monitor electrolytes  - f/u TTE

## 2021-02-04 NOTE — PROGRESS NOTE ADULT - PROBLEM SELECTOR PLAN 10
DVT ppx: hep SQ  Diet: dysphagia 2 mechanical soft diet  Dispo: back to [facility] where he is a long-term resident. At baseline, non-ambulatory.     # neurogenic bladder  - straight cath 2x daily    #GERD  -c/w home protonix    Major depression  - c/w home citalopram once tolerating PO  Rena (cousin): [phone] (mobile)  Taryn Mccormack (cousin): [phone] (home), [phone] (mobile)

## 2021-02-04 NOTE — PROGRESS NOTE ADULT - PROBLEM SELECTOR PLAN 2
Primary hyperparathyroidism, based on serologic workup from 12/2020. Poor candidate for parathyroidectomy given patient's comorbidities.  - On Sensipar 30 mg PO BID at home. Increased Sensipar to 60 mg PO BID while inpatient.  - continue to monitor with BMP Qd  -HD, per below p/w fever, tachycardia, lethargy. U/A shows large leuk est, turbid  - s/p zosyn (1/27 - 2/2 am dose)  - s/p augmentin (2/2 - 2/3)  - MRSA/MSSA (1/29) MRSA negative, S. 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

## 2021-02-04 NOTE — PROVIDER CONTACT NOTE (OTHER) - SITUATION
Patient blood glucose prior to dinner 52.
Pt initial BG 69- repeat 57   pt was without symptoms - Alert and at baseline orientation

## 2021-02-04 NOTE — PROVIDER CONTACT NOTE (OTHER) - ASSESSMENT
pt given D50% through iv along with full meal tray and four 4oz apple juices
RN assessed. Patient awake and alert; asymptomatic.

## 2021-02-04 NOTE — PROGRESS NOTE ADULT - PROBLEM SELECTOR PLAN 8
2/2 uremia, hypercalcemia, hypernatremia, hyperkalemia after missing HD session  - will monitor BMPs  - will treat underlying conditions as stated  - now resolved - c/w home midodrine 10 mg TID  - Monitor vitals daily

## 2021-02-04 NOTE — PROGRESS NOTE ADULT - SUBJECTIVE AND OBJECTIVE BOX
Seen on HD   VITAL: T(C): , Max: 37 (02-03-21 @ 20:56) T(F): , Max: 98.6 (02-03-21 @ 20:56) HR: 79 (02-04-21 @ 07:15) BP: 122/69 (02-04-21 @ 07:15) BP(mean): -- RR: 16 (02-04-21 @ 07:15) SpO2: 100% (02-04-21 @ 05:50)   PHYSICAL EXAM: Constitutional: NAD HEENT: NCAT, DMM Neck: Supple, No JVD Respiratory: CTA-b/l Cardiovascular: RRR s1s2, no m/r/g Gastrointestinal: BS+, soft, NT/ND Extremities: No peripheral edema b/l Neurological: no focal deficits; strength grossly intact Back: no CVAT b/l Skin: No rashes, no nevi Access: LUE AVF -accessed   LABS:  Na(138)/K(4.4)/Cl(97)/HCO3(29)/BUN(38)/Cr(4.79)Glu(73)/Ca(10.1)/Mg(2.2)/PO4(4.4)    02-03 @ 07:36 Na(139)/K(5.3)/Cl(95)/HCO3(25)/BUN(58)/Cr(6.39)Glu(77)/Ca(10.9)/Mg(2.5)/PO4(5.2)    02-02 @ 07:16   IMPRESSION: 70M w/ dementia, HTN, CAD,  past paraspinal abscess requiring long-term IV antibiotics, and ESRD-HD, 1/26/21 from [place] with AMS/fever/electrolyte derangements  (1)Renal - ESRD - HD TTS - on HD now/tolerating  (2)Hypercalcemia - primary hyperparathyroidism - reasonably controlled, with HD/Sensipar  (3)ID - resolved sepsis  (4)Orthostatic hypotension - on standing Midodrine   RECOMMEND: (1)Meds as ordered/dose new meds for GFR<10/HD (2)HD today as ordered      Heath Huff MD [facility] Office: [phone] Cell: [phone]        Seen on HD No complaints  VITAL: T(C): , Max: 37 (02-03-21 @ 20:56) T(F): , Max: 98.6 (02-03-21 @ 20:56) HR: 79 (02-04-21 @ 07:15) BP: 122/69 (02-04-21 @ 07:15) BP(mean): -- RR: 16 (02-04-21 @ 07:15) SpO2: 100% (02-04-21 @ 05:50)   PHYSICAL EXAM: Constitutional: NAD HEENT: NCAT, DMM Neck: Supple, No JVD Respiratory: CTA-b/l Cardiovascular: RRR s1s2, no m/r/g Gastrointestinal: BS+, soft, NT/ND Extremities: No peripheral edema b/l Neurological: no focal deficits; strength grossly intact Back: no CVAT b/l Skin: No rashes, no nevi Access: LUE AVF -accessed   LABS:  Na(138)/K(4.4)/Cl(97)/HCO3(29)/BUN(38)/Cr(4.79)Glu(73)/Ca(10.1)/Mg(2.2)/PO4(4.4)    02-03 @ 07:36 Na(139)/K(5.3)/Cl(95)/HCO3(25)/BUN(58)/Cr(6.39)Glu(77)/Ca(10.9)/Mg(2.5)/PO4(5.2)    02-02 @ 07:16   IMPRESSION: 70M w/ dementia, HTN, CAD,  past paraspinal abscess requiring long-term IV antibiotics, and ESRD-HD, 1/26/21 from [place] with AMS/fever/electrolyte derangements  (1)Renal - ESRD - HD TTS - on HD now/tolerating  (2)Hypercalcemia - primary hyperparathyroidism - reasonably controlled, with HD/Sensipar  (3)ID - resolved sepsis  (4)Orthostatic hypotension - on standing Midodrine   RECOMMEND: (1)Meds as ordered/dose new meds for GFR<10/HD (2)HD today as ordered      Heath Huff MD [facility] Office: [phone] Cell: [phone]

## 2021-02-04 NOTE — PROGRESS NOTE ADULT - PROBLEM SELECTOR PLAN 3
Noted intermittently since 2018. Unclear etiology. Possible ?Bone Marrow component. Sepsis and abx may be playing a role.  Noted anemia as well likely in the setting of ESRD. No clinical /active signs of bleeding   -will continue to monitor and tranfuse PRN based on transfusion goals.  -will review all of his home meds to see if contributing Primary hyperparathyroidism, based on serologic workup from 12/2020. Poor candidate for parathyroidectomy given patient's comorbidities.  - On Sensipar 30 mg PO BID at home. Increased Sensipar to 60 mg PO BID while inpatient.  - continue to monitor with BMP Qd  -HD, per below

## 2021-02-04 NOTE — PROGRESS NOTE ADULT - ASSESSMENT
Patient is a 70 year old man with history of multiple medical issues including ESRD (HD T/Th/S), CAD, HTN, past paraspinal abscess requiring long-term IV abx presenting with metabolic encephalopathy, fever, tachycardia likely 2/2 sepsis due to UTI. Also missed HD the day prior to arrival with elevated K, SCr, Na, and Ca. Hyperkalemia is now resolved. Found incidentally to have runs of Vtach on tele (initially placed no tele for hyperkalemia), now on lopressor pending TTE. Patient is a 70 year old man with history of multiple medical issues including ESRD (HD T/Th/S), CAD, HTN, past paraspinal abscess requiring long-term IV abx presenting with metabolic encephalopathy, fever, tachycardia likely 2/2 sepsis due to UTI. Also missed HD the day prior to arrival with elevated K, SCr, Na, and Ca. Hyperkalemia is now resolved. Found incidentally to have runs of Vtach on tele (initially placed no tele for hyperkalemia), now on lopressor with TTE showing severe systolic and diastolic dysfunction with EF 12%. Per cardiology, will follow up for outpatient workup.

## 2021-02-04 NOTE — PROGRESS NOTE ADULT - PROBLEM SELECTOR PLAN 4
Pt on telemetry, and has had PVCs with 6-8 beats of V tach  - started metoprolol 12.5 mg BID, now resolved  - monitor electrolytes  - f/u TTE Noted intermittently since 2018. Unclear etiology. Possible ?Bone Marrow component. Sepsis and abx may be playing a role.  Noted anemia as well likely in the setting of ESRD. No clinical /active signs of bleeding   -will continue to monitor and tranfuse PRN based on transfusion goals.  -will review all of his home meds to see if contributing

## 2021-02-04 NOTE — PROGRESS NOTE ADULT - ATTENDING COMMENTS
70 year old man with PMHx as stated above a/w metabolic encephalopathy in the setting of sepsis, hyperkalemia from missed HD , mild hypernatremia and hypercalcemia. Now s/p medical management for hyperkalemia/hypercalcemia with improvement. Course c/b asymptomatic NSVT now on BB.     Suspect sepsis was likely in the setting of UTI - given +u/a and his hx of straight cathing. No prior +urine cx noted in sunrise. Pt had brief episode of diarrhea on admission however was likely in setting of  kayaxelate received. CXR and CT chest personally reviewed and w/o evidence of pulmonary infection. Now s/p 7 day course of abx for presumed UTI. Blood cx NTD. Urine cx unremarkable however, urine cx obtained late.    Discharge delayed yesterday dur to TTE results showing EF 12% which is reduced compared to prior.     -C/w metoprolol 12.5 mg BID -HR has been controlled. Unable to start ACE/ARB given renal dysfunction. Pt seen by cards and recs appreciated- plan is for outpt follow up with possible outpt stress test. Currently pt is compensated from a HF standpoint.     Dc planning back to [facility] today. Further details outlined in discharge documentation. Spent 38 min in discharge planning and coordination.

## 2021-02-04 NOTE — PROGRESS NOTE ADULT - PROVIDER SPECIALTY LIST ADULT
Internal Medicine
Nephrology
Internal Medicine
Nephrology
Internal Medicine

## 2021-03-01 NOTE — ED ADULT TRIAGE NOTE - CHIEF COMPLAINT QUOTE
Pt from [facility], sent for AMS since yesterday per EMS. Pt arrives to triage AA0X0. Pt arrives on 3L NC which is baseline per EMS. H/o ESRD, anemia, depression. fs 107.

## 2021-03-02 NOTE — H&P ADULT - HISTORY OF PRESENT ILLNESS
71 y/o M w/ hx ESRD on HD, anemia, hyperparathyroidism, CAD s/p stent, BPH w/ neurogenic bladder (last dialysis 2/27/21), cognitive impairment, that was brought in by EMS from [facility] for AMS. Due to patients mental status history obtained from ED provider note.  Per chart, pt usually verbal but upon check up at NH pt non verbal. Pt was admitted to this hospital for similar issues in past and was found to have infection (PNA). Pt not able to provide further history. Chart shows pt is dependent on all ADLs.  Unable to attain review of systems at this time.    69 y/o M w/ hx ESRD on HD, anemia, hyperparathyroidism, CAD s/p stent, BPH w/ neurogenic bladder (last dialysis 2/27/21), cognitive impairment, that was brought in by EMS from [facility] for AMS. Due to patients mental status history obtained from ED provider note.  Per chart, pt usually verbal but upon check up at NH pt non verbal. Pt was admitted to this hospital for similar issues in past and was found to have infection (PNA). Pt not able to provide further history. Chart shows pt is dependent on all ADLs.  Unable to attain review of systems at this time.    71 y/o M w/ hx ESRD on HD, anemia, hyperparathyroidism, CAD s/p stent, BPH w/ neurogenic bladder (last dialysis 2/27/21), cognitive impairment, that was brought in by EMS from [facility] for AMS. Due to patients mental status history obtained from ED provider note.  Per chart, pt usually verbal but upon check up at NH pt non verbal. Pt was admitted to this hospital for similar issues in past and was found to have infection (PNA). Pt not able to provide further history. Chart shows pt is dependent on all ADLs.  Unable to attain review of systems at this time.

## 2021-03-02 NOTE — H&P ADULT - ASSESSMENT
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

## 2021-03-02 NOTE — ED PROVIDER NOTE - CARE PLAN
Principal Discharge DX:	Altered mental status, unspecified altered mental status type  Secondary Diagnosis:	Fever, unspecified fever cause

## 2021-03-02 NOTE — ED ADULT NURSE REASSESSMENT NOTE - NS ED NURSE REASSESS COMMENT FT1
Straight cath with 2 RN at bedside using sterile procedure. Unknown if pt produces urine d/t ESRD hx. Little urine obtained and sent to the lab. MD aware

## 2021-03-02 NOTE — ED PROVIDER NOTE - PHYSICAL EXAMINATION
[Const] pt not answering questions, responsive to pain or IV insertion  [HEENT] PERRL, EOMI, moist mucus membranes  [Neck] Supple, trachea midline  [CV] +S1/S2, no m/r/g appreciated  [Lungs] Clear to auscultations bilaterally, no adventitious lung sounds  [Abd] abd soft, nontender, slightly distended, no fluid shift  [MSK] moving all extremities, AVF in left arm  [Skin] warm, dry, well-perfused  [Neuro] A&Ox0, Cranial Nerves II-XII intact

## 2021-03-02 NOTE — ED PROVIDER NOTE - ATTENDING CONTRIBUTION TO CARE
***LIMITED HISTORY FROM CHART, PT NON VERBAL AND ALTERED***  HPI: 71 y/o F w/ hx ESRD on HD, anemia, hyperparathyroidism, CAD s/p stent, BPH w/ neurogenic bladder (last dialysis 2/27/21), cognitive impairment, that was brought in by EMS from [facility] for AMS. Per chart, pt usually verbal but upon check up at NH pt non verbal. Pt was admitted to this hospital for similar issues in past and was found to have infection (PNA). Pt not able to provide further history. Chart shows pt is dependent on all ADLs.   EXAM: cachetic appearing, contracted, head atraumatic, eyes PERRL, heart RRR, lungs ctab, abd soft nontender, contracted extremities, no sacral or heel ulcers.   MDM: pt with multiple medical problems that was sent from NH for AMS, non verbal. Per NH chart they wanted to r/o CVA but pt has been here for similar issues in past and found to have PNA as cause. Will work up for CVA but also infectious etiology as pt has fever here on rectal temp. Will likely admit.

## 2021-03-02 NOTE — H&P ADULT - NSHPPHYSICALEXAM_GEN_ALL_CORE
PHYSICAL EXAM:  GENERAL: A+X x0 minimally verbal  HEAD:  Atraumatic, Normocephalic  EYES: EOMI, PERRLA, conjunctiva grey  NECK: Supple, No JVD  CHEST/LUNG: Clear to auscultation bilaterally; No wheeze/rhonchi/rale  HEART: Regular rate and rhythm; No murmurs, rubs, or gallops  ABDOMEN: Soft, Nontender, Nondistended; Bowel sounds present  EXTREMITIES:  2+ Peripheral Pulses, No clubbing, cyanosis, or edema  PSYCH: AAOx3  NEUROLOGY: non-focal  SKIN: No rashes or lesions

## 2021-03-02 NOTE — CONSULT NOTE ADULT - SUBJECTIVE AND OBJECTIVE BOX
HPI: Mr. Beaver is a 70 year-old man with history of multiple medical issues including dementia, coronary artery disease, past epidural abscess, and end stage renal disease. He undergoes hemodialysis Tuesdays, Thursdays, and Saturdays at [facility] [place], under the care of my partner Dr. Ramón Resendiz. He presented this a.m. to the [facility] ER from [place] with altered mental status. He is mildly confused but verbal/conversive at baseline; he was noted to be nonverbal this a.m.    PAST MEDICAL & SURGICAL HISTORY: HLD (hyperlipidemia) HTN CAD - angioplasty Neurogenic bladder Spinal abscess Cavitary lung disease ESRD (end stage renal disease) Primary hyperparathyroidism Allergies No Known Allergies  SOCIAL HISTORY: Denies ETOh,Smoking,   FAMILY HISTORY: No pertinent family history in first degree relatives Family history of breast cancer (Sibling)  REVIEW OF SYSTEMS: unable to obtain from patient due to dementia/delirium  VITAL: T(C): , Max: 37.8 (03-02-21 @ 03:39) T(F): , Max: 100.1 (03-02-21 @ 03:39) HR: 88 (03-02-21 @ 09:13) BP: 117/78 (03-02-21 @ 09:13) RR: 21 (03-02-21 @ 09:13) SpO2: 95% (03-02-21 @ 09:13)  PHYSICAL EXAM: Constitutional: NAD, Alert HEENT: NCAT, MMM Neck: Supple, No JVD Respiratory: CTA-b/l Cardiovascular: RRR s1s2, no m/r/g Gastrointestinal: BS+, soft, NT/ND Extremities: No peripheral edema b/l Neurological: no focal deficits; strength grossly intact Back: no CVAT b/l Skin: No rashes, no nevi  LABS:                      12.5  4.00  )-----------( 96       ( 02 Mar 2021 03:34 )            40.2   Na(147)/K(5.6)/Cl(101)/HCO3(26)/BUN(83)/Cr(5.88)Glu(190)/Ca(10.6)/Mg(--)/PO4(--)    03-02 @ 06:30 Na(145)/K(6.1)/Cl(98)/HCO3(24)/BUN(84)/Cr(6.01)Glu(125)/Ca(11.1)/Mg(--)/PO4(--)    03-02 @ 03:36 Na(144)/K(5.9)/Cl(98)/HCO3(26)/BUN(84)/Cr(6.07)Glu(124)/Ca(11.2)/Mg(2.9)/PO4(5.8)    03-02 @ 03:34   IMAGING: < from: CT Head No Cont (03.02.21 @ 04:48) > No acute intracranial hemorrhage or mass effect.  ASSESSMENT: (1)Renal - ESRD - HD TTS - due for next HD today (2)Hyperkalemia - should improve with HD (3)Hypercalcemia - primary hyperparathyroidism - chronically managed on Sensipar (4)Hypernatremia - in setting of poor free water access - should improve with HD (5)AMS - unclear exact etiology  RECOMMEND: (1)Next HD today - low K/low Ca bath (2)Renal diet (3)Sensipar as taken as outpatient (4)Dose new meds for GFR<10/HD   Thank you for involving [facility] in this patient's care.  With warm regards,  Heath Huff MD  [facility] Office: [phone] Cell: [phone]          HPI: Mr. Beaver is a 70 year-old man with history of multiple medical issues including dementia, coronary artery disease, past epidural abscess, and end stage renal disease. He undergoes hemodialysis Tuesdays, Thursdays, and Saturdays at [facility] [place], under the care of my partner Dr. Ramón Resendiz. He presented this a.m. to the [facility] ER from [place] with altered mental status. He is mildly confused but verbal/conversive at baseline; he was noted to be nonverbal this a.m.   History quite limited - patient unable to answer simple questions  PAST MEDICAL & SURGICAL HISTORY: HLD (hyperlipidemia) HTN CAD - angioplasty Neurogenic bladder Spinal abscess Cavitary lung disease ESRD (end stage renal disease) Primary hyperparathyroidism Allergies No Known Allergies  SOCIAL HISTORY: Denies ETOh,Smoking,   FAMILY HISTORY: No pertinent family history in first degree relatives Family history of breast cancer (Sibling)  REVIEW OF SYSTEMS: unable to obtain from patient due to dementia/delirium  VITAL: T(C): , Max: 37.8 (03-02-21 @ 03:39) T(F): , Max: 100.1 (03-02-21 @ 03:39) HR: 88 (03-02-21 @ 09:13) BP: 117/78 (03-02-21 @ 09:13) RR: 21 (03-02-21 @ 09:13) SpO2: 95% (03-02-21 @ 09:13)  PHYSICAL EXAM: Constitutional: NAD, Alert HEENT: NCAT, MMM Neck: Supple, No JVD Respiratory: CTA-b/l Cardiovascular: RRR s1s2, no m/r/g Gastrointestinal: BS+, soft, NT/ND Extremities: No peripheral edema b/l Neurological: no focal deficits; strength grossly intact Back: no CVAT b/l Skin: No rashes, no nevi  LABS:                      12.5  4.00  )-----------( 96       ( 02 Mar 2021 03:34 )            40.2   Na(147)/K(5.6)/Cl(101)/HCO3(26)/BUN(83)/Cr(5.88)Glu(190)/Ca(10.6)/Mg(--)/PO4(--)    03-02 @ 06:30 Na(145)/K(6.1)/Cl(98)/HCO3(24)/BUN(84)/Cr(6.01)Glu(125)/Ca(11.1)/Mg(--)/PO4(--)    03-02 @ 03:36 Na(144)/K(5.9)/Cl(98)/HCO3(26)/BUN(84)/Cr(6.07)Glu(124)/Ca(11.2)/Mg(2.9)/PO4(5.8)    03-02 @ 03:34   IMAGING: < from: CT Head No Cont (03.02.21 @ 04:48) > No acute intracranial hemorrhage or mass effect.  ASSESSMENT: (1)Renal - ESRD - HD TTS - due for next HD today (2)Hyperkalemia - should improve with HD (3)Hypercalcemia - primary hyperparathyroidism - chronically managed on Sensipar (4)Hypernatremia - in setting of poor free water access - should improve with HD (5)AMS - unclear exact etiology  RECOMMEND: (1)Next HD today - low K/low Ca bath (2)Renal diet (3)Sensipar as taken as outpatient (4)Dose new meds for GFR<10/HD   Thank you for involving [facility] in this patient's care.  With warm regards,  Heath Huff MD  [facility] Office: [phone] Cell: [phone]

## 2021-03-02 NOTE — H&P ADULT - ATTENDING COMMENTS
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 of note pt recently hospitalized 01/27-2/4 for similar condition. At that time he was empirically treated for PNA vs UTI with vanc/zosyn, bcx and Ucx were negative, and his mental status improved to AAO x 2 (appears to be baseline from documentation).    - pt is AAO x 0, somnolent, unable to participate in interview although maintaining airway, no LE edema, no JVD, cold extremities, unable to asses heart and lung sounds    - low grade fever 100.1 rectally, no leukocytosis, normal HR, CXR showed unchanged large L. pleural effusion, on CT chest during prior hospitalization he was found to have moderate L. pleural effusion, TTE 12% with severe global LV dysfunction.  - per ED straight cath revealed pus and crusted urine, unable to process UA  - will empirically treat with vanc by level and zosyn, f/u bcx  - pt appears hypovolemic on exam, give additional 500 cc IVF  - if clinically deteriorates low threshold for repeat CT chest as patient is high risk for aspiration  - keep NPO, obtian S&S eval  - nephro c/s for HD, due today  - hyperkalemic to 5.6, no acute EKG changes  - troponemia in setting of ESRD, repeat hst to calculate delta    rest of care as mentioned above 71 y/o M w/ hx ESRD on HD, anemia, hyperparathyroidism, CAD s/p stent, BPH w/ neurogenic bladder (last dialysis 2/27/21), cognitive impairment, 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 of note pt recently hospitalized 01/27-2/4 for similar condition. At that time he was empirically treated for PNA vs UTI with vanc/zosyn, bcx and Ucx were negative, and his mental status improved to AAO x 2 (appears to be baseline from documentation).    - pt is AAO x 0, somnolent, unable to participate in interview although maintaining airway, no LE edema, no JVD, cold extremities, unable to asses heart and lung sounds    - low grade fever 100.1 rectally, no leukocytosis, normal HR, CXR showed unchanged large L. pleural effusion, on CT chest during prior hospitalization he was found to have moderate L. pleural effusion, TTE 12% with severe global LV dysfunction.  - per ED straight cath revealed pus and crusted urine, unable to process UA  - will empirically treat with vanc by level and zosyn, f/u bcx  - pt appears hypovolemic on exam, give additional 500 cc IVF, repeat blood lactate  - if clinically deteriorates low threshold for repeat CT chest as patient is high risk for aspiration  - keep NPO, obtian S&S eval  - nephro c/s for HD, due today  - hyperkalemic to 5.6, no acute EKG changes  - troponemia in setting of ESRD, repeat hst to calculate delta    rest of care as mentioned above

## 2021-03-02 NOTE — H&P ADULT - PROBLEM SELECTOR PLAN 1
admit to medicine with telemetry  Unclear etiology of encephalopathy at this time  S/P Zosyn x1 in ED - will continue with Vanco/Zosyn for now (hold home vibramycin)  ED attempted to send UA but only small pus with straight Cath unable to send urine culture.  Follow up blood cultures  Lactate elevated to 5.2 s/p 1 L and zosyn improved to 4.3 - will repeat again with HD

## 2021-03-02 NOTE — ED ADULT NURSE NOTE - OBJECTIVE STATEMENT
71y/o male aaox0 from [facility] sent for AMS. Pt not answering assessment questions at this time. Pt on 3L NC which is baseline. 20g IV placed via US by MD; labs collected and sent as per MD order. Pt cleaned, turned, and repositioned for comfort. Will continue to monitor.

## 2021-03-02 NOTE — H&P ADULT - PROBLEM SELECTOR PLAN 2
K initially 5.9 but was hemolyzed, improved to 5.6  S/P Insulin 5u, Calcium Gluconate and D50  Nephrology ([facility]) called for HD

## 2021-03-02 NOTE — ED PROVIDER NOTE - OBJECTIVE STATEMENT
69 y/o F w/ hx ESRD on HD, anemia, hyperparathyroidism, CAD s/p stent, BPH w/ neurogenic bladder (last dialysis 2/27/21), cognitive impairment,

## 2021-03-02 NOTE — H&P ADULT - NSHPLABSRESULTS_GEN_ALL_CORE
12.5   4.00  )-----------( 96       ( 02 Mar 2021 03:34 )             40.2   03-02    147<H>  |  101  |  83<H>  ----------------------------<  190<H>  5.6<H>   |  26  |  5.88<H>    Ca    10.6<H>      02 Mar 2021 06:30  Phos  5.8     03-02  Mg     2.9     03-02    TPro  6.3  /  Alb  3.2<L>  /  TBili  0.5  /  DBili  x   /  AST  56<H>  /  ALT  124<H>  /  AlkPhos  135<H>  03-02      EKG NSR 93 Poor R wave progression, TWI in v5-6, aVL  CT head No acute hemorrhage  CXR large loculated effusion left lung, chronic  Lactate 5.2 > 4.3  Trop 276 CK 74

## 2021-03-02 NOTE — ED PROVIDER NOTE - CLINICAL SUMMARY MEDICAL DECISION MAKING FREE TEXT BOX
69 y/o F w/ hx ESRD on HD, anemia, hyperparathyroidism, CAD s/p stent, BPH w/ neurogenic bladder (last dialysis 2/27/21), cognitive impairment presents from [facility] for altered mental status. Hx of urosepsis and similar presentation. RN tried to cath patient--mucous pus/dried urine, lab unable to run. Lactate 5, Rectal temp 100.1, sepsis work-up done, zosyn and fluids given. CXr with left pleural effusion similar to prior. Slight elevation in LFT's, will repeat CMP and VBG. CTH negative for large vessel ischemia or bleed.

## 2021-03-02 NOTE — H&P ADULT - PROBLEM SELECTOR PLAN 4
Does not appear grossly volume overloaded  Volume management via HD as patient is anuric  Previously recommended to have outpatient followup for AICD placement - will monitor on tele for ectopy or any urgent indications for AICD eval  Continue home medication regimen.   Avoid ACE/ARB for now due to hyperkalemia.

## 2021-03-02 NOTE — PATIENT PROFILE ADULT - NSPROPOAURINARYCATHETER_GEN_A_NUR
Date: 8/8/2019    Time: 3:06 PM    Patient Placed On BIPAP/CPAP/ Non-Invasive Ventilation? Yes    If no must comment. Facial area red/color change? No           If YES are Blister/Lesion present? No   If yes must notify nursing staff  BIPAP/CPAP skin barrier?   Yes    Skin barrier type:Liquicel       Comments:        Antoni Jaeger no

## 2021-03-03 NOTE — PROVIDER CONTACT NOTE (OTHER) - SITUATION
patient AM labs were unable to be obtained. Patient was stuck multiple times, blood was unable to be drawn. CHANNING summers came to a-patient with ultrasound yet unable to obtain blood.

## 2021-03-03 NOTE — SWALLOW BEDSIDE ASSESSMENT ADULT - SWALLOW EVAL: RECOMMENDED DIET
1) Initiate Puree with Nectar-Thick Liquids; 2) SLP intervention deemed no longer indicated at this time; Suggest MD re-consult this service if concern for change in status and/or to assess for diet advancement as overall medical status improves

## 2021-03-03 NOTE — PROGRESS NOTE ADULT - SUBJECTIVE AND OBJECTIVE BOX
[facility] Division of Hospital Medicine  Barber Braxton MD  Pager (ALIZE-F, 8A-5P): [phone]  Other Times:  [phone]    Patient is a 70y old  Male who presents with a chief complaint of AMS (03 Mar 2021 08:28)    SUBJECTIVE / OVERNIGHT EVENTS:  Patient is more alert.  Patient able to answer questions and is back to his baseline mental status.  Episode of hypothermia this AM - on bear hugger.  No F/C, N/V, CP, SOB, Cough, lightheadedness, dizziness, abdominal pain, diarrhea, dysuria.    MEDICATIONS  (STANDING):  aspirin enteric coated 81 milliGRAM(s) Oral daily  atorvastatin 10 milliGRAM(s) Oral at bedtime  chlorhexidine 4% Liquid 1 Application(s) Topical daily  cinacalcet 90 milliGRAM(s) Oral daily  citalopram 20 milliGRAM(s) Oral daily  heparin   Injectable 5000 Unit(s) SubCutaneous every 8 hours  lactobacillus acidophilus 1 Tablet(s) Oral daily  metoprolol tartrate 12.5 milliGRAM(s) Oral two times a day  midodrine. 10 milliGRAM(s) Oral three times a day  piperacillin/tazobactam IVPB.. 3.3375 Gram(s) IV Intermittent every 12 hours  polyethylene glycol 3350 17 Gram(s) Oral daily  senna 2 Tablet(s) Oral at bedtime  tamsulosin 0.4 milliGRAM(s) Oral at bedtime    MEDICATIONS  (PRN):      Vital Signs Last 24 Hrs  T(C): 36.3 (03 Mar 2021 14:14), Max: 36.4 (02 Mar 2021 15:30)  T(F): 97.3 (03 Mar 2021 14:14), Max: 97.5 (02 Mar 2021 15:30)  HR: 72 (03 Mar 2021 11:21) (65 - 73)  BP: 100/61 (03 Mar 2021 11:21) (100/61 - 111/65)  BP(mean): --  RR: 17 (03 Mar 2021 11:21) (17 - 18)  SpO2: 99% (03 Mar 2021 11:21) (95% - 100%)  CAPILLARY BLOOD GLUCOSE        I&O's Summary    02 Mar 2021 07:01  -  03 Mar 2021 07:00  --------------------------------------------------------  IN: 400 mL / OUT: 930 mL / NET: -530 mL    03 Mar 2021 07:01  -  03 Mar 2021 15:15  --------------------------------------------------------  IN: 0 mL / OUT: 0 mL / NET: 0 mL        PHYSICAL EXAM:  GENERAL: NAD  HEAD:  Atraumatic, Normocephalic  EYES: EOMI, PERRLA, conjunctiva and sclera clear  NECK: Supple, No JVD  CHEST/LUNG: decreased BS to L base; No wheeze  HEART: Regular rate and rhythm; No murmurs, rubs, or gallops  ABDOMEN: Soft, Nontender, Nondistended; Bowel sounds present  EXTREMITIES:  2+ Peripheral Pulses, No clubbing, cyanosis, or edema. LUE AVF.  PSYCH: Calm  NEUROLOGY: A/Ox3, non-focal  SKIN: No rashes or lesions    LABS:                        11.7   3.30  )-----------( 62       ( 03 Mar 2021 13:14 )             37.4     03-03    136  |  95<L>  |  60<H>  ----------------------------<  77  4.9   |  27  |  4.50<H>    Ca    9.9      03 Mar 2021 13:14  Phos  5.5     03-03  Mg     2.4     03-03    TPro  6.3  /  Alb  3.2<L>  /  TBili  0.5  /  DBili  x   /  AST  56<H>  /  ALT  124<H>  /  AlkPhos  135<H>  03-02    PT/INR - ( 02 Mar 2021 03:34 )   PT: 17.6 sec;   INR: 1.56 ratio         PTT - ( 02 Mar 2021 03:34 )  PTT:33.0 sec  CARDIAC MARKERS ( 02 Mar 2021 03:36 )  x     / x     / 74 U/L / x     / x              RADIOLOGY & ADDITIONAL TESTS:    Imaging Personally Reviewed:    Care Discussed with Consultants/Other Providers:

## 2021-03-03 NOTE — PROGRESS NOTE ADULT - ASSESSMENT
70M CAD/stent, sCHF LVEF 12%, BPH, neurogenic bladder, ESRD on HD w/ chronic anemia, MRSA on vibramycin p/w suspected UTI with acute metabolic encephalopathy, transaminitis, hyperkalemia, lactic acidosis.

## 2021-03-03 NOTE — SWALLOW BEDSIDE ASSESSMENT ADULT - COMMENTS
Per chartin69 y/o M w/ hx ESRD on HD, anemia, hyperparathyroidism, CAD s/p stent, BPH w/ neurogenic bladder (last dialysis 21), cognitive impairment, that was brought in by EMS from [facility] for AMS. Due to patients mental status history obtained from ED provider note.  Per chart, pt usually verbal but upon check up at NH pt non verbal. Pt was admitted to this hospital for similar issues in past and was found to have infection (PNA). Pt not able to provide further history. Chart shows pt is dependent on all ADLs.  Unable to attain review of systems at this time.     CT Brain 3/2/21: IMPRESSION: No acute intracranial hemorrhage or mass effect.    CT Chest 3/2/21: The cardiomediastinal silhouette is not well evaluated in this single AP projection. Redemonstration of a large left-sided loculated pleural effusion with adjacent subsegmental atelectasis which appears grossly unchanged from prior examination. No acute osseous abnormalities. Generalized abdominal distention.    Patient is known to this department from bedside swallow evaluation (21) during previous admission, at which time a mechanical soft diet and thin liquids were recommended (see chart for full report). Patient was received awake, grossly non-verbal and inconsistently able to follow low-level directives with clinician cueing. Recommendations discussed with ACP (Kourtney). Per chartin71 y/o M w/ hx ESRD on HD, anemia, hyperparathyroidism, CAD s/p stent, BPH w/ neurogenic bladder (last dialysis 21), cognitive impairment, that was brought in by EMS from [facility] for AMS. Due to patients mental status history obtained from ED provider note.  Per chart, pt usually verbal but upon check up at NH pt non verbal. Pt was admitted to this hospital for similar issues in past and was found to have infection (PNA). Pt not able to provide further history. Chart shows pt is dependent on all ADLs.  Unable to attain review of systems at this time.     CT Brain 3/2/21: IMPRESSION: No acute intracranial hemorrhage or mass effect.    CT Chest 3/2/21: The cardiomediastinal silhouette is not well evaluated in this single AP projection. Redemonstration of a large left-sided loculated pleural effusion with adjacent subsegmental atelectasis which appears grossly unchanged from prior examination. No acute osseous abnormalities. Generalized abdominal distention.    Patient is known to this department from bedside swallow evaluation (21) during previous admission, at which time a mechanical soft diet and thin liquids were recommended (see chart for full report). Patient was received awake, grossly non-verbal and inconsistently able to follow low-level directives given verbal and tactile cueing. Warming blanket in place. Recommendations discussed with ACP (Kourtney).

## 2021-03-03 NOTE — SWALLOW BEDSIDE ASSESSMENT ADULT - SWALLOW EVAL: RECOMMENDED FEEDING/EATING TECHNIQUES
allow for swallow between intakes/crush medication (when feasible)/maintain upright posture during/after eating for 30 mins/no straws/oral hygiene/position upright (90 degrees)/small sips/bites PEDS

## 2021-03-03 NOTE — SWALLOW BEDSIDE ASSESSMENT ADULT - SWALLOW EVAL: DIAGNOSIS
Patient demonstrated oropharyngeal dysphagia characterized by adequate oral acceptance followed by slow/weak lingual motion with prolonged bolus collection, manipulation and transport, suspect premature spillage over base of tongue for thin liquids, delayed pharyngeal triggering and reduced laryngeal elevation upon digital palpation for PO trials of puree, nectar-thick and thin liquids. Throat clearing noted post deglutition for thin liquids suggestive of laryngeal penetration vs aspiration. No overt clinical s/s of impaired airway protection noted for puree and nectar-thick liquids.

## 2021-03-03 NOTE — SWALLOW BEDSIDE ASSESSMENT ADULT - PHARYNGEAL PHASE
Delayed pharyngeal swallow/Decreased laryngeal elevation Delayed pharyngeal swallow/Decreased laryngeal elevation/Throat clear post oral intake

## 2021-03-03 NOTE — PROGRESS NOTE ADULT - PROBLEM SELECTOR PLAN 3
Compared to late january CXR - interval increase?  Pulmonary consult for diagnostic thoracentesis, if indicated.

## 2021-03-04 NOTE — PROVIDER CONTACT NOTE (OTHER) - SITUATION
ACP notified patients rectal temp decreased since last checked one hour ago. Patient also remains hypotensive

## 2021-03-04 NOTE — CONSULT NOTE ADULT - ASSESSMENT
70M CAD s/p stent, HFrEF (LVEF 12%), BPH, neurogenic bladder, ESRD on HD, chronic anemia, MRSA on vibramycin admitted for suspected UTI. Pulmonology consulted for loculated left pleural effusion. 70M CAD (s/p stent), HFrEF (LVEF 12%), BPH, neurogenic bladder, ESRD on HD, chronic anemia, MRSA on vibramycin admitted for suspected UTI. Pulmonology consulted for left pleural effusion.    #PLEURAL EFFUSION  -Chronic left-sided loculated effusion, present since January as per imaging, appears loculated on CXR  -History notable for severe heart failure 70M CAD (s/p stent), HFrEF (LVEF 12%), BPH, neurogenic bladder, ESRD on HD, chronic anemia, MRSA on vibramycin admitted for acute metabolic encephalopathy, hypotension, suspected UTI. Pulmonology consulted for persistent left pleural effusion.    #PLEURAL EFFUSION  -Chronic left-sided loculated effusion, present since January as per imaging, appeared loculated on CXR  -Bedside POCUS showing moderate simple left pleural effusion, trace right pleural effusion    ***plan to be discussed with attending*** 70M CAD (s/p stent), HFrEF (LVEF 12%), BPH, neurogenic bladder, ESRD on HD, chronic anemia, MRSA on vibramycin admitted for acute metabolic encephalopathy, hypotension, suspected UTI. Pulmonology consulted for persistent left pleural effusion.    #PLEURAL EFFUSION  -Chronic left-sided loculated effusion, present since December 2020 as per imaging. Small right effusion also noted on January 2021 CT chest.  -Bedside POCUS showing moderate simple left pleural effusion, trace right pleural effusion  -DDx broad, includes 2/2 infection (empyema, parapneumonic effusion), fluid overload (in setting of severe congestive heart failure, ESRD), malignancy  -Would hold off on thoracentesis at this time given chronicity of effusion and lack of signs or symptoms of respiratory distress  -Recommend to continue HD sessions for goal euvolemia and reassess for improvement

## 2021-03-04 NOTE — PROGRESS NOTE ADULT - SUBJECTIVE AND OBJECTIVE BOX
Overnight events noted   VITAL: T(C): , Max: 36.5 (03-03-21 @ 17:12) T(F): , Max: 97.7 (03-03-21 @ 17:12) HR: 82 (03-04-21 @ 05:44) BP: 85/67 (03-04-21 @ 05:44) BP(mean): -- RR: 16 (03-04-21 @ 05:44) SpO2: 94% (03-04-21 @ 05:44)   PHYSICAL EXAM: Constitutional: lethargic but alert (improved from yesterday) HEENT: NCAT, MMM Neck: Supple, No JVD Respiratory: CTA-R; decreased BS L base Cardiovascular: RRR s1s2, no m/r/g Gastrointestinal: BS+, soft, NT/ND Extremities: No peripheral edema b/l Neurological: no focal deficits; strength grossly intact Back: no CVAT b/l Skin: No rashes, no nevi Access: LUE AVF (+)thrill  LABS:                      11.7  3.30  )-----------( 62       ( 03 Mar 2021 13:14 )            37.4   Na(136)/K(4.9)/Cl(95)/HCO3(27)/BUN(60)/Cr(4.50)Glu(77)/Ca(9.9)/Mg(2.4)/PO4(5.5)    03-03 @ 13:14 Na(128)/K(5.1)/Cl(91)/HCO3(20)/BUN(53)/Cr(3.84)Glu(256)/Ca(10.2)/Mg(2.4)/PO4(--)    03-03 @ 09:13 Na(147)/K(5.6)/Cl(101)/HCO3(26)/BUN(83)/Cr(5.88)Glu(190)/Ca(10.6)/Mg(--)/PO4(--)    03-02 @ 06:30 Na(145)/K(6.1)/Cl(98)/HCO3(24)/BUN(84)/Cr(6.01)Glu(125)/Ca(11.1)/Mg(--)/PO4(--)    03-02 @ 03:36 Na(144)/K(5.9)/Cl(98)/HCO3(26)/BUN(84)/Cr(6.07)Glu(124)/Ca(11.2)/Mg(2.9)/PO4(5.8)    03-02 @ 03:34   IMPRESSION: 70M w/ dementia, CAD, past epidural abscess, and ESRD, 3/2/21 a/w AMS  (1)Renal - ESRD - HD TTS - due for next HD today (2)Hyperkalemia - improved (3)Hypercalcemia - primary hyperparathyroidism - on Sensipar - improved relative to admission (4)AMS - sepsis-associated? Now on Zosyn - clinically improving (5)CV - hypotensive -presently dependent on high-dose midodrine  RECOMMEND: (1)Next HD today - 0.5kg UF as able (2)Midodrine as ordered (3)Sensipar as ordered (4)Continue abx for GFR<10/HD      Heath Huff MD [facility] Office: [phone] Cell: [phone]       No pain, no sob   VITAL: T(C): , Max: 36.5 (03-03-21 @ 17:12) T(F): , Max: 97.7 (03-03-21 @ 17:12) HR: 82 (03-04-21 @ 05:44) BP: 85/67 (03-04-21 @ 05:44) BP(mean): -- RR: 16 (03-04-21 @ 05:44) SpO2: 94% (03-04-21 @ 05:44)   PHYSICAL EXAM: Constitutional: lethargic but alert; cachectic; (+)warming blanket HEENT: NCAT, DMM Neck: Supple, No JVD Respiratory: CTA-R; decreased BS L base Cardiovascular: RRR s1s2, no m/r/g Gastrointestinal: BS+, soft, NT/ND Extremities: No peripheral edema b/l Neurological: no focal deficits; strength grossly intact Back: no CVAT b/l Skin: No rashes, no nevi Access: LUE AVF (+)thrill  LABS:                      11.7  3.30  )-----------( 62       ( 03 Mar 2021 13:14 )            37.4   Na(136)/K(4.9)/Cl(95)/HCO3(27)/BUN(60)/Cr(4.50)Glu(77)/Ca(9.9)/Mg(2.4)/PO4(5.5)    03-03 @ 13:14 Na(128)/K(5.1)/Cl(91)/HCO3(20)/BUN(53)/Cr(3.84)Glu(256)/Ca(10.2)/Mg(2.4)/PO4(--)    03-03 @ 09:13 Na(147)/K(5.6)/Cl(101)/HCO3(26)/BUN(83)/Cr(5.88)Glu(190)/Ca(10.6)/Mg(--)/PO4(--)    03-02 @ 06:30 Na(145)/K(6.1)/Cl(98)/HCO3(24)/BUN(84)/Cr(6.01)Glu(125)/Ca(11.1)/Mg(--)/PO4(--)    03-02 @ 03:36 Na(144)/K(5.9)/Cl(98)/HCO3(26)/BUN(84)/Cr(6.07)Glu(124)/Ca(11.2)/Mg(2.9)/PO4(5.8)    03-02 @ 03:34   IMPRESSION: 70M w/ dementia, CAD, past epidural abscess, and ESRD, 3/2/21 a/w AMS  (1)Renal - ESRD - HD TTS - due for next HD today (2)Hyperkalemia - improved (3)Hypercalcemia - primary hyperparathyroidism - on Sensipar - improved relative to admission (4)AMS - sepsis-associated? Now on Zosyn - clinically improving (5)CV - hypotensive -presently dependent on high-dose midodrine  RECOMMEND: (1)Next HD today - 0.5kg UF as able (2)Midodrine as ordered (3)Sensipar as ordered (4)Continue abx for GFR<10/HD      Heath Huff MD [facility] Office: [phone] Cell: [phone]       No pain, no sob   VITAL: T(C): , Max: 36.5 (03-03-21 @ 17:12) T(F): , Max: 97.7 (03-03-21 @ 17:12) HR: 82 (03-04-21 @ 05:44) BP: 85/67 (03-04-21 @ 05:44) BP(mean): -- RR: 16 (03-04-21 @ 05:44) SpO2: 94% (03-04-21 @ 05:44)   PHYSICAL EXAM: Constitutional: lethargic but alert; cachectic; (+)warming blanket HEENT: NCAT, DMM Neck: Supple, No JVD Respiratory: CTA-R; decreased BS L base Cardiovascular: RRR s1s2, no m/r/g Gastrointestinal: BS+, soft, NT/ND Extremities: No peripheral edema b/l Neurological: no focal deficits; strength grossly intact Back: no CVAT b/l Skin: No rashes, no nevi Access: LUE AVF (+)thrill  LABS:                      11.7  3.30  )-----------( 62       ( 03 Mar 2021 13:14 )            37.4   Na(136)/K(4.9)/Cl(95)/HCO3(27)/BUN(60)/Cr(4.50)Glu(77)/Ca(9.9)/Mg(2.4)/PO4(5.5)    03-03 @ 13:14 Na(128)/K(5.1)/Cl(91)/HCO3(20)/BUN(53)/Cr(3.84)Glu(256)/Ca(10.2)/Mg(2.4)/PO4(--)    03-03 @ 09:13 Na(147)/K(5.6)/Cl(101)/HCO3(26)/BUN(83)/Cr(5.88)Glu(190)/Ca(10.6)/Mg(--)/PO4(--)    03-02 @ 06:30 Na(145)/K(6.1)/Cl(98)/HCO3(24)/BUN(84)/Cr(6.01)Glu(125)/Ca(11.1)/Mg(--)/PO4(--)    03-02 @ 03:36 Na(144)/K(5.9)/Cl(98)/HCO3(26)/BUN(84)/Cr(6.07)Glu(124)/Ca(11.2)/Mg(2.9)/PO4(5.8)    03-02 @ 03:34   IMPRESSION: 70M w/ dementia, CAD, past epidural abscess, and ESRD, 3/2/21 a/w AMS  (1)Renal - ESRD - HD TTS - due for next HD today (2)Hyperkalemia - improved (3)Hypercalcemia - primary hyperparathyroidism - on Sensipar - improved relative to admission (4)AMS - sepsis-associated? Now on Zosyn - clinically improving (5)CV - hypotensive -presently dependent on high-dose midodrine - why is his BP so low?  RECOMMEND: (1)Next HD today - no net UF; low BFR/low dialysate temp, in effort to minimize risk of intradialytic hypotension; Midodrine 10mg po x1 prn SBP<85 on HD (in addition to the TID Midodrine already ordered) (2)Midodrine TID as ordered (3)No objection to NS 500cc bolus x 2 today as needed for hypotension (4)Sensipar as ordered (5)Continue abx for GFR<10/HD   discussed with primary team ANNIKA Huff MD [facility] Office: [phone] Cell: [phone]

## 2021-03-04 NOTE — CONSULT NOTE ADULT - SUBJECTIVE AND OBJECTIVE BOX
HPI:  71 y/o M w/ hx ESRD on HD, anemia, hyperparathyroidism, CAD s/p stent, BPH w/ neurogenic bladder (last dialysis 2/27/21), cognitive impairment, that was brought in by EMS from [facility] for AMS. Patient was admitted with suspected UTI (with increased pus in urine) with acute metabolic encephalopathy. Patient was started on Zosyn and vanc by level. Patient became hypotensive despite antibiotic treatment. Patient received 250cc bolus at 6PM and 10PM and two extra doses of midodrine at 6 and 8pm. Despite this, patient remained hypotensive. Currently Mr. Beaver is A&Ox2 (to himself, location, but not date). He reports lower abdominal pain that has been persistent since hospital stay. He also says he feels fatigued and overall weak.  Patient denies any chest pain, nausea, vomiting, shortness of breath, cough.       FAMILY HISTORY:  No pertinent family history in first degree relatives    Family history of breast cancer (Sibling)    SOCIAL HISTORY: unknown  Allergies    No Known Allergies    Intolerances        HOME MEDICATIONS:    REVIEW OF SYSTEMS:  CV: denies chest pain, palpitations  Resp: denies shortness of breath, cough  GI: lower abdominal pain  : +burning with urination  Neurological: denies headaches  [x] All other systems negative  [ ] Unable to assess ROS because ________    OBJECTIVE:  ICU Vital Signs Last 24 Hrs  T(C): 35.8 (04 Mar 2021 02:03), Max: 36.5 (03 Mar 2021 17:12)  T(F): 96.5 (04 Mar 2021 02:03), Max: 97.7 (03 Mar 2021 17:12)  HR: 80 (04 Mar 2021 02:03) (65 - 92)  BP: 79/56 (04 Mar 2021 02:03) (79/56 - 111/65)  BP(mean): --  ABP: --  ABP(mean): --  RR: 18 (04 Mar 2021 02:03) (15 - 18)  SpO2: 97% (04 Mar 2021 02:03) (97% - 100%)        03-02 @ 07:01  -  03-03 @ 07:00  --------------------------------------------------------  IN: 400 mL / OUT: 930 mL / NET: -530 mL    03-03 @ 07:01  -  03-04 @ 03:09  --------------------------------------------------------  IN: 268 mL / OUT: 0 mL / NET: 268 mL      CAPILLARY BLOOD GLUCOSE      POCT Blood Glucose.: 92 mg/dL (02 Mar 2021 14:08)      PHYSICAL EXAM:  GENERAL: NAD  NECK: No JVD  CHEST/LUNG: no crackles, rales, rhonchi, wheezing. Decreased lung sound on auscultation in LLL  HEART: RRR, no murmers, gallops  ABDOMEN: Soft, Nontender, Nondistended;  EXTREMITIES:  2+ Peripheral Pulses, LUE AVF present  NEUROLOGY: A/Ox2, 4/5 strength bilateral, able to move upper extremities, follows commands   SKIN: No rashes or lesions    HOSPITAL MEDICATIONS:  MEDICATIONS  (STANDING):  aspirin enteric coated 81 milliGRAM(s) Oral daily  atorvastatin 10 milliGRAM(s) Oral at bedtime  chlorhexidine 4% Liquid 1 Application(s) Topical daily  cinacalcet 90 milliGRAM(s) Oral daily  citalopram 20 milliGRAM(s) Oral daily  heparin   Injectable 5000 Unit(s) SubCutaneous every 8 hours  lactobacillus acidophilus 1 Tablet(s) Oral daily  metoprolol tartrate 12.5 milliGRAM(s) Oral two times a day  midodrine. 20 milliGRAM(s) Oral three times a day  mupirocin 2% Ointment 1 Application(s) Both Nostrils two times a day  piperacillin/tazobactam IVPB.. 3.3375 Gram(s) IV Intermittent every 12 hours  polyethylene glycol 3350 17 Gram(s) Oral daily  senna 2 Tablet(s) Oral at bedtime  tamsulosin 0.4 milliGRAM(s) Oral at bedtime    MEDICATIONS  (PRN):      LABS:                        11.7   3.30  )-----------( 62       ( 03 Mar 2021 13:14 )             37.4     03-03    136  |  95<L>  |  60<H>  ----------------------------<  77  4.9   |  27  |  4.50<H>    Ca    9.9      03 Mar 2021 13:14  Phos  5.5     03-03  Mg     2.4     03-03    TPro  6.3  /  Alb  3.2<L>  /  TBili  0.5  /  DBili  x   /  AST  56<H>  /  ALT  124<H>  /  AlkPhos  135<H>  03-02    PT/INR - ( 02 Mar 2021 03:34 )   PT: 17.6 sec;   INR: 1.56 ratio         PTT - ( 02 Mar 2021 03:34 )  PTT:33.0 sec      Venous Blood Gas:  03-02 @ 06:28  7.35/56/28/26/33.9  VBG Lactate: 4.3  Venous Blood Gas:  03-02 @ 03:34  7.39/47/33/26/51.3  VBG Lactate: 5.2      MICROBIOLOGY:     RADIOLOGY:  < from: Xray Chest 1 View- PORTABLE-Urgent (03.02.21 @ 04:41) >  IMPRESSION:  Stable examination from prior.  Large loculated left-sided pleural effusion.  < end of copied text >  [x] Reviewed and interpreted by me

## 2021-03-04 NOTE — CHART NOTE - NSCHARTNOTEFT_GEN_A_CORE
[facility] MEDICINE NIGHT COVERAGE - Medicine Subsequent Hospital Care Note    CC: Hypotensive  HPI/Subjective: 69 y/o M w/ hx ESRD on HD TTS, anemia, hyperparathyroidism, CAD s/p stent, BPH w/ neurogenic bladder (last dialysis 2/27/21), cognitive impairment, 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 Recent hospitalization for same, empirically treated for PNA vs UTI with improved mental status (A&Ox2 at baseline). Overnight, patient hypotensive with SBP ranging in 80's.    Vital Signs Last 24 Hrs  T(C): 35.8 (03-04-21 @ 02:03), Max: 36.5 (03-03-21 @ 17:12)  T(F): 96.5 (03-04-21 @ 02:03), Max: 97.7 (03-03-21 @ 17:12)  HR: 80 (03-04-21 @ 02:03) (65 - 92)  BP: 79/56 (03-04-21 @ 02:03) (79/56 - 111/65)  RR: 18 (03-04-21 @ 02:03) (15 - 18)  SpO2: 97% (03-04-21 @ 02:03) (97% - 100%) on (O2)    PHYSICAL EXAM:  Constitutional: NAD  Respiratory: Clear to auscultation bilaterally. No wheezes, rales or rhonchi. Normal respiratory effort  Cardiovascular: regular rate and rhythm, S1 and S2 present  Gastrointestinal: soft, nontender, nondistended, +bowel sounds, no hernia  Psychiatric: A&Ox2, appropriate mood, affect    LABS:                        11.7   3.30  )-----------( 62       ( 03 Mar 2021 13:14 )             37.4     03-03    136  |  95<L>  |  60<H>  ----------------------------<  77  4.9   |  27  |  4.50<H>    Ca    9.9      03 Mar 2021 13:14  Phos  5.5     03-03  Mg     2.4     03-03    TPro  6.3  /  Alb  3.2<L>  /  TBili  0.5  /  DBili  x   /  AST  56<H>  /  ALT  124<H>  /  AlkPhos  135<H>  03-02    PT/INR: PT: 17.6 sec | INR: 1.56 ratio (03-02-21 @ 03:34)  PTT: 33.0 sec (03-02-21 @ 03:34)    CARDIAC ENZYMES    Creatine Kinase, Serum: 74 U/L (03-02-21 @ 03:36)    Serum Pro-Brain Natriuretic Peptide:   D-Dimer Assay:     Urinanalysis Basic (03-04-21 @ 04:37):      Blood Gas Venous (03-04-21 @ 04:37):  pH: 7.35 | HCO3: 26 | pCO2: 56 | pO2: 28 | Lactate: 4.3  pH: 7.39 | HCO3: 26 | pCO2: 47 | pO2: 33 | Lactate: 5.2    Blood Gas Arterial (03-04-21 @ 04:37):    CAPILLARY BLOOD GLUCOSE:  POCT Blood Glucose: 92 mg/dL (03-02-21 @ 14:08)  POCT Blood Glucose: 107 mg/dL (03-01-21 @ 23:49)  POCT Blood Glucose: 75 mg/dL (02-04-21 @ 17:02)      COVID PCR:  NotDetec (03-02-21 @ 07:37)  NotDetec (02-02-21 @ 15:22)  NotDetec (12-10-20 @ 16:54)      RADIOLOGY:    MEDICATIONS  (STANDING):  aspirin enteric coated 81 milliGRAM(s) Oral daily  atorvastatin 10 milliGRAM(s) Oral at bedtime  chlorhexidine 4% Liquid 1 Application(s) Topical daily  cinacalcet 90 milliGRAM(s) Oral daily  citalopram 20 milliGRAM(s) Oral daily  heparin   Injectable 5000 Unit(s) SubCutaneous every 8 hours  lactobacillus acidophilus 1 Tablet(s) Oral daily  midodrine. 20 milliGRAM(s) Oral three times a day  mupirocin 2% Ointment 1 Application(s) Both Nostrils two times a day  piperacillin/tazobactam IVPB.. 3.3375 Gram(s) IV Intermittent every 12 hours  polyethylene glycol 3350 17 Gram(s) Oral daily  senna 2 Tablet(s) Oral at bedtime  tamsulosin 0.4 milliGRAM(s) Oral at bedtime    MEDICATIONS  (PRN):    I&O's Summary    02 Mar 2021 07:01  -  03 Mar 2021 07:00  --------------------------------------------------------  IN: 400 mL / OUT: 930 mL / NET: -530 mL    03 Mar 2021 07:01  -  04 Mar 2021 04:37  --------------------------------------------------------  IN: 268 mL / OUT: 0 mL / NET: 268 mL      I reviewed the above labs, radiology, medications, tests, telemetry, and EKG interpretation.    ASSESSMENT/PLAN: 69 y/o male with hx End Stage Renal Disease now hypotensive with SBP in the 80's.    Hypotension  -MICU Consulted->Will trend lactate with am labs. Increase Midodrine to 20mg TID. Discontinue Beta blocker  -s/p additional 5mg PO Midodrine  -s/p 250cc Normal Saline Bolus over 30 minutes  -Vitals Q4    Hypothermia  -Warming [place] (Hyperthermia [place]) ordered    - Clinical findings, labs, tests, telemetry, and ekg reviewed with attending. Case discussed with [facility] Dr. Pennington. Will continue to monitor patient closely.     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 Recent hospitalization for same, empirically treated for PNA vs UTI with improved mental status (A&Ox2 at baseline). Overnight, patient hypotensive with SBP ranging in 80's.    Vital Signs Last 24 Hrs  T(C): 35.8 (03-04-21 @ 02:03), Max: 36.5 (03-03-21 @ 17:12)  T(F): 96.5 (03-04-21 @ 02:03), Max: 97.7 (03-03-21 @ 17:12)  HR: 80 (03-04-21 @ 02:03) (65 - 92)  BP: 79/56 (03-04-21 @ 02:03) (79/56 - 111/65)  RR: 18 (03-04-21 @ 02:03) (15 - 18)  SpO2: 97% (03-04-21 @ 02:03) (97% - 100%) on (O2)    PHYSICAL EXAM:  Constitutional: NAD  Respiratory: Clear to auscultation bilaterally. No wheezes, rales or rhonchi. Normal respiratory effort  Cardiovascular: regular rate and rhythm, S1 and S2 present  Gastrointestinal: soft, nontender, nondistended, +bowel sounds, no hernia  Psychiatric: A&Ox2, appropriate mood, affect    LABS:                        11.7   3.30  )-----------( 62       ( 03 Mar 2021 13:14 )             37.4     03-03    136  |  95<L>  |  60<H>  ----------------------------<  77  4.9   |  27  |  4.50<H>    Ca    9.9      03 Mar 2021 13:14  Phos  5.5     03-03  Mg     2.4     03-03    TPro  6.3  /  Alb  3.2<L>  /  TBili  0.5  /  DBili  x   /  AST  56<H>  /  ALT  124<H>  /  AlkPhos  135<H>  03-02    PT/INR: PT: 17.6 sec | INR: 1.56 ratio (03-02-21 @ 03:34)  PTT: 33.0 sec (03-02-21 @ 03:34)    CARDIAC ENZYMES    Creatine Kinase, Serum: 74 U/L (03-02-21 @ 03:36)    Serum Pro-Brain Natriuretic Peptide:   D-Dimer Assay:     Urinanalysis Basic (03-04-21 @ 04:37):      Blood Gas Venous (03-04-21 @ 04:37):  pH: 7.35 | HCO3: 26 | pCO2: 56 | pO2: 28 | Lactate: 4.3  pH: 7.39 | HCO3: 26 | pCO2: 47 | pO2: 33 | Lactate: 5.2    Blood Gas Arterial (03-04-21 @ 04:37):    CAPILLARY BLOOD GLUCOSE:  POCT Blood Glucose: 92 mg/dL (03-02-21 @ 14:08)  POCT Blood Glucose: 107 mg/dL (03-01-21 @ 23:49)  POCT Blood Glucose: 75 mg/dL (02-04-21 @ 17:02)      COVID PCR:  NotDetec (03-02-21 @ 07:37)  NotDetec (02-02-21 @ 15:22)  NotDetec (12-10-20 @ 16:54)      RADIOLOGY:    MEDICATIONS  (STANDING):  aspirin enteric coated 81 milliGRAM(s) Oral daily  atorvastatin 10 milliGRAM(s) Oral at bedtime  chlorhexidine 4% Liquid 1 Application(s) Topical daily  cinacalcet 90 milliGRAM(s) Oral daily  citalopram 20 milliGRAM(s) Oral daily  heparin   Injectable 5000 Unit(s) SubCutaneous every 8 hours  lactobacillus acidophilus 1 Tablet(s) Oral daily  midodrine. 20 milliGRAM(s) Oral three times a day  mupirocin 2% Ointment 1 Application(s) Both Nostrils two times a day  piperacillin/tazobactam IVPB.. 3.3375 Gram(s) IV Intermittent every 12 hours  polyethylene glycol 3350 17 Gram(s) Oral daily  senna 2 Tablet(s) Oral at bedtime  tamsulosin 0.4 milliGRAM(s) Oral at bedtime    MEDICATIONS  (PRN):    I&O's Summary    02 Mar 2021 07:01  -  03 Mar 2021 07:00  --------------------------------------------------------  IN: 400 mL / OUT: 930 mL / NET: -530 mL    03 Mar 2021 07:01  -  04 Mar 2021 04:37  --------------------------------------------------------  IN: 268 mL / OUT: 0 mL / NET: 268 mL      I reviewed the above labs, radiology, medications, tests, telemetry, and EKG interpretation.    ASSESSMENT/PLAN: 69 y/o M CAD/stent, sCHF LVEF 12%, BPH, neurogenic bladder, ESRD on HD w/ chronic anemia, MRSA on vibramycin p/w suspected UTI. Overnight Hypotensive with SBP in 80's. Patient mentating well.    Hypotension  -MICU Consulted->Will trend lactate with am labs. Increase Midodrine to 20mg TID. Discontinue Metoprolol  -s/p additional 5mg PO Midodrine  -s/p 250cc Normal Saline Bolus over 30 minutes  -will give another 250bolus if hypotension does not resolve  -Vitals Q4    Hypothermia  -[place]arming [place] (Hyperthermia [place]) ordered    - Clinical findings, labs, tests, telemetry, and ekg reviewed with attending. Case discussed with [facility] Dr. Pennington. Will continue to monitor patient closely.     Luli Oates PA-C  Medicine [id number]    [ ] Moderate complexity/risk (Time < 50min)

## 2021-03-04 NOTE — CONSULT NOTE ADULT - ASSESSMENT
70M CAD/stent, sCHF LVEF 12%, BPH, neurogenic bladder, ESRD on HD w/ chronic anemia, MRSA on vibramycin p/w suspected UTI. MICU consulted for hypotension.    #Hypotension  -has chronic hypotension, but now exacerbated due to UTI  -Patient's BP ~84/64 on my manual BP measurement   -Patient otherwise mentating well, able to follow all commands, has lactate of 2.0 now, and MAP>65  -recommend increasing midodrine to 20 TID  -can give another 250bolus if hypotension does not resolve  -DC metoprolol until hypotension resolves  -can trend lactate (last one was 1pm 3/3/21)   -discussed above with primary team     Discussed with Dr. Atul Mazariegos, PGY2  MICU Consult Resident

## 2021-03-04 NOTE — CONSULT NOTE ADULT - SUBJECTIVE AND OBJECTIVE BOX
Patient is a 70y old  Male who presents with a chief complaint of AMS (04 Mar 2021 08:17)    HPI:  71 y/o M w/ hx ESRD on HD, anemia, hyperparathyroidism, CAD s/p stent, BPH w/ neurogenic bladder (last dialysis 2/27/21), cognitive impairment, that was brought in by EMS from [facility] for AMS. Due to patients mental status history obtained from ED provider note.  Per chart, pt usually verbal but upon check up at NH pt non verbal. Pt was admitted to this hospital for similar issues in past and was found to have infection (PNA). Pt not able to provide further history. Chart shows pt is dependent on all ADLs.  Unable to attain review of systems at this time.    (02 Mar 2021 09:08)     prior hospital charts reviewed [  ]  primary team notes reviewed [X]  other consultant notes reviewed [X]    PAST MEDICAL & SURGICAL HISTORY:  Inguinal hernia  NSTEMI (non-ST elevated myocardial infarction)  HLD (hyperlipidemia)  Neurogenic bladder  Spinal abscess  Cavitary lung disease  CAD (coronary artery disease)  Abscess of sacrum  Anemia due to chronic renal failure treated with erythropoietin, stage 2 (mild)  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 device, implant or graft  HPTH (hyperparathyroidism)  Secondary HTN (hypertension)  ESRD (end stage renal disease)  Cavitary lung disease  CAD in native artery  Hypertension  ESRD (end stage renal disease)  S/P primary angioplasty with coronary stent  Kidney abscess    Allergies  No Known Allergies    ANTIMICROBIALS (past 90 days)  MEDICATIONS  (STANDING):  piperacillin/tazobactam IVPB..   25 mL/Hr IV Intermittent (03-04-21 @ 05:45)   25 mL/Hr IV Intermittent (03-03-21 @ 18:23)   25 mL/Hr IV Intermittent (03-03-21 @ 04:51)   25 mL/Hr IV Intermittent (03-02-21 @ 22:05)    piperacillin/tazobactam IVPB...   200 mL/Hr IV Intermittent (03-02-21 @ 05:35)    vancomycin  IVPB   250 mL/Hr IV Intermittent (03-02-21 @ 20:30)    piperacillin/tazobactam IVPB.. 3.3375 every 12 hours    OTHER MEDS: MEDICATIONS  (STANDING):  aspirin enteric coated 81 daily  atorvastatin 10 at bedtime  cinacalcet 90 daily  citalopram 20 daily  heparin   Injectable 5000 every 8 hours  midodrine. 20 three times a day  polyethylene glycol 3350 17 daily  senna 2 at bedtime  tamsulosin 0.4 at bedtime    SOCIAL HISTORY:    FAMILY HISTORY:  Family history of breast cancer (Sibling)    REVIEW OF SYSTEMS  [  ] ROS unobtainable because:    [  ] All other systems negative except as noted below:	    Vital Signs Last 24 Hrs  T(F): 96.4 (03-04-21 @ 05:44), Max: 100.1 (03-02-21 @ 03:39)  Vital Signs Last 24 Hrs  HR: 82 (03-04-21 @ 05:44) (72 - 92)  BP: 85/67 (03-04-21 @ 05:44) (79/56 - 100/61)  RR: 16 (03-04-21 @ 05:44)  SpO2: 94% (03-04-21 @ 05:44) (94% - 100%)    PHYSICAL EXAM:  GENERAL: Lethargic  SKIN: No rashes  HEENT: NCAT, MMM  LUNGS: R lung clear to auscultation, L lung decreased breath sounds  CV: RRR, +S1S2  ABDOMEN: +BS, soft NTND  EXTREMITIES: no peripheral edema, LUE AVF  NEURO: no gross focal neurologic deficits    LABS             11.7   3.30  )-----------( 62       ( 03 Mar 2021 13:14 )             37.4   03-03    136  |  95<L>  |  60<H>  ----------------------------<  77  4.9   |  27  |  4.50<H>    Ca    9.9      03 Mar 2021 13:14  Phos  5.5     03-03  Mg     2.4     03-03    MICROBIOLOGY:  Culture - Blood (collected 02 Mar 2021 06:39)  Source: .Blood Blood-Peripheral  Preliminary Report (03 Mar 2021 07:01):    No growth to date.    Culture - Blood (collected 02 Mar 2021 06:39)  Source: .Blood Blood-Peripheral  Preliminary Report (03 Mar 2021 07:01):    No growth to date.    RADIOLOGY:  imaging below personally reviewed    < from: Xray Chest 1 View- PORTABLE-Urgent (03.02.21 @ 04:41) >  IMPRESSION:  Stable examination from prior.  Large loculated left-sided pleural effusion.    < from: Xray Chest 1 View AP/PA (01.27.21 @ 02:49) >  IMPRESSION:  Unchanged loculated left pleural effusion.  No acute pulmonary disease.    < from: CT Chest No Cont (01.27.21 @ 17:32) >  IMPRESSION:  Interval development of moderate left and small right effusions with adjacent areas of atelectasis. Mild septal thickening and cardiomegaly all suggestive of minimal interstitial edema.  Remaining incidental findings as above Patient is a 70y old  Male who presents with a chief complaint of AMS (04 Mar 2021 08:17)    HPI:  71 y/o M w/ hx ESRD on HD, anemia, hyperparathyroidism, CAD s/p stent, BPH w/ neurogenic bladder (last dialysis 2/27/21), cognitive impairment, that was brought in by EMS from [facility] for AMS. Due to patients mental status history obtained from ED provider note.  Per chart, pt usually verbal but upon check up at NH pt non verbal. Pt was admitted to this hospital for similar issues in past and was found to have infection (PNA). Pt not able to provide further history. Chart shows pt is dependent on all ADLs.  Unable to attain review of systems at this time.    (02 Mar 2021 09:08)    Above reviewed. Patient A&Ox2 (person, hospital), answering questions with "yes" and "no," unable to state why he is in the hospital.     prior hospital charts reviewed [  ]  primary team notes reviewed [X]  other consultant notes reviewed [X]    PAST MEDICAL & SURGICAL HISTORY:  Inguinal hernia  NSTEMI (non-ST elevated myocardial infarction)  HLD (hyperlipidemia)  Neurogenic bladder  Spinal abscess  Cavitary lung disease  CAD (coronary artery disease)  Abscess of sacrum  Anemia due to chronic renal failure treated with erythropoietin, stage 2 (mild)  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 device, implant or graft  HPTH (hyperparathyroidism)  Secondary HTN (hypertension)  ESRD (end stage renal disease)  Cavitary lung disease  CAD in native artery  Hypertension  ESRD (end stage renal disease)  S/P primary angioplasty with coronary stent  Kidney abscess    Allergies  No Known Allergies    ANTIMICROBIALS (past 90 days)  MEDICATIONS  (STANDING):  piperacillin/tazobactam IVPB..   25 mL/Hr IV Intermittent (03-04-21 @ 05:45)   25 mL/Hr IV Intermittent (03-03-21 @ 18:23)   25 mL/Hr IV Intermittent (03-03-21 @ 04:51)   25 mL/Hr IV Intermittent (03-02-21 @ 22:05)    piperacillin/tazobactam IVPB...   200 mL/Hr IV Intermittent (03-02-21 @ 05:35)    vancomycin  IVPB   250 mL/Hr IV Intermittent (03-02-21 @ 20:30)    piperacillin/tazobactam IVPB.. 3.3375 every 12 hours    OTHER MEDS: MEDICATIONS  (STANDING):  aspirin enteric coated 81 daily  atorvastatin 10 at bedtime  cinacalcet 90 daily  citalopram 20 daily  heparin   Injectable 5000 every 8 hours  midodrine. 20 three times a day  polyethylene glycol 3350 17 daily  senna 2 at bedtime  tamsulosin 0.4 at bedtime    SOCIAL HISTORY: Unable to obtain due to altered mental status    FAMILY HISTORY:  Family history of breast cancer (Sibling) per chart    REVIEW OF SYSTEMS  CONSTITUTIONAL: No fevers or chills  RESPIRATORY: No cough, wheezing, shortness of breath  CARDIOVASCULAR: No chest pain  GASTROINTESTINAL: No abdominal or epigastric pain. No nausea, vomiting.  GENITOURINARY: No dysuria    Vital Signs Last 24 Hrs  T(F): 96.4 (03-04-21 @ 05:44), Max: 100.1 (03-02-21 @ 03:39)  Vital Signs Last 24 Hrs  HR: 82 (03-04-21 @ 05:44) (72 - 92)  BP: 85/67 (03-04-21 @ 05:44) (79/56 - 100/61)  RR: 16 (03-04-21 @ 05:44)  SpO2: 94% (03-04-21 @ 05:44) (94% - 100%)    PHYSICAL EXAM:  GENERAL: Thin male, alert, sitting up in bed eating breakfast  SKIN: No rashes  HEENT: NCAT, PERRL, MMM  LUNGS: Normal work of breathing on room air. R lung clear to auscultation, L lung decreased breath sounds.  CV: RRR, +S1S2  ABDOMEN: +BS, soft NTND  EXTREMITIES: no peripheral edema  NEURO: A&Ox2 (person, [facility]). Answering other questions with one word answers ("yes" and "no"). MAEx4, no gross focal neurologic deficits.    LABS             11.7   3.30  )-----------( 62       ( 03 Mar 2021 13:14 )             37.4   03-03    136  |  95<L>  |  60<H>  ----------------------------<  77  4.9   |  27  |  4.50<H>    Ca    9.9      03 Mar 2021 13:14  Phos  5.5     03-03  Mg     2.4     03-03    MICROBIOLOGY:  Culture - Blood (collected 02 Mar 2021 06:39)  Source: .Blood Blood-Peripheral  Preliminary Report (03 Mar 2021 07:01):    No growth to date.    Culture - Blood (collected 02 Mar 2021 06:39)  Source: .Blood Blood-Peripheral  Preliminary Report (03 Mar 2021 07:01):    No growth to date.    RADIOLOGY:  imaging below personally reviewed    < from: Xray Chest 1 View- PORTABLE-Urgent (03.02.21 @ 04:41) >  IMPRESSION:  Stable examination from prior.  Large loculated left-sided pleural effusion.    < from: Xray Chest 1 View AP/PA (01.27.21 @ 02:49) >  IMPRESSION:  Unchanged loculated left pleural effusion.  No acute pulmonary disease.    < from: CT Chest No Cont (01.27.21 @ 17:32) >  IMPRESSION:  Interval development of moderate left and small right effusions with adjacent areas of atelectasis. Mild septal thickening and cardiomegaly all suggestive of minimal interstitial edema.  Remaining incidental findings as above Patient is a 70y old  Male who presents with a chief complaint of AMS (04 Mar 2021 08:17)    HPI:  71 y/o M w/ hx ESRD on HD, anemia, hyperparathyroidism, CAD s/p stent, BPH w/ neurogenic bladder (last dialysis 2/27/21), cognitive impairment, that was brought in by EMS from [facility] for AMS. Due to patients mental status history obtained from ED provider note.  Per chart, pt usually verbal but upon check up at NH pt non verbal. Pt was admitted to this hospital for similar issues in past and was found to have infection (PNA). Pt not able to provide further history. Chart shows pt is dependent on all ADLs.  Unable to attain review of systems at this time.    (02 Mar 2021 09:08)    Above reviewed. Patient A&Ox2 (person, [facility]), answering questions with "yes" and "no," unable to state why he is in the hospital.     prior hospital charts reviewed [  ]  primary team notes reviewed [X]  other consultant notes reviewed [X]    PAST MEDICAL & SURGICAL HISTORY:  Inguinal hernia  NSTEMI (non-ST elevated myocardial infarction)  HLD (hyperlipidemia)  Neurogenic bladder  Spinal abscess  Cavitary lung disease  CAD (coronary artery disease)  Abscess of sacrum  Anemia due to chronic renal failure treated with erythropoietin, stage 2 (mild)  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 device, implant or graft  HPTH (hyperparathyroidism)  Secondary HTN (hypertension)  ESRD (end stage renal disease)  Cavitary lung disease  CAD in native artery  Hypertension  ESRD (end stage renal disease)  S/P primary angioplasty with coronary stent  Kidney abscess    Allergies  No Known Allergies    ANTIMICROBIALS (past 90 days)  MEDICATIONS  (STANDING):  piperacillin/tazobactam IVPB..   25 mL/Hr IV Intermittent (03-04-21 @ 05:45)   25 mL/Hr IV Intermittent (03-03-21 @ 18:23)   25 mL/Hr IV Intermittent (03-03-21 @ 04:51)   25 mL/Hr IV Intermittent (03-02-21 @ 22:05)    piperacillin/tazobactam IVPB...   200 mL/Hr IV Intermittent (03-02-21 @ 05:35)    vancomycin  IVPB   250 mL/Hr IV Intermittent (03-02-21 @ 20:30)    piperacillin/tazobactam IVPB.. 3.3375 every 12 hours    OTHER MEDS: MEDICATIONS  (STANDING):  aspirin enteric coated 81 daily  atorvastatin 10 at bedtime  cinacalcet 90 daily  citalopram 20 daily  heparin   Injectable 5000 every 8 hours  midodrine. 20 three times a day  polyethylene glycol 3350 17 daily  senna 2 at bedtime  tamsulosin 0.4 at bedtime    SOCIAL HISTORY: Unable to obtain due to altered mental status    FAMILY HISTORY:  Family history of breast cancer (Sibling) per chart    REVIEW OF SYSTEMS: Patient answered "no" to the below  CONSTITUTIONAL: No fevers or chills  RESPIRATORY: No cough, wheezing, shortness of breath  CARDIOVASCULAR: No chest pain  GASTROINTESTINAL: No abdominal pain    Vital Signs Last 24 Hrs  T(F): 96.4 (03-04-21 @ 05:44), Max: 100.1 (03-02-21 @ 03:39)  Vital Signs Last 24 Hrs  HR: 82 (03-04-21 @ 05:44) (72 - 92)  BP: 85/67 (03-04-21 @ 05:44) (79/56 - 100/61)  RR: 16 (03-04-21 @ 05:44)  SpO2: 94% (03-04-21 @ 05:44) (94% - 100%)    PHYSICAL EXAM:  GENERAL: Thin male, alert, sitting up in bed eating breakfast  SKIN: No rashes  HEENT: NCAT, PERRL, MMM  LUNGS: Normal work of breathing on room air. R lung clear to auscultation, L lung decreased breath sounds.  CV: RRR, +S1S2  ABDOMEN: +BS, soft NTND  EXTREMITIES: no peripheral edema  NEURO: A&Ox2 (person, [facility]). Answering most questions with one word answers ("yes" and "no"). MAEx4, no gross focal neurologic deficits.    LABS               11.7   3.30  )-----------( 62       ( 03 Mar 2021 13:14 )             37.4   03-03    136  |  95<L>  |  60<H>  ----------------------------<  77  4.9   |  27  |  4.50<H>    Ca    9.9      03 Mar 2021 13:14  Phos  5.5     03-03  Mg     2.4     03-03    MICROBIOLOGY:  Culture - Blood (collected 02 Mar 2021 06:39)  Source: .Blood Blood-Peripheral  Preliminary Report (03 Mar 2021 07:01):    No growth to date.    Culture - Blood (collected 02 Mar 2021 06:39)  Source: .Blood Blood-Peripheral  Preliminary Report (03 Mar 2021 07:01):    No growth to date.    RADIOLOGY:  imaging below personally reviewed    < from: Xray Chest 1 View- PORTABLE-Urgent (03.02.21 @ 04:41) >  IMPRESSION:  Stable examination from prior.  Large loculated left-sided pleural effusion.    < from: Xray Chest 1 View AP/PA (01.27.21 @ 02:49) >  IMPRESSION:  Unchanged loculated left pleural effusion.  No acute pulmonary disease.    < from: CT Chest No Cont (01.27.21 @ 17:32) >  IMPRESSION:  Interval development of moderate left and small right effusions with adjacent areas of atelectasis. Mild septal thickening and cardiomegaly all suggestive of minimal interstitial edema.  Remaining incidental findings as above Patient is a 70y old  Male who presents with a chief complaint of AMS (04 Mar 2021 08:17)    HPI:  69 y/o M w/ hx ESRD on HD, anemia, hyperparathyroidism, CAD s/p stent, BPH w/ neurogenic bladder (last dialysis 2/27/21), cognitive impairment, that was brought in by EMS from [facility] for AMS. Due to patients mental status history obtained from ED provider note.  Per chart, pt usually verbal but upon check up at NH pt non verbal. Pt was admitted to this hospital for similar issues in past and was found to have infection (PNA). Pt not able to provide further history. Chart shows pt is dependent on all ADLs.  Unable to attain review of systems at this time.    (02 Mar 2021 09:08)    Chart reviewed. 70M PMH ESRD on HD, anemia, hyperparathyroidism, CAD s/p stent, BPH w/ neurogenic bladder, cognitive impairment BIBEMS from [facility] for AMS. Patient suspected to have acute metabolic encephalopathy 2/2 UTI (noted increased pus in urine). He was started on Zosyn and vanc by level. Course c/b hypotension for which MICU was consulted. Of note, patient was admitted earlier this year (1/27/21-2/7/21) for sepsis suspected 2/2 UTI. He was treated with 7-day course of antibiotics (Zosyn-->Augmentin). CXR on previous admission, and again on this admission, showing left pleural effusion for which pulmonology was consulted.    Patient A&Ox2 (person, [facility]), answering questions with "yes" and "no," unable to state why he is in the hospital.     prior hospital charts reviewed [  ]  primary team notes reviewed [X]  other consultant notes reviewed [X]    PAST MEDICAL & SURGICAL HISTORY:  Inguinal hernia  NSTEMI (non-ST elevated myocardial infarction)  HLD (hyperlipidemia)  Neurogenic bladder  Spinal abscess  Cavitary lung disease  CAD (coronary artery disease)  Abscess of sacrum  Anemia due to chronic renal failure treated with erythropoietin, stage 2 (mild)  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 device, implant or graft  HPTH (hyperparathyroidism)  Secondary HTN (hypertension)  ESRD (end stage renal disease)  Cavitary lung disease  CAD in native artery  Hypertension  ESRD (end stage renal disease)  S/P primary angioplasty with coronary stent  Kidney abscess    Allergies  No Known Allergies    ANTIMICROBIALS (past 90 days)  MEDICATIONS  (STANDING):  piperacillin/tazobactam IVPB..   25 mL/Hr IV Intermittent (03-04-21 @ 05:45)   25 mL/Hr IV Intermittent (03-03-21 @ 18:23)   25 mL/Hr IV Intermittent (03-03-21 @ 04:51)   25 mL/Hr IV Intermittent (03-02-21 @ 22:05)    piperacillin/tazobactam IVPB...   200 mL/Hr IV Intermittent (03-02-21 @ 05:35)    vancomycin  IVPB   250 mL/Hr IV Intermittent (03-02-21 @ 20:30)    piperacillin/tazobactam IVPB.. 3.3375 every 12 hours    OTHER MEDS: MEDICATIONS  (STANDING):  aspirin enteric coated 81 daily  atorvastatin 10 at bedtime  cinacalcet 90 daily  citalopram 20 daily  heparin   Injectable 5000 every 8 hours  midodrine. 20 three times a day  polyethylene glycol 3350 17 daily  senna 2 at bedtime  tamsulosin 0.4 at bedtime    SOCIAL HISTORY: Unable to obtain due to altered mental status    FAMILY HISTORY:  Family history of breast cancer (Sibling) per chart    REVIEW OF SYSTEMS: Patient answered "no" to the below  CONSTITUTIONAL: No fevers or chills  RESPIRATORY: No cough, wheezing, shortness of breath  CARDIOVASCULAR: No chest pain  GASTROINTESTINAL: No abdominal pain    Vital Signs Last 24 Hrs  T(F): 96.4 (03-04-21 @ 05:44), Max: 100.1 (03-02-21 @ 03:39)  Vital Signs Last 24 Hrs  HR: 82 (03-04-21 @ 05:44) (72 - 92)  BP: 85/67 (03-04-21 @ 05:44) (79/56 - 100/61)  RR: 16 (03-04-21 @ 05:44)  SpO2: 94% (03-04-21 @ 05:44) (94% - 100%)    PHYSICAL EXAM:  GENERAL: Thin male, alert, sitting up in bed eating breakfast  SKIN: No rashes  HEENT: NCAT, PERRL, MMM  LUNGS: Normal work of breathing on room air. R lung is clear to auscultation, L lung has decreased breath sounds.  CV: RRR, +S1S2  ABDOMEN: +BS, soft NTND  EXTREMITIES: no peripheral edema  NEURO: A&Ox2 (person, [facility]). Answering most questions with one word answers ("yes" and "no"). Follows commands. MAEx4, no gross focal neurologic deficits.    LABS             11.7   3.30  )-----------( 62       ( 03 Mar 2021 13:14 )             37.4   03-03    136  |  95<L>  |  60<H>  ----------------------------<  77  4.9   |  27  |  4.50<H>    Ca    9.9      03 Mar 2021 13:14  Phos  5.5     03-03  Mg     2.4     03-03    MICROBIOLOGY:  Culture - Blood (collected 02 Mar 2021 06:39)  Source: .Blood Blood-Peripheral  Preliminary Report (03 Mar 2021 07:01):    No growth to date.    Culture - Blood (collected 02 Mar 2021 06:39)  Source: .Blood Blood-Peripheral  Preliminary Report (03 Mar 2021 07:01):    No growth to date.    RADIOLOGY:  imaging below personally reviewed    < from: Xray Chest 1 View- PORTABLE-Urgent (03.02.21 @ 04:41) >  IMPRESSION:  Stable examination from prior.  Large loculated left-sided pleural effusion.    < from: Xray Chest 1 View AP/PA (01.27.21 @ 02:49) >  IMPRESSION:  Unchanged loculated left pleural effusion.  No acute pulmonary disease.    < from: CT Chest No Cont (01.27.21 @ 17:32) >  IMPRESSION:  Interval development of moderate left and small right effusions with adjacent areas of atelectasis. Mild septal thickening and cardiomegaly all suggestive of minimal interstitial edema.  Remaining incidental findings as above    Limited chest POCUS (3/4): Moderate, simple appear L pleural effusion. Trace R pleural effusion. Patient is a 70y old  Male who presents with a chief complaint of AMS (04 Mar 2021 08:17)    HPI:  71 y/o M w/ hx ESRD on HD, anemia, hyperparathyroidism, CAD s/p stent, BPH w/ neurogenic bladder (last dialysis 2/27/21), cognitive impairment, that was brought in by EMS from [facility] for AMS. Due to patients mental status history obtained from ED provider note.  Per chart, pt usually verbal but upon check up at NH pt non verbal. Pt was admitted to this hospital for similar issues in past and was found to have infection (PNA). Pt not able to provide further history. Chart shows pt is dependent on all ADLs.  Unable to attain review of systems at this time.    (02 Mar 2021 09:08)    Chart reviewed. 70M PMH ESRD on HD, anemia, hyperparathyroidism, CAD s/p stent, BPH w/ neurogenic bladder, cognitive impairment BIBEMS from [facility] for AMS. Patient suspected to have acute metabolic encephalopathy 2/2 UTI (noted increased pus in urine). He was started on Zosyn and vanc by level. Course c/b hypotension for which MICU was consulted. Of note, patient was admitted earlier this year (1/27/21-2/7/21) for sepsis suspected 2/2 UTI. He was treated with 7-day course of antibiotics (Zosyn-->Augmentin). CXR on previous admission, and again on this admission, showing left pleural effusion for which pulmonology was consulted.    Patient A&Ox2 (person, [facility]), alert, answering questions with "yes" and "no." Unable to state why he is in the hospital.     prior hospital charts reviewed [  ]  primary team notes reviewed [X]  other consultant notes reviewed [X]    PAST MEDICAL & SURGICAL HISTORY:  Inguinal hernia  NSTEMI (non-ST elevated myocardial infarction)  HLD (hyperlipidemia)  Neurogenic bladder  Spinal abscess  Cavitary lung disease  CAD (coronary artery disease)  Abscess of sacrum  Anemia due to chronic renal failure treated with erythropoietin, stage 2 (mild)  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 device, implant or graft  HPTH (hyperparathyroidism)  Secondary HTN (hypertension)  ESRD (end stage renal disease)  Cavitary lung disease  CAD in native artery  Hypertension  ESRD (end stage renal disease)  S/P primary angioplasty with coronary stent  Kidney abscess    Allergies  No Known Allergies    ANTIMICROBIALS (past 90 days)  MEDICATIONS  (STANDING):  piperacillin/tazobactam IVPB..   25 mL/Hr IV Intermittent (03-04-21 @ 05:45)   25 mL/Hr IV Intermittent (03-03-21 @ 18:23)   25 mL/Hr IV Intermittent (03-03-21 @ 04:51)   25 mL/Hr IV Intermittent (03-02-21 @ 22:05)    piperacillin/tazobactam IVPB...   200 mL/Hr IV Intermittent (03-02-21 @ 05:35)    vancomycin  IVPB   250 mL/Hr IV Intermittent (03-02-21 @ 20:30)    piperacillin/tazobactam IVPB.. 3.3375 every 12 hours    OTHER MEDS: MEDICATIONS  (STANDING):  aspirin enteric coated 81 daily  atorvastatin 10 at bedtime  cinacalcet 90 daily  citalopram 20 daily  heparin   Injectable 5000 every 8 hours  midodrine. 20 three times a day  polyethylene glycol 3350 17 daily  senna 2 at bedtime  tamsulosin 0.4 at bedtime    Home Medications:  Acidophilus oral tablet: 1 tab(s) orally once a day (02 Mar 2021 10:40)  Aquaphor topical ointment: Apply topically to affected area 4 times a day (02 Mar 2021 10:40)  aspirin 81 mg oral tablet: 1 tab(s) orally once a day (02 Mar 2021 10:40)  citalopram 20 mg oral tablet: 1 tab(s) orally once a day (02 Mar 2021 10:40)  Claritin 5 mg oral tablet, chewable: 1 tab(s) orally every 12 hours (02 Mar 2021 10:40)  famotidine 20 mg oral tablet: 1 tab(s) orally every other day (at bedtime) (02 Mar 2021 10:40)  Lipitor 10 mg oral tablet: 1 tab(s) orally once a day (02 Mar 2021 10:40)  midodrine 10 mg oral tablet: 1 tab(s) orally 3 times a day (02 Mar 2021 10:40)  MiraLax oral powder for reconstitution:  (02 Mar 2021 10:40)  Senna 8.6 mg oral tablet: 2 tab(s) orally once a day (at bedtime) (02 Mar 2021 10:40)  tamsulosin 0.4 mg oral capsule: 1 cap(s) orally once a day (02 Mar 2021 10:40)  Vibramycin 100 mg oral capsule: 1 cap(s) orally a day (02 Mar 2021 10:40)    SOCIAL HISTORY: Unable to obtain due to altered mental status    FAMILY HISTORY:  Family history of breast cancer (Sibling) per chart    REVIEW OF SYSTEMS: Patient answered "no" to the below  CONSTITUTIONAL: No fevers or chills  RESPIRATORY: No cough, wheezing, shortness of breath  CARDIOVASCULAR: No chest pain  GASTROINTESTINAL: No abdominal pain    Vital Signs Last 24 Hrs  T(F): 96.4 (03-04-21 @ 05:44), Max: 100.1 (03-02-21 @ 03:39)  Vital Signs Last 24 Hrs  HR: 82 (03-04-21 @ 05:44) (72 - 92)  BP: 85/67 (03-04-21 @ 05:44) (79/56 - 100/61)  RR: 16 (03-04-21 @ 05:44)  SpO2: 94% (03-04-21 @ 05:44) (94% - 100%)    PHYSICAL EXAM:  GENERAL: Thin male, alert, appears comfortable, sitting up in bed eating breakfast  SKIN: No rashes  HEENT: NCAT, PERRL, MMM  LUNGS: Normal work of breathing on room air. R lung is clear to auscultation, L lung has decreased breath sounds.  CV: RRR, +S1S2  ABDOMEN: +BS, soft NTND  EXTREMITIES: no peripheral edema  NEURO: A&Ox2 (person, [facility]). Answering most questions with one word answers ("yes" and "no"). Follows commands. MAEx4, no gross focal neurologic deficits.    LABS             11.7   3.30  )-----------( 62       ( 03 Mar 2021 13:14 )             37.4   03-03    136  |  95<L>  |  60<H>  ----------------------------<  77  4.9   |  27  |  4.50<H>    Ca    9.9      03 Mar 2021 13:14  Phos  5.5     03-03  Mg     2.4     03-03    MICROBIOLOGY:  Culture - Blood (collected 02 Mar 2021 06:39)  Source: .Blood Blood-Peripheral  Preliminary Report (03 Mar 2021 07:01):    No growth to date.    Culture - Blood (collected 02 Mar 2021 06:39)  Source: .Blood Blood-Peripheral  Preliminary Report (03 Mar 2021 07:01):    No growth to date.    RADIOLOGY:  imaging below personally reviewed    < from: Xray Chest 1 View- PORTABLE-Urgent (03.02.21 @ 04:41) >  IMPRESSION:  Stable examination from prior.  Large loculated left-sided pleural effusion.    < from: Xray Chest 1 View AP/PA (01.27.21 @ 02:49) >  IMPRESSION:  Unchanged loculated left pleural effusion.  No acute pulmonary disease.    < from: CT Chest No Cont (01.27.21 @ 17:32) >  IMPRESSION:  Interval development of moderate left and small right effusions with adjacent areas of atelectasis. Mild septal thickening and cardiomegaly all suggestive of minimal interstitial edema.  Remaining incidental findings as above    Limited chest POCUS (3/4): Moderate, simple appear L pleural effusion. Trace R pleural effusion. Patient is a 70y old  Male who presents with a chief complaint of AMS (04 Mar 2021 08:17)    HPI:  69 y/o M w/ hx ESRD on HD, anemia, hyperparathyroidism, CAD s/p stent, BPH w/ neurogenic bladder (last dialysis 2/27/21), cognitive impairment, that was brought in by EMS from [facility] for AMS. Due to patients mental status history obtained from ED provider note.  Per chart, pt usually verbal but upon check up at NH pt non verbal. Pt was admitted to this hospital for similar issues in past and was found to have infection (PNA). Pt not able to provide further history. Chart shows pt is dependent on all ADLs.  Unable to attain review of systems at this time.    (02 Mar 2021 09:08)    Chart reviewed. 70M PMH ESRD on HD, anemia, hyperparathyroidism, CAD s/p stent, BPH w/ neurogenic bladder, cognitive impairment BIBEMS from [facility] for AMS. Patient suspected to have acute metabolic encephalopathy 2/2 UTI (noted increased pus in urine). He was started on Zosyn and vanc by level. Course c/b hypotension for which MICU was consulted. Of note, patient was admitted earlier this year (1/27/21-2/7/21) and at the end of last year (12/8-12/11/20) for sepsis suspected 2/2 UTI. On last admission he was treated with 7-day course of antibiotics (Zosyn-->Augmentin); during December admission treated with 3-day course of ceftriaxone. CXR previous admissions and again on this admission, showing left pleural effusion for which pulmonology was consulted.    Patient A&Ox2 (person, [facility]), alert, answering questions with "yes" and "no." Unable to state why he is in the hospital.     prior hospital charts reviewed [  ]  primary team notes reviewed [X]  other consultant notes reviewed [X]    PAST MEDICAL & SURGICAL HISTORY:  Inguinal hernia  NSTEMI (non-ST elevated myocardial infarction)  HLD (hyperlipidemia)  Neurogenic bladder  Spinal abscess  Cavitary lung disease  CAD (coronary artery disease)  Abscess of sacrum  Anemia due to chronic renal failure treated with erythropoietin, stage 2 (mild)  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 device, implant or graft  HPTH (hyperparathyroidism)  Secondary HTN (hypertension)  ESRD (end stage renal disease)  Cavitary lung disease  CAD in native artery  Hypertension  ESRD (end stage renal disease)  S/P primary angioplasty with coronary stent  Kidney abscess    Allergies  No Known Allergies    ANTIMICROBIALS (past 90 days)  MEDICATIONS  (STANDING):  piperacillin/tazobactam IVPB..   25 mL/Hr IV Intermittent (03-04-21 @ 05:45)   25 mL/Hr IV Intermittent (03-03-21 @ 18:23)   25 mL/Hr IV Intermittent (03-03-21 @ 04:51)   25 mL/Hr IV Intermittent (03-02-21 @ 22:05)    piperacillin/tazobactam IVPB...   200 mL/Hr IV Intermittent (03-02-21 @ 05:35)    vancomycin  IVPB   250 mL/Hr IV Intermittent (03-02-21 @ 20:30)    piperacillin/tazobactam IVPB.. 3.3375 every 12 hours    OTHER MEDS: MEDICATIONS  (STANDING):  aspirin enteric coated 81 daily  atorvastatin 10 at bedtime  cinacalcet 90 daily  citalopram 20 daily  heparin   Injectable 5000 every 8 hours  midodrine. 20 three times a day  polyethylene glycol 3350 17 daily  senna 2 at bedtime  tamsulosin 0.4 at bedtime    Home Medications:  Acidophilus oral tablet: 1 tab(s) orally once a day (02 Mar 2021 10:40)  Aquaphor topical ointment: Apply topically to affected area 4 times a day (02 Mar 2021 10:40)  aspirin 81 mg oral tablet: 1 tab(s) orally once a day (02 Mar 2021 10:40)  citalopram 20 mg oral tablet: 1 tab(s) orally once a day (02 Mar 2021 10:40)  Claritin 5 mg oral tablet, chewable: 1 tab(s) orally every 12 hours (02 Mar 2021 10:40)  famotidine 20 mg oral tablet: 1 tab(s) orally every other day (at bedtime) (02 Mar 2021 10:40)  Lipitor 10 mg oral tablet: 1 tab(s) orally once a day (02 Mar 2021 10:40)  midodrine 10 mg oral tablet: 1 tab(s) orally 3 times a day (02 Mar 2021 10:40)  MiraLax oral powder for reconstitution:  (02 Mar 2021 10:40)  Senna 8.6 mg oral tablet: 2 tab(s) orally once a day (at bedtime) (02 Mar 2021 10:40)  tamsulosin 0.4 mg oral capsule: 1 cap(s) orally once a day (02 Mar 2021 10:40)  Vibramycin 100 mg oral capsule: 1 cap(s) orally a day (02 Mar 2021 10:40)    SOCIAL HISTORY: Unable to obtain due to altered mental status    FAMILY HISTORY:  Family history of breast cancer (Sibling) per chart    REVIEW OF SYSTEMS: Patient answered "no" to the below  CONSTITUTIONAL: No fevers or chills  RESPIRATORY: No cough, wheezing, shortness of breath  CARDIOVASCULAR: No chest pain  GASTROINTESTINAL: No abdominal pain    Vital Signs Last 24 Hrs  T(F): 96.4 (03-04-21 @ 05:44), Max: 100.1 (03-02-21 @ 03:39)  Vital Signs Last 24 Hrs  HR: 82 (03-04-21 @ 05:44) (72 - 92)  BP: 85/67 (03-04-21 @ 05:44) (79/56 - 100/61)  RR: 16 (03-04-21 @ 05:44)  SpO2: 94% (03-04-21 @ 05:44) (94% - 100%)    PHYSICAL EXAM:  GENERAL: Thin male, alert, appears comfortable, sitting up in bed eating breakfast  SKIN: No rashes  HEENT: NCAT, PERRL, MMM  LUNGS: Normal work of breathing on room air. R lung is clear to auscultation, L lung has decreased breath sounds.  CV: RRR, +S1S2  ABDOMEN: +BS, soft NTND  EXTREMITIES: no peripheral edema  NEURO: A&Ox2 (person, [facility]). Answering most questions with one word answers ("yes" and "no"). Follows commands. MAEx4, no gross focal neurologic deficits.    LABS             11.7   3.30  )-----------( 62       ( 03 Mar 2021 13:14 )             37.4   03-03    136  |  95<L>  |  60<H>  ----------------------------<  77  4.9   |  27  |  4.50<H>    Ca    9.9      03 Mar 2021 13:14  Phos  5.5     03-03  Mg     2.4     03-03    MICROBIOLOGY:  Culture - Blood (collected 02 Mar 2021 06:39)  Source: .Blood Blood-Peripheral  Preliminary Report (03 Mar 2021 07:01):    No growth to date.    Culture - Blood (collected 02 Mar 2021 06:39)  Source: .Blood Blood-Peripheral  Preliminary Report (03 Mar 2021 07:01):    No growth to date.    RADIOLOGY:  imaging below personally reviewed    < from: Xray Chest 1 View- PORTABLE-Urgent (03.02.21 @ 04:41) >  IMPRESSION:  Stable examination from prior.  Large loculated left-sided pleural effusion.    < from: Xray Chest 1 View AP/PA (01.27.21 @ 02:49) >  IMPRESSION:  Unchanged loculated left pleural effusion.  No acute pulmonary disease.    < from: Xray Chest 2 Views PA/Lat (12.08.20 @ 15:18) >  IMPRESSION:  Limited images.    Patchy right lower lung opacity which could be due to atelectasis or pneumonia.    Elevated left hemidiaphragm.    Small loculated left pleural effusion with apparent extension into the major fissure. Likely associated passive atelectasis. Underlying atelectasis of other cause and/or other pathology including, but not limited to, pneumonia is not excluded.    < from: CT Chest No Cont (01.27.21 @ 17:32) >  IMPRESSION:  Interval development of moderate left and small right effusions with adjacent areas of atelectasis. Mild septal thickening and cardiomegaly all suggestive of minimal interstitial edema.  Remaining incidental findings as above    Limited chest POCUS (3/4): Moderate, simple appear L pleural effusion. Trace R pleural effusion.

## 2021-03-04 NOTE — PROGRESS NOTE ADULT - ASSESSMENT
70M CAD/stent, sCHF LVEF 12%, BPH, neurogenic bladder, ESRD on HD w/ chronic anemia, MRSA on vibramycin p/w sepsis POA from UTI with acute metabolic encephalopathy, hypotension.

## 2021-03-04 NOTE — PROGRESS NOTE ADULT - SUBJECTIVE AND OBJECTIVE BOX
[facility] Division of Hospital Medicine  Barber Braxton MD  Pager (M-F, 8A-5P): [phone]  Other Times:  [phone]    Patient is a 70y old  Male who presents with a chief complaint of AMS (04 Mar 2021 09:15)    SUBJECTIVE / OVERNIGHT EVENTS:  Patient still with hypothermia and hypotension.  Patient offers no new complaints.  No F/C, N/V, CP, SOB, Cough, lightheadedness, dizziness, abdominal pain, diarrhea, dysuria.    MEDICATIONS  (STANDING):  aspirin enteric coated 81 milliGRAM(s) Oral daily  atorvastatin 10 milliGRAM(s) Oral at bedtime  chlorhexidine 4% Liquid 1 Application(s) Topical daily  cinacalcet 90 milliGRAM(s) Oral daily  citalopram 20 milliGRAM(s) Oral daily  heparin   Injectable 5000 Unit(s) SubCutaneous every 8 hours  lactobacillus acidophilus 1 Tablet(s) Oral daily  midodrine. 20 milliGRAM(s) Oral three times a day  midodrine. 10 milliGRAM(s) Oral <User Schedule>  mupirocin 2% Ointment 1 Application(s) Both Nostrils two times a day  piperacillin/tazobactam IVPB.. 3.3375 Gram(s) IV Intermittent every 12 hours  polyethylene glycol 3350 17 Gram(s) Oral daily  senna 2 Tablet(s) Oral at bedtime  tamsulosin 0.4 milliGRAM(s) Oral at bedtime    MEDICATIONS  (PRN):      Vital Signs Last 24 Hrs  T(C): 35 (04 Mar 2021 12:22), Max: 36.5 (03 Mar 2021 17:12)  T(F): 95 (04 Mar 2021 12:22), Max: 97.7 (03 Mar 2021 17:12)  HR: 83 (04 Mar 2021 12:22) (80 - 92)  BP: 94/65 (04 Mar 2021 12:22) (79/56 - 94/65)  BP(mean): --  RR: 16 (04 Mar 2021 12:22) (15 - 18)  SpO2: 98% (04 Mar 2021 12:22) (94% - 100%)  CAPILLARY BLOOD GLUCOSE        I&O's Summary    03 Mar 2021 07:01  -  04 Mar 2021 07:00  --------------------------------------------------------  IN: 468 mL / OUT: 0 mL / NET: 468 mL        PHYSICAL EXAM:  GENERAL: NAD  HEAD:  Atraumatic, Normocephalic  EYES: EOMI, PERRLA, conjunctiva and sclera clear  NECK: Supple, No JVD  CHEST/LUNG: decreased BS to L base; No wheeze  HEART: Regular rate and rhythm; No murmurs, rubs, or gallops  ABDOMEN: Soft, Nontender, Nondistended; Bowel sounds present  EXTREMITIES:  2+ Peripheral Pulses, No clubbing, cyanosis, or edema. LUCIANAE AVF.  PSYCH: Calm  NEUROLOGY: A/Ox3, non-focal  SKIN: No rashes or lesions    LABS:                        11.7   3.30  )-----------( 62       ( 03 Mar 2021 13:14 )             37.4     03-03    136  |  95<L>  |  60<H>  ----------------------------<  77  4.9   |  27  |  4.50<H>    Ca    9.9      03 Mar 2021 13:14  Phos  5.5     03-03  Mg     2.4     03-03                RADIOLOGY & ADDITIONAL TESTS:    Imaging Personally Reviewed:    Care Discussed with Consultants/Other Providers:

## 2021-03-04 NOTE — CHART NOTE - NSCHARTNOTEFT_GEN_A_CORE
: Zoltan Reyes    INDICATION: Pleural effusion    PROCEDURE:  [ ] LIMITED ECHO  [X] LIMITED CHEST  [ ] LIMITED RETROPERITONEAL  [ ] LIMITED ABDOMINAL  [ ] LIMITED DVT  [ ] NEEDLE GUIDANCE VASCULAR  [ ] NEEDLE GUIDANCE THORACENTESIS  [ ] NEEDLE GUIDANCE PARACENTESIS  [ ] NEEDLE GUIDANCE PERICARDIOCENTESIS  [ ] OTHER    FINDINGS: Moderate, simple appear L pleural effusion. Trace R pleural effusion.      INTERPRETATION: Moderate, simple appear L pleural effusion. Trace R pleural effusion.    Images stored on [employer].      Silverio Reyes MD  Pulmonary & Critical Care Fellow  [phone] Statement Selected

## 2021-03-05 NOTE — PROVIDER CONTACT NOTE (OTHER) - ACTION/TREATMENT ORDERED:
Patient Education        Psoriasis: Care Instructions  Your Care Instructions  Psoriasis (say \"lxx-IK-gt-michelle\") is a long-term skin problem that causes thick, white, silvery, or red patches on the skin. The patches may be small or large, and they occur most often on the knees, elbows, scalp, hands, feet, or lower back. The skin may be scaly. If the condition is severe, your skin can become itchy and tender. Psoriasis also can be embarrassing if the patches are on visible areas. You can treat psoriasis with good care at home and with medicine from your doctor. You may put medicine on your skin and take pills or have shots to stop the redness and swelling. Your doctor also may suggest ultraviolet light treatments. Follow-up care is a key part of your treatment and safety. Be sure to make and go to all appointments, and call your doctor if you are having problems. It's also a good idea to know your test results and keep a list of the medicines you take. How can you care for yourself at home? · If your doctor prescribes medicine, use it exactly as prescribed. Follow your doctor's advice for sunlight or ultraviolet light treatment. Call your doctor if you think you are having a problem with your medicine. · Protect your skin:  ? Keep your skin moist. After bathing, put an ointment, cream, or lotion on your skin while it is still damp. This seals in moisture. Use over-the-counter products that your doctor suggests. These may include Cetaphil, Lubriderm, or Eucerin. Petroleum jelly (such as Vaseline) and vegetable shortening (such as Crisco) also work. ? If you have psoriasis on your scalp, use a shampoo with salicylic acid, such as Neutrogena T/Sal.  ? Avoid harsh skin products, such as those that contain alcohol. ? Cover your skin in cold weather. ? Try to prevent sunburn. Although short periods of sun exposure reduce psoriasis in most people, too much sun can damage the skin and cause skin cancer.  In addition, sunburns can trigger psoriasis. Use sunscreen on areas of your skin that do not have psoriasis. Make sure to use a broad-spectrum sunscreen that has a sun protection factor (SPF) of 30 or higher. Use it every day, even when it is cloudy. ? Take care to avoid accidents such as cutting or scraping your skin. An injury to the skin can cause psoriasis patches to form anywhere on the body, including the area of the injury. ? Avoid tight shoes, clothing, watchbands, and hats. These may irritate your skin. ? Use a vaporizer or humidifier to add moisture to your bedroom. Follow the directions for cleaning the machine. · Try making one or more changes to your daily habits to help with managing your psoriasis. For example:  ? Try to control stress and anxiety. They may cause psoriasis to appear suddenly or can make symptoms worse. ? If you smoke, think about quitting. If you need help quitting, talk to your doctor about stop-smoking programs and medicines. ? If you drink, limit or reduce the amount of alcohol you drink. ? If you are overweight, see if you can lose some weight. · Seek support from family and friends. Talk to a counselor or other professional if you feel sad about your condition and need more help. When should you call for help? Call your doctor now or seek immediate medical care if:  · You have signs of infection, such as:  ? Increased pain, swelling, warmth, or redness. ? Red streaks leading from the area. ? Pus draining from the area. ? A fever. Watch closely for changes in your health, and be sure to contact your doctor if:  · You have swelling, stiffness, or pain in your joints. · You do not get better as expected. Where can you learn more? Go to [email or web address] org and sign in to your [employer] account. Enter K592 in the [employer] box to learn more about \"Psoriasis: Care Instructions. \"     If you do not have an account, please click on the \"Sign Up Now\" Cont to monitor link.  Current as of: October 31, 2019               Content Version: 12.5  © 8200-1946 [employer]wise, Incorporated. Care instructions adapted under license by [facility] ([facility]). If you have questions about a medical condition or this instruction, always ask your healthcare professional. [address] any warranty or liability for your use of this information.

## 2021-03-05 NOTE — PROGRESS NOTE ADULT - SUBJECTIVE AND OBJECTIVE BOX
[facility] Division of Hospital Medicine  Barber Braxton MD  Pager (M-F, 8A-5P): [phone]  Other Times:  [phone]    Patient is a 70y old  Male who presents with a chief complaint of AMS (04 Mar 2021 13:04)    SUBJECTIVE / OVERNIGHT EVENTS:  Hypothermia and hypotension improved but episode of hypoglycemia this am - improved with orange juice.  No F/C, N/V, CP, SOB, Cough, lightheadedness, dizziness, abdominal pain, diarrhea, dysuria.    MEDICATIONS  (STANDING):  aspirin enteric coated 81 milliGRAM(s) Oral daily  atorvastatin 10 milliGRAM(s) Oral at bedtime  chlorhexidine 4% Liquid 1 Application(s) Topical daily  cinacalcet 90 milliGRAM(s) Oral daily  citalopram 20 milliGRAM(s) Oral daily  dextrose 40% Gel 15 Gram(s) Oral once  dextrose 50% Injectable 25 Gram(s) IV Push once  dextrose 50% Injectable 12.5 Gram(s) IV Push once  dextrose 50% Injectable 25 Gram(s) IV Push once  glucagon  Injectable 1 milliGRAM(s) IntraMuscular once  heparin   Injectable 5000 Unit(s) SubCutaneous every 8 hours  lactobacillus acidophilus 1 Tablet(s) Oral daily  midodrine. 20 milliGRAM(s) Oral three times a day  midodrine. 10 milliGRAM(s) Oral <User Schedule>  mupirocin 2% Ointment 1 Application(s) Both Nostrils two times a day  piperacillin/tazobactam IVPB.. 3.3375 Gram(s) IV Intermittent every 12 hours  polyethylene glycol 3350 17 Gram(s) Oral daily  senna 2 Tablet(s) Oral at bedtime  tamsulosin 0.4 milliGRAM(s) Oral at bedtime    MEDICATIONS  (PRN):      Vital Signs Last 24 Hrs  T(C): 36.6 (05 Mar 2021 10:30), Max: 36.6 (05 Mar 2021 10:30)  T(F): 97.9 (05 Mar 2021 10:30), Max: 97.9 (05 Mar 2021 10:30)  HR: 82 (05 Mar 2021 10:30) (80 - 91)  BP: 118/84 (05 Mar 2021 10:30) (91/64 - 156/96)  BP(mean): --  RR: 18 (05 Mar 2021 10:30) (16 - 18)  SpO2: 98% (05 Mar 2021 10:30) (97% - 100%)  CAPILLARY BLOOD GLUCOSE      POCT Blood Glucose.: 109 mg/dL (05 Mar 2021 14:04)  POCT Blood Glucose.: 109 mg/dL (05 Mar 2021 13:40)  POCT Blood Glucose.: 95 mg/dL (05 Mar 2021 12:32)  POCT Blood Glucose.: 65 mg/dL (05 Mar 2021 11:33)  POCT Blood Glucose.: 76 mg/dL (05 Mar 2021 11:32)  POCT Blood Glucose.: 112 mg/dL (05 Mar 2021 11:23)  POCT Blood Glucose.: 51 mg/dL (05 Mar 2021 10:45)  POCT Blood Glucose.: 35 mg/dL (05 Mar 2021 10:26)    I&O's Summary    04 Mar 2021 07:01  -  05 Mar 2021 07:00  --------------------------------------------------------  IN: 1118 mL / OUT: 0 mL / NET: 1118 mL        PHYSICAL EXAM:  GENERAL: NAD  HEAD:  Atraumatic, Normocephalic  EYES: EOMI, PERRLA, conjunctiva and sclera clear  NECK: Supple, No JVD  CHEST/LUNG: decreased BS to L base; No wheeze  HEART: Regular rate and rhythm; No murmurs, rubs, or gallops  ABDOMEN: Soft, Nontender, Nondistended; Bowel sounds present  EXTREMITIES:  2+ Peripheral Pulses, No clubbing, cyanosis, or edema. LUE AVF.  PSYCH: Calm  NEUROLOGY: A/Ox3, non-focal  SKIN: No rashes or lesions    LABS:                        13.4   4.93  )-----------( 102      ( 05 Mar 2021 09:22 )             41.6     03-05    138  |  94<L>  |  52<H>  ----------------------------<  49<LL>  5.1   |  27  |  3.96<H>    Ca    9.2      05 Mar 2021 09:22  Phos  5.8     03-05  Mg     2.5     03-05    TPro  6.7  /  Alb  3.6  /  TBili  1.0  /  DBili  0.5<H>  /  AST  447<H>  /  ALT  545<H>  /  AlkPhos  529<H>  03-05              RADIOLOGY & ADDITIONAL TESTS:    Imaging Personally Reviewed:    Care Discussed with Consultants/Other Providers:

## 2021-03-05 NOTE — PROGRESS NOTE ADULT - SUBJECTIVE AND OBJECTIVE BOX
Overnight events noted   VITAL: T(C): , Max: 36.6 (03-05-21 @ 10:30) T(F): , Max: 97.9 (03-05-21 @ 10:30) HR: 82 (03-05-21 @ 10:30) BP: 118/84 (03-05-21 @ 10:30) BP(mean): -- RR: 18 (03-05-21 @ 10:30) SpO2: 98% (03-05-21 @ 10:30)   PHYSICAL EXAM: Constitutional: lethargic but alert; cachectic; (+)warming blanket HEENT: NCAT, DMM Neck: Supple, No JVD Respiratory: CTA-R; decreased BS L base Cardiovascular: RRR s1s2, no m/r/g Gastrointestinal: BS+, soft, NT/ND Extremities: No peripheral edema b/l Neurological: no focal deficits; strength grossly intact Back: no CVAT b/l Skin: No rashes, no nevi Access: LUE AVF (+)thrill   LABS:                      13.4  4.93  )-----------( 102      ( 05 Mar 2021 09:22 )            41.6   Na(138)/K(5.1)/Cl(94)/HCO3(27)/BUN(52)/Cr(3.96)Glu(49)/Ca(9.2)/Mg(2.5)/PO4(5.8)    03-05 @ 09:22 Na(139)/K(5.6)/Cl(97)/HCO3(21)/BUN(81)/Cr(5.39)Glu(106)/Ca(9.0)/Mg(2.6)/PO4(6.4)    03-04 @ 16:22 Na(136)/K(4.9)/Cl(95)/HCO3(27)/BUN(60)/Cr(4.50)Glu(77)/Ca(9.9)/Mg(2.4)/PO4(5.5)    03-03 @ 13:14 Na(128)/K(5.1)/Cl(91)/HCO3(20)/BUN(53)/Cr(3.84)Glu(256)/Ca(10.2)/Mg(2.4)/PO4(--)    03-03 @ 09:13   IMPRESSION: 70M w/ dementia, CAD, past epidural abscess, and ESRD, 3/2/21 a/w AMS  (1)Renal - ESRD - HD TTS - due for next HD tomorrow (2)Hyperkalemia - improved as of today, s/p HD yesterday (3)AMS - sepsis-associated? Now on Zosyn - clinically improved relative to admission (4)CV - hypotensive -presently dependent on high-dose midodrine   RECOMMEND: (1)Next HD tomorrow - no net UF; low BFR/low dialysate temp, in effort to minimize risk of intradialytic hypotension; Midodrine 10mg po x1 prn SBP<85 on HD (in addition to the TID Midodrine already ordered) (2)Midodrine TID as ordered (3)Sensipar as ordered (4)Continue abx for GFR<10/HD      Heath Huff MD [facility] Office: [phone] Cell: [phone]

## 2021-03-06 NOTE — PROGRESS NOTE ADULT - SUBJECTIVE AND OBJECTIVE BOX
[facility] Division of Hospital Medicine  Shireen Miranda MD  Pager [phone]    Patient is a 70y old  Male who presents with a chief complaint of AMS       SUBJECTIVE / OVERNIGHT EVENTS: pt with hypoglycemia due to poor PO      MEDICATIONS  (STANDING):  aspirin enteric coated 81 milliGRAM(s) Oral daily  atorvastatin 10 milliGRAM(s) Oral at bedtime  chlorhexidine 4% Liquid 1 Application(s) Topical daily  cinacalcet 90 milliGRAM(s) Oral daily  citalopram 20 milliGRAM(s) Oral daily  dextrose 40% Gel 15 Gram(s) Oral once  dextrose 5%. 1000 milliLiter(s) (50 mL/Hr) IV Continuous <Continuous>  dextrose 50% Injectable 25 Gram(s) IV Push once  dextrose 50% Injectable 25 Gram(s) IV Push once  dextrose 50% Injectable 12.5 Gram(s) IV Push once  glucagon  Injectable 1 milliGRAM(s) IntraMuscular once  heparin   Injectable 5000 Unit(s) SubCutaneous every 8 hours  lactobacillus acidophilus 1 Tablet(s) Oral daily  midodrine. 20 milliGRAM(s) Oral three times a day  midodrine. 10 milliGRAM(s) Oral <User Schedule>  mupirocin 2% Ointment 1 Application(s) Both Nostrils two times a day  piperacillin/tazobactam IVPB.. 3.3375 Gram(s) IV Intermittent every 12 hours  polyethylene glycol 3350 17 Gram(s) Oral daily  senna 2 Tablet(s) Oral at bedtime  tamsulosin 0.4 milliGRAM(s) Oral at bedtime        CAPILLARY BLOOD GLUCOSE  POCT Blood Glucose.: 113 mg/dL (06 Mar 2021 14:01)  POCT Blood Glucose.: 120 mg/dL (06 Mar 2021 13:32)  POCT Blood Glucose.: 65 mg/dL (06 Mar 2021 13:08)  POCT Blood Glucose.: 63 mg/dL (06 Mar 2021 13:07)  POCT Blood Glucose.: 55 mg/dL (06 Mar 2021 12:47)  POCT Blood Glucose.: 50 mg/dL (06 Mar 2021 12:26)  POCT Blood Glucose.: 50 mg/dL (06 Mar 2021 12:24)  POCT Blood Glucose.: 77 mg/dL (06 Mar 2021 08:39)  POCT Blood Glucose.: 60 mg/dL (06 Mar 2021 08:37)  POCT Blood Glucose.: 109 mg/dL (06 Mar 2021 02:54)  POCT Blood Glucose.: 74 mg/dL (05 Mar 2021 20:23)        PHYSICAL EXAM:  Vital Signs Last 24 Hrs  T(F): 97.3 (06 Mar 2021 11:49), Max: 98.2 (05 Mar 2021 17:17)  HR: 88 (06 Mar 2021 11:49) (85 - 96)  BP: 109/71 (06 Mar 2021 11:49) (103/85 - 123/75)  RR: 18 (06 Mar 2021 11:49) (18 - 18)  SpO2: 96% (06 Mar 2021 11:49) (95% - 99%)    CONSTITUTIONAL: NAD  RESPIRATORY: Normal respiratory effort; lungs are clear to auscultation bilaterally ant/lat  CARDIOVASCULAR: Regular rate and rhythm, No lower extremity edema;   ABDOMEN: Nontender to palpation, normoactive bowel sounds  MUSCULOSKELETAL:   no joint swelling or tenderness to palpation  PSYCH: calm, coop  NEUROLOGY: mild L facial droop      LABS:             P

## 2021-03-06 NOTE — PROVIDER CONTACT NOTE (OTHER) - ACTION/TREATMENT ORDERED:
Provider ordered maintenance fluids of D5W @ 50 mL/hr. Continue to monitor glucose and hourly rounding.

## 2021-03-06 NOTE — PROVIDER CONTACT NOTE (OTHER) - ASSESSMENT
Pt A&Ox2, slightly lethargic. Glucose value was 50 @12:24, verified by 50 @12:26. Pt given oral glucose gel. Re-check was value of 55 @12:47. In mean time provider ordered D5W fluid. Then activated 12.5 grams of D50 IVP and gave. Re-check was 63 @13:07 , verified by 65 @13:08. Waited and re-checked @ 13:32 glucose was 120, and final check was 113 @14:01.

## 2021-03-06 NOTE — PROGRESS NOTE ADULT - ASSESSMENT
Date of Service: 03/15/2017    SUBJECTIVE:  The patient is a 71-year-old lady seen today with conjunctivitis.  She stated that she awoke this morning with some redness of her conjunctivae along with some mattering of her eye.  She wears a CPAP device at bedtime. It is not altogether clear whether or not the CPAP device somehow inadvertently resulted in an infection in her left eye. She states her vision has not been impaired, but she had some mattering and goop in her eye this morning upon awakening.    PHYSICAL EXAMINATION:    When we examined her today, her left eye is involved with some redness of the conjunctiva.  Sclera was otherwise clear.  Cornea was clear.  The vision was not impaired.  The eyelid itself did not appear to be involved. I did not see any periorbital cellulitis. The right eye was normal. There was no evidence of any iritis and the eye was not painful.    ASSESSMENT:  Conjunctivitis.    PLAN:  The patient put on Ciloxan ophthalmic drops 2 drops left eye 4 times daily.  The patient was in need of a DTaP vaccination and this was given today.  She will see me back in followup if not improved within a weeks' time.      Dictated By: Valentín Guadalupe MD  Signing Provider: MD DEDRA Pollack/brittani ([id number])  DD: 03/15/2017 18:13:47 TD: 03/16/2017 08:32:00    Copy Sent To:    70M CAD/stent, sCHF LVEF 12%, BPH, neurogenic bladder, ESRD on HD w/ chronic anemia, MRSA on vibramycin p/w sepsis POA from UTI with acute metabolic encephalopathy, hypotension.

## 2021-03-06 NOTE — PROGRESS NOTE ADULT - ASSESSMENT
Seen and examined today at bedside . Denies complaints  Due for HD on the 4th shift.  Next HD Tuesday and or as needed  No labs  today    aspirin enteric coated 81 milliGRAM(s) Oral daily  atorvastatin 10 milliGRAM(s) Oral at bedtime  chlorhexidine 4% Liquid 1 Application(s) Topical daily  cinacalcet 90 milliGRAM(s) Oral daily  citalopram 20 milliGRAM(s) Oral daily  dextrose 40% Gel 15 Gram(s) Oral once  dextrose 5%. 1000 milliLiter(s) IV Continuous <Continuous>  dextrose 50% Injectable 25 Gram(s) IV Push once  dextrose 50% Injectable 12.5 Gram(s) IV Push once  dextrose 50% Injectable 25 Gram(s) IV Push once  glucagon  Injectable 1 milliGRAM(s) IntraMuscular once  heparin   Injectable 5000 Unit(s) SubCutaneous every 8 hours  lactobacillus acidophilus 1 Tablet(s) Oral daily  midodrine. 20 milliGRAM(s) Oral three times a day  midodrine. 10 milliGRAM(s) Oral <User Schedule>  mupirocin 2% Ointment 1 Application(s) Both Nostrils two times a day  piperacillin/tazobactam IVPB.. 3.3375 Gram(s) IV Intermittent every 12 hours  polyethylene glycol 3350 17 Gram(s) Oral daily  senna 2 Tablet(s) Oral at bedtime  tamsulosin 0.4 milliGRAM(s) Oral at bedtime      VITAL:  T(C): , Max: 36.8 (03-05-21 @ 17:17)  T(F): , Max: 98.2 (03-05-21 @ 17:17)  HR: 88 (03-06-21 @ 11:49)  BP: 109/71 (03-06-21 @ 11:49)  BP(mean): 6 (03-05-21 @ 17:17)  RR: 18 (03-06-21 @ 11:49)  SpO2: 96% (03-06-21 @ 11:49)  Wt(kg): --    03-05-21 @ 07:01  -  03-06-21 @ 07:00  --------------------------------------------------------  IN: 320 mL / OUT: 0 mL / NET: 320 mL        PHYSICAL EXAM:  Constitutional: lethargic but alert; cachectic  HEENT: NCAT, DMM  Neck: Supple, No JVD  Respiratory: diminished breath sounds b/l  Cardiovascular: RRR s1s2, no m/r/g  Gastrointestinal: BS+, soft, NT/ND  Extremities: No peripheral edema b/l  Neurological: no focal deficits; strength grossly intact  Back: no CVAT b/l  Skin: No rashes, no nevi  Access: LUE AVF (+)thrill    LABS:                          13.4   4.93  )-----------( 102      ( 05 Mar 2021 09:22 )             41.6     Na(138)/K(5.1)/Cl(94)/HCO3(27)/BUN(52)/Cr(3.96)Glu(49)/Ca(9.2)/Mg(2.5)/PO4(5.8)    03-05 @ 09:22  Na(139)/K(5.6)/Cl(97)/HCO3(21)/BUN(81)/Cr(5.39)Glu(106)/Ca(9.0)/Mg(2.6)/PO4(6.4)    03-04 @ 16:22            ASSESSMENT/PLAN    IMPRESSION: 70M w/ dementia, CAD, past epidural abscess, and ESRD, 3/2/21 a/w AMS    (1)Renal - ESRD - HD TTS - due for next HD today  (2)Hyperkalemia - improved based on   yesterdays labs  (3)Hypercalcemia - primary hyperparathyroidism - on Sensipar - improved relative to admission  (4)AMS - sepsis-associated? Now on Zosyn - clinically improving  (5)CV - hypotensive -presently dependent on high-dose midodrine -     RECOMMEND:  (1)Next HD today - no net UF; low BFR/low dialysate temp, in effort to minimize risk of intradialytic hypotention  (2)Midodrine TID as ordered  (3)No objection to NS 500cc bolus x 2 today as needed for hypotension  (4) c/wSensipar as ordered  (5)Continue abx for GFR<10/HD    Dc Henriquez NP-BC  [facility]  [phone]

## 2021-03-07 NOTE — CHART NOTE - NSCHARTNOTEFT_GEN_A_CORE
NGT placed, patient with poor po intake and with difficulty with swallowing. CXR ordered to confirm placement. Will initiate TF once tip of NGT confirmed in stomach.

## 2021-03-07 NOTE — PROVIDER CONTACT NOTE (OTHER) - ASSESSMENT
Patient glucose was 50 @17:31, verified by 43 @17:32. Pt unable to tolerate PO so provider stated to give IV push 12.5 D50% . IV push meds given. Pt glucose check was done @1757, value was 153, re-check was 147@ 1807.

## 2021-03-07 NOTE — PROGRESS NOTE ADULT - ASSESSMENT
70M CAD/stent, sCHF LVEF 12%, BPH, neurogenic bladder, ESRD on HD w/ chronic anemia, MRSA on vibramycin p/w sepsis POA from UTI with acute metabolic encephalopathy, hypotension, c/b hypoglycemia and hypothermia

## 2021-03-07 NOTE — PROVIDER CONTACT NOTE (OTHER) - ASSESSMENT
Glucose was 56 @ 12:23 verified by 45 @12:33. Pt is slightly lethargic but still arousable. Pt unable to tolerate PO gel. Gave 12.5 Grams of D50% and glucose came up to 124 @12:53, and re-check was 157 @13:06.

## 2021-03-07 NOTE — PROGRESS NOTE ADULT - PROBLEM SELECTOR PLAN 3
awaiting cortisol level  ensure adequate PO intake.  poss due to poor PO vs poss adrenal insuff  cortisol P

## 2021-03-07 NOTE — PROGRESS NOTE ADULT - SUBJECTIVE AND OBJECTIVE BOX
[facility] Division of Hospital Medicine  Shireen Miranda MD  Pager [phone]    Patient is a 70y old  Male who presents with a chief complaint of AMS       SUBJECTIVE / OVERNIGHT EVENTS: per staff, pt not swallowing, pills had to be removed from mouth; hypoglycemic and hypothermic again      MEDICATIONS  (STANDING):  aspirin enteric coated 81 milliGRAM(s) Oral daily  atorvastatin 10 milliGRAM(s) Oral at bedtime  chlorhexidine 4% Liquid 1 Application(s) Topical daily  cinacalcet 90 milliGRAM(s) Oral daily  citalopram 20 milliGRAM(s) Oral daily  dextrose 40% Gel 15 Gram(s) Oral once  dextrose 50% Injectable 25 Gram(s) IV Push once  dextrose 50% Injectable 12.5 Gram(s) IV Push once  dextrose 50% Injectable 25 Gram(s) IV Push once  glucagon  Injectable 1 milliGRAM(s) IntraMuscular once  heparin   Injectable 5000 Unit(s) SubCutaneous every 8 hours  lactobacillus acidophilus 1 Tablet(s) Oral daily  midodrine. 20 milliGRAM(s) Oral three times a day  midodrine. 10 milliGRAM(s) Oral <User Schedule>  mupirocin 2% Ointment 1 Application(s) Both Nostrils two times a day  polyethylene glycol 3350 17 Gram(s) Oral daily  senna 2 Tablet(s) Oral at bedtime  tamsulosin 0.4 milliGRAM(s) Oral at bedtime          CAPILLARY BLOOD GLUCOSE  POCT Blood Glucose.: 157 mg/dL (07 Mar 2021 13:06)  POCT Blood Glucose.: 124 mg/dL (07 Mar 2021 12:53)  POCT Blood Glucose.: 45 mg/dL (07 Mar 2021 12:33)  POCT Blood Glucose.: 56 mg/dL (07 Mar 2021 12:23)  POCT Blood Glucose.: 134 mg/dL (07 Mar 2021 08:31)  POCT Blood Glucose.: 119 mg/dL (06 Mar 2021 23:38)  POCT Blood Glucose.: 85 mg/dL (06 Mar 2021 17:33)  POCT Blood Glucose.: 113 mg/dL (06 Mar 2021 14:01)          PHYSICAL EXAM:  Vital Signs Last 24 Hrs  T(F): 96.3 (07 Mar 2021 12:01), Max: 97.5 (06 Mar 2021 15:49)  HR: 89 (07 Mar 2021 12:01) (78 - 96)  BP: 103/67 (07 Mar 2021 12:01) (101/75 - 135/85)  RR: 20 (07 Mar 2021 12:01) (18 - 20)  SpO2: 99% (07 Mar 2021 12:01) (96% - 99%)    CONSTITUTIONAL: NAD  RESPIRATORY: Normal respiratory effort; grossly b/l AE ant  CARDIOVASCULAR: Regular rate and rhythm; No lower extremity edema;   ABDOMEN: Nontender to palpation, normoactive bowel sounds  MUSCULOSKELETAL:  no joint swelling or tenderness to palpation  PSYCH: agitated at times  NEUROLOGY: L facial droop    LABS:                        11.3   5.55  )-----------( 64       ( 07 Mar 2021 03:00 )             34.5     03-07    132<L>  |  91<L>  |  47<H>  ----------------------------<  141<H>  4.6   |  23  |  3.41<H>    Ca    8.1<L>      07 Mar 2021 03:00  Phos  4.7     03-07  Mg     2.1     03-07

## 2021-03-08 NOTE — PROGRESS NOTE ADULT - SUBJECTIVE AND OBJECTIVE BOX
Overnight events noted   VITAL: T(C): , Max: 36.6 (03-07-21 @ 17:22) T(F): , Max: 97.8 (03-07-21 @ 17:22) HR: 87 (03-08-21 @ 04:54) BP: 96/57 (03-08-21 @ 04:54) BP(mean): -- RR: 20 (03-08-21 @ 04:54) SpO2: 96% (03-08-21 @ 04:54)   PHYSICAL EXAM: Constitutional: lethargic but alert; cachectic; (+)warming blanket HEENT: NCAT, DMM Neck: Supple, No JVD Respiratory: CTA-R; decreased BS L base Cardiovascular: RRR s1s2, no m/r/g Gastrointestinal: BS+, soft, NT/ND Extremities: No peripheral edema b/l Neurological: no focal deficits; strength grossly intact Back: no CVAT b/l Skin: No rashes, no nevi Access: LUE AVF (+)thrill  LABS:                      12.9  7.42  )-----------( 46       ( 08 Mar 2021 07:26 )            39.9   Na(136)/K(5.8)/Cl(88)/HCO3(23)/BUN(67)/Cr(4.94)Glu(79)/Ca(8.4)/Mg(2.8)/PO4(7.5)    03-08 @ 07:26 Na(132)/K(4.6)/Cl(91)/HCO3(23)/BUN(47)/Cr(3.41)Glu(141)/Ca(8.1)/Mg(2.1)/PO4(4.7)    03-07 @ 03:00 Na(136)/K(6.5)/Cl(93)/HCO3(18)/BUN(84)/Cr(5.51)Glu(157)/Ca(7.9)/Mg(2.6)/PO4(7.7)    03-06 @ 21:16 Na(138)/K(5.1)/Cl(94)/HCO3(27)/BUN(52)/Cr(3.96)Glu(49)/Ca(9.2)/Mg(2.5)/PO4(5.8)    03-05 @ 09:22   IMPRESSION: 70M w/ dementia, CAD, past epidural abscess, and ESRD, 3/2/21 a/w AMS  (1)Renal - ESRD - HD TTS - due for next HD tomorrow (2)Hyperkalemia - warranting Lokelma today/on non-hd days for now (3)GI - worsening transaminitis (4)CV - hypotensive -presently dependent on high-dose midodrine   RECOMMEND: (1)Next HD tomorrow - no net UF; low BFR/low dialysate temp, in effort to minimize risk of intradialytic hypotension; Midodrine 10mg po x1 prn SBP<85 on HD (in addition to the TID Midodrine already ordered) (2)Lokelma 10gm po QIW on non-hd days, 1st dose today (could switch to Kayexalate 15gm QIW on discharge) (3)Workup of transaminitis per primary team/GI       Heath Huff MD [facility] Office: [phone] Cell: [phone]        Minimally communicative at present   VITAL: T(C): , Max: 36.6 (03-07-21 @ 17:22) T(F): , Max: 97.8 (03-07-21 @ 17:22) HR: 87 (03-08-21 @ 04:54) BP: 96/57 (03-08-21 @ 04:54) RR: 20 (03-08-21 @ 04:54) SpO2: 96% (03-08-21 @ 04:54)   PHYSICAL EXAM: Constitutional: minimally communicative/tachypnic HEENT: DMM; (+)NGT Neck: Supple, No JVD Respiratory: CTA-R; decreased BS L base Cardiovascular: RRR s1s2, no m/r/g Gastrointestinal: BS+, soft, NT/ND Extremities: No peripheral edema b/l Neurological: no focal deficits; strength grossly intact Back: no CVAT b/l Skin: No rashes, no nevi Access: LUE AVF (+)thrill  LABS:                      12.9  7.42  )-----------( 46       ( 08 Mar 2021 07:26 )            39.9   Na(136)/K(5.8)/Cl(88)/HCO3(23)/BUN(67)/Cr(4.94)Glu(79)/Ca(8.4)/Mg(2.8)/PO4(7.5)    03-08 @ 07:26 Na(132)/K(4.6)/Cl(91)/HCO3(23)/BUN(47)/Cr(3.41)Glu(141)/Ca(8.1)/Mg(2.1)/PO4(4.7)    03-07 @ 03:00 Na(136)/K(6.5)/Cl(93)/HCO3(18)/BUN(84)/Cr(5.51)Glu(157)/Ca(7.9)/Mg(2.6)/PO4(7.7)    03-06 @ 21:16 Na(138)/K(5.1)/Cl(94)/HCO3(27)/BUN(52)/Cr(3.96)Glu(49)/Ca(9.2)/Mg(2.5)/PO4(5.8)    03-05 @ 09:22   IMPRESSION: 70M w/ dementia, CAD, past epidural abscess, and ESRD, 3/2/21 a/w AMS  (1)Renal - ESRD - HD TTS - due for next HD tomorrow (2)Hyperkalemia - warranting Lokelma today/on non-hd days for now (3)Metabolic acidosis - AG 25 - type A lactic acidosis? (3)GI - worsening transaminitis - shock liver? (4)CV - hypotensive -presently dependent on high-dose midodrine - due to malnutrition?   RECOMMEND: (1)GI eval (2)MICU eval (3)Lokelma 10gm po x 1 (and QIW on non-HD days for now) (4)No HD for now - will reconsider HD later today if K+ rises to 6 or higher despite medical therapy (5)BMP at least BID for now      Heath Huff MD [facility] Office: [phone] Cell: [phone]

## 2021-03-08 NOTE — DIETITIAN NUTRITION RISK NOTIFICATION - TREATMENT: THE FOLLOWING DIET HAS BEEN RECOMMENDED
Diet, NPO with Tube Feed:   Tube Feeding Modality: Nasogastric  Nepro with Carb Steady (NEPRORTH)  Total Volume for 24 Hours (mL): 960  Continuous  Starting Tube Feed Rate {mL per Hour}: 10  Increase Tube Feed Rate by (mL): 10     Every 4 hours  Until Goal Tube Feed Rate (mL per Hour): 40  Tube Feed Duration (in Hours): 24  Tube Feed Start Time: 18:00 (03-07-21 @ 18:02) [Active]

## 2021-03-08 NOTE — CONSULT NOTE ADULT - ASSESSMENT
70M CAD/stent, sCHF LVEF 12%, BPH, neurogenic bladder, ESRD on HD w/ chronic anemia, MRSA on vibramycin p/w suspected UTI now with elevated LFT's, hypoglycemia, hypotension, and worsening functional status.     #Hyperkalemia  #Elevated LFTs  #Sepsis  #Hypoglycemia    Recommendations:      70M CAD/stent, sCHF LVEF 12%, BPH, neurogenic bladder, ESRD on HD w/ chronic anemia, MRSA on vibramycin p/w suspected UTI now with elevated LFT's, hypoglycemia, hypotension, and worsening functional status.     #Hyperkalemia  - iso ESRD, follow up nephrology  - cw Mckenzie, trend EKGs    #Elevated LFTs  - DDx includes shock vs DILI vs viral vs PVT  - follow up Liver US with doppler  - recommend hepatology consult  - check ammonia    #Hypoxemia  - cw nasal cannula  - fu with pulmonary team          70M CAD/stent, sCHF LVEF 12%, BPH, neurogenic bladder, ESRD on HD w/ chronic anemia, MRSA on vibramycin p/w suspected UTI now with elevated LFT's, hypoglycemia, hypotension, and worsening functional status.     #Hyperkalemia  - iso ESRD, follow up nephrology  - aster Rincon, elton EKGs  #Elevated LFTs      Patient not currently MICU candidate.

## 2021-03-08 NOTE — PROGRESS NOTE ADULT - SUBJECTIVE AND OBJECTIVE BOX
[facility] Division of Hospital Medicine  Shireen Miranda MD  Pager [phone]    Patient is a 70y old  Male who presents with a chief complaint of AMS       SUBJECTIVE / OVERNIGHT EVENTS: opens eyes to name; less responsive      MEDICATIONS  (STANDING):  aspirin  chewable 81 milliGRAM(s) Oral daily  atorvastatin 10 milliGRAM(s) Oral at bedtime  chlorhexidine 4% Liquid 1 Application(s) Topical daily  cinacalcet 90 milliGRAM(s) Oral daily  citalopram 20 milliGRAM(s) Oral daily  dextrose 40% Gel 15 Gram(s) Oral once  dextrose 50% Injectable 25 Gram(s) IV Push once  dextrose 50% Injectable 12.5 Gram(s) IV Push once  dextrose 50% Injectable 25 Gram(s) IV Push once  doxazosin 1 milliGRAM(s) Oral at bedtime  glucagon  Injectable 1 milliGRAM(s) IntraMuscular once  heparin   Injectable 5000 Unit(s) SubCutaneous every 8 hours  lactobacillus acidophilus 1 Tablet(s) Oral daily  mupirocin 2% Ointment 1 Application(s) Both Nostrils two times a day  polyethylene glycol 3350 17 Gram(s) Oral daily  senna Syrup 10 milliLiter(s) Oral daily  sodium zirconium cyclosilicate 10 Gram(s) Oral <User Schedule>    MEDICATIONS  (PRN):  midodrine. 10 milliGRAM(s) Oral <User Schedule> PRN SBP < 85 on HD      CAPILLARY BLOOD GLUCOSE  POCT Blood Glucose.: 83 mg/dL (08 Mar 2021 12:10)  POCT Blood Glucose.: 130 mg/dL (08 Mar 2021 07:08)  POCT Blood Glucose.: 147 mg/dL (08 Mar 2021 06:53)  POCT Blood Glucose.: 65 mg/dL (08 Mar 2021 06:14)  POCT Blood Glucose.: 59 mg/dL (08 Mar 2021 06:11)  POCT Blood Glucose.: 108 mg/dL (08 Mar 2021 02:15)  POCT Blood Glucose.: 144 mg/dL (08 Mar 2021 01:57)  POCT Blood Glucose.: 95 mg/dL (08 Mar 2021 01:38)  POCT Blood Glucose.: 68 mg/dL (08 Mar 2021 00:52)  POCT Blood Glucose.: 61 mg/dL (08 Mar 2021 00:46)  POCT Blood Glucose.: 82 mg/dL (08 Mar 2021 00:30)  POCT Blood Glucose.: 34 mg/dL (07 Mar 2021 23:53)  POCT Blood Glucose.: 33 mg/dL (07 Mar 2021 23:52)  POCT Blood Glucose.: 147 mg/dL (07 Mar 2021 18:07)  POCT Blood Glucose.: 153 mg/dL (07 Mar 2021 17:57)  POCT Blood Glucose.: 43 mg/dL (07 Mar 2021 17:32)  POCT Blood Glucose.: 50 mg/dL (07 Mar 2021 17:31)      PHYSICAL EXAM:  Vital Signs Last 24 Hrs  T(F): 97.2 (08 Mar 2021 10:59), Max: 97.8 (07 Mar 2021 17:22)  HR: 88 (08 Mar 2021 10:59) (85 - 91)  BP: 105/72 (08 Mar 2021 10:59) (96/57 - 105/72)  RR: 18 (08 Mar 2021 10:59) (18 - 20)  SpO2: 99% (08 Mar 2021 10:59) (84% - 99%)    CONSTITUTIONAL: NAD  RESPIRATORY: Normal respiratory effort; b/l AE ant  CARDIOVASCULAR: Regular rate and rhythm; No lower extremity edema;   ABDOMEN: Nontender to palpation, normoactive bowel sounds  MUSCULOSKELETAL:  no joint swelling or tenderness to palpation  PSYCH: calm  NEUROLOGY: less responsive      LABS:                        12.9   7.42  )-----------( 46       ( 08 Mar 2021 07:26 )             39.9     03-08    136  |  88<L>  |  67<H>  ----------------------------<  79  5.8<H>   |  23  |  4.94<H>    Ca    8.4      08 Mar 2021 07:26  Phos  7.5     03-08  Mg     2.8     03-08    TPro  6.5  /  Alb  3.4  /  TBili  1.8<H>  /  DBili  x   /  AST  957<H>  /  ALT  1361<H>  /  AlkPhos  381<H>  03-08    PT/INR - ( 08 Mar 2021 11:20 )   PT: 32.4 sec;   INR: 2.97 ratio         PTT - ( 08 Mar 2021 11:20 )  PTT:46.3 sec          Culture - Blood (collected 07 Mar 2021 02:47)  Source: .Blood Blood-Venous  Preliminary Report (08 Mar 2021 03:01):    No growth to date.    Culture - Blood (collected 07 Mar 2021 02:47)  Source: .Blood Blood-Peripheral  Preliminary Report (08 Mar 2021 03:01):    No growth to date.

## 2021-03-08 NOTE — DIETITIAN INITIAL EVALUATION ADULT. - ENTERAL
Nepro @ 40 mL/hr x 24 hrs provides 960 mL, 1728 ankita, 78 gm pro. (32 ankita/kg ABW and 1.4 gm pro/kg ABW)

## 2021-03-08 NOTE — CONSULT NOTE ADULT - SUBJECTIVE AND OBJECTIVE BOX
CHIEF COMPLAINT: as above     HPI:    69 y/o M w/ hx ESRD on HD, anemia, hyperparathyroidism, CAD s/p stent, BPH w/ neurogenic bladder (last dialysis 2/27/21), cognitive impairment, that was brought in by EMS from [facility] for AMS. Patient was admitted with suspected UTI (with increased pus in urine) with acute metabolic encephalopathy. Patient was started on Zosyn and vanc by level. Patient became hypotensive despite antibiotic treatment. Patient received 250cc bolus at 6PM and 10PM and two extra doses of midodrine at 6 and 8pm. Despite this, patient remained hypotensive. Currently Mr. Beaver is A&Ox2 (to himself, location, but not date). He reports lower abdominal pain that has been persistent since hospital stay. He also says he feels fatigued and overall weak.  Patient denies any chest pain, nausea, vomiting, shortness of breath, cough.     MICU consulted for hypotension and worsening AMS iso relative hypotension. Patient seen and examined at bedside. He was not fully participating in the interview process He was responsive to name and tracking across the room but only mildly responding to commands. He has been getting HD tuesday thursday saturday and tolerating it well with midodrine dose prior. Patient with persistently elevated LFT's though now plateauing.     PAST MEDICAL & SURGICAL HISTORY:  Inguinal hernia    NSTEMI (non-ST elevated myocardial infarction)    HLD (hyperlipidemia)    Neurogenic bladder    Spinal abscess    Cavitary lung disease    CAD (coronary artery disease)    Abscess of sacrum    Anemia due to chronic renal failure treated with erythropoietin, stage 2 (mild)    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 device, implant or graft    HPTH (hyperparathyroidism)  Secondary    HTN (hypertension)    ESRD (end stage renal disease)    Cavitary lung disease    CAD in native artery    Hypertension    ESRD (end stage renal disease)    S/P primary angioplasty with coronary stent    Kidney abscess        FAMILY HISTORY:  No pertinent family history in first degree relatives    Family history of breast cancer (Sibling)        Allergies    No Known Allergies    Intolerances        HOME MEDICATIONS:    REVIEW OF SYSTEMS: unable to assess    OBJECTIVE:  ICU Vital Signs Last 24 Hrs  T(C): 35.9 (08 Mar 2021 04:54), Max: 36.6 (07 Mar 2021 17:22)  T(F): 96.7 (08 Mar 2021 04:54), Max: 97.8 (07 Mar 2021 17:22)  HR: 87 (08 Mar 2021 04:54) (85 - 91)  BP: 96/57 (08 Mar 2021 04:54) (96/57 - 104/76)  BP(mean): --  ABP: --  ABP(mean): --  RR: 20 (08 Mar 2021 04:54) (20 - 20)  SpO2: 96% (08 Mar 2021 04:54) (96% - 99%)        CAPILLARY BLOOD GLUCOSE      POCT Blood Glucose.: 130 mg/dL (08 Mar 2021 07:08)      PHYSICAL EXAM:  PHYSICAL EXAM:  GENERAL: NAD, cachectic   HEAD:  Atraumatic, Normocephalic  EYES: EOMI, PERRLA, conjunctiva and sclera clear  ENT: Moist mucous membranes  NECK: Supple, No JVD  CHEST/LUNG: some scattered rhonchi   HEART: Regular rate and rhythm; No murmurs, rubs, or gallops  ABDOMEN: Bowel sounds present; Soft, Nontender, Nondistended. No hepatomegally  NERVOUS SYSTEM:  Alert & Oriented X 0ions    HOSPITAL MEDICATIONS:  MEDICATIONS  (STANDING):  aspirin  chewable 81 milliGRAM(s) Oral daily  atorvastatin 10 milliGRAM(s) Oral at bedtime  chlorhexidine 4% Liquid 1 Application(s) Topical daily  cinacalcet 90 milliGRAM(s) Oral daily  citalopram 20 milliGRAM(s) Oral daily  dextrose 40% Gel 15 Gram(s) Oral once  dextrose 50% Injectable 25 Gram(s) IV Push once  dextrose 50% Injectable 12.5 Gram(s) IV Push once  dextrose 50% Injectable 25 Gram(s) IV Push once  doxazosin 1 milliGRAM(s) Oral at bedtime  glucagon  Injectable 1 milliGRAM(s) IntraMuscular once  heparin   Injectable 5000 Unit(s) SubCutaneous every 8 hours  lactobacillus acidophilus 1 Tablet(s) Oral daily  mupirocin 2% Ointment 1 Application(s) Both Nostrils two times a day  polyethylene glycol 3350 17 Gram(s) Oral daily  senna Syrup 10 milliLiter(s) Oral daily  sodium zirconium cyclosilicate 10 Gram(s) Oral <User Schedule>    MEDICATIONS  (PRN):  midodrine. 10 milliGRAM(s) Oral <User Schedule> PRN SBP < 85 on HD      LABS:                        12.9   7.42  )-----------( 46       ( 08 Mar 2021 07:26 )             39.9     03-08    136  |  88<L>  |  67<H>  ----------------------------<  79  5.8<H>   |  23  |  4.94<H>    Ca    8.4      08 Mar 2021 07:26  Phos  7.5     03-08  Mg     2.8     03-08    TPro  6.5  /  Alb  3.4  /  TBili  1.8<H>  /  DBili  x   /  AST  957<H>  /  ALT  1361<H>  /  AlkPhos  381<H>  03-08              MICROBIOLOGY:     RADIOLOGY:  [ ] Reviewed and interpreted by me    EKG:

## 2021-03-08 NOTE — DIETITIAN INITIAL EVALUATION ADULT. - OTHER INFO
Pt is nonverbal; collateral obtained from RN and chart review. Speech and swallow 3/5 recommending pureed, Sorrel Consistency fluids. Per chart review, pt stopped swallowing 3/7 and had a poor PO intake during this admission so a NGT was placed. Pt seen receiving Nepro @ goal rate of 40 mL/hr. Per RN, no diarrhea/constipation. Pt with high potassium and phosphorous.

## 2021-03-08 NOTE — PROVIDER CONTACT NOTE (CRITICAL VALUE NOTIFICATION) - ACTION/TREATMENT ORDERED:
Will give one amp [id number], follow hypoglycemia protocol and recheck FS every 15 minutes until over 100x2. Repeat FS were 147, 130.

## 2021-03-08 NOTE — PROVIDER CONTACT NOTE (CRITICAL VALUE NOTIFICATION) - ACTION/TREATMENT ORDERED:
Will give half amp [id number], follow hypoglycemia protocol and recheck FS every 15 minutes until over 100x2.    0050- Pt. FS was 82, then 61, repeat 68. ANNIKA Alexander notified, instructed to give full amp of [id number]. follow hypoglycemia protocol and recheck FS every 15 minutes until over 100x2.

## 2021-03-09 NOTE — CONSULT NOTE ADULT - ASSESSMENT
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 Of note pt recently hospitalized 01/27-2/4 for similar condition. At that time he was empirically treated for PNA vs UTI with vanc/zosyn, bcx and Ucx were negative, and his mental status improved to AAO x 2 (appears to be baseline from documentation). Patient is being actively treated for sepsis secondary to UTI , being worked up for metabolic encephalopathy . Hepatology consulted for elevated liver enzymes , and to rule out liver failure . LFTs at presentation 0.6/125/33/56 , patient dropped his blood pressure to 70 systolic in 3/4 LFTs worsened significantly thereafter 3/8 1.8/381/ 957/1361 and then started improving thereafter . INR prolonged to 2.97 .  US Abdomen Doppler  No evidence of portal venous thrombosis, as clinically questioned.  US Abdomen Limited  . Liver is sonographically within normal limits.  No evidence of cholelithiasis or biliary ductal dilatation. No previous history of liver disease . Further history is limited secondary to patient condition       #Elevated liver enzymes : likely secondary to ischemic hepatitis   Viral hepatitis panel is negative   LFTs started trending down   would rule out other less likely causes : autoimmune versus others   patient is now oriented *1-2    REC:  optimize blood pressure   avoid hepatotoxic drugs   daily INR/ LFTs   INA, AMA , ASMA ,IGG levels and ANTI LKM   treat underlying sepsis   please check HCV PCR , HBV PCR   Not a candidate for liver transplant given very low EF 12%  will follow

## 2021-03-09 NOTE — CONSULT NOTE ADULT - SUBJECTIVE AND OBJECTIVE BOX
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 Of note pt recently hospitalized 01/27-2/4 for similar condition. At that time he was empirically treated for PNA vs UTI with vanc/zosyn, bcx and Ucx were negative, and his mental status improved to AAO x 2 (appears to be baseline from documentation). Patient is being actively treated for sepsis secondary to UTI , being worked up for metabolic encephalopathy . Hepatology consulted for elevated liver enzymes , and to rule out liver failure . LFTs at presentation 0.6/125/33/56 , patient dropped his blood pressure to 70 systolic in 3/4 LFTs worsened significantly thereafter 3/8 1.8/381/ 957/1361 and then started improving thereafter . INR prolonged to 2.97 .  US Abdomen Doppler  No evidence of portal venous thrombosis, as clinically questioned.  US Abdomen Limited  . Liver is sonographically within normal limits.  No evidence of cholelithiasis or biliary ductal dilatation. No previous history of liver disease . Further history is limited secondary to patient condition       Prior records Reviewed (Y/N): Y  History obtained from person other than patient (Y/N): Y      PAST MEDICAL & SURGICAL HISTORY:  Inguinal hernia    NSTEMI (non-ST elevated myocardial infarction)    HLD (hyperlipidemia)    Neurogenic bladder    Spinal abscess    Cavitary lung disease    CAD (coronary artery disease)    Abscess of sacrum    Anemia due to chronic renal failure treated with erythropoietin, stage 2 (mild)    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 device, implant or graft    HPTH (hyperparathyroidism)  Secondary    HTN (hypertension)    ESRD (end stage renal disease)    Cavitary lung disease    CAD in native artery    Hypertension    ESRD (end stage renal disease)    S/P primary angioplasty with coronary stent    Kidney abscess        FAMILY HISTORY:  No pertinent family history in first degree relatives    Family history of breast cancer (Sibling)      Home Medications:  Acidophilus oral tablet: 1 tab(s) orally once a day (02 Mar 2021 10:40)  Aquaphor topical ointment: Apply topically to affected area 4 times a day (02 Mar 2021 10:40)  aspirin 81 mg oral tablet: 1 tab(s) orally once a day (02 Mar 2021 10:40)  citalopram 20 mg oral tablet: 1 tab(s) orally once a day (02 Mar 2021 10:40)  Claritin 5 mg oral tablet, chewable: 1 tab(s) orally every 12 hours (02 Mar 2021 10:40)  famotidine 20 mg oral tablet: 1 tab(s) orally every other day (at bedtime) (02 Mar 2021 10:40)  Lipitor 10 mg oral tablet: 1 tab(s) orally once a day (02 Mar 2021 10:40)  midodrine 10 mg oral tablet: 1 tab(s) orally 3 times a day (02 Mar 2021 10:40)  MiraLax oral powder for reconstitution:  (02 Mar 2021 10:40)  Senna 8.6 mg oral tablet: 2 tab(s) orally once a day (at bedtime) (02 Mar 2021 10:40)  tamsulosin 0.4 mg oral capsule: 1 cap(s) orally once a day (02 Mar 2021 10:40)  Vibramycin 100 mg oral capsule: 1 cap(s) orally a day (02 Mar 2021 10:40)    MEDICATIONS  (STANDING):  aspirin  chewable 81 milliGRAM(s) Oral daily  atorvastatin 10 milliGRAM(s) Oral at bedtime  chlorhexidine 4% Liquid 1 Application(s) Topical daily  cinacalcet 90 milliGRAM(s) Oral daily  citalopram 20 milliGRAM(s) Oral daily  dextrose 40% Gel 15 Gram(s) Oral once  dextrose 50% Injectable 25 Gram(s) IV Push once  dextrose 50% Injectable 12.5 Gram(s) IV Push once  dextrose 50% Injectable 25 Gram(s) IV Push once  glucagon  Injectable 1 milliGRAM(s) IntraMuscular once  heparin   Injectable 5000 Unit(s) SubCutaneous every 8 hours  lactobacillus acidophilus 1 Tablet(s) Oral daily  mupirocin 2% Ointment 1 Application(s) Both Nostrils two times a day  piperacillin/tazobactam IVPB.. 3.375 Gram(s) IV Intermittent every 12 hours  polyethylene glycol 3350 17 Gram(s) Oral daily  senna Syrup 10 milliLiter(s) Oral daily  sodium zirconium cyclosilicate 10 Gram(s) Oral <User Schedule>    MEDICATIONS  (PRN):  midodrine. 10 milliGRAM(s) Oral <User Schedule> PRN SBP < 90 on HD      Allergies  No Known Allergies      Review of Systems:   unable to obtain secondary to patient condition         Physical Examination:  T(C): 36.2 (03-09-21 @ 11:11), Max: 36.7 (03-09-21 @ 05:52)  HR: 75 (03-09-21 @ 11:11) (75 - 96)  BP: 96/61 (03-09-21 @ 11:11) (88/63 - 104/76)  RR: 17 (03-09-21 @ 11:11) (17 - 18)  SpO2: 99% (03-09-21 @ 05:52) (99% - 99%)      03-08-21 @ 07:01  -  03-09-21 @ 07:00  --------------------------------------------------------  IN: 1030 mL / OUT: 0 mL / NET: 1030 mL    03-09-21 @ 07:01  -  03-09-21 @ 16:28  --------------------------------------------------------  IN: 758 mL / OUT: 400 mL / NET: 358 mL        Constitutional: No acute distress.  Eyes:. Conjunctivae are clear, Sclera is non-icteric.  Ears Nose and Throat: The external ears are normal appearing,  Oral mucosa is pink and moist.  Respiratory:  No signs of respiratory distress. Lung sounds are clear bilaterally.  Cardiovascular:  S1 S2, Regular rate and rhythm.  GI: Abdomen is soft, symmetric, and non-tender without distention.  Bowel sounds are present and normoactive in all four quadrants. No masses, hepatomegaly, or splenomegaly are noted.   Neuro: No Tremor, No involuntary movements  Skin: No rashes, No Jaundice.          Data: (reviewed by attending)                        11.7   13.66 )-----------( 43       ( 09 Mar 2021 08:40 )             36.1     Hgb Trend:  11.7  03-09-21 @ 08:40  12.9  03-08-21 @ 07:26  11.3  03-07-21 @ 03:00  11.8  03-06-21 @ 21:16        03-09    136  |  90<L>  |  92<H>  ----------------------------<  83  4.5   |  24  |  5.57<H>    Ca    7.6<L>      09 Mar 2021 08:40  Phos  7.2     03-08  Mg     2.4     03-08    TPro  5.8<L>  /  Alb  3.2<L>  /  TBili  1.3<H>  /  DBili  x   /  AST  617<H>  /  ALT  1100<H>  /  AlkPhos  332<H>  03-09    Liver panel trend:  TBili 1.3   /      /   ALT 1100   /   AlkP 332   /   Tptn 5.8   /   Alb 3.2    /   DBili --      03-09  TBili 1.8   /      /   ALT 1361   /   AlkP 381   /   Tptn 6.5   /   Alb 3.4    /   DBili --      03-08  TBili 1.2   /      /      /   AlkP 382   /   Tptn 6.2   /   Alb 3.3    /   DBili --      03-06  TBili 1.0   /      /      /   AlkP 529   /   Tptn 6.7   /   Alb 3.6    /   DBili 0.5      03-05  TBili 0.9   /      /      /   AlkP 568   /   Tptn 6.2   /   Alb 3.4    /   DBili 0.6      03-04  TBili 0.5   /   AST 56   /      /   AlkP 135   /   Tptn 6.3   /   Alb 3.2    /   DBili --      03-02  TBili 0.6   /   AST 77   /      /   AlkP 155   /   Tptn 7.3   /   Alb 3.7    /   DBili --      03-02  TBili 0.6   /   AST 71   /      /   AlkP 158   /   Tptn 7.3   /   Alb 3.7    /   DBili --      03-02      PT/INR - ( 08 Mar 2021 11:20 )   PT: 32.4 sec;   INR: 2.97 ratio         PTT - ( 08 Mar 2021 11:20 )  PTT:46.3 sec    Culture - Blood (collected 07 Mar 2021 02:47)  Source: .Blood Blood-Venous  Preliminary Report (08 Mar 2021 03:01):    No growth to date.    Culture - Blood (collected 07 Mar 2021 02:47)  Source: .Blood Blood-Peripheral  Preliminary Report (08 Mar 2021 03:01):    No growth to date.          Radiology:(reviewed by attending)    US Abdomen Doppler:   EXAM:  US DPLX ABDOMEN        PROCEDURE DATE:  Mar  8 2021         INTERPRETATION:  CLINICAL INFORMATION: Abnormal liver function test. Evaluate for portal venous thrombosis.    COMPARISON: None available.    TECHNIQUE: Sonography of the abdomen. Color Doppler ultrasound was utilized for evaluation of the patient's portal and hepatic vasculature. Spectral evaluation was attempted, but could not be performed due to patient respiratory motion.    FINDINGS:    Liver: Within normal limits.    Doppler: No pulmonary venous thrombosis. The main, left, and right portal veins are patent, demonstrating normal direction of flow. The right, middle, and left hepatic veins are patent.    Bile ducts: Normal caliber. Common bile duct measures 2 mm.  Gallbladder: Within normal limits.  Pancreas: Not visualized  Ascites: Trace perihepatic ascites.  Aorta and IVC: Visualized portions are within normal limits.    IMPRESSION:  No evidence of portal venous thrombosis, as clinically questioned.                JEOVANY LARIOS MD; Attending Radiologist  This document has been electronically signed. 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 Of note pt recently hospitalized 01/27-2/4 for similar condition. At that time he was empirically treated for PNA vs UTI with vanc/zosyn, bcx and Ucx were negative, and his mental status improved to AAO x 2 (appears to be baseline from documentation). Patient is being actively treated for sepsis secondary to UTI , being worked up for metabolic encephalopathy . Hepatology consulted for elevated liver enzymes , and to rule out liver failure . LFTs at presentation 0.6/125/33/56 , patient dropped his blood pressure to 70 systolic in 3/4 LFTs worsened significantly thereafter 3/8 1.8/381/ 957/1361 and then started improving thereafter . INR prolonged to 2.97 .  US Abdomen Doppler  No evidence of portal venous thrombosis, as clinically questioned.  US Abdomen Limited  . Liver is sonographically within normal limits.  No evidence of cholelithiasis or biliary ductal dilatation. No previous history of liver disease . Further history is limited secondary to patient condition       Prior records Reviewed (Y/N): Y  History obtained from person other than patient (Y/N): Y      PAST MEDICAL & SURGICAL HISTORY:  Inguinal hernia    NSTEMI (non-ST elevated myocardial infarction)    HLD (hyperlipidemia)    Neurogenic bladder    Spinal abscess    Cavitary lung disease    CAD (coronary artery disease)    Abscess of sacrum    Anemia due to chronic renal failure treated with erythropoietin, stage 2 (mild)    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 device, implant or graft    HPTH (hyperparathyroidism)  Secondary    HTN (hypertension)    ESRD (end stage renal disease)    Cavitary lung disease    CAD in native artery    Hypertension    ESRD (end stage renal disease)    S/P primary angioplasty with coronary stent    Kidney abscess        FAMILY HISTORY:  No pertinent family history in first degree relatives    Family history of breast cancer (Sibling)      Home Medications:  Acidophilus oral tablet: 1 tab(s) orally once a day (02 Mar 2021 10:40)  Aquaphor topical ointment: Apply topically to affected area 4 times a day (02 Mar 2021 10:40)  aspirin 81 mg oral tablet: 1 tab(s) orally once a day (02 Mar 2021 10:40)  citalopram 20 mg oral tablet: 1 tab(s) orally once a day (02 Mar 2021 10:40)  Claritin 5 mg oral tablet, chewable: 1 tab(s) orally every 12 hours (02 Mar 2021 10:40)  famotidine 20 mg oral tablet: 1 tab(s) orally every other day (at bedtime) (02 Mar 2021 10:40)  Lipitor 10 mg oral tablet: 1 tab(s) orally once a day (02 Mar 2021 10:40)  midodrine 10 mg oral tablet: 1 tab(s) orally 3 times a day (02 Mar 2021 10:40)  MiraLax oral powder for reconstitution:  (02 Mar 2021 10:40)  Senna 8.6 mg oral tablet: 2 tab(s) orally once a day (at bedtime) (02 Mar 2021 10:40)  tamsulosin 0.4 mg oral capsule: 1 cap(s) orally once a day (02 Mar 2021 10:40)  Vibramycin 100 mg oral capsule: 1 cap(s) orally a day (02 Mar 2021 10:40)    MEDICATIONS  (STANDING):  aspirin  chewable 81 milliGRAM(s) Oral daily  atorvastatin 10 milliGRAM(s) Oral at bedtime  chlorhexidine 4% Liquid 1 Application(s) Topical daily  cinacalcet 90 milliGRAM(s) Oral daily  citalopram 20 milliGRAM(s) Oral daily  dextrose 40% Gel 15 Gram(s) Oral once  dextrose 50% Injectable 25 Gram(s) IV Push once  dextrose 50% Injectable 12.5 Gram(s) IV Push once  dextrose 50% Injectable 25 Gram(s) IV Push once  glucagon  Injectable 1 milliGRAM(s) IntraMuscular once  heparin   Injectable 5000 Unit(s) SubCutaneous every 8 hours  lactobacillus acidophilus 1 Tablet(s) Oral daily  mupirocin 2% Ointment 1 Application(s) Both Nostrils two times a day  piperacillin/tazobactam IVPB.. 3.375 Gram(s) IV Intermittent every 12 hours  polyethylene glycol 3350 17 Gram(s) Oral daily  senna Syrup 10 milliLiter(s) Oral daily  sodium zirconium cyclosilicate 10 Gram(s) Oral <User Schedule>    MEDICATIONS  (PRN):  midodrine. 10 milliGRAM(s) Oral <User Schedule> PRN SBP < 90 on HD      Allergies  No Known Allergies      Review of Systems:   unable to obtain secondary to patient condition         Physical Examination:  T(C): 36.2 (03-09-21 @ 11:11), Max: 36.7 (03-09-21 @ 05:52)  HR: 75 (03-09-21 @ 11:11) (75 - 96)  BP: 96/61 (03-09-21 @ 11:11) (88/63 - 104/76)  RR: 17 (03-09-21 @ 11:11) (17 - 18)  SpO2: 99% (03-09-21 @ 05:52) (99% - 99%)      03-08-21 @ 07:01  -  03-09-21 @ 07:00  --------------------------------------------------------  IN: 1030 mL / OUT: 0 mL / NET: 1030 mL    03-09-21 @ 07:01  -  03-09-21 @ 16:28  --------------------------------------------------------  IN: 758 mL / OUT: 400 mL / NET: 358 mL        Constitutional: No acute distress.  Eyes:. Conjunctivae are clear, Sclera is non-icteric.  Ears Nose and Throat: The external ears are normal appearing,  Oral mucosa is pink and moist.  Respiratory:  No signs of respiratory distress. Lung sounds are clear bilaterally.  Cardiovascular:  S1 S2, Regular rate and rhythm.  GI: Abdomen is soft, symmetric, and non-tender without distention.  Bowel sounds are present and normoactive in all four quadrants. No masses, hepatomegaly, or splenomegaly are noted.   Neuro: No Tremor, No involuntary movements  Skin: No rashes, No Jaundice.          Data: (reviewed by attending)                        11.7   13.66 )-----------( 43       ( 09 Mar 2021 08:40 )             36.1     Hgb Trend:  11.7  03-09-21 @ 08:40  12.9  03-08-21 @ 07:26  11.3  03-07-21 @ 03:00  11.8  03-06-21 @ 21:16        03-09    136  |  90<L>  |  92<H>  ----------------------------<  83  4.5   |  24  |  5.57<H>    Ca    7.6<L>      09 Mar 2021 08:40  Phos  7.2     03-08  Mg     2.4     03-08    TPro  5.8<L>  /  Alb  3.2<L>  /  TBili  1.3<H>  /  DBili  x   /  AST  617<H>  /  ALT  1100<H>  /  AlkPhos  332<H>  03-09    Liver panel trend:  TBili 1.3   /      /   ALT 1100   /   AlkP 332   /   Tptn 5.8   /   Alb 3.2    /   DBili --      03-09  TBili 1.8   /      /   ALT 1361   /   AlkP 381   /   Tptn 6.5   /   Alb 3.4    /   DBili --      03-08  TBili 1.2   /      /      /   AlkP 382   /   Tptn 6.2   /   Alb 3.3    /   DBili --      03-06  TBili 1.0   /      /      /   AlkP 529   /   Tptn 6.7   /   Alb 3.6    /   DBili 0.5      03-05  TBili 0.9   /      /      /   AlkP 568   /   Tptn 6.2   /   Alb 3.4    /   DBili 0.6      03-04  TBili 0.5   /   AST 56   /      /   AlkP 135   /   Tptn 6.3   /   Alb 3.2    /   DBili --      03-02  TBili 0.6   /   AST 77   /      /   AlkP 155   /   Tptn 7.3   /   Alb 3.7    /   DBili --      03-02  TBili 0.6   /   AST 71   /      /   AlkP 158   /   Tptn 7.3   /   Alb 3.7    /   DBili --      03-02      PT/INR - ( 08 Mar 2021 11:20 )   PT: 32.4 sec;   INR: 2.97 ratio         PTT - ( 08 Mar 2021 11:20 )  PTT:46.3 sec    Culture - Blood (collected 07 Mar 2021 02:47)  Source: .Blood Blood-Venous  Preliminary Report (08 Mar 2021 03:01):    No growth to date.    Culture - Blood (collected 07 Mar 2021 02:47)  Source: .Blood Blood-Peripheral  Preliminary Report (08 Mar 2021 03:01):    No growth to date.          Radiology:(reviewed by attending)    US Abdomen Doppler:   EXAM:  US DPLX ABDOMEN        PROCEDURE DATE:  Mar  8 2021         INTERPRETATION:  CLINICAL INFORMATION: Abnormal liver function test. Evaluate for portal venous thrombosis.    COMPARISON: None available.    TECHNIQUE: Sonography of the abdomen. Color Doppler ultrasound was utilized for evaluation of the patient's portal and hepatic vasculature. Spectral evaluation was attempted, but could not be performed due to patient respiratory motion.    FINDINGS:    Liver: Within normal limits.    Doppler: No pulmonary venous thrombosis. The main, left, and right portal veins are patent, demonstrating normal direction of flow. The right, middle, and left hepatic veins are patent.    Bile ducts: Normal caliber. Common bile duct measures 2 mm.  Gallbladder: Within normal limits.  Pancreas: Not visualized  Ascites: Trace perihepatic ascites.  Aorta and IVC: Visualized portions are within normal limits.    IMPRESSION:  No evidence of portal venous thrombosis, as clinically questioned.                JEOVANY LARIOS MD; Attending Radiologist  This document has been electronically signed. Mar  8 2021  2:22PM (03-08-21 @ 14:08)

## 2021-03-09 NOTE — PROGRESS NOTE ADULT - SUBJECTIVE AND OBJECTIVE BOX
[facility] Division of Hospital Medicine  Shireen Miranda MD  Pager [phone]    Patient is a 70y old  Male who presents with a chief complaint of AMS       SUBJECTIVE / OVERNIGHT EVENTS: seen in HD; still lethargic but opens eyes to name      MEDICATIONS  (STANDING):  aspirin  chewable 81 milliGRAM(s) Oral daily  atorvastatin 10 milliGRAM(s) Oral at bedtime  chlorhexidine 4% Liquid 1 Application(s) Topical daily  cinacalcet 90 milliGRAM(s) Oral daily  citalopram 20 milliGRAM(s) Oral daily  dextrose 40% Gel 15 Gram(s) Oral once  dextrose 50% Injectable 25 Gram(s) IV Push once  dextrose 50% Injectable 12.5 Gram(s) IV Push once  dextrose 50% Injectable 25 Gram(s) IV Push once  glucagon  Injectable 1 milliGRAM(s) IntraMuscular once  heparin   Injectable 5000 Unit(s) SubCutaneous every 8 hours  lactobacillus acidophilus 1 Tablet(s) Oral daily  mupirocin 2% Ointment 1 Application(s) Both Nostrils two times a day  piperacillin/tazobactam IVPB.. 3.375 Gram(s) IV Intermittent every 12 hours  polyethylene glycol 3350 17 Gram(s) Oral daily  senna Syrup 10 milliLiter(s) Oral daily  sodium zirconium cyclosilicate 10 Gram(s) Oral <User Schedule>    MEDICATIONS  (PRN):  midodrine. 10 milliGRAM(s) Oral <User Schedule> PRN SBP < 90 on HD      CAPILLARY BLOOD GLUCOSE  POCT Blood Glucose.: 68 mg/dL (09 Mar 2021 11:47)  POCT Blood Glucose.: 85 mg/dL (09 Mar 2021 09:18)  POCT Blood Glucose.: 113 mg/dL (09 Mar 2021 06:39)  POCT Blood Glucose.: 109 mg/dL (08 Mar 2021 23:51)  POCT Blood Glucose.: 128 mg/dL (08 Mar 2021 18:00)  POCT Blood Glucose.: 77 mg/dL (08 Mar 2021 17:59)  POCT Blood Glucose.: 83 mg/dL (08 Mar 2021 12:10)      PHYSICAL EXAM:  Vital Signs Last 24 Hrs  T(F): 97.2 (09 Mar 2021 11:11), Max: 98 (09 Mar 2021 05:52)  HR: 75 (09 Mar 2021 11:11) (75 - 96)  BP: 96/61 (09 Mar 2021 11:11) (88/63 - 110/72)  RR: 17 (09 Mar 2021 11:11) (17 - 18)  SpO2: 99% (09 Mar 2021 05:52) (99% - 99%)    CONSTITUTIONAL: NAD  RESPIRATORY: Normal respiratory effort; b/l AE ant  CARDIOVASCULAR: Regular rate and rhythm; No lower extremity edema;   ABDOMEN: Nontender to palpation, normoactive bowel sounds  MUSCULOSKELETAL:  no joint swelling or tenderness to palpation  PSYCH: opens eyes to name  NEUROLOGY: lethargic  SKIN: LUE AVF accessed    LABS:                        11.7   13.66 )-----------( 43       ( 09 Mar 2021 08:40 )             36.1     03-09    136  |  90<L>  |  92<H>  ----------------------------<  83  4.5   |  24  |  5.57<H>    Ca    7.6<L>      09 Mar 2021 08:40  Phos  7.2     03-08  Mg     2.4     03-08    TPro  5.8<L>  /  Alb  3.2<L>  /  TBili  1.3<H>  /  DBili  x   /  AST  617<H>  /  ALT  1100<H>  /  AlkPhos  332<H>  03-09    PT/INR - ( 08 Mar 2021 11:20 )   PT: 32.4 sec;   INR: 2.97 ratio         PTT - ( 08 Mar 2021 11:20 )  PTT:46.3 sec          Culture - Blood (collected 07 Mar 2021 02:47)  Source: .Blood Blood-Venous  Preliminary Report (08 Mar 2021 03:01):    No growth to date.    Culture - Blood (collected 07 Mar 2021 02:47)  Source: .Blood Blood-Peripheral  Preliminary Report (08 Mar 2021 03:01):    No growth to date.

## 2021-03-09 NOTE — PROGRESS NOTE ADULT - SUBJECTIVE AND OBJECTIVE BOX
Overnight events noted   VITAL: T(C): , Max: 36.7 (03-09-21 @ 05:52) T(F): , Max: 98 (03-09-21 @ 05:52) HR: 96 (03-09-21 @ 07:50) BP: 88/63 (03-09-21 @ 07:50) BP(mean): -- RR: 18 (03-09-21 @ 07:50) SpO2: 99% (03-09-21 @ 05:52)   PHYSICAL EXAM: Constitutional: minimally communicative/tachypnic HEENT: DMM; (+)NGT Neck: Supple, No JVD Respiratory: CTA-R; decreased BS L base Cardiovascular: RRR s1s2, no m/r/g Gastrointestinal: BS+, soft, NT/ND Extremities: No peripheral edema b/l Neurological: no focal deficits; strength grossly intact Back: no CVAT b/l Skin: No rashes, no nevi Access: LUE AVF (+)thrill  LABS:                      12.9  7.42  )-----------( 46       ( 08 Mar 2021 07:26 )            39.9   Na(132)/K(5.4)/Cl(89)/HCO3(20)/BUN(78)/Cr(4.99)Glu(159)/Ca(7.6)/Mg(2.4)/PO4(7.2)    03-08 @ 18:43 Na(136)/K(5.8)/Cl(88)/HCO3(23)/BUN(67)/Cr(4.94)Glu(79)/Ca(8.4)/Mg(2.8)/PO4(7.5)    03-08 @ 07:26 Na(132)/K(4.6)/Cl(91)/HCO3(23)/BUN(47)/Cr(3.41)Glu(141)/Ca(8.1)/Mg(2.1)/PO4(4.7)    03-07 @ 03:00 Na(136)/K(6.5)/Cl(93)/HCO3(18)/BUN(84)/Cr(5.51)Glu(157)/Ca(7.9)/Mg(2.6)/PO4(7.7)    03-06 @ 21:16   IMPRESSION: 70M w/ dementia, CAD, past epidural abscess, and ESRD, 3/2/21 a/w AMS  (1)Renal - ESRD - HD TTS - due for next HD today (2)Hyperkalemia - improved as of yesterday evening, s/p Lokelma in a.m. Now set for QIW Lokelma on non-HD days. (3)Metabolic acidosis - AG high but improved from yesterday (4)CV - intermittent hypotension - on Midodrine with HD; off standing ATC Midodrine. On Cardura - this could drive down the BP - best that we d/c it (5)Advanced Directives - now DNR (highly appropriate)   RECOMMEND: (1)D/C Cardura (2)QIW Lokelma as ordered (3)HD today - no UF; Midodrine prn SBP<90 on HD        Heath Huff MD [facility] Office: [phone] Cell: [phone]        Seen on HD  Required Midodrine earlier this a.m. for hypotension   VITAL: T(C): , Max: 36.7 (03-09-21 @ 05:52) T(F): , Max: 98 (03-09-21 @ 05:52) HR: 96 (03-09-21 @ 07:50) BP: 88/63 (03-09-21 @ 07:50) RR: 18 (03-09-21 @ 07:50) SpO2: 99% (03-09-21 @ 05:52)   PHYSICAL EXAM: Constitutional: lethargic/difficult to arouse HEENT: DMM Neck: Supple, No JVD Respiratory: CTA-R; decreased BS L base Cardiovascular: RRR s1s2, no m/r/g Gastrointestinal: BS+, soft, NT/ND Extremities: No peripheral edema b/l Neurological: reduced generalized strength Back: no CVAT b/l Skin: No rashes, no nevi Access: LUE AVF -accessed  LABS:                      12.9  7.42  )-----------( 46       ( 08 Mar 2021 07:26 )            39.9   Na(132)/K(5.4)/Cl(89)/HCO3(20)/BUN(78)/Cr(4.99)Glu(159)/Ca(7.6)/Mg(2.4)/PO4(7.2)    03-08 @ 18:43 Na(136)/K(5.8)/Cl(88)/HCO3(23)/BUN(67)/Cr(4.94)Glu(79)/Ca(8.4)/Mg(2.8)/PO4(7.5)    03-08 @ 07:26 Na(132)/K(4.6)/Cl(91)/HCO3(23)/BUN(47)/Cr(3.41)Glu(141)/Ca(8.1)/Mg(2.1)/PO4(4.7)    03-07 @ 03:00 Na(136)/K(6.5)/Cl(93)/HCO3(18)/BUN(84)/Cr(5.51)Glu(157)/Ca(7.9)/Mg(2.6)/PO4(7.7)    03-06 @ 21:16   IMPRESSION: 70M w/ dementia, CAD, past epidural abscess, and ESRD, 3/2/21 a/w AMS  (1)Renal - ESRD - HD TTS - on HD now (2)Hyperkalemia - improved as of yesterday evening, s/p Lokelma in a.m. Now set for QIW Lokelma on non-HD days. (3)Metabolic acidosis - AG high but improved from yesterday (4)CV - intermittent hypotension - on Midodrine with HD; off standing ATC Midodrine. On Cardura - this could drive down the BP - best that we d/c it (5)Advanced Directives - now DNR (highly appropriate)   RECOMMEND: (1)D/C Cardura (2)QIW Lokelma as ordered (3)HD today as ordered       Heath Huff MD [facility] Office: [phone] Cell: [phone]

## 2021-03-09 NOTE — PROVIDER CONTACT NOTE (CRITICAL VALUE NOTIFICATION) - BACKGROUND
Pt. was previously on dextrose drip for being hypoglycemic, then started on tube feeds.
ESRD
Pt. was previously on dextrose drip for being hypoglycemic, then started in tube feeds.
Patient with serum glucose 49. ACP [facility] culver.

## 2021-03-10 NOTE — PROGRESS NOTE ADULT - SUBJECTIVE AND OBJECTIVE BOX
Gastroenterology progress note:     Patient is a 70y old  Male who presents with a chief complaint of AMS (09 Mar 2021 16:27)       Admitted on: 03-02-21    We are following the patient for: elevated liver enzymes      Interval History: patient is oriented to name and place . Had non sustained V tach overnight . Denies abdominal pain . LFTs trending down     Patient's medical problems are stable       PAST MEDICAL & SURGICAL HISTORY:  Inguinal hernia    NSTEMI (non-ST elevated myocardial infarction)    HLD (hyperlipidemia)    Neurogenic bladder    Spinal abscess    Cavitary lung disease    CAD (coronary artery disease)    Abscess of sacrum    Anemia due to chronic renal failure treated with erythropoietin, stage 2 (mild)    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 device, implant or graft    HPTH (hyperparathyroidism)  Secondary    HTN (hypertension)    ESRD (end stage renal disease)    Cavitary lung disease    CAD in native artery    Hypertension    ESRD (end stage renal disease)    S/P primary angioplasty with coronary stent    Kidney abscess        MEDICATIONS  (STANDING):  aspirin  chewable 81 milliGRAM(s) Oral daily  atorvastatin 10 milliGRAM(s) Oral at bedtime  chlorhexidine 4% Liquid 1 Application(s) Topical daily  cinacalcet 90 milliGRAM(s) Oral daily  citalopram 20 milliGRAM(s) Oral daily  dextrose 40% Gel 15 Gram(s) Oral once  dextrose 50% Injectable 25 Gram(s) IV Push once  dextrose 50% Injectable 12.5 Gram(s) IV Push once  dextrose 50% Injectable 25 Gram(s) IV Push once  glucagon  Injectable 1 milliGRAM(s) IntraMuscular once  heparin   Injectable 5000 Unit(s) SubCutaneous every 8 hours  lactobacillus acidophilus 1 Tablet(s) Oral daily  mupirocin 2% Ointment 1 Application(s) Both Nostrils two times a day  piperacillin/tazobactam IVPB.. 3.375 Gram(s) IV Intermittent every 12 hours  polyethylene glycol 3350 17 Gram(s) Oral daily  senna Syrup 10 milliLiter(s) Oral daily  sodium zirconium cyclosilicate 10 Gram(s) Oral <User Schedule>    MEDICATIONS  (PRN):  midodrine. 10 milliGRAM(s) Oral <User Schedule> PRN SBP < 90 on HD      Allergies  No Known Allergies      Review of Systems:   Cardiovascular:  No Chest Pain, No Palpitations  Respiratory:  No Cough, No Dyspnea  Gastrointestinal:  As described in HPI    Physical Examination:  T(C): 36.2 (03-10-21 @ 07:45), Max: 36.4 (03-09-21 @ 22:00)  HR: 79 (03-10-21 @ 07:45) (73 - 79)  BP: 91/60 (03-10-21 @ 07:45) (86/60 - 96/56)  RR: 18 (03-10-21 @ 07:45) (16 - 18)  SpO2: 96% (03-10-21 @ 07:45) (96% - 98%)      03-09-21 @ 07:01  -  03-10-21 @ 07:00  --------------------------------------------------------  IN: 1094 mL / OUT: 400 mL / NET: 694 mL      Constitutional: No acute distress.  Respiratory:  No signs of respiratory distress. Lung sounds are clear bilaterally.  Cardiovascular:  S1 S2, Regular rate and rhythm.  Abdominal: Abdomen is soft, symmetric, and non-tender without distention.  Bowel sounds are present and normoactive in all four quadrants. No masses, hepatomegaly, or splenomegaly are noted.   Skin: No rashes, No Jaundice.        Data: (reviewed by attending)                        11.1   7.11  )-----------( 29       ( 10 Mar 2021 10:45 )             35.0     Hgb trend:  11.1  03-10-21 @ 10:45  11.7  03-09-21 @ 08:40  12.9  03-08-21 @ 07:26        03-10    137  |  93<L>  |  64<H>  ----------------------------<  82  4.4   |  26  |  4.18<H>    Ca    7.8<L>      10 Mar 2021 10:45  Phos  4.3     03-10  Mg     2.3     03-10    TPro  5.5<L>  /  Alb  3.0<L>  /  TBili  1.3<H>  /  DBili  x   /  AST  373<H>  /  ALT  859<H>  /  AlkPhos  288<H>  03-10    Liver panel trend:  TBili 1.3   /      /      /   AlkP 288   /   Tptn 5.5   /   Alb 3.0    /   DBili --      03-10  TBili 1.3   /      /   ALT 1100   /   AlkP 332   /   Tptn 5.8   /   Alb 3.2    /   DBili --      03-09  TBili 1.8   /      /   ALT 1361   /   AlkP 381   /   Tptn 6.5   /   Alb 3.4    /   DBili --      03-08  TBili 1.2   /      /      /   AlkP 382   /   Tptn 6.2   /   Alb 3.3    /   DBili --      03-06  TBili 1.0   /      /      /   AlkP 529   /   Tptn 6.7   /   Alb 3.6    /   DBili 0.5      03-05  TBili 0.9   /      /      /   AlkP 568   /   Tptn 6.2   /   Alb 3.4    /   DBili 0.6      03-04  TBili 0.5   /   AST 56   /      /   AlkP 135   /   Tptn 6.3   /   Alb 3.2    /   DBili --      03-02  TBili 0.6   /   AST 77   /      /   AlkP 155   /   Tptn 7.3   /   Alb 3.7    /   DBili --      03-02  TBili 0.6   /   AST 71   /      /   AlkP 158   /   Tptn 7.3   /   Alb 3.7    /   DBili --      03-02             Radiology: (reviewed by attending)    US Abdomen Doppler:   EXAM:  US DPLX ABDOMEN        PROCEDURE DATE:  Mar  8 2021         INTERPRETATION:  CLINICAL INFORMATION: Abnormal liver function test. Evaluate for portal venous thrombosis.    COMPARISON: None available.    TECHNIQUE: Sonography of the abdomen. Color Doppler ultrasound was utilized for evaluation of the patient's portal and hepatic vasculature. Spectral evaluation was attempted, but could not be performed due to patient respiratory motion.    FINDINGS:    Liver: Within normal limits.    Doppler: No pulmonary venous thrombosis. The main, left, and right portal veins are patent, demonstrating normal direction of flow. The right, middle, and left hepatic veins are patent.    Bile ducts: Normal caliber. Common bile duct measures 2 mm.  Gallbladder: Within normal limits.  Pancreas: Not visualized  Ascites: Trace perihepatic ascites.  Aorta and IVC: Visualized portions are within normal limits.    IMPRESSION:  No evidence of portal venous thrombosis, as clinically questioned.       US Abdomen Doppler  No evidence of portal venous thrombosis, as clinically questioned.    US Abdomen Limited  . Liver is sonographically within normal limits.  No evidence of cholelithiasis or biliary ductal dilatation. No previous history of liver disease . Further history is limited secondary to patient condition            Hepatology progress note:     Patient is a 70y old  Male who presents with a chief complaint of AMS (09 Mar 2021 16:27)       Admitted on: 03-02-21    We are following the patient for: elevated liver enzymes      Interval History: patient is oriented to name and place . Had non sustained V tach overnight . Denies abdominal pain . LFTs trending down     Patient's medical problems are stable       PAST MEDICAL & SURGICAL HISTORY:  Inguinal hernia    NSTEMI (non-ST elevated myocardial infarction)    HLD (hyperlipidemia)    Neurogenic bladder    Spinal abscess    Cavitary lung disease    CAD (coronary artery disease)    Abscess of sacrum    Anemia due to chronic renal failure treated with erythropoietin, stage 2 (mild)    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 device, implant or graft    HPTH (hyperparathyroidism)  Secondary    HTN (hypertension)    ESRD (end stage renal disease)    Cavitary lung disease    CAD in native artery    Hypertension    ESRD (end stage renal disease)    S/P primary angioplasty with coronary stent    Kidney abscess        MEDICATIONS  (STANDING):  aspirin  chewable 81 milliGRAM(s) Oral daily  atorvastatin 10 milliGRAM(s) Oral at bedtime  chlorhexidine 4% Liquid 1 Application(s) Topical daily  cinacalcet 90 milliGRAM(s) Oral daily  citalopram 20 milliGRAM(s) Oral daily  dextrose 40% Gel 15 Gram(s) Oral once  dextrose 50% Injectable 25 Gram(s) IV Push once  dextrose 50% Injectable 12.5 Gram(s) IV Push once  dextrose 50% Injectable 25 Gram(s) IV Push once  glucagon  Injectable 1 milliGRAM(s) IntraMuscular once  heparin   Injectable 5000 Unit(s) SubCutaneous every 8 hours  lactobacillus acidophilus 1 Tablet(s) Oral daily  mupirocin 2% Ointment 1 Application(s) Both Nostrils two times a day  piperacillin/tazobactam IVPB.. 3.375 Gram(s) IV Intermittent every 12 hours  polyethylene glycol 3350 17 Gram(s) Oral daily  senna Syrup 10 milliLiter(s) Oral daily  sodium zirconium cyclosilicate 10 Gram(s) Oral <User Schedule>    MEDICATIONS  (PRN):  midodrine. 10 milliGRAM(s) Oral <User Schedule> PRN SBP < 90 on HD      Allergies  No Known Allergies      Review of Systems:   Cardiovascular:  No Chest Pain, No Palpitations  Respiratory:  No Cough, No Dyspnea  Gastrointestinal:  As described in HPI    Physical Examination:  T(C): 36.2 (03-10-21 @ 07:45), Max: 36.4 (03-09-21 @ 22:00)  HR: 79 (03-10-21 @ 07:45) (73 - 79)  BP: 91/60 (03-10-21 @ 07:45) (86/60 - 96/56)  RR: 18 (03-10-21 @ 07:45) (16 - 18)  SpO2: 96% (03-10-21 @ 07:45) (96% - 98%)      03-09-21 @ 07:01  -  03-10-21 @ 07:00  --------------------------------------------------------  IN: 1094 mL / OUT: 400 mL / NET: 694 mL      Constitutional: No acute distress.  Respiratory:  No signs of respiratory distress. Lung sounds are clear bilaterally.  Cardiovascular:  S1 S2, Regular rate and rhythm.  Abdominal: Abdomen is soft, symmetric, and non-tender without distention.  Bowel sounds are present and normoactive in all four quadrants. No masses, hepatomegaly, or splenomegaly are noted.   Skin: No rashes, No Jaundice.        Data: (reviewed by attending)                        11.1   7.11  )-----------( 29       ( 10 Mar 2021 10:45 )             35.0     Hgb trend:  11.1  03-10-21 @ 10:45  11.7  03-09-21 @ 08:40  12.9  03-08-21 @ 07:26        03-10    137  |  93<L>  |  64<H>  ----------------------------<  82  4.4   |  26  |  4.18<H>    Ca    7.8<L>      10 Mar 2021 10:45  Phos  4.3     03-10  Mg     2.3     03-10    TPro  5.5<L>  /  Alb  3.0<L>  /  TBili  1.3<H>  /  DBili  x   /  AST  373<H>  /  ALT  859<H>  /  AlkPhos  288<H>  03-10    Liver panel trend:  TBili 1.3   /      /      /   AlkP 288   /   Tptn 5.5   /   Alb 3.0    /   DBili --      03-10  TBili 1.3   /      /   ALT 1100   /   AlkP 332   /   Tptn 5.8   /   Alb 3.2    /   DBili --      03-09  TBili 1.8   /      /   ALT 1361   /   AlkP 381   /   Tptn 6.5   /   Alb 3.4    /   DBili --      03-08  TBili 1.2   /      /      /   AlkP 382   /   Tptn 6.2   /   Alb 3.3    /   DBili --      03-06  TBili 1.0   /      /      /   AlkP 529   /   Tptn 6.7   /   Alb 3.6    /   DBili 0.5      03-05  TBili 0.9   /      /      /   AlkP 568   /   Tptn 6.2   /   Alb 3.4    /   DBili 0.6      03-04  TBili 0.5   /   AST 56   /      /   AlkP 135   /   Tptn 6.3   /   Alb 3.2    /   DBili --      03-02  TBili 0.6   /   AST 77   /      /   AlkP 155   /   Tptn 7.3   /   Alb 3.7    /   DBili --      03-02  TBili 0.6   /   AST 71   /      /   AlkP 158   /   Tptn 7.3   /   Alb 3.7    /   DBili --      03-02             Radiology: (reviewed by attending)    US Abdomen Doppler:   EXAM:  US DPLX ABDOMEN        PROCEDURE DATE:  Mar  8 2021         INTERPRETATION:  CLINICAL INFORMATION: Abnormal liver function test. Evaluate for portal venous thrombosis.    COMPARISON: None available.    TECHNIQUE: Sonography of the abdomen. Color Doppler ultrasound was utilized for evaluation of the patient's portal and hepatic vasculature. Spectral evaluation was attempted, but could not be performed due to patient respiratory motion.    FINDINGS:    Liver: Within normal limits.    Doppler: No pulmonary venous thrombosis. The main, left, and right portal veins are patent, demonstrating normal direction of flow. The right, middle, and left hepatic veins are patent.    Bile ducts: Normal caliber. Common bile duct measures 2 mm.  Gallbladder: Within normal limits.  Pancreas: Not visualized  Ascites: Trace perihepatic ascites.  Aorta and IVC: Visualized portions are within normal limits.    IMPRESSION:  No evidence of portal venous thrombosis, as clinically questioned.       US Abdomen Doppler  No evidence of portal venous thrombosis, as clinically questioned.    US Abdomen Limited  . Liver is sonographically within normal limits.  No evidence of cholelithiasis or biliary ductal dilatation. No previous history of liver disease . Further history is limited secondary to patient condition

## 2021-03-10 NOTE — PROGRESS NOTE ADULT - SUBJECTIVE AND OBJECTIVE BOX
Overnight events noted   VITAL: T(C): , Max: 36.4 (03-09-21 @ 22:00) T(F): , Max: 97.5 (03-09-21 @ 22:00) HR: 77 (03-10-21 @ 11:45) BP: 93/62 (03-10-21 @ 11:45) BP(mean): -- RR: 18 (03-10-21 @ 11:45) SpO2: 97% (03-10-21 @ 11:45)   PHYSICAL EXAM: Constitutional: lethargic/difficult to arouse HEENT: DMM Neck: Supple, No JVD Respiratory: CTA-R; decreased BS L base Cardiovascular: RRR s1s2, no m/r/g Gastrointestinal: BS+, soft, NT/ND Extremities: No peripheral edema b/l Neurological: reduced generalized strength Back: no CVAT b/l Skin: No rashes, no nevi Access: ZEKE AVF -accessed  LABS:                      11.1  7.11  )-----------( 29       ( 10 Mar 2021 10:45 )            35.0   Na(137)/K(4.4)/Cl(93)/HCO3(26)/BUN(64)/Cr(4.18)Glu(82)/Ca(7.8)/Mg(2.3)/PO4(4.3)    03-10 @ 10:45 Na(136)/K(4.5)/Cl(90)/HCO3(24)/BUN(92)/Cr(5.57)Glu(83)/Ca(7.6)/Mg(--)/PO4(--)    03-09 @ 08:40 Na(132)/K(5.4)/Cl(89)/HCO3(20)/BUN(78)/Cr(4.99)Glu(159)/Ca(7.6)/Mg(2.4)/PO4(7.2)    03-08 @ 18:43 Na(136)/K(5.8)/Cl(88)/HCO3(23)/BUN(67)/Cr(4.94)Glu(79)/Ca(8.4)/Mg(2.8)/PO4(7.5)    03-08 @ 07:26   IMPRESSION: 70M w/ dementia, CAD, past epidural abscess, and ESRD, 3/2/21 a/w AMS  (1)Renal - ESRD - HD TTS - last dialyzed yesterday; due for next HD tomorrow (2)Hyperkalemia - improved, with Lokelma on non-HD days (3)CV - intermittent hypotension - on Midodrine with HD; off standing ATC Midodrine.    RECOMMEND: (1)QIW Lokelma as ordered (2)Next HD tomorrow; midodrine prn intradialytic hypotension (3)D/C planning per primary team      Heath Huff MD [facility] Office: [phone] Cell: [phone]       no pain, no sob   VITAL: T(C): , Max: 36.4 (03-09-21 @ 22:00) T(F): , Max: 97.5 (03-09-21 @ 22:00) HR: 77 (03-10-21 @ 11:45) BP: 93/62 (03-10-21 @ 11:45) BP(mean): -- RR: 18 (03-10-21 @ 11:45) SpO2: 97% (03-10-21 @ 11:45)   PHYSICAL EXAM: Constitutional: frail/cachectic; alert, NAD HEENT: DMM Neck: Supple, No JVD Respiratory: CTA-R; decreased BS L base Cardiovascular: RRR s1s2, no m/r/g Gastrointestinal: BS+, soft, NT/ND Extremities: No peripheral edema b/l Neurological: reduced generalized strength Back: no CVAT b/l Skin: No rashes, no nevi Access: LUE AVF (+)thrill  LABS:                      11.1  7.11  )-----------( 29       ( 10 Mar 2021 10:45 )            35.0   Na(137)/K(4.4)/Cl(93)/HCO3(26)/BUN(64)/Cr(4.18)Glu(82)/Ca(7.8)/Mg(2.3)/PO4(4.3)    03-10 @ 10:45 Na(136)/K(4.5)/Cl(90)/HCO3(24)/BUN(92)/Cr(5.57)Glu(83)/Ca(7.6)/Mg(--)/PO4(--)    03-09 @ 08:40 Na(132)/K(5.4)/Cl(89)/HCO3(20)/BUN(78)/Cr(4.99)Glu(159)/Ca(7.6)/Mg(2.4)/PO4(7.2)    03-08 @ 18:43 Na(136)/K(5.8)/Cl(88)/HCO3(23)/BUN(67)/Cr(4.94)Glu(79)/Ca(8.4)/Mg(2.8)/PO4(7.5)    03-08 @ 07:26   IMPRESSION: 70M w/ dementia, CAD, past epidural abscess, and ESRD, 3/2/21 a/w AMS  (1)Renal - ESRD - HD TTS - last dialyzed yesterday; due for next HD tomorrow (2)Hyperkalemia - improved, with Lokelma on non-HD days (3)CV - intermittent hypotension - on Midodrine with HD; off standing ATC Midodrine.    RECOMMEND: (1)QIW Lokelma as ordered (2)Next HD tomorrow; midodrine prn intradialytic hypotension (3)D/C planning per primary team      Heath Huff MD [facility] Office: [phone] Cell: [phone]

## 2021-03-10 NOTE — PROGRESS NOTE ADULT - SUBJECTIVE AND OBJECTIVE BOX
[facility] Division of Hospital Medicine  Shireen Miranda MD  Pager [phone]    Patient is a 70y old  Male who presents with a chief complaint of AMS       SUBJECTIVE / OVERNIGHT EVENTS: much more alert this AM; eating with assist       MEDICATIONS  (STANDING):  aspirin  chewable 81 milliGRAM(s) Oral daily  atorvastatin 10 milliGRAM(s) Oral at bedtime  chlorhexidine 4% Liquid 1 Application(s) Topical daily  cinacalcet 90 milliGRAM(s) Oral daily  citalopram 20 milliGRAM(s) Oral daily  dextrose 40% Gel 15 Gram(s) Oral once  dextrose 50% Injectable 25 Gram(s) IV Push once  dextrose 50% Injectable 12.5 Gram(s) IV Push once  dextrose 50% Injectable 25 Gram(s) IV Push once  glucagon  Injectable 1 milliGRAM(s) IntraMuscular once  lactobacillus acidophilus 1 Tablet(s) Oral daily  mupirocin 2% Ointment 1 Application(s) Both Nostrils two times a day  piperacillin/tazobactam IVPB.. 3.375 Gram(s) IV Intermittent every 12 hours  polyethylene glycol 3350 17 Gram(s) Oral daily  senna Syrup 10 milliLiter(s) Oral daily  sodium zirconium cyclosilicate 10 Gram(s) Oral <User Schedule>    MEDICATIONS  (PRN):  midodrine. 10 milliGRAM(s) Oral <User Schedule> PRN SBP < 90 on HD      CAPILLARY BLOOD GLUCOSE  POCT Blood Glucose.: 75 mg/dL (10 Mar 2021 12:13)  POCT Blood Glucose.: 61 mg/dL (10 Mar 2021 12:11)  POCT Blood Glucose.: 78 mg/dL (10 Mar 2021 05:22)  POCT Blood Glucose.: 107 mg/dL (09 Mar 2021 23:49)  POCT Blood Glucose.: 72 mg/dL (09 Mar 2021 17:52)        PHYSICAL EXAM:  Vital Signs Last 24 Hrs  T(F): 97.1 (10 Mar 2021 11:45), Max: 97.5 (09 Mar 2021 22:00)  HR: 77 (10 Mar 2021 11:45) (73 - 79)  BP: 93/62 (10 Mar 2021 11:45) (86/60 - 96/56)  RR: 18 (10 Mar 2021 11:45) (16 - 18)  SpO2: 97% (10 Mar 2021 11:45) (96% - 98%)    CONSTITUTIONAL: NAD  RESPIRATORY: Normal respiratory effort; grossly b/l AE  CARDIOVASCULAR: Regular rate and rhythm; No lower extremity edema;   ABDOMEN: Nontender to palpation, normoactive bowel sounds,  MUSCULOSKELETAL:  no joint swelling or tenderness to palpation  PSYCH: engaging  NEUROLOGY: swallow better, taking PO      LABS:                        11.1   7.11  )-----------( 29       ( 10 Mar 2021 10:45 )             35.0     03-10    137  |  93<L>  |  64<H>  ----------------------------<  82  4.4   |  26  |  4.18<H>    Ca    7.8<L>      10 Mar 2021 10:45  Phos  4.3     03-10  Mg     2.3     03-10    TPro  5.5<L>  /  Alb  3.0<L>  /  TBili  1.3<H>  /  DBili  x   /  AST  373<H>  /  ALT  859<H>  /  AlkPhos  288<H>  03-10

## 2021-03-10 NOTE — PROCEDURE NOTE - ADDITIONAL PROCEDURE DETAILS
Patient tolerated procedure well. Hemostasis achieved with direct pressure. Pressure bandage placed and left c/d/i.    Butch Bueno PA-C  [facility], pgr [phone]

## 2021-03-10 NOTE — PROGRESS NOTE ADULT - ASSESSMENT
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  Patient is being actively treated for sepsis secondary to UTI , being worked up for metabolic encephalopathy . Hepatology consulted for elevated liver enzymes , and to rule out liver failure . Patient with EF 12% , LFTs at presentation 0.6/125/33/56 , patient dropped his blood pressure to 70 systolic in 3/4 LFTs worsened significantly thereafter 3/8 1.8/381/ 957/1361 and then started improving thereafter . 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  Patient is being actively treated for sepsis secondary to UTI , being worked up for metabolic encephalopathy . Hepatology consulted for elevated liver enzymes , and to rule out liver failure . Patient with EF 12% , LFTs at presentation 0.6/125/33/56 , patient dropped his blood pressure to 70 systolic in 3/4 LFTs worsened significantly thereafter 3/8 1.8/381/ 957/1361 and then started improving thereafter . INR prolonged to 2.97     #Elevated liver enzymes : likely secondary to ischemic hepatitis   Viral hepatitis panel is negative   LFTs started trending down   would rule out other less likely causes : autoimmune versus others   patient is now oriented *1-2    REC:  optimize blood pressure   avoid hepatotoxic drugs   daily INR/ LFTs   INA, ASMA ,IGG levels   treat underlying sepsis   patient to follow up in liver clinic after discharge

## 2021-03-11 NOTE — PROVIDER CONTACT NOTE (OTHER) - ASSESSMENT
dinner time FS taken at 1723, result was 56. FS rechecked at 1725, result was 65. Hypoglycemia protocol activated. PO intake encouraged. 1748 FS result was 60. Dextrose 50% given. FS at 1807 was 127. 1825 FS was 143. Pt is A&Ox1 and was asymptomatic during this time. Pt fed dinner at bedside and PO intake encouraged.

## 2021-03-11 NOTE — PROGRESS NOTE ADULT - PROBLEM SELECTOR PLAN 3
cortisol level adequate  NGT placed for nutritional support, but pt removed  taking some PO now but hypoglycemic again this AM  severe prot ankita malnutrition, related as pt with poor reserve   d/w HCP, Deana regarding nutritional status and poss of PEG but will not change overall prognosis; she wants to think about itl in the meantime, will will cont to hand feed

## 2021-03-11 NOTE — PROGRESS NOTE ADULT - SUBJECTIVE AND OBJECTIVE BOX
[facility] Division of Hospital Medicine  Shireen Miranda MD  Pager [phone]    Patient is a 70y old  Male who presents with a chief complaint of AMS       SUBJECTIVE / OVERNIGHT EVENTS: more awake but not taking much PO; hypoglycemic this AM      MEDICATIONS  (STANDING):  aspirin  chewable 81 milliGRAM(s) Oral daily  atorvastatin 10 milliGRAM(s) Oral at bedtime  chlorhexidine 4% Liquid 1 Application(s) Topical daily  cinacalcet 90 milliGRAM(s) Oral daily  citalopram 20 milliGRAM(s) Oral daily  dextrose 40% Gel 15 Gram(s) Oral once  dextrose 50% Injectable 25 Gram(s) IV Push once  dextrose 50% Injectable 12.5 Gram(s) IV Push once  dextrose 50% Injectable 25 Gram(s) IV Push once  glucagon  Injectable 1 milliGRAM(s) IntraMuscular once  lactobacillus acidophilus 1 Tablet(s) Oral daily  mupirocin 2% Ointment 1 Application(s) Both Nostrils two times a day  piperacillin/tazobactam IVPB.. 3.375 Gram(s) IV Intermittent every 12 hours  polyethylene glycol 3350 17 Gram(s) Oral daily  senna Syrup 10 milliLiter(s) Oral daily  sodium zirconium cyclosilicate 10 Gram(s) Oral <User Schedule>    MEDICATIONS  (PRN):  midodrine. 10 milliGRAM(s) Oral <User Schedule> PRN SBP < 90 on HD      CAPILLARY BLOOD GLUCOSE  POCT Blood Glucose.: 95 mg/dL (11 Mar 2021 08:31)  POCT Blood Glucose.: 131 mg/dL (11 Mar 2021 06:53)  POCT Blood Glucose.: 105 mg/dL (11 Mar 2021 06:40)  POCT Blood Glucose.: 57 mg/dL (11 Mar 2021 05:52)  POCT Blood Glucose.: 53 mg/dL (11 Mar 2021 05:50)  POCT Blood Glucose.: 74 mg/dL (10 Mar 2021 21:51)  POCT Blood Glucose.: 90 mg/dL (10 Mar 2021 17:30)        PHYSICAL EXAM:  Vital Signs Last 24 Hrs  T(F): 97.5 (11 Mar 2021 11:20), Max: 97.5 (11 Mar 2021 11:20)  HR: 90 (11 Mar 2021 11:20) (79 - 90)  BP: 112/55 (11 Mar 2021 11:20) (83/55 - 112/55)  RR: 18 (11 Mar 2021 11:20) (18 - 18)  SpO2: 96% (11 Mar 2021 08:00) (96% - 100%)    CONSTITUTIONAL: NAD  RESPIRATORY: Normal respiratory effort; grossly clear ant.lat  CARDIOVASCULAR: Regular rate and rhythm; No lower extremity edema;   ABDOMEN: Nontender to palpation, normoactive bowel sounds  MUSCULOSKELETAL:  no joint swelling or tenderness to palpation  PSYCH:   NEUROLOGY: CN 2-12 are intact and symmetric; no gross sensory deficits   SKIN: No rashes; no palpable lesions    LABS:                        11.3   5.98  )-----------( 37       ( 11 Mar 2021 11:49 )             33.8     03-11    134<L>  |  92<L>  |  81<H>  ----------------------------<  107<H>  4.9   |  21<L>  |  5.01<H>    Ca    7.9<L>      11 Mar 2021 11:49  Phos  6.4     03-11  Mg     2.6     03-11    TPro  5.9<L>  /  Alb  3.2<L>  /  TBili  1.2  /  DBili  x   /  AST  310<H>  /  ALT  782<H>  /  AlkPhos  284<H>  03-11                RADIOLOGY & ADDITIONAL TESTS:  Results Reviewed:   Imaging Personally Reviewed:  Electrocardiogram Personally Reviewed:    COORDINATION OF CARE:  Care Discussed with Consultants/Other Providers [Y/N]:  Prior or Outpatient Records Reviewed [Y/N]:   [facility] Division of Hospital Medicine  Shireen Miranda MD  Pager [phone]    Patient is a 70y old  Male who presents with a chief complaint of AMS       SUBJECTIVE / OVERNIGHT EVENTS: more awake but not taking much PO; hypoglycemic this AM      MEDICATIONS  (STANDING):  aspirin  chewable 81 milliGRAM(s) Oral daily  atorvastatin 10 milliGRAM(s) Oral at bedtime  chlorhexidine 4% Liquid 1 Application(s) Topical daily  cinacalcet 90 milliGRAM(s) Oral daily  citalopram 20 milliGRAM(s) Oral daily  dextrose 40% Gel 15 Gram(s) Oral once  dextrose 50% Injectable 25 Gram(s) IV Push once  dextrose 50% Injectable 12.5 Gram(s) IV Push once  dextrose 50% Injectable 25 Gram(s) IV Push once  glucagon  Injectable 1 milliGRAM(s) IntraMuscular once  lactobacillus acidophilus 1 Tablet(s) Oral daily  mupirocin 2% Ointment 1 Application(s) Both Nostrils two times a day  piperacillin/tazobactam IVPB.. 3.375 Gram(s) IV Intermittent every 12 hours  polyethylene glycol 3350 17 Gram(s) Oral daily  senna Syrup 10 milliLiter(s) Oral daily  sodium zirconium cyclosilicate 10 Gram(s) Oral <User Schedule>    MEDICATIONS  (PRN):  midodrine. 10 milliGRAM(s) Oral <User Schedule> PRN SBP < 90 on HD      CAPILLARY BLOOD GLUCOSE  POCT Blood Glucose.: 95 mg/dL (11 Mar 2021 08:31)  POCT Blood Glucose.: 131 mg/dL (11 Mar 2021 06:53)  POCT Blood Glucose.: 105 mg/dL (11 Mar 2021 06:40)  POCT Blood Glucose.: 57 mg/dL (11 Mar 2021 05:52)  POCT Blood Glucose.: 53 mg/dL (11 Mar 2021 05:50)  POCT Blood Glucose.: 74 mg/dL (10 Mar 2021 21:51)  POCT Blood Glucose.: 90 mg/dL (10 Mar 2021 17:30)        PHYSICAL EXAM:  Vital Signs Last 24 Hrs  T(F): 97.5 (11 Mar 2021 11:20), Max: 97.5 (11 Mar 2021 11:20)  HR: 90 (11 Mar 2021 11:20) (79 - 90)  BP: 112/55 (11 Mar 2021 11:20) (83/55 - 112/55)  RR: 18 (11 Mar 2021 11:20) (18 - 18)  SpO2: 96% (11 Mar 2021 08:00) (96% - 100%)    CONSTITUTIONAL: NAD, cachetic  RESPIRATORY: Normal respiratory effort; grossly clear ant.lat  CARDIOVASCULAR: Regular rate and rhythm; No lower extremity edema;   ABDOMEN: Nontender to palpation, normoactive bowel sounds  MUSCULOSKELETAL:  no joint swelling or tenderness to palpation; atrophic limbs  PSYCH: answers simple questions  NEUROLOGY: moves all ext      LABS:                        11.3   5.98  )-----------( 37       ( 11 Mar 2021 11:49 )             33.8     03-11    134<L>  |  92<L>  |  81<H>  ----------------------------<  107<H>  4.9   |  21<L>  |  5.01<H>    Ca    7.9<L>      11 Mar 2021 11:49  Phos  6.4     03-11  Mg     2.6     03-11    TPro  5.9<L>  /  Alb  3.2<L>  /  TBili  1.2  /  DBili  x   /  AST  310<H>  /  ALT  782<H>  /  AlkPhos  284<H>  03-11

## 2021-03-11 NOTE — PROGRESS NOTE ADULT - SUBJECTIVE AND OBJECTIVE BOX
Overnight events noted   VITAL: T(C): , Max: 36.4 (03-11-21 @ 11:20) T(F): , Max: 97.5 (03-11-21 @ 11:20) HR: 81 (03-11-21 @ 15:23) BP: 118/70 (03-11-21 @ 15:23) BP(mean): -- RR: 18 (03-11-21 @ 15:23) SpO2: 99% (03-11-21 @ 15:23)   PHYSICAL EXAM: Constitutional: frail/cachectic; alert, NAD HEENT: DMM Neck: Supple, No JVD Respiratory: CTA-R; decreased BS L base Cardiovascular: RRR s1s2, no m/r/g Gastrointestinal: BS+, soft, NT/ND Extremities: No peripheral edema b/l Neurological: reduced generalized strength Back: no CVAT b/l Skin: No rashes, no nevi Access: LUE AVF (+)thrill  LABS:                      11.3  5.98  )-----------( 37       ( 11 Mar 2021 11:49 )            33.8   Na(134)/K(4.9)/Cl(92)/HCO3(21)/BUN(81)/Cr(5.01)Glu(107)/Ca(7.9)/Mg(2.6)/PO4(6.4)    03-11 @ 11:49 Na(137)/K(4.4)/Cl(93)/HCO3(26)/BUN(64)/Cr(4.18)Glu(82)/Ca(7.8)/Mg(2.3)/PO4(4.3)    03-10 @ 10:45 Na(136)/K(4.5)/Cl(90)/HCO3(24)/BUN(92)/Cr(5.57)Glu(83)/Ca(7.6)/Mg(--)/PO4(--)    03-09 @ 08:40 Na(132)/K(5.4)/Cl(89)/HCO3(20)/BUN(78)/Cr(4.99)Glu(159)/Ca(7.6)/Mg(2.4)/PO4(7.2)    03-08 @ 18:43   IMPRESSION: 70M w/ dementia, CAD, past epidural abscess, and ESRD, 3/2/21 a/w AMS  (1)Renal - ESRD - HD TTS - s/p HD today; due for next HD Saturday 3/13 (2)Hyperkalemia - improved, with Lokelma on non-HD days (3)CV - intermittent hypotension - on Midodrine with HD; off standing ATC Midodrine.    RECOMMEND: (1)QIW Lokelma as ordered (2)D/C planning per primary team; next HD 3/13, inpatient versus outpatient      Heath Huff MD [facility] Office: [phone] Cell: [phone]        no complaints   VITAL: T(C): , Max: 36.4 (03-11-21 @ 11:20) T(F): , Max: 97.5 (03-11-21 @ 11:20) HR: 81 (03-11-21 @ 15:23) BP: 118/70 (03-11-21 @ 15:23) BP(mean): -- RR: 18 (03-11-21 @ 15:23) SpO2: 99% (03-11-21 @ 15:23)   PHYSICAL EXAM: Constitutional: frail/cachectic; alert, NAD HEENT: DMM Neck: Supple, No JVD Respiratory: CTA-R; decreased BS L base Cardiovascular: RRR s1s2, no m/r/g Gastrointestinal: BS+, soft, NT/ND Extremities: No peripheral edema b/l Neurological: reduced generalized strength Back: no CVAT b/l Skin: No rashes, no nevi Access: LUE AVF (+)thrill  LABS:                      11.3  5.98  )-----------( 37       ( 11 Mar 2021 11:49 )            33.8   Na(134)/K(4.9)/Cl(92)/HCO3(21)/BUN(81)/Cr(5.01)Glu(107)/Ca(7.9)/Mg(2.6)/PO4(6.4)    03-11 @ 11:49 Na(137)/K(4.4)/Cl(93)/HCO3(26)/BUN(64)/Cr(4.18)Glu(82)/Ca(7.8)/Mg(2.3)/PO4(4.3)    03-10 @ 10:45 Na(136)/K(4.5)/Cl(90)/HCO3(24)/BUN(92)/Cr(5.57)Glu(83)/Ca(7.6)/Mg(--)/PO4(--)    03-09 @ 08:40 Na(132)/K(5.4)/Cl(89)/HCO3(20)/BUN(78)/Cr(4.99)Glu(159)/Ca(7.6)/Mg(2.4)/PO4(7.2)    03-08 @ 18:43   IMPRESSION: 70M w/ dementia, CAD, past epidural abscess, and ESRD, 3/2/21 a/w AMS  (1)Renal - ESRD - HD TTS - s/p HD today; due for next HD Saturday 3/13 (2)Hyperkalemia - improved, with Lokelma on non-HD days (3)CV - intermittent hypotension - on Midodrine with HD; off standing ATC Midodrine.    RECOMMEND: (1)QIW Lokelma as ordered (2)D/C planning per primary team; next HD 3/13, inpatient versus outpatient      Heath Huff MD [facility] Office: [phone] Cell: [phone]

## 2021-03-11 NOTE — PROVIDER CONTACT NOTE (OTHER) - ACTION/TREATMENT ORDERED:
.5 amp of [id number] given, followed hypoglycemia protocol. Repeat FS after .5amp [id number] given 105, 131.

## 2021-03-12 NOTE — PROGRESS NOTE ADULT - SUBJECTIVE AND OBJECTIVE BOX
[facility] Division of Hospital Medicine  Shireen Miranda MD  Pager [phone]    Patient is a 70y old  Male who presents with a chief complaint of AMS       SUBJECTIVE / OVERNIGHT EVENTS: cont to have episodes of hypoglycemia; spoke with HCP who wishes for PEG placement      MEDICATIONS  (STANDING):  aspirin  chewable 81 milliGRAM(s) Oral daily  atorvastatin 10 milliGRAM(s) Oral at bedtime  chlorhexidine 4% Liquid 1 Application(s) Topical daily  cinacalcet 90 milliGRAM(s) Oral daily  citalopram 20 milliGRAM(s) Oral daily  dextrose 40% Gel 15 Gram(s) Oral once  dextrose 5%. 1000 milliLiter(s) (30 mL/Hr) IV Continuous <Continuous>  dextrose 50% Injectable 25 Gram(s) IV Push once  dextrose 50% Injectable 12.5 Gram(s) IV Push once  dextrose 50% Injectable 25 Gram(s) IV Push once  glucagon  Injectable 1 milliGRAM(s) IntraMuscular once  lactobacillus acidophilus 1 Tablet(s) Oral daily  mupirocin 2% Ointment 1 Application(s) Both Nostrils two times a day  piperacillin/tazobactam IVPB.. 3.375 Gram(s) IV Intermittent every 12 hours  polyethylene glycol 3350 17 Gram(s) Oral daily  senna Syrup 10 milliLiter(s) Oral daily  sodium zirconium cyclosilicate 10 Gram(s) Oral <User Schedule>    MEDICATIONS  (PRN):  midodrine. 10 milliGRAM(s) Oral <User Schedule> PRN SBP < 90 on HD      CAPILLARY BLOOD GLUCOSE  POCT Blood Glucose.: 73 mg/dL (12 Mar 2021 12:16)  POCT Blood Glucose.: 91 mg/dL (12 Mar 2021 11:14)  POCT Blood Glucose.: 83 mg/dL (12 Mar 2021 10:40)  POCT Blood Glucose.: 88 mg/dL (12 Mar 2021 10:04)  POCT Blood Glucose.: 72 mg/dL (12 Mar 2021 09:48)  POCT Blood Glucose.: 80 mg/dL (12 Mar 2021 09:31)  POCT Blood Glucose.: 69 mg/dL (12 Mar 2021 09:16)  POCT Blood Glucose.: 54 mg/dL (12 Mar 2021 09:00)  POCT Blood Glucose.: 54 mg/dL (12 Mar 2021 08:59)  POCT Blood Glucose.: 139 mg/dL (11 Mar 2021 21:01)  POCT Blood Glucose.: 143 mg/dL (11 Mar 2021 18:25)  POCT Blood Glucose.: 127 mg/dL (11 Mar 2021 18:07)  POCT Blood Glucose.: 60 mg/dL (11 Mar 2021 17:48)  POCT Blood Glucose.: 65 mg/dL (11 Mar 2021 17:25)  POCT Blood Glucose.: 56 mg/dL (11 Mar 2021 17:23)          PHYSICAL EXAM:  Vital Signs Last 24 Hrs  T(F): 98.4 (12 Mar 2021 13:27), Max: 99.1 (12 Mar 2021 10:09)  HR: 85 (12 Mar 2021 13:27) (80 - 94)  BP: 109/75 (12 Mar 2021 13:27) (98/60 - 125/78)  RR: 17 (12 Mar 2021 13:27) (17 - 18)  SpO2: 96% (12 Mar 2021 13:27) (96% - 99%)    CONSTITUTIONAL: NAD  RESPIRATORY: Normal respiratory effort; b/l AE  CARDIOVASCULAR: Regular rate and rhythm; No lower extremity edema;   ABDOMEN: Nontender to palpation, normoactive bowel sounds  MUSCULOSKELETAL: no joint swelling or tenderness to palpation  PSYCH: limited engagement  NEUROLOGY:  moves all ext      LABS:                        11.3   5.98  )-----------( 37       ( 11 Mar 2021 11:49 )             33.8     03-11    134<L>  |  92<L>  |  81<H>  ----------------------------<  107<H>  4.9   |  21<L>  |  5.01<H>    Ca    7.9<L>      11 Mar 2021 11:49  Phos  6.4     03-11  Mg     2.6     03-11    TPro  5.9<L>  /  Alb  3.2<L>  /  TBili  1.2  /  DBili  x   /  AST  310<H>  /  ALT  782<H>  /  AlkPhos  284<H>  03-11

## 2021-03-12 NOTE — CHART NOTE - NSCHARTNOTEFT_GEN_A_CORE
RRT called for hypoglycemia (FS: <25). Pt at bedside appears mildly more lethargic than baseline. 2 full amps of  Dextrose 50% given with improvement of finger stick glucose to 124. Dextrose 10% at 30mL per hour started. Q4hr FS ordered. NGT feedings to be initiated by bedside RN. RRT called for hypoglycemia (FS: <25). Pt at bedside appears mildly more lethargic than baseline. 2 full amps of  Dextrose 50% given with improvement of finger stick glucose to 124. Dextrose 10% at 30mL per hour started. Q4hr FS ordered. NGT feedings to be initiated by bedside RN. Dr. Miranda made aware. Consider endo cx in AM if needed. RRT called for hypoglycemia (FS: <25). Pt at bedside appears mildly more lethargic than baseline. 2 full amps of  Dextrose 50% given with improvement of finger stick glucose to 124. Dextrose 10% at 30mL per hour started (D5 order DC'd). Q4hr FS ordered. NGT feedings to be initiated by bedside RN. Dr. Miranda made aware. Consider endo cx in AM if needed.

## 2021-03-12 NOTE — PROGRESS NOTE ADULT - PROBLEM SELECTOR PLAN 3
cortisol level adequate  NGT placed for nutritional support, but pt removed  taking some PO now but hypoglycemic again this AM  severe prot ankita malnutrition, related as pt with poor reserve   d/w HCP, Deana and she would like to pursue PEG placement; CARLOS yu P with [facility]

## 2021-03-12 NOTE — RAPID RESPONSE TEAM SUMMARY - NSSITUATIONBACKGROUNDRRT_GEN_ALL_CORE
70M CAD/stent, sCHF LVEF 12%, BPH, neurogenic bladder, ESRD on HD w/ chronic anemia, MRSA on vibramycin p/w sepsis POA from UTI with acute metabolic encephalopathy, hypotension, c/b hypoglycemia and hypothermia. Hypoglycemia attributed to sepsis and malnutrition

## 2021-03-12 NOTE — PROGRESS NOTE ADULT - SUBJECTIVE AND OBJECTIVE BOX
Overnight events noted   VITAL: T(C): , Max: 36.7 (03-12-21 @ 03:15) T(F): , Max: 98.1 (03-12-21 @ 03:15) HR: 80 (03-12-21 @ 06:45) BP: 103/65 (03-12-21 @ 06:45) BP(mean): -- RR: 18 (03-12-21 @ 06:45) SpO2: 97% (03-12-21 @ 06:45)   PHYSICAL EXAM: Constitutional: frail/cachectic; alert, NAD HEENT: DMM Neck: Supple, No JVD Respiratory: CTA-R; decreased BS L base Cardiovascular: RRR s1s2, no m/r/g Gastrointestinal: BS+, soft, NT/ND Extremities: No peripheral edema b/l Neurological: reduced generalized strength Back: no CVAT b/l Skin: No rashes, no nevi Access: LUE AVF (+)thrill  LABS:                      11.3  5.98  )-----------( 37       ( 11 Mar 2021 11:49 )            33.8   Na(134)/K(4.9)/Cl(92)/HCO3(21)/BUN(81)/Cr(5.01)Glu(107)/Ca(7.9)/Mg(2.6)/PO4(6.4)    03-11 @ 11:49 Na(137)/K(4.4)/Cl(93)/HCO3(26)/BUN(64)/Cr(4.18)Glu(82)/Ca(7.8)/Mg(2.3)/PO4(4.3)    03-10 @ 10:45   IMPRESSION: 70M w/ dementia, CAD, past epidural abscess, and ESRD, 3/2/21 a/w AMS  (1)Renal - ESRD - HD TTS - last dialyzed yesterday; due for next HD tomorrow (2)Hyperkalemia - improved, with Lokelma on non-HD days (3)CV - intermittent hypotension - on Midodrine with HD; off standing ATC Midodrine.    RECOMMEND: (1)QIW Lokelma as ordered (2)D/C planning per primary team; next HD 3/13, inpatient versus outpatient      Heath Huff MD [facility] Office: [phone] Cell: [phone]        Overnight events noted   VITAL: T(C): , Max: 36.7 (03-12-21 @ 03:15) T(F): , Max: 98.1 (03-12-21 @ 03:15) HR: 80 (03-12-21 @ 06:45) BP: 103/65 (03-12-21 @ 06:45) BP(mean): -- RR: 18 (03-12-21 @ 06:45) SpO2: 97% (03-12-21 @ 06:45)   PHYSICAL EXAM: Constitutional: frail/cachectic; alert, NAD HEENT: DMM Neck: Supple, No JVD Respiratory: CTA-R; decreased BS L base Cardiovascular: RRR s1s2, no m/r/g Gastrointestinal: BS+, soft, NT/ND Extremities: No peripheral edema b/l Neurological: reduced generalized strength Back: no CVAT b/l Skin: No rashes, no nevi Access: LUE AVF (+)thrill  LABS:                      11.3  5.98  )-----------( 37       ( 11 Mar 2021 11:49 )            33.8   Na(134)/K(4.9)/Cl(92)/HCO3(21)/BUN(81)/Cr(5.01)Glu(107)/Ca(7.9)/Mg(2.6)/PO4(6.4)    03-11 @ 11:49 Na(137)/K(4.4)/Cl(93)/HCO3(26)/BUN(64)/Cr(4.18)Glu(82)/Ca(7.8)/Mg(2.3)/PO4(4.3)    03-10 @ 10:45   IMPRESSION: 70M w/ dementia, CAD, past epidural abscess, and ESRD, 3/2/21 a/w AMS  (1)Renal - ESRD - HD TTS - last dialyzed yesterday; due for next HD tomorrow (2)Hyperkalemia - improved, with Lokelma on non-HD days (3)CV - intermittent hypotension - on Midodrine with HD; off standing ATC Midodrine.    RECOMMEND: (1)QIW Lokelma as ordered (2)D/C planning per primary team; next HD 3/13, inpatient versus outpatient - will attempt 0.5kg UF if remains at [facility] 3/13    Heath Huff MD [facility] Office: [phone] Cell: [phone]        no complaints   VITAL: T(C): , Max: 36.7 (03-12-21 @ 03:15) T(F): , Max: 98.1 (03-12-21 @ 03:15) HR: 80 (03-12-21 @ 06:45) BP: 103/65 (03-12-21 @ 06:45) BP(mean): -- RR: 18 (03-12-21 @ 06:45) SpO2: 97% (03-12-21 @ 06:45)   PHYSICAL EXAM: Constitutional: frail/cachectic; alert, NAD HEENT: DMM Neck: Supple, No JVD Respiratory: CTA-R; decreased BS L base Cardiovascular: RRR s1s2, no m/r/g Gastrointestinal: BS+, soft, NT/ND Extremities: No peripheral edema b/l Neurological: reduced generalized strength Back: no CVAT b/l Skin: No rashes, no nevi Access: LUE AVF (+)thrill  LABS:                      11.3  5.98  )-----------( 37       ( 11 Mar 2021 11:49 )            33.8   Na(134)/K(4.9)/Cl(92)/HCO3(21)/BUN(81)/Cr(5.01)Glu(107)/Ca(7.9)/Mg(2.6)/PO4(6.4)    03-11 @ 11:49 Na(137)/K(4.4)/Cl(93)/HCO3(26)/BUN(64)/Cr(4.18)Glu(82)/Ca(7.8)/Mg(2.3)/PO4(4.3)    03-10 @ 10:45   IMPRESSION: 70M w/ dementia, CAD, past epidural abscess, and ESRD, 3/2/21 a/w AMS  (1)Renal - ESRD - HD TTS - last dialyzed yesterday; due for next HD tomorrow (2)Hyperkalemia - improved, with Lokelma on non-HD days (3)CV - intermittent hypotension - on Midodrine with HD; off standing ATC Midodrine.    RECOMMEND: (1)QIW Lokelma as ordered (2)D/C planning per primary team; next HD 3/13, inpatient versus outpatient - will attempt 0.5kg UF if remains at [facility] 3/13    Heath Huff MD [facility] Office: [phone] Cell: [phone]

## 2021-03-12 NOTE — CHART NOTE - NSCHARTNOTEFT_GEN_A_CORE
Medicine PA Note    Called by another ACP to obtain blood work. A few people had tried and were unsuccessful. Pt only has right arm available secondary to dialysis access on left arm. I was able to obtain blood via right radial artery using an ultrasound machine. Minimal bleeding had occurred. No signs of distress. Blood obtained and sent to the lab.    Chavo Beaver PA-C  x [phone]

## 2021-03-12 NOTE — CONSULT NOTE ADULT - PROBLEM SELECTOR RECOMMENDATION 9
-2/2 AMS; passed swallow eval but with low PO intake  -PEG tube for supplemental nutrition to prevent hypoglycemic events 2/2 low PO   -aspiration precautions   -TTE reviewed, please obtain cardiology clearance for endoscopic eval   -plan for EGD/PEG on Tuesday pending above clearance

## 2021-03-12 NOTE — CHART NOTE - NSCHARTNOTEFT_GEN_A_CORE
Source: Patient [ ]    Family [ ]     other [x ] RN, chart review     Nutrition consult for tube feeding. 71 y/o M with hx ESRD on HD p/w AMS; a/w hypercalcemia and pleural effusion. Pt currently ordered for PO diet, but with very poor PO (<25% completion of meals per RN). Pt a/w persistent hypoglycemia. NGT was replaced today. Pt was tolerating Nepro with CarbSteady @ 40 mL/hr on March 8th.     Diet, Dysphagia 1 Pureed-Honey Consistency Fluid:   Consistent Carbohydrate {No Snacks} (CSTCHO)  Supplement Feeding Modality:  Oral  Nepro Cans or Servings Per Day:  3       Frequency:  Three Times a day  Ensure Pudding Cans or Servings Per Day:  3       Frequency:  Three Times a day (03-11-21 @ 15:51)      Reported: RN Denies any GI issues (nausea/vomiting/diarrhea/constipation.)    PO intake:  < 50% [x ] 50-75% [ ]   % [ ]  other :  Current Weight: Weight (kg): 54.5 (03-03)  (3/9) 55.6 kg     Edema: none  Pressure Injuries: none    __________________ Pertinent Medications__________________   MEDICATIONS  (STANDING):  aspirin  chewable 81 milliGRAM(s) Oral daily  atorvastatin 10 milliGRAM(s) Oral at bedtime  chlorhexidine 4% Liquid 1 Application(s) Topical daily  cinacalcet 90 milliGRAM(s) Oral daily  citalopram 20 milliGRAM(s) Oral daily  dextrose 40% Gel 15 Gram(s) Oral once  dextrose 5%. 1000 milliLiter(s) (30 mL/Hr) IV Continuous <Continuous>  dextrose 50% Injectable 25 Gram(s) IV Push once  dextrose 50% Injectable 12.5 Gram(s) IV Push once  dextrose 50% Injectable 25 Gram(s) IV Push once  glucagon  Injectable 1 milliGRAM(s) IntraMuscular once  lactobacillus acidophilus 1 Tablet(s) Oral daily  mupirocin 2% Ointment 1 Application(s) Both Nostrils two times a day  piperacillin/tazobactam IVPB.. 3.375 Gram(s) IV Intermittent every 12 hours  polyethylene glycol 3350 17 Gram(s) Oral daily  senna Syrup 10 milliLiter(s) Oral daily  sodium zirconium cyclosilicate 10 Gram(s) Oral <User Schedule>    MEDICATIONS  (PRN):  midodrine. 10 milliGRAM(s) Oral <User Schedule> PRN SBP < 90 on HD      __________________ Pertinent Labs__________________   03-12 Na137 mmol/L Glu 74 mg/dL K+ 5.0 mmol/L Cr  4.10 mg/dL<H> BUN 68 mg/dL<H> 03-12 Phos 7.1 mg/dL<H> 03-12 Alb 3.3 g/dL 03-03 Chol 133 mg/dL LDL --    HDL 49 mg/dL Trig QNS mg/dL    CAPILLARY BLOOD GLUCOSE      POCT Blood Glucose.: 73 mg/dL (12 Mar 2021 12:16)  POCT Blood Glucose.: 91 mg/dL (12 Mar 2021 11:14)  POCT Blood Glucose.: 83 mg/dL (12 Mar 2021 10:40)  POCT Blood Glucose.: 88 mg/dL (12 Mar 2021 10:04)  POCT Blood Glucose.: 72 mg/dL (12 Mar 2021 09:48)  POCT Blood Glucose.: 80 mg/dL (12 Mar 2021 09:31)  POCT Blood Glucose.: 69 mg/dL (12 Mar 2021 09:16)  POCT Blood Glucose.: 54 mg/dL (12 Mar 2021 09:00)  POCT Blood Glucose.: 54 mg/dL (12 Mar 2021 08:59)  POCT Blood Glucose.: 139 mg/dL (11 Mar 2021 21:01)  POCT Blood Glucose.: 143 mg/dL (11 Mar 2021 18:25)  POCT Blood Glucose.: 127 mg/dL (11 Mar 2021 18:07)  POCT Blood Glucose.: 60 mg/dL (11 Mar 2021 17:48)          Estimated Needs:   [ x] no change since previous assessment  [ ] recalculated:       Previous Nutrition Diagnosis: severe protein calorie malnutrition     Nutrition Diagnosis is [x ] ongoing  [ ] resolved [ ] not applicable       Recommendations:  1. Continue pleasure feeds as tolerated.  2. Recommend to initiate Nepro @ 20 mL/hr and increase by 10 mL q 4 hrs to goal rate of Nepro @ 40 mL/hr x 24 hrs ( 960 mL, 1728 ankita, 78 gm pro).   3. Bowel regimen PRN.       Monitoring and Evaluation:     [x ] PO intake [ x] Tolerance to diet prescription [x ] weights [x ] follow up per protocol  [ ] other:

## 2021-03-12 NOTE — CONSULT NOTE ADULT - ASSESSMENT
71 y/o M w/ hx ESRD on HD, anemia, hyperparathyroidism, CAD s/p stent, BPH w/ neurogenic bladder (last dialysis 2/27/21), cognitive impairment, that was brought in by EMS from [facility] for AMS. GI consulted for PEG eval 2/2 FTT

## 2021-03-12 NOTE — CONSULT NOTE ADULT - SUBJECTIVE AND OBJECTIVE BOX
Chief Complaint:  Patient is a 70y old  Male who presents with a chief complaint of AMS    Inguinal hernia  NSTEMI (non-ST elevated myocardial infarction)  HLD (hyperlipidemia)  Neurogenic bladder  Spinal abscess  Cavitary lung disease  CAD (coronary artery disease)  Abscess of sacrum  Anemia due to chronic renal failure treated with erythropoietin, stage 2 (mild)  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 device, implant or graft  HPTH (hyperparathyroidism)  HTN (hypertension)  ESRD (end stage renal disease)  Cavitary lung disease  CAD in native artery  Hypertension  ESRD (end stage renal disease)  S/P primary angioplasty with coronary stent  Kidney abscess       HPI:  71 y/o M w/ hx ESRD on HD, anemia, hyperparathyroidism, CAD s/p stent, BPH w/ neurogenic bladder (last dialysis 2/27/21), cognitive impairment, that was brought in by EMS from [facility] for AMS. Due to patients mental status history obtained from ED provider note.  Per chart, pt usually verbal but upon check up at NH pt non verbal. Pt was admitted to this hospital for similar issues in past and was found to have infection (PNA). Pt not able to provide further history. Chart shows pt is dependent on all ADLs.  Unable to attain review of systems at this time. GI Consulted for PEG evaluation given failure to thrive.    (02 Mar 2021 09:08)      No Known Allergies      aspirin  chewable 81 milliGRAM(s) Oral daily  atorvastatin 10 milliGRAM(s) Oral at bedtime  chlorhexidine 4% Liquid 1 Application(s) Topical daily  cinacalcet 90 milliGRAM(s) Oral daily  citalopram 20 milliGRAM(s) Oral daily  dextrose 40% Gel 15 Gram(s) Oral once  dextrose 5%. 1000 milliLiter(s) IV Continuous <Continuous>  dextrose 50% Injectable 25 Gram(s) IV Push once  dextrose 50% Injectable 12.5 Gram(s) IV Push once  dextrose 50% Injectable 25 Gram(s) IV Push once  glucagon  Injectable 1 milliGRAM(s) IntraMuscular once  lactobacillus acidophilus 1 Tablet(s) Oral daily  midodrine. 10 milliGRAM(s) Oral <User Schedule> PRN  mupirocin 2% Ointment 1 Application(s) Both Nostrils two times a day  piperacillin/tazobactam IVPB.. 3.375 Gram(s) IV Intermittent every 12 hours  polyethylene glycol 3350 17 Gram(s) Oral daily  senna Syrup 10 milliLiter(s) Oral daily  sodium zirconium cyclosilicate 10 Gram(s) Oral <User Schedule>        FAMILY HISTORY:  No pertinent family history in first degree relatives    Family history of breast cancer (Sibling)          Review of Systems: *does not participate         Relevant Family History:   n/c    Relevant Social History: n/c      Physical Exam:    Vital Signs:  Vital Signs Last 24 Hrs  T(C): 36.9 (12 Mar 2021 13:27), Max: 37.3 (12 Mar 2021 10:09)  T(F): 98.4 (12 Mar 2021 13:27), Max: 99.1 (12 Mar 2021 10:09)  HR: 85 (12 Mar 2021 13:27) (80 - 94)  BP: 109/75 (12 Mar 2021 13:27) (98/60 - 125/78)  BP(mean): --  RR: 17 (12 Mar 2021 13:27) (17 - 18)  SpO2: 96% (12 Mar 2021 13:27) (96% - 99%)  Daily     Daily     General:  Appears stated age, well-groomed, nad  HEENT:  NC/AT,  conjunctivae clear and pink, no thyromegaly, nodules, adenopathy, no JVD  Chest:  Full & symmetric excursion, no increased effort, breath sounds clear  Cardiovascular:  Regular rhythm, S1, S2, no murmur/rub/S3/S4, no abdominal bruit, no edema  Abdomen:  Soft, non-tender, non-distended, normoactive bowel sounds,  no masses ,no hepatosplenomeagaly, no signs of chronic liver disease  Extremities:  no cyanosis,clubbing or edema  Skin:  No rash/erythema/ecchymoses/petechiae/wounds/abscess/warm/dry  Neuro/Psych:  A&Ox1  , no asterixis, no tremor, no encephalopathy    Laboratory:                            11.3   5.98  )-----------( 37       ( 11 Mar 2021 11:49 )             33.8     03-11    134<L>  |  92<L>  |  81<H>  ----------------------------<  107<H>  4.9   |  21<L>  |  5.01<H>    Ca    7.9<L>      11 Mar 2021 11:49  Phos  6.4     03-11  Mg     2.6     03-11    TPro  5.9<L>  /  Alb  3.2<L>  /  TBili  1.2  /  DBili  x   /  AST  310<H>  /  ALT  782<H>  /  AlkPhos  284<H>  03-11    LIVER FUNCTIONS - ( 11 Mar 2021 11:49 )  Alb: 3.2 g/dL / Pro: 5.9 g/dL / ALK PHOS: 284 U/L / ALT: 782 U/L / AST: 310 U/L / GGT: x                 Imaging:    < from: CT Head No Cont (03.02.21 @ 04:48) >    EXAM:  CT BRAIN        PROCEDURE DATE:  Mar  2 2021         INTERPRETATION:  PROCEDURE: CT head without contrast.    INDICATION: Altered mental status    TECHNIQUE: Multiple axial sections were obtained at 5 mm intervals. The images were reviewed in brain and bone windows. Imaging is performed using helical low-dose technique, and sagittal and coronal reformations are provided.    COMPARISON: Noncontrast CT scan of head 1/11/2016    FINDINGS:  The CT examination demonstrates generalized volume loss as manifested by the enlargement of the ventricles, cisternal spaces, and cortical sulci throughout. Gray-white matter differentiation is maintained.    There is no acute intracranial hemorrhage, mass effect, midline shift or abnormal extra axialcollection.    There is moderate periventricular white matter lucency, likely the sequela of small vessel ischemic disease.    The bony windows demonstrates no fracture.    The included paranasal sinuses and mastoid air cells are predominantly clear.    Left maxillary mucosal thickening.    IMPRESSION:  No acute intracranial hemorrhage or mass effect.            RUBIN CHEEMA MD; Resident Radiology  This document has been electronically signed.  MARVIN MEEKS MD; Attending Radiologist  This document has been electronically signed. Mar  2 2021  5:01AM    < end of copied text >    < from: US Abdomen Doppler (03.08.21 @ 14:08) >    EXAM:  US DPLX ABDOMEN        PROCEDURE DATE:  Mar  8 2021         INTERPRETATION:  CLINICAL INFORMATION: Abnormal liver function test. Evaluate for portal venous thrombosis.    COMPARISON: None available.    TECHNIQUE: Sonography of the abdomen. Color Doppler ultrasound was utilized for evaluation of the patient's portal and hepatic vasculature. Spectral evaluation was attempted, but could not be performed due to patient respiratory motion.    FINDINGS:    Liver: Within normal limits.    Doppler: No pulmonary venous thrombosis. The main, left, and right portal veins are patent, demonstrating normal direction of flow. The right, middle, and left hepatic veins are patent.    Bile ducts: Normal caliber. Common bile duct measures 2 mm.  Gallbladder: Within normal limits.  Pancreas: Not visualized  Ascites: Trace perihepatic ascites.  Aorta and IVC: Visualized portions are within normal limits.    IMPRESSION:  No evidence of portal venous thrombosis, as clinically questioned.                JEOVANY LARIOS MD; Attending Radiologist  This document has been electronically signed. Mar  8 2021  2:22PM    < end of copied text >

## 2021-03-12 NOTE — RAPID RESPONSE TEAM SUMMARY - NSADDTLFINDINGSRRT_GEN_ALL_CORE
RRT called for hypoglycemia (FS: <25). Pt at reportedly appearing mildly more lethargic than baseline. 2 full amps of  Dextrose 50% given with improvement of finger stick glucose to 124 (glucose 274 when checked on earlobe). Dextrose 10% at 30mL per hour started (D5 order DC'd). Q4hr FS ordered. NGT feedings to be initiated by bedside RN.

## 2021-03-13 NOTE — CONSULT NOTE ADULT - ASSESSMENT
EKG NSR LVH   Echo 2/21 - Severe systolic dysfunction     a/p     1) Chronic severe systolic dysfunction - on midodrine for hypotension, hold metoprolol not on OMT secondary to BP    2) PEG placement - pt is non verbal, EKG ok , moderate risk for PEG placement     3) Thrombocytopenia - will hold asa

## 2021-03-13 NOTE — CONSULT NOTE ADULT - CONSULT REASON
Hypotension, AMS, Elevated LFT's.
pleural effusion
Hypoglycemia
Dialysis evaluation
Hypotension
clearance
elevated liver enzymes
peg eval/FTT

## 2021-03-13 NOTE — PROGRESS NOTE ADULT - SUBJECTIVE AND OBJECTIVE BOX
Nephrology Progress Note    Patient is a 70y male seen today undergoing HD, tolerating well.  Hypoglycemic this am, now on Dextrose drip    Allergies:  No Known Allergies    Hospital Medications:   MEDICATIONS  (STANDING):  aspirin  chewable 81 milliGRAM(s) Oral daily  atorvastatin 10 milliGRAM(s) Oral at bedtime  chlorhexidine 4% Liquid 1 Application(s) Topical daily  cinacalcet 90 milliGRAM(s) Oral daily  citalopram 20 milliGRAM(s) Oral daily  dextrose 10%. 1000 milliLiter(s) (30 mL/Hr) IV Continuous <Continuous>  dextrose 40% Gel 15 Gram(s) Oral once  dextrose 50% Injectable 25 Gram(s) IV Push once  dextrose 50% Injectable 12.5 Gram(s) IV Push once  dextrose 50% Injectable 25 Gram(s) IV Push once  glucagon  Injectable 1 milliGRAM(s) IntraMuscular once  lactobacillus acidophilus 1 Tablet(s) Oral daily  mupirocin 2% Ointment 1 Application(s) Both Nostrils two times a day  piperacillin/tazobactam IVPB.. 3.375 Gram(s) IV Intermittent every 12 hours  polyethylene glycol 3350 17 Gram(s) Oral daily  senna Syrup 10 milliLiter(s) Oral daily  sodium zirconium cyclosilicate 10 Gram(s) Oral <User Schedule>        VITALS:  T(F): 97.9 (03-13-21 @ 07:00), Max: 99.1 (03-12-21 @ 10:09)  HR: 90 (03-13-21 @ 07:00)  BP: 113/77 (03-13-21 @ 07:00)  RR: 17 (03-13-21 @ 07:00)  SpO2: 95% (03-13-21 @ 07:00)  Wt(kg): --    03-11 @ 07:01  -  03-12 @ 07:00  --------------------------------------------------------  IN: 500 mL / OUT: 400 mL / NET: 100 mL    03-12 @ 07:01  -  03-13 @ 07:00  --------------------------------------------------------  IN: 350 mL / OUT: 0 mL / NET: 350 mL      PHYSICAL EXAM:  Constitutional: frail/cachectic; alert, NAD  HEENT: DMM  Neck: Supple, No JVD  Respiratory: CTA-R; decreased BS L base  Cardiovascular: RRR s1s2, no m/r/g  Gastrointestinal: BS+, soft, NT/ND  Extremities: No peripheral edema b/l  Neurological: reduced generalized strength  Back: no CVAT b/l  Skin: No rashes, no nevi  Access: RACHELLE MORAN (+)thri    LABS:  03-13    135  |  90<L>  |  79<H>  ----------------------------<  117<H>  4.9   |  19<L>  |  4.54<H>    Ca    8.7      13 Mar 2021 08:00  Phos  7.5     03-13  Mg     2.7     03-13    TPro  6.1  /  Alb  3.1<L>  /  TBili  1.6<H>  /  DBili      /  AST  399<H>  /  ALT  801<H>  /  AlkPhos  324<H>  03-13                          11.8   7.31  )-----------( x        ( 13 Mar 2021 08:00 )             35.8

## 2021-03-13 NOTE — CONSULT NOTE ADULT - CONSULT REQUESTED DATE/TIME
02-Mar-2021 10:18
04-Mar-2021 09:15
13-Mar-2021 17:20
09-Mar-2021 16:28
04-Mar-2021 03:09
08-Mar-2021 10:31
13-Mar-2021 10:54
12-Mar-2021 15:22

## 2021-03-13 NOTE — CONSULT NOTE ADULT - SUBJECTIVE AND OBJECTIVE BOX
HPI:  71 y/o M w/ hx ESRD on HD, anemia, hyperparathyroidism, CAD s/p stent, BPH w/ neurogenic bladder (last dialysis 2/27/21), cognitive impairment, that was brought in by EMS from [facility] for AMS. Due to patients mental status history obtained from ED provider note.  Per chart, pt usually verbal but upon check up at NH pt non verbal. Pt was admitted to this hospital for similar issues in past and was found to have infection (PNA). Pt not able to provide further history. Chart shows pt is dependent on all ADLs.  Unable to attain review of systems at this time.    (02 Mar 2021 09:08)      PAST MEDICAL & SURGICAL HISTORY:  Inguinal hernia    NSTEMI (non-ST elevated myocardial infarction)    HLD (hyperlipidemia)    Neurogenic bladder    Spinal abscess    Cavitary lung disease    CAD (coronary artery disease)    Abscess of sacrum    Anemia due to chronic renal failure treated with erythropoietin, stage 2 (mild)    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 device, implant or graft    HPTH (hyperparathyroidism)  Secondary    HTN (hypertension)    ESRD (end stage renal disease)    Cavitary lung disease    CAD in native artery    Hypertension    ESRD (end stage renal disease)    S/P primary angioplasty with coronary stent    Kidney abscess        FAMILY HISTORY:  No pertinent family history in first degree relatives    Family history of breast cancer (Sibling)        Social History:    Outpatient Medications:    MEDICATIONS  (STANDING):  atorvastatin 10 milliGRAM(s) Oral at bedtime  chlorhexidine 4% Liquid 1 Application(s) Topical daily  cinacalcet 90 milliGRAM(s) Oral daily  citalopram 20 milliGRAM(s) Oral daily  dextrose 10%. 1000 milliLiter(s) (30 mL/Hr) IV Continuous <Continuous>  dextrose 40% Gel 15 Gram(s) Oral once  dextrose 50% Injectable 25 Gram(s) IV Push once  dextrose 50% Injectable 12.5 Gram(s) IV Push once  dextrose 50% Injectable 25 Gram(s) IV Push once  glucagon  Injectable 1 milliGRAM(s) IntraMuscular once  lactobacillus acidophilus 1 Tablet(s) Oral daily  mupirocin 2% Ointment 1 Application(s) Both Nostrils two times a day  piperacillin/tazobactam IVPB.. 3.375 Gram(s) IV Intermittent every 12 hours  polyethylene glycol 3350 17 Gram(s) Oral daily  senna Syrup 10 milliLiter(s) Oral daily  sodium zirconium cyclosilicate 10 Gram(s) Oral <User Schedule>    MEDICATIONS  (PRN):  midodrine. 10 milliGRAM(s) Oral <User Schedule> PRN SBP < 90 on HD      Allergies    No Known Allergies    Intolerances      Review of Systems:  Constitutional: No fever  Eyes: No blurry vision  Neuro: No tremors  HEENT: No pain  Cardiovascular: No chest pain, palpitations  Respiratory: No SOB, no cough  GI: No nausea, vomiting, abdominal pain  : No dysuria  Skin: no rash  Psych: no depression  Endocrine: no polyuria, polydipsia  Hem/lymph: no swelling  Osteoporosis: no fractures    ALL OTHER SYSTEMS REVIEWED AND NEGATIVE    UNABLE TO OBTAIN    PHYSICAL EXAM:  VITALS: T(C): 37 (03-13-21 @ 14:25)  T(F): 98.6 (03-13-21 @ 14:25), Max: 98.6 (03-13-21 @ 14:25)  HR: 89 (03-13-21 @ 14:25) (78 - 90)  BP: 110/67 (03-13-21 @ 14:25) (105/65 - 114/79)  RR:  (17 - 18)  SpO2:  (95% - 97%)  Wt(kg): --  GENERAL: NAD, well-groomed, well-developed  EYES: No proptosis, no lid lag, anicteric  HEENT:  Atraumatic, Normocephalic, moist mucous membranes  THYROID: Normal size, no palpable nodules  RESPIRATORY: Clear to auscultation bilaterally; No rales, rhonchi, wheezing  CARDIOVASCULAR: Regular rate and rhythm; No murmurs; no peripheral edema  GI: Soft, nontender, non distended, normal bowel sounds  SKIN: Dry, intact, No rashes or lesions  MUSCULOSKELETAL: Full range of motion, normal strength  NEURO: sensation intact, extraocular movements intact, no tremor  PSYCH: Alert and oriented x 3, normal affect, normal mood  CUSHING'S SIGNS: no striae    POCT Blood Glucose.: 110 mg/dL (03-13-21 @ 16:36)  POCT Blood Glucose.: 81 mg/dL (03-13-21 @ 12:37)  POCT Blood Glucose.: 108 mg/dL (03-13-21 @ 09:12)  POCT Blood Glucose.: 107 mg/dL (03-13-21 @ 05:28)  POCT Blood Glucose.: 111 mg/dL (03-13-21 @ 02:49)  POCT Blood Glucose.: 68 mg/dL (03-13-21 @ 02:44)  POCT Blood Glucose.: 114 mg/dL (03-12-21 @ 22:16)  POCT Blood Glucose.: 274 mg/dL (03-12-21 @ 18:31)  POCT Blood Glucose.: 124 mg/dL (03-12-21 @ 18:30)  POCT Blood Glucose.: 73 mg/dL (03-12-21 @ 18:20)  POCT Blood Glucose.: 82 mg/dL (03-12-21 @ 18:16)  POCT Blood Glucose.: 94 mg/dL (03-12-21 @ 18:09)  POCT Blood Glucose.: 31 mg/dL (03-12-21 @ 17:52)  POCT Blood Glucose.: <25 mg/dL (03-12-21 @ 17:49)  POCT Blood Glucose.: 32 mg/dL (03-12-21 @ 17:48)  POCT Blood Glucose.: 73 mg/dL (03-12-21 @ 12:16)  POCT Blood Glucose.: 91 mg/dL (03-12-21 @ 11:14)  POCT Blood Glucose.: 83 mg/dL (03-12-21 @ 10:40)  POCT Blood Glucose.: 88 mg/dL (03-12-21 @ 10:04)  POCT Blood Glucose.: 72 mg/dL (03-12-21 @ 09:48)  POCT Blood Glucose.: 80 mg/dL (03-12-21 @ 09:31)  POCT Blood Glucose.: 69 mg/dL (03-12-21 @ 09:16)  POCT Blood Glucose.: 54 mg/dL (03-12-21 @ 09:00)  POCT Blood Glucose.: 54 mg/dL (03-12-21 @ 08:59)  POCT Blood Glucose.: 139 mg/dL (03-11-21 @ 21:01)  POCT Blood Glucose.: 143 mg/dL (03-11-21 @ 18:25)  POCT Blood Glucose.: 127 mg/dL (03-11-21 @ 18:07)  POCT Blood Glucose.: 60 mg/dL (03-11-21 @ 17:48)  POCT Blood Glucose.: 65 mg/dL (03-11-21 @ 17:25)  POCT Blood Glucose.: 56 mg/dL (03-11-21 @ 17:23)  POCT Blood Glucose.: 81 mg/dL (03-11-21 @ 13:31)  POCT Blood Glucose.: 95 mg/dL (03-11-21 @ 08:31)  POCT Blood Glucose.: 131 mg/dL (03-11-21 @ 06:53)  POCT Blood Glucose.: 105 mg/dL (03-11-21 @ 06:40)  POCT Blood Glucose.: 57 mg/dL (03-11-21 @ 05:52)  POCT Blood Glucose.: 53 mg/dL (03-11-21 @ 05:50)  POCT Blood Glucose.: 74 mg/dL (03-10-21 @ 21:51)  POCT Blood Glucose.: 90 mg/dL (03-10-21 @ 17:30)                            11.8   7.31  )-----------( 45       ( 13 Mar 2021 08:00 )             35.8       03-13    135  |  90<L>  |  79<H>  ----------------------------<  117<H>  4.9   |  19<L>  |  4.54<H>    EGFR if : 14<L>  EGFR if non : 12<L>    Ca    8.7      03-13  Mg     2.7     03-13  Phos  7.5     03-13    TPro  6.1  /  Alb  3.1<L>  /  TBili  1.6<H>  /  DBili  x   /  AST  399<H>  /  ALT  801<H>  /  AlkPhos  324<H>  03-13 03-03 Chol 133 LDL -- HDL 49 Trig QNS                      HPI: 71 yo M with hx ESRD on HD, anemia, hyperparathyroidism, CAD s/p stent, BPH w/ neurogenic bladder (last dialysis 2/27/21), cognitive impairment, that was brought in by EMS from [facility] for AMS. Due to patients mental status history obtained from ED provider note.  Per chart, pt usually verbal but upon check up at NH pt non verbal. Pt was admitted to this hospital for similar issues in past and was found to have infection (PNA). Pt not able to provide further history. Chart shows pt is dependent on all ADLs.    During hospital admission patient noted to be Hypothermic and hypoglycemia multiple times. Patient treated for UTI with IV Antibiotics with no improvement. Patient noted to have worsening liver function during admission per hepatology likely due to ischemic hepatitis, will rule out autoimmune causes.   Endocrine consult requested for management of hypoglycemia.     Endocrine history:  Unable to obtain given mental status     PAST MEDICAL & SURGICAL HISTORY:  Inguinal hernia  NSTEMI (non-ST elevated myocardial infarction)  HLD (hyperlipidemia)  Neurogenic bladder  Spinal abscess  Cavitary lung disease  CAD (coronary artery disease)  Abscess of sacrum  Anemia due to chronic renal failure treated with erythropoietin, stage 2 (mild)  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 device, implant or graft  HPTH (hyperparathyroidism)  Secondary  HTN (hypertension)  ESRD (end stage renal disease)  Cavitary lung disease  CAD in native artery  Hypertension  ESRD (end stage renal disease)  S/P primary angioplasty with coronary stent  Kidney abscess    FAMILY HISTORY:  unknown     Social History:  unknown     Outpatient Medications:  Home Medications:  Acidophilus oral tablet: 1 tab(s) orally once a day (02 Mar 2021 10:40)  Aquaphor topical ointment: Apply topically to affected area 4 times a day (02 Mar 2021 10:40)  aspirin 81 mg oral tablet: 1 tab(s) orally once a day (02 Mar 2021 10:40)  citalopram 20 mg oral tablet: 1 tab(s) orally once a day (02 Mar 2021 10:40)  Claritin 5 mg oral tablet, chewable: 1 tab(s) orally every 12 hours (02 Mar 2021 10:40)  famotidine 20 mg oral tablet: 1 tab(s) orally every other day (at bedtime) (02 Mar 2021 10:40)  Lipitor 10 mg oral tablet: 1 tab(s) orally once a day (02 Mar 2021 10:40)  midodrine 10 mg oral tablet: 1 tab(s) orally 3 times a day (02 Mar 2021 10:40)  MiraLax oral powder for reconstitution:  (02 Mar 2021 10:40)  Senna 8.6 mg oral tablet: 2 tab(s) orally once a day (at bedtime) (02 Mar 2021 10:40)  tamsulosin 0.4 mg oral capsule: 1 cap(s) orally once a day (02 Mar 2021 10:40)  Vibramycin 100 mg oral capsule: 1 cap(s) orally a day (02 Mar 2021 10:40)      MEDICATIONS  (STANDING):  atorvastatin 10 milliGRAM(s) Oral at bedtime  chlorhexidine 4% Liquid 1 Application(s) Topical daily  cinacalcet 90 milliGRAM(s) Oral daily  citalopram 20 milliGRAM(s) Oral daily  dextrose 10%. 1000 milliLiter(s) (30 mL/Hr) IV Continuous <Continuous>  dextrose 40% Gel 15 Gram(s) Oral once  dextrose 50% Injectable 25 Gram(s) IV Push once  dextrose 50% Injectable 12.5 Gram(s) IV Push once  dextrose 50% Injectable 25 Gram(s) IV Push once  glucagon  Injectable 1 milliGRAM(s) IntraMuscular once  lactobacillus acidophilus 1 Tablet(s) Oral daily  mupirocin 2% Ointment 1 Application(s) Both Nostrils two times a day  piperacillin/tazobactam IVPB.. 3.375 Gram(s) IV Intermittent every 12 hours  polyethylene glycol 3350 17 Gram(s) Oral daily  senna Syrup 10 milliLiter(s) Oral daily  sodium zirconium cyclosilicate 10 Gram(s) Oral <User Schedule>    MEDICATIONS  (PRN):  midodrine. 10 milliGRAM(s) Oral <User Schedule> PRN SBP < 90 on HD      Allergies    No Known Allergies    Intolerances      Review of Systems:  Constitutional: No fever  Eyes: No blurry vision  Neuro: No tremors  HEENT: No pain  Cardiovascular: No chest pain, palpitations  Respiratory: No SOB, no cough  GI: No nausea, vomiting, abdominal pain  : No dysuria  Skin: no rash  Psych: no depression  Endocrine: no polyuria, polydipsia  Hem/lymph: no swelling  Osteoporosis: no fractures    ALL OTHER SYSTEMS REVIEWED AND NEGATIVE    UNABLE TO OBTAIN    PHYSICAL EXAM:  VITALS: T(C): 37 (03-13-21 @ 14:25)  T(F): 98.6 (03-13-21 @ 14:25), Max: 98.6 (03-13-21 @ 14:25)  HR: 89 (03-13-21 @ 14:25) (78 - 90)  BP: 110/67 (03-13-21 @ 14:25) (105/65 - 114/79)  RR:  (17 - 18)  SpO2:  (95% - 97%)  Wt(kg): --  GENERAL: NAD, well-groomed, well-developed  EYES: No proptosis, no lid lag, anicteric  HEENT:  Atraumatic, Normocephalic, moist mucous membranes  THYROID: Normal size, no palpable nodules  RESPIRATORY: Clear to auscultation bilaterally; No rales, rhonchi, wheezing  CARDIOVASCULAR: Regular rate and rhythm; No murmurs; no peripheral edema  GI: Soft, nontender, non distended, normal bowel sounds  SKIN: Dry, intact, No rashes or lesions  MUSCULOSKELETAL: Full range of motion, normal strength  NEURO: sensation intact, extraocular movements intact, no tremor  PSYCH: Alert and oriented x 3, normal affect, normal mood  CUSHING'S SIGNS: no striae    POCT Blood Glucose.: 110 mg/dL (03-13-21 @ 16:36)  POCT Blood Glucose.: 81 mg/dL (03-13-21 @ 12:37)  POCT Blood Glucose.: 108 mg/dL (03-13-21 @ 09:12)  POCT Blood Glucose.: 107 mg/dL (03-13-21 @ 05:28)  POCT Blood Glucose.: 111 mg/dL (03-13-21 @ 02:49)  POCT Blood Glucose.: 68 mg/dL (03-13-21 @ 02:44)  POCT Blood Glucose.: 114 mg/dL (03-12-21 @ 22:16)  POCT Blood Glucose.: 274 mg/dL (03-12-21 @ 18:31)  POCT Blood Glucose.: 124 mg/dL (03-12-21 @ 18:30)  POCT Blood Glucose.: 73 mg/dL (03-12-21 @ 18:20)  POCT Blood Glucose.: 82 mg/dL (03-12-21 @ 18:16)  POCT Blood Glucose.: 94 mg/dL (03-12-21 @ 18:09)  POCT Blood Glucose.: 31 mg/dL (03-12-21 @ 17:52)  POCT Blood Glucose.: <25 mg/dL (03-12-21 @ 17:49)  POCT Blood Glucose.: 32 mg/dL (03-12-21 @ 17:48)  POCT Blood Glucose.: 73 mg/dL (03-12-21 @ 12:16)  POCT Blood Glucose.: 91 mg/dL (03-12-21 @ 11:14)  POCT Blood Glucose.: 83 mg/dL (03-12-21 @ 10:40)  POCT Blood Glucose.: 88 mg/dL (03-12-21 @ 10:04)  POCT Blood Glucose.: 72 mg/dL (03-12-21 @ 09:48)  POCT Blood Glucose.: 80 mg/dL (03-12-21 @ 09:31)  POCT Blood Glucose.: 69 mg/dL (03-12-21 @ 09:16)  POCT Blood Glucose.: 54 mg/dL (03-12-21 @ 09:00)  POCT Blood Glucose.: 54 mg/dL (03-12-21 @ 08:59)  POCT Blood Glucose.: 139 mg/dL (03-11-21 @ 21:01)  POCT Blood Glucose.: 143 mg/dL (03-11-21 @ 18:25)  POCT Blood Glucose.: 127 mg/dL (03-11-21 @ 18:07)  POCT Blood Glucose.: 60 mg/dL (03-11-21 @ 17:48)  POCT Blood Glucose.: 65 mg/dL (03-11-21 @ 17:25)  POCT Blood Glucose.: 56 mg/dL (03-11-21 @ 17:23)  POCT Blood Glucose.: 81 mg/dL (03-11-21 @ 13:31)  POCT Blood Glucose.: 95 mg/dL (03-11-21 @ 08:31)  POCT Blood Glucose.: 131 mg/dL (03-11-21 @ 06:53)  POCT Blood Glucose.: 105 mg/dL (03-11-21 @ 06:40)  POCT Blood Glucose.: 57 mg/dL (03-11-21 @ 05:52)  POCT Blood Glucose.: 53 mg/dL (03-11-21 @ 05:50)  POCT Blood Glucose.: 74 mg/dL (03-10-21 @ 21:51)  POCT Blood Glucose.: 90 mg/dL (03-10-21 @ 17:30)                            11.8   7.31  )-----------( 45       ( 13 Mar 2021 08:00 )             35.8       03-13    135  |  90<L>  |  79<H>  ----------------------------<  117<H>  4.9   |  19<L>  |  4.54<H>    EGFR if : 14<L>  EGFR if non : 12<L>    Ca    8.7      03-13  Mg     2.7     03-13  Phos  7.5     03-13    TPro  6.1  /  Alb  3.1<L>  /  TBili  1.6<H>  /  DBili  x   /  AST  399<H>  /  ALT  801<H>  /  AlkPhos  324<H>  03-13 03-03 Chol 133 LDL -- HDL 49 Trig QNS                      HPI: 71 yo M with hx ESRD on HD, anemia, hyperparathyroidism, CAD s/p stent, BPH w/ neurogenic bladder (last dialysis 2/27/21), cognitive impairment, that was brought in by EMS from [facility] for AMS. Due to patients mental status history obtained from ED provider note.  Per chart, pt usually verbal but upon check up at NH pt non verbal. Pt was admitted to this hospital for similar issues in past and was found to have infection (PNA). Pt not able to provide further history. Chart shows pt is dependent on all ADLs.    During hospital admission patient noted to be Hypothermic and hypoglycemia multiple times. Patient treated for UTI with IV Antibiotics with no improvement. Patient noted to have worsening liver function during admission per hepatology likely due to ischemic hepatitis, will rule out autoimmune causes.   Endocrine consult requested for management of hypoglycemia.     Endocrine history:  Unable to obtain given mental status     PAST MEDICAL & SURGICAL HISTORY:  Inguinal hernia  NSTEMI (non-ST elevated myocardial infarction)  HLD (hyperlipidemia)  Neurogenic bladder  Spinal abscess  Cavitary lung disease  CAD (coronary artery disease)  Abscess of sacrum  Anemia due to chronic renal failure treated with erythropoietin, stage 2 (mild)  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 device, implant or graft  HPTH (hyperparathyroidism)  Secondary  HTN (hypertension)  ESRD (end stage renal disease)  Cavitary lung disease  CAD in native artery  Hypertension  ESRD (end stage renal disease)  S/P primary angioplasty with coronary stent  Kidney abscess    FAMILY HISTORY:  unknown     Social History:  unknown     Outpatient Medications:  Home Medications:  Acidophilus oral tablet: 1 tab(s) orally once a day (02 Mar 2021 10:40)  Aquaphor topical ointment: Apply topically to affected area 4 times a day (02 Mar 2021 10:40)  aspirin 81 mg oral tablet: 1 tab(s) orally once a day (02 Mar 2021 10:40)  citalopram 20 mg oral tablet: 1 tab(s) orally once a day (02 Mar 2021 10:40)  Claritin 5 mg oral tablet, chewable: 1 tab(s) orally every 12 hours (02 Mar 2021 10:40)  famotidine 20 mg oral tablet: 1 tab(s) orally every other day (at bedtime) (02 Mar 2021 10:40)  Lipitor 10 mg oral tablet: 1 tab(s) orally once a day (02 Mar 2021 10:40)  midodrine 10 mg oral tablet: 1 tab(s) orally 3 times a day (02 Mar 2021 10:40)  MiraLax oral powder for reconstitution:  (02 Mar 2021 10:40)  Senna 8.6 mg oral tablet: 2 tab(s) orally once a day (at bedtime) (02 Mar 2021 10:40)  tamsulosin 0.4 mg oral capsule: 1 cap(s) orally once a day (02 Mar 2021 10:40)  Vibramycin 100 mg oral capsule: 1 cap(s) orally a day (02 Mar 2021 10:40)      MEDICATIONS  (STANDING):  atorvastatin 10 milliGRAM(s) Oral at bedtime  chlorhexidine 4% Liquid 1 Application(s) Topical daily  cinacalcet 90 milliGRAM(s) Oral daily  citalopram 20 milliGRAM(s) Oral daily  dextrose 10%. 1000 milliLiter(s) (30 mL/Hr) IV Continuous <Continuous>  dextrose 40% Gel 15 Gram(s) Oral once  dextrose 50% Injectable 25 Gram(s) IV Push once  dextrose 50% Injectable 12.5 Gram(s) IV Push once  dextrose 50% Injectable 25 Gram(s) IV Push once  glucagon  Injectable 1 milliGRAM(s) IntraMuscular once  lactobacillus acidophilus 1 Tablet(s) Oral daily  mupirocin 2% Ointment 1 Application(s) Both Nostrils two times a day  piperacillin/tazobactam IVPB.. 3.375 Gram(s) IV Intermittent every 12 hours  polyethylene glycol 3350 17 Gram(s) Oral daily  senna Syrup 10 milliLiter(s) Oral daily  sodium zirconium cyclosilicate 10 Gram(s) Oral <User Schedule>    MEDICATIONS  (PRN):  midodrine. 10 milliGRAM(s) Oral <User Schedule> PRN SBP < 90 on HD    Allergies  No Known Allergies    Review of Systems:  UNABLE TO OBTAIN    PHYSICAL EXAM:  VITALS: T(C): 37 (03-13-21 @ 14:25)  T(F): 98.6 (03-13-21 @ 14:25), Max: 98.6 (03-13-21 @ 14:25)  HR: 89 (03-13-21 @ 14:25) (78 - 90)  BP: 110/67 (03-13-21 @ 14:25) (105/65 - 114/79)  RR:  (17 - 18)  SpO2:  (95% - 97%)  Wt(kg): --  GENERAL: lethargic and cachectic  EYES: No proptosis, no lid lag, anicteric  HEENT:  Atraumatic, Normocephalic, moist mucous membranes  THYROID: Normal size, no palpable nodules  RESPIRATORY: Clear to auscultation bilaterally; No rales, rhonchi, wheezing  CARDIOVASCULAR: Regular rate and rhythm; No murmurs; no peripheral edema  GI: Soft, nontender, non distended, normal bowel sounds, +NGT   SKIN: dry skin   MUSCULOSKELETAL: unable to assess   PSYCH: lethargic     POCT Blood Glucose.: 110 mg/dL (03-13-21 @ 16:36)  POCT Blood Glucose.: 81 mg/dL (03-13-21 @ 12:37)  POCT Blood Glucose.: 108 mg/dL (03-13-21 @ 09:12)  POCT Blood Glucose.: 107 mg/dL (03-13-21 @ 05:28)  POCT Blood Glucose.: 111 mg/dL (03-13-21 @ 02:49)  POCT Blood Glucose.: 68 mg/dL (03-13-21 @ 02:44)  POCT Blood Glucose.: 114 mg/dL (03-12-21 @ 22:16)  POCT Blood Glucose.: 274 mg/dL (03-12-21 @ 18:31)  POCT Blood Glucose.: 124 mg/dL (03-12-21 @ 18:30)  POCT Blood Glucose.: 73 mg/dL (03-12-21 @ 18:20)  POCT Blood Glucose.: 82 mg/dL (03-12-21 @ 18:16)  POCT Blood Glucose.: 94 mg/dL (03-12-21 @ 18:09)  POCT Blood Glucose.: 31 mg/dL (03-12-21 @ 17:52)  POCT Blood Glucose.: <25 mg/dL (03-12-21 @ 17:49)  POCT Blood Glucose.: 32 mg/dL (03-12-21 @ 17:48)  POCT Blood Glucose.: 73 mg/dL (03-12-21 @ 12:16)  POCT Blood Glucose.: 91 mg/dL (03-12-21 @ 11:14)  POCT Blood Glucose.: 83 mg/dL (03-12-21 @ 10:40)  POCT Blood Glucose.: 88 mg/dL (03-12-21 @ 10:04)  POCT Blood Glucose.: 72 mg/dL (03-12-21 @ 09:48)  POCT Blood Glucose.: 80 mg/dL (03-12-21 @ 09:31)  POCT Blood Glucose.: 69 mg/dL (03-12-21 @ 09:16)  POCT Blood Glucose.: 54 mg/dL (03-12-21 @ 09:00)  POCT Blood Glucose.: 54 mg/dL (03-12-21 @ 08:59)  POCT Blood Glucose.: 139 mg/dL (03-11-21 @ 21:01)  POCT Blood Glucose.: 143 mg/dL (03-11-21 @ 18:25)  POCT Blood Glucose.: 127 mg/dL (03-11-21 @ 18:07)  POCT Blood Glucose.: 60 mg/dL (03-11-21 @ 17:48)  POCT Blood Glucose.: 65 mg/dL (03-11-21 @ 17:25)  POCT Blood Glucose.: 56 mg/dL (03-11-21 @ 17:23)  POCT Blood Glucose.: 81 mg/dL (03-11-21 @ 13:31)  POCT Blood Glucose.: 95 mg/dL (03-11-21 @ 08:31)  POCT Blood Glucose.: 131 mg/dL (03-11-21 @ 06:53)  POCT Blood Glucose.: 105 mg/dL (03-11-21 @ 06:40)  POCT Blood Glucose.: 57 mg/dL (03-11-21 @ 05:52)  POCT Blood Glucose.: 53 mg/dL (03-11-21 @ 05:50)  POCT Blood Glucose.: 74 mg/dL (03-10-21 @ 21:51)  POCT Blood Glucose.: 90 mg/dL (03-10-21 @ 17:30)                            11.8   7.31  )-----------( 45       ( 13 Mar 2021 08:00 )             35.8       03-13    135  |  90<L>  |  79<H>  ----------------------------<  117<H>  4.9   |  19<L>  |  4.54<H>    EGFR if : 14<L>  EGFR if non : 12<L>    Ca    8.7      03-13  Mg     2.7     03-13  Phos  7.5     03-13    TPro  6.1  /  Alb  3.1<L>  /  TBili  1.6<H>  /  DBili  x   /  AST  399<H>  /  ALT  801<H>  /  AlkPhos  324<H>  03-13 03-03 Chol 133 LDL -- HDL 49 Trig QNS                      HPI: 69 yo M with hx ESRD on HD, anemia, hyperparathyroidism, CAD s/p stent, BPH w/ neurogenic bladder (last dialysis 2/27/21), cognitive impairment, that was brought in by EMS from [facility] for AMS. Due to patients mental status history obtained from ED provider note.  Per chart, pt usually verbal but upon check up at NH pt non verbal. Pt was admitted to this hospital for similar issues in past and was found to have infection (PNA). Pt not able to provide further history. Chart shows pt is dependent on all ADLs.    During hospital admission patient noted to be Hypothermic and hypoglycemia multiple times. Patient treated for UTI with IV Antibiotics with no improvement. Patient noted to have worsening liver function during admission per hepatology likely due to ischemic hepatitis, will rule out autoimmune causes.   Endocrine consult requested for management of hypoglycemia.     Endocrine history:  Unable to obtain given mental status     PAST MEDICAL & SURGICAL HISTORY:  Inguinal hernia  NSTEMI (non-ST elevated myocardial infarction)  HLD (hyperlipidemia)  Neurogenic bladder  Spinal abscess  Cavitary lung disease  CAD (coronary artery disease)  Abscess of sacrum  Anemia due to chronic renal failure treated with erythropoietin, stage 2 (mild)  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 device, implant or graft  HPTH (hyperparathyroidism)  Secondary  HTN (hypertension)  ESRD (end stage renal disease)  Cavitary lung disease  CAD in native artery  Hypertension  ESRD (end stage renal disease)  S/P primary angioplasty with coronary stent  Kidney abscess    FAMILY HISTORY:  unknown     Social History:  unknown     Outpatient Medications:  Home Medications:  Acidophilus oral tablet: 1 tab(s) orally once a day (02 Mar 2021 10:40)  Aquaphor topical ointment: Apply topically to affected area 4 times a day (02 Mar 2021 10:40)  aspirin 81 mg oral tablet: 1 tab(s) orally once a day (02 Mar 2021 10:40)  citalopram 20 mg oral tablet: 1 tab(s) orally once a day (02 Mar 2021 10:40)  Claritin 5 mg oral tablet, chewable: 1 tab(s) orally every 12 hours (02 Mar 2021 10:40)  famotidine 20 mg oral tablet: 1 tab(s) orally every other day (at bedtime) (02 Mar 2021 10:40)  Lipitor 10 mg oral tablet: 1 tab(s) orally once a day (02 Mar 2021 10:40)  midodrine 10 mg oral tablet: 1 tab(s) orally 3 times a day (02 Mar 2021 10:40)  MiraLax oral powder for reconstitution:  (02 Mar 2021 10:40)  Senna 8.6 mg oral tablet: 2 tab(s) orally once a day (at bedtime) (02 Mar 2021 10:40)  tamsulosin 0.4 mg oral capsule: 1 cap(s) orally once a day (02 Mar 2021 10:40)  Vibramycin 100 mg oral capsule: 1 cap(s) orally a day (02 Mar 2021 10:40)      MEDICATIONS  (STANDING):  atorvastatin 10 milliGRAM(s) Oral at bedtime  chlorhexidine 4% Liquid 1 Application(s) Topical daily  cinacalcet 90 milliGRAM(s) Oral daily  citalopram 20 milliGRAM(s) Oral daily  dextrose 10%. 1000 milliLiter(s) (30 mL/Hr) IV Continuous <Continuous>  dextrose 40% Gel 15 Gram(s) Oral once  dextrose 50% Injectable 25 Gram(s) IV Push once  dextrose 50% Injectable 12.5 Gram(s) IV Push once  dextrose 50% Injectable 25 Gram(s) IV Push once  glucagon  Injectable 1 milliGRAM(s) IntraMuscular once  lactobacillus acidophilus 1 Tablet(s) Oral daily  mupirocin 2% Ointment 1 Application(s) Both Nostrils two times a day  piperacillin/tazobactam IVPB.. 3.375 Gram(s) IV Intermittent every 12 hours  polyethylene glycol 3350 17 Gram(s) Oral daily  senna Syrup 10 milliLiter(s) Oral daily  sodium zirconium cyclosilicate 10 Gram(s) Oral <User Schedule>    MEDICATIONS  (PRN):  midodrine. 10 milliGRAM(s) Oral <User Schedule> PRN SBP < 90 on HD    Allergies  No Known Allergies    Review of Systems:  UNABLE TO OBTAIN    PHYSICAL EXAM:  VITALS: T(C): 37 (03-13-21 @ 14:25)  T(F): 98.6 (03-13-21 @ 14:25), Max: 98.6 (03-13-21 @ 14:25)  HR: 89 (03-13-21 @ 14:25) (78 - 90)  BP: 110/67 (03-13-21 @ 14:25) (105/65 - 114/79)  RR:  (17 - 18)  SpO2:  (95% - 97%)  Wt(kg): --  GENERAL: lethargic and cachectic  EYES: No proptosis, no lid lag, anicteric  HEENT:  Atraumatic, Normocephalic, moist mucous membranes  THYROID: Normal size, no palpable nodules  RESPIRATORY: Clear to auscultation bilaterally; No rales, rhonchi, wheezing  CARDIOVASCULAR: Regular rate and rhythm; No murmurs; no peripheral edema  GI: Soft, nontender, non distended, normal bowel sounds, +NGT   SKIN: dry skin   MUSCULOSKELETAL: unable to assess   PSYCH: lethargic     POCT Blood Glucose.: 110 mg/dL (03-13-21 @ 16:36)  POCT Blood Glucose.: 81 mg/dL (03-13-21 @ 12:37)  POCT Blood Glucose.: 108 mg/dL (03-13-21 @ 09:12)  POCT Blood Glucose.: 107 mg/dL (03-13-21 @ 05:28)  POCT Blood Glucose.: 111 mg/dL (03-13-21 @ 02:49)  POCT Blood Glucose.: 68 mg/dL (03-13-21 @ 02:44)  POCT Blood Glucose.: 114 mg/dL (03-12-21 @ 22:16)  POCT Blood Glucose.: 274 mg/dL (03-12-21 @ 18:31)  POCT Blood Glucose.: 124 mg/dL (03-12-21 @ 18:30)  POCT Blood Glucose.: 73 mg/dL (03-12-21 @ 18:20)  POCT Blood Glucose.: 82 mg/dL (03-12-21 @ 18:16)  POCT Blood Glucose.: 94 mg/dL (03-12-21 @ 18:09)  POCT Blood Glucose.: 31 mg/dL (03-12-21 @ 17:52)  POCT Blood Glucose.: <25 mg/dL (03-12-21 @ 17:49)  POCT Blood Glucose.: 32 mg/dL (03-12-21 @ 17:48)  POCT Blood Glucose.: 73 mg/dL (03-12-21 @ 12:16)  POCT Blood Glucose.: 91 mg/dL (03-12-21 @ 11:14)  POCT Blood Glucose.: 83 mg/dL (03-12-21 @ 10:40)  POCT Blood Glucose.: 88 mg/dL (03-12-21 @ 10:04)  POCT Blood Glucose.: 72 mg/dL (03-12-21 @ 09:48)  POCT Blood Glucose.: 80 mg/dL (03-12-21 @ 09:31)  POCT Blood Glucose.: 69 mg/dL (03-12-21 @ 09:16)  POCT Blood Glucose.: 54 mg/dL (03-12-21 @ 09:00)  POCT Blood Glucose.: 54 mg/dL (03-12-21 @ 08:59)  POCT Blood Glucose.: 139 mg/dL (03-11-21 @ 21:01)  POCT Blood Glucose.: 143 mg/dL (03-11-21 @ 18:25)  POCT Blood Glucose.: 127 mg/dL (03-11-21 @ 18:07)  POCT Blood Glucose.: 60 mg/dL (03-11-21 @ 17:48)  POCT Blood Glucose.: 65 mg/dL (03-11-21 @ 17:25)  POCT Blood Glucose.: 56 mg/dL (03-11-21 @ 17:23)  POCT Blood Glucose.: 81 mg/dL (03-11-21 @ 13:31)  POCT Blood Glucose.: 95 mg/dL (03-11-21 @ 08:31)  POCT Blood Glucose.: 131 mg/dL (03-11-21 @ 06:53)  POCT Blood Glucose.: 105 mg/dL (03-11-21 @ 06:40)  POCT Blood Glucose.: 57 mg/dL (03-11-21 @ 05:52)  POCT Blood Glucose.: 53 mg/dL (03-11-21 @ 05:50)  POCT Blood Glucose.: 74 mg/dL (03-10-21 @ 21:51)  POCT Blood Glucose.: 90 mg/dL (03-10-21 @ 17:30)                            11.8   7.31  )-----------( 45       ( 13 Mar 2021 08:00 )             35.8       03-13    135  |  90<L>  |  79<H>  ----------------------------<  117<H>  4.9   |  19<L>  |  4.54<H>    EGFR if : 14<L>  EGFR if non : 12<L>    Ca    8.7      03-13  Mg     2.7     03-13  Phos  7.5     03-13    TPro  6.1  /  Alb  3.1<L>  /  TBili  1.6<H>  /  DBili  x   /  AST  399<H>  /  ALT  801<H>  /  AlkPhos  324<H>  03-13 03-03 Chol 133 LDL -- HDL 49 Trig QNS

## 2021-03-13 NOTE — PROGRESS NOTE ADULT - SUBJECTIVE AND OBJECTIVE BOX
Jarad Boyd M.D.  Hospitalist 8am-7pm  Division of Hospital Medicine  [facility]  cell [phone]  pager [phone]    Patient is a 70y old  Male who presents with a chief complaint of AMS (13 Mar 2021 10:53)    SUBJECTIVE / OVERNIGHT EVENTS: Patient seen and examined. RRT called O/N for hypoglycemia with lethargy greater than baseline. Responded to D50.     OBJECTIVE:  Vital Signs Last 24 Hrs  T(C): 36.5 (13 Mar 2021 10:25), Max: 36.8 (12 Mar 2021 17:24)  T(F): 97.7 (13 Mar 2021 10:25), Max: 98.2 (12 Mar 2021 17:24)  HR: 78 (13 Mar 2021 10:25) (78 - 90)  BP: 106/63 (13 Mar 2021 10:25) (105/65 - 114/79)  BP(mean): --  RR: 17 (13 Mar 2021 10:25) (17 - 18)  SpO2: 95% (13 Mar 2021 10:25) (95% - 96%)    I&O's Summary    12 Mar 2021 07:01  -  13 Mar 2021 07:00  --------------------------------------------------------  IN: 350 mL / OUT: 0 mL / NET: 350 mL    13 Mar 2021 06:01  -  13 Mar 2021 13:38  --------------------------------------------------------  IN: 400 mL / OUT: 900 mL / NET: -500 mL      Physical Examination:  CONSTITUTIONAL: NAD  NGT  RESPIRATORY: Normal respiratory effort; grossly b/l AE  CARDIOVASCULAR: Regular rate and rhythm; No lower extremity edema;   ABDOMEN: Nontender to palpation, normoactive bowel sounds,  MUSCULOSKELETAL:  no joint swelling or tenderness to palpation  PSYCH: engaging  NEUROLOGY: swallow better, taking PO    (03-13 @ 08:00)                      11.8  7.31 )-----------( 45                 35.8      03-13    135  |  90<L>  |  79<H>  ----------------------------<  117<H>  4.9   |  19<L>  |  4.54<H>    Ca    8.7      13 Mar 2021 08:00  Phos  7.5     03-13  Mg     2.7     03-13    TPro  6.1  /  Alb  3.1<L>  /  TBili  1.6<H>  /  DBili  x   /  AST  399<H>  /  ALT  801<H>  /  AlkPhos  324<H>  03-13    COVID-19 PCR: NotDetec (02 Mar 2021 07:37)  COVID-19 PCR: NotDetec (02 Feb 2021 15:22)  SARS-CoV-2: NotDetec (27 Jan 2021 04:37)  COVID-19 PCR: NotDetec (10 Dec 2020 16:54)  COVID-19 PCR: NotDetec (08 Dec 2020 14:53)  SARS-CoV-2: NotDetec (08 Dec 2020 09:13)    Lactate, Blood: 1.5 mmol/L (03-09-21 @ 08:40)  Lactate, Blood: 2.3 mmol/L (03-04-21 @ 16:19)    Prothrombin Time, Plasma: 32.4 sec (03-08-21 @ 11:20)    Activated Partial Thromboplastin Time: 46.3 sec (03-08-21 @ 11:20)    I&O's Summary    12 Mar 2021 07:01  -  13 Mar 2021 07:00  --------------------------------------------------------  IN: 350 mL / OUT: 0 mL / NET: 350 mL    13 Mar 2021 06:01  -  13 Mar 2021 13:38  --------------------------------------------------------  IN: 400 mL / OUT: 900 mL / NET: -500 mL      IMPROVE VTE Individual Risk Assessment          RISK                                                          Points  [  ] Previous VTE                                                3  [  ] Thrombophilia                                             2  [  ] Lower limb paralysis                                   2        (unable to hold up >15 seconds)    [  ] Current Cancer                                             2         (within 6 months)  [  ] Immobilization > 24 hrs                              1  [  ] ICU/CCU stay > 24 hours                             1  [  ] Age > 60                                                         1    IMPROVE VTE Score:         [         ]    Total Risk Factor Score:    0 - 1:   Consider IPC  >2 - 3:  Thromboprophylaxis required (enoxaparin or SQ heparin)        >4:   High Risk: Thromboprophylaxis required (enoxaparin or SQ heparin), optional add IPC  **If CONTRAINDICATION to enoxaparin or SQ heparin, USE IPCs**        Consultant(s) Notes Reviewed: nephrol 3/12, GI 3/12    MEDICATIONS  (STANDING):  atorvastatin 10 milliGRAM(s) Oral at bedtime  chlorhexidine 4% Liquid 1 Application(s) Topical daily  cinacalcet 90 milliGRAM(s) Oral daily  citalopram 20 milliGRAM(s) Oral daily  dextrose 10%. 1000 milliLiter(s) (30 mL/Hr) IV Continuous <Continuous>  dextrose 40% Gel 15 Gram(s) Oral once  dextrose 50% Injectable 25 Gram(s) IV Push once  dextrose 50% Injectable 12.5 Gram(s) IV Push once  dextrose 50% Injectable 25 Gram(s) IV Push once  glucagon  Injectable 1 milliGRAM(s) IntraMuscular once  lactobacillus acidophilus 1 Tablet(s) Oral daily  mupirocin 2% Ointment 1 Application(s) Both Nostrils two times a day  piperacillin/tazobactam IVPB.. 3.375 Gram(s) IV Intermittent every 12 hours  polyethylene glycol 3350 17 Gram(s) Oral daily  senna Syrup 10 milliLiter(s) Oral daily  sodium zirconium cyclosilicate 10 Gram(s) Oral <User Schedule>    MEDICATIONS  (PRN):  midodrine. 10 milliGRAM(s) Oral <User Schedule> PRN SBP < 90 on HD   Jarad Boyd M.D.  Hospitalist 8am-7pm  Division of Hospital Medicine  [facility]  cell [phone]  pager [phone]    Patient is a 70y old  Male who presents with a chief complaint of AMS (13 Mar 2021 10:53)    SUBJECTIVE / OVERNIGHT EVENTS: Patient seen and examined. RRT called O/N for hypoglycemia with lethargy greater than baseline. Responded to D50. Now on a D10 gtt, BG still <100    OBJECTIVE:  Vital Signs Last 24 Hrs  T(C): 36.5 (13 Mar 2021 10:25), Max: 36.8 (12 Mar 2021 17:24)  T(F): 97.7 (13 Mar 2021 10:25), Max: 98.2 (12 Mar 2021 17:24)  HR: 78 (13 Mar 2021 10:25) (78 - 90)  BP: 106/63 (13 Mar 2021 10:25) (105/65 - 114/79)  BP(mean): --  RR: 17 (13 Mar 2021 10:25) (17 - 18)  SpO2: 95% (13 Mar 2021 10:25) (95% - 96%)    I&O's Summary    12 Mar 2021 07:01  -  13 Mar 2021 07:00  --------------------------------------------------------  IN: 350 mL / OUT: 0 mL / NET: 350 mL    13 Mar 2021 06:01  -  13 Mar 2021 13:38  --------------------------------------------------------  IN: 400 mL / OUT: 900 mL / NET: -500 mL      Physical Examination:  CONSTITUTIONAL: NAD  NGT  RESPIRATORY: Normal respiratory effort; grossly b/l AE  CARDIOVASCULAR: Regular rate and rhythm; No lower extremity edema;   ABDOMEN: Nontender to palpation, normoactive bowel sounds,  MUSCULOSKELETAL:  no joint swelling or tenderness to palpation  PSYCH: engaging  NEUROLOGY: swallow better, taking PO    (03-13 @ 08:00)                      11.8  7.31 )-----------( 45                 35.8      03-13    135  |  90<L>  |  79<H>  ----------------------------<  117<H>  4.9   |  19<L>  |  4.54<H>    Ca    8.7      13 Mar 2021 08:00  Phos  7.5     03-13  Mg     2.7     03-13    TPro  6.1  /  Alb  3.1<L>  /  TBili  1.6<H>  /  DBili  x   /  AST  399<H>  /  ALT  801<H>  /  AlkPhos  324<H>  03-13    COVID-19 PCR: NotDetec (02 Mar 2021 07:37)  COVID-19 PCR: NotDetec (02 Feb 2021 15:22)  SARS-CoV-2: NotDetec (27 Jan 2021 04:37)  COVID-19 PCR: NotDetec (10 Dec 2020 16:54)  COVID-19 PCR: NotDetec (08 Dec 2020 14:53)  SARS-CoV-2: NotDetec (08 Dec 2020 09:13)    Lactate, Blood: 1.5 mmol/L (03-09-21 @ 08:40)  Lactate, Blood: 2.3 mmol/L (03-04-21 @ 16:19)    Prothrombin Time, Plasma: 32.4 sec (03-08-21 @ 11:20)    Activated Partial Thromboplastin Time: 46.3 sec (03-08-21 @ 11:20)    I&O's Summary    12 Mar 2021 07:01  -  13 Mar 2021 07:00  --------------------------------------------------------  IN: 350 mL / OUT: 0 mL / NET: 350 mL    13 Mar 2021 06:01  -  13 Mar 2021 13:38  --------------------------------------------------------  IN: 400 mL / OUT: 900 mL / NET: -500 mL      IMPROVE VTE Individual Risk Assessment          RISK                                                          Points  [  ] Previous VTE                                                3  [  ] Thrombophilia                                             2  [  ] Lower limb paralysis                                   2        (unable to hold up >15 seconds)    [  ] Current Cancer                                             2         (within 6 months)  [  ] Immobilization > 24 hrs                              1  [  ] ICU/CCU stay > 24 hours                             1  [  ] Age > 60                                                         1    IMPROVE VTE Score:         [         ]    Total Risk Factor Score:    0 - 1:   Consider IPC  >2 - 3:  Thromboprophylaxis required (enoxaparin or SQ heparin)        >4:   High Risk: Thromboprophylaxis required (enoxaparin or SQ heparin), optional add IPC  **If CONTRAINDICATION to enoxaparin or SQ heparin, USE IPCs**    Consultant(s) Notes Reviewed: nephrol 3/12, GI 3/12    MEDICATIONS  (STANDING):  atorvastatin 10 milliGRAM(s) Oral at bedtime  chlorhexidine 4% Liquid 1 Application(s) Topical daily  cinacalcet 90 milliGRAM(s) Oral daily  citalopram 20 milliGRAM(s) Oral daily  dextrose 10%. 1000 milliLiter(s) (30 mL/Hr) IV Continuous <Continuous>  dextrose 40% Gel 15 Gram(s) Oral once  dextrose 50% Injectable 25 Gram(s) IV Push once  dextrose 50% Injectable 12.5 Gram(s) IV Push once  dextrose 50% Injectable 25 Gram(s) IV Push once  glucagon  Injectable 1 milliGRAM(s) IntraMuscular once  lactobacillus acidophilus 1 Tablet(s) Oral daily  mupirocin 2% Ointment 1 Application(s) Both Nostrils two times a day  piperacillin/tazobactam IVPB.. 3.375 Gram(s) IV Intermittent every 12 hours  polyethylene glycol 3350 17 Gram(s) Oral daily  senna Syrup 10 milliLiter(s) Oral daily  sodium zirconium cyclosilicate 10 Gram(s) Oral <User Schedule>    MEDICATIONS  (PRN):  midodrine. 10 milliGRAM(s) Oral <User Schedule> PRN SBP < 90 on HD

## 2021-03-13 NOTE — CONSULT NOTE ADULT - ASSESSMENT
Hypoglycemia- multifactorial   Noted to have multiple hypoglycemia events during admission   Common causes of hypoglycemia include: medications, critical illness (ESRD, Liver dysfunction, starvation or sepsis), counter reg hormone deficency or tumors  Mr Beaver has poor po intake, liver and renal dysfunction, treated for sepsis therefore would anticipate low PO intake.  AI has been ruled out as patient AM cortisol was appropriately elevated in time of stress, 26.5  Given patient FTT, recommend continue tube feeding as tolerated until final discussion about goals of care  Recommend FS q4h until FS>150x2  NO further workup recommended at this time    Endocrine will sign off  Please reconsult as needed    Lyndsey Francisco MD  Endocrine Fellow   Pager  on weekdays 9am- 5pm   Please call  after hours and on weekends 71 yo M with hx ESRD on HD, anemia, hyperparathyroidism, CAD s/p stent, BPH w/ neurogenic bladder (last dialysis 2/27/21), cognitive impairment, that was brought in by EMS from [facility] for AMS. During hospital admission patient noted to be Hypothermic and hypoglycemia multiple times. Patient treated for UTI with IV Antibiotics with no improvement. Patient noted to have worsening liver function during admission per hepatology likely due to ischemic hepatitis, will rule out autoimmune causes.   Endocrine consult requested for management of hypoglycemia.       Hypoglycemia- multifactorial   Noted to have multiple hypoglycemia events during admission   Common causes of hypoglycemia include: medications, critical illness (ESRD, Liver dysfunction, starvation or sepsis), counter reg hormone deficency or tumors  Mr Beaver has poor po intake, liver and renal dysfunction, treated for sepsis therefore would anticipate low PO intake.  AI has been ruled out as patient AM cortisol was appropriately elevated in time of stress, 26.5  Given patient FTT, recommend continue tube feeding as tolerated until final discussion about goals of care  Recommend FS q8h, can replace AM FS with serum glucose obtained  IF BG <70, treat hypoglycemia and check in 15mins   IF persistently hypoglycemic FS<55, recommend start D5 and hypoglycemic protocol   NO further workup recommended at this time    Endocrine will sign off  Please reconsult as needed  If persistently hypoglycemic recall    Lyndsey Francisco MD  Endocrine Fellow   Pager  on weekdays 9am- 5pm   Please call  after hours and on weekends 69 yo M with hx ESRD on HD, anemia, hyperparathyroidism, CAD s/p stent, BPH w/ neurogenic bladder (last dialysis 2/27/21), cognitive impairment, that was brought in by EMS from [facility] for AMS. During hospital admission patient noted to be Hypothermic and hypoglycemia multiple times. Patient treated for UTI with IV Antibiotics with no improvement. Patient noted to have worsening liver function during admission per hepatology likely due to ischemic hepatitis, will rule out autoimmune causes.   Endocrine consult requested for management of hypoglycemia.       Hypoglycemia- multifactorial   Noted to have multiple hypoglycemia events during admission   Common causes of hypoglycemia include: medications, critical illness (ESRD, Liver dysfunction, starvation or sepsis), counter reg hormone deficency or tumors  Mr Beaver has poor po intake, liver and renal dysfunction, treated for sepsis therefore would anticipate low PO intake.   AI has been ruled out as patient AM cortisol was appropriately elevated in time of stress, 26.5  Given patient FTT, recommend continue tube feeding as tolerated until final discussion about goals of care  Recommend FS q8h, can replace AM FS with serum glucose obtained on labs  IF BG <70, treat hypoglycemia and check in 15mins   IF persistently hypoglycemic FS<55, recommend start D5 and hypoglycemic protocol   NO further workup recommended at this time    Endocrine will sign off  Please reconsult as needed  If persistently hypoglycemic recall our service for guidance    Lyndsey Francisco MD  Endocrine Fellow   Pager  on weekdays 9am- 5pm   Please call  after hours and on weekends

## 2021-03-13 NOTE — PROGRESS NOTE ADULT - PROBLEM SELECTOR PLAN 3
cortisol level adequate  NGT placed for nutritional support, but pt removed  taking some PO now but hypoglycemic again this AM  severe prot ankita malnutrition, related as pt with poor reserve   d/w HCP, Deana and she would like to pursue PEG placement; CARLOS yu P with [facility] cortisol level adequate  NGT placed for nutritional support, but pt removed  Poor PO is contributing  awaiting PEG placement (needs cards clearance)  c/w D10 gtt as long as needed.

## 2021-03-13 NOTE — PROGRESS NOTE ADULT - ASSESSMENT
70M w/ dementia, CAD, past epidural abscess, and ESRD, 3/2/21 a/w AMS    (1)Renal - ESRD - HD TTS - last dialyzed Thursday  (2)Hyperkalemia - improved with Lokelma on non-HD days  (3)CV - intermittent hypotension - on Midodrine with HD; off standing ATC Midodrine.       RECOMMEND:   -0.5kg UF today   -continue QIW Lokelma as ordered  - D/C planning per primary team        Shena English NP  [facility]  Office: [phone]

## 2021-03-13 NOTE — CONSULT NOTE ADULT - SUBJECTIVE AND OBJECTIVE BOX
lAex Navarrete MD  Interventional Cardiology / Advance Heart Failure and Cardiac Transplant Specialist  [place] Office : [address] [place] [address] [place] [address]  Tel:   [place] Office : [address] [facility] [place] [address]  Tel: [phone]  Cell : [phone]      HISTORY OF PRESENTING ILLNESS:  HPI:  69 y/o M w/ hx ESRD on HD, anemia, hyperparathyroidism, CAD s/p stent, BPH w/ neurogenic bladder (last dialysis 2/27/21), cognitive impairment, that was brought in by EMS from [facility] for AMS. Due to patients mental status history obtained from ED provider note.  Per chart, pt usually verbal but upon check up at NH pt non verbal. Pt was admitted to this hospital for similar issues in past and was found to have infection (PNA). Pt not able to provide further history. Chart shows pt is dependent on all ADLs.  Unable to attain review of systems at this time.   Cardiology called for clearance for PEG    PAST MEDICAL & SURGICAL HISTORY:  Inguinal hernia    NSTEMI (non-ST elevated myocardial infarction)    HLD (hyperlipidemia)    Neurogenic bladder    Spinal abscess    Cavitary lung disease    CAD (coronary artery disease)    Abscess of sacrum    Anemia due to chronic renal failure treated with erythropoietin, stage 2 (mild)    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 device, implant or graft    HPTH (hyperparathyroidism)  Secondary    HTN (hypertension)    ESRD (end stage renal disease)    Cavitary lung disease    CAD in native artery    Hypertension    ESRD (end stage renal disease)    S/P primary angioplasty with coronary stent    Kidney abscess        SOCIAL HISTORY: Substance Use (street drugs): ( x ) never used  (  ) other:    FAMILY HISTORY:  No pertinent family history in first degree relatives    Family history of breast cancer (Sibling)         MEDICATIONS:  aspirin  chewable 81 milliGRAM(s) Oral daily  midodrine. 10 milliGRAM(s) Oral <User Schedule> PRN    piperacillin/tazobactam IVPB.. 3.375 Gram(s) IV Intermittent every 12 hours      citalopram 20 milliGRAM(s) Oral daily    polyethylene glycol 3350 17 Gram(s) Oral daily  senna Syrup 10 milliLiter(s) Oral daily    atorvastatin 10 milliGRAM(s) Oral at bedtime  cinacalcet 90 milliGRAM(s) Oral daily  dextrose 40% Gel 15 Gram(s) Oral once  dextrose 50% Injectable 25 Gram(s) IV Push once  dextrose 50% Injectable 12.5 Gram(s) IV Push once  dextrose 50% Injectable 25 Gram(s) IV Push once  glucagon  Injectable 1 milliGRAM(s) IntraMuscular once    chlorhexidine 4% Liquid 1 Application(s) Topical daily  dextrose 10%. 1000 milliLiter(s) IV Continuous <Continuous>  mupirocin 2% Ointment 1 Application(s) Both Nostrils two times a day      FAMILY HISTORY:  No pertinent family history in first degree relatives    Family history of breast cancer (Sibling)          Allergies    No Known Allergies    Intolerances    	      PHYSICAL EXAM:  T(C): 36.5 (03-13-21 @ 10:25), Max: 36.9 (03-12-21 @ 13:27)  HR: 78 (03-13-21 @ 10:25) (78 - 90)  BP: 106/63 (03-13-21 @ 10:25) (105/65 - 114/79)  RR: 17 (03-13-21 @ 10:25) (17 - 18)  SpO2: 95% (03-13-21 @ 10:25) (95% - 96%)  Wt(kg): --  I&O's Summary    12 Mar 2021 07:01  -  13 Mar 2021 07:00  --------------------------------------------------------  IN: 350 mL / OUT: 0 mL / NET: 350 mL    13 Mar 2021 06:01  -  13 Mar 2021 11:54  --------------------------------------------------------  IN: 400 mL / OUT: 900 mL / NET: -500 mL           EYES:  PERRLA   ENMT:  No lesions  Cardiovascular: Normal S1 S2, No JVD, No murmurs, No edema  Respiratory: Lungs clear to auscultation	  Gastrointestinal:  Soft, Non-tender, + BS	  Extremities: no edema    LABS:	 	    CARDIAC MARKERS:                                  11.8   7.31  )-----------( 45       ( 13 Mar 2021 08:00 )             35.8     03-13    135  |  90<L>  |  79<H>  ----------------------------<  117<H>  4.9   |  19<L>  |  4.54<H>    Ca    8.7      13 Mar 2021 08:00  Phos  7.5     03-13  Mg     2.7     03-13    TPro  6.1  /  Alb  3.1<L>  /  TBili  1.6<H>  /  DBili  x   /  AST  399<H>  /  ALT  801<H>  /  AlkPhos  324<H>  03-13    proBNP:   Lipid Profile:   HgA1c:   TSH:     Consultant(s) Notes Reviewed:  [x ] YES  [ ] NO    Care Discussed with Consultants/Other Providers [ x] YES  [ ] NO    Imaging Personally Reviewed independently:  [x] YES  [ ] NO    All labs, radiologic studies, vitals, orders and medications list reviewed. Patient is seen and examined at bedside. Case discussed with medical team.    ASSESSMENT/PLAN:

## 2021-03-13 NOTE — CONSULT NOTE ADULT - ATTENDING COMMENTS
Pt seen and examined with resident  71 yo with PMHx as above incl chronic hypotension on midodrine, now with suspected UTI.    Mentating well, nl lactate, MAP >64 following 500cc total IVF over last 8 hours and 20mg midodrine about 8 hours ago in divided doses  Agree with above, midodrine 20mg q8h and another 250cc ivf bolus if hypotension does not resolve  He does not require MICU level of care at this time
Patient examined and care reviewed in detail on bedside rounds
patient seen and examined with liver team. I agree with the plan as above.  Likely ischemic hepatopathy as transaminases increased with hypotension and INR out of proportion to other tests which is typically seen in ischemic liver injury. PLease continue to trend liver tests daily
Patient seen and examined. Patient is A&Ox0, does not open eyes to verbal or noxious stimuli. Hands are wrapped in mittens, legs are in boots with possible contractures, tube feeds are ongoing via NGT. Hypoglycemia is multifactorial given FTT and illness. Agree with assessment and plan as above. Poor prognosis, recommend discussion of goal of care for this gentleman. Our service will sign off. Please call us with questions.    Segun Hassan DO  Pager: [phone]
70 year old male with coronary artery disease s/p stent, HFrEF (LVEF 12%), ESRD, which chronic left pleural effusion.  Effusion is chronic, in setting of ESRD and heart failure.   Patient is asymptomatic. Would hold off on thoracentesis at this time.  Continue treatment for urinary tract infection.  Fluid removal with HD and can consider diagnostic thoracentesis once acute issues resolve.

## 2021-03-14 NOTE — PROGRESS NOTE ADULT - SUBJECTIVE AND OBJECTIVE BOX
Alex Navarrete MD  Interventional Cardiology / Advance Heart Failure and Cardiac Transplant Specialist  [place] Office : [phone] [place] [address] [place]. [address]  Tel:   [place] Office : [address] [facility] [place] [address]  Tel: [phone]  Cell : [phone]    Pt is lying in bed comfortable not in distress, non verbal  	  MEDICATIONS:  midodrine. 10 milliGRAM(s) Oral <User Schedule> PRN    piperacillin/tazobactam IVPB.. 3.375 Gram(s) IV Intermittent every 12 hours      citalopram 20 milliGRAM(s) Oral daily    polyethylene glycol 3350 17 Gram(s) Oral daily  senna Syrup 10 milliLiter(s) Oral daily    atorvastatin 10 milliGRAM(s) Oral at bedtime  cinacalcet 90 milliGRAM(s) Oral daily  dextrose 40% Gel 15 Gram(s) Oral once  dextrose 50% Injectable 25 Gram(s) IV Push once  dextrose 50% Injectable 12.5 Gram(s) IV Push once  dextrose 50% Injectable 25 Gram(s) IV Push once  glucagon  Injectable 1 milliGRAM(s) IntraMuscular once    chlorhexidine 4% Liquid 1 Application(s) Topical daily  dextrose 10%. 1000 milliLiter(s) IV Continuous <Continuous>  mupirocin 2% Ointment 1 Application(s) Both Nostrils two times a day      PAST MEDICAL/SURGICAL HISTORY  PAST MEDICAL & SURGICAL HISTORY:  Inguinal hernia    NSTEMI (non-ST elevated myocardial infarction)    HLD (hyperlipidemia)    Neurogenic bladder    Spinal abscess    Cavitary lung disease    CAD (coronary artery disease)    Abscess of sacrum    Anemia due to chronic renal failure treated with erythropoietin, stage 2 (mild)    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 device, implant or graft    HPTH (hyperparathyroidism)  Secondary    HTN (hypertension)    ESRD (end stage renal disease)    Cavitary lung disease    CAD in native artery    Hypertension    ESRD (end stage renal disease)    S/P primary angioplasty with coronary stent    Kidney abscess        SOCIAL HISTORY: Substance Use (street drugs): ( x ) never used  (  ) other:    FAMILY HISTORY:  No pertinent family history in first degree relatives    Family history of breast cancer (Sibling)         PHYSICAL EXAM:  T(C): 36.2 (03-14-21 @ 09:04), Max: 37 (03-13-21 @ 14:25)  HR: 81 (03-14-21 @ 09:04) (73 - 89)  BP: 100/69 (03-14-21 @ 09:04) (99/62 - 113/61)  RR: 16 (03-14-21 @ 09:04) (16 - 18)  SpO2: 97% (03-14-21 @ 09:04) (97% - 98%)  Wt(kg): --  I&O's Summary    13 Mar 2021 06:01  -  14 Mar 2021 07:00  --------------------------------------------------------  IN: 400 mL / OUT: 900 mL / NET: -500 mL    14 Mar 2021 07:01  -  14 Mar 2021 12:04  --------------------------------------------------------  IN: 210 mL / OUT: 0 mL / NET: 210 mL             EYES: EOMI, PERRLA   ENMT: s/p NG tube  Cardiovascular: Normal S1 S2, No JVD, No murmurs, No edema  Respiratory: b/l rhonchi  Gastrointestinal:  Soft, Non-tender, + BS	  Extremities:no edema                                  11.8   7.31  )-----------( 45       ( 13 Mar 2021 08:00 )             35.8     03-13    135  |  90<L>  |  79<H>  ----------------------------<  117<H>  4.9   |  19<L>  |  4.54<H>    Ca    8.7      13 Mar 2021 08:00  Phos  7.5     03-13  Mg     2.7     03-13    TPro  6.1  /  Alb  3.1<L>  /  TBili  1.6<H>  /  DBili  x   /  AST  399<H>  /  ALT  801<H>  /  AlkPhos  324<H>  03-13    proBNP:   Lipid Profile:   HgA1c:   TSH:     Consultant(s) Notes Reviewed:  [x ] YES  [ ] NO    Care Discussed with Consultants/Other Providers [ x] YES  [ ] NO    Imaging Personally Reviewed independently:  [x] YES  [ ] NO    All labs, radiologic studies, vitals, orders and medications list reviewed. Patient is seen and examined at bedside. Case discussed with medical team.

## 2021-03-14 NOTE — PROGRESS NOTE ADULT - SUBJECTIVE AND OBJECTIVE BOX
Jarad Boyd M.D.  Hospitalist 8am-7pm  Division of Hospital Medicine  [facility]  cell [phone]  pager [phone]    Patient is a 70y old  Male who presents with a chief complaint of AMS (14 Mar 2021 11:04)    SUBJECTIVE / OVERNIGHT EVENTS: Patient seen and examined. No acute overnight events. More responsive this AM. Able to tell me he is in hospital and denies any complaints.     OBJECTIVE:  Vital Signs Last 24 Hrs  T(C): 36.2 (14 Mar 2021 09:04), Max: 37 (13 Mar 2021 14:25)  T(F): 97.2 (14 Mar 2021 09:04), Max: 98.6 (13 Mar 2021 14:25)  HR: 81 (14 Mar 2021 09:04) (73 - 89)  BP: 100/69 (14 Mar 2021 09:04) (99/62 - 113/61)  BP(mean): --  RR: 16 (14 Mar 2021 09:04) (16 - 18)  SpO2: 97% (14 Mar 2021 09:04) (97% - 98%)    I&O's Summary    13 Mar 2021 06:01  -  14 Mar 2021 07:00  --------------------------------------------------------  IN: 400 mL / OUT: 900 mL / NET: -500 mL    14 Mar 2021 07:01  -  14 Mar 2021 13:04  --------------------------------------------------------  IN: 210 mL / OUT: 0 mL / NET: 210 mL      Physical Examination:  GEN: Well appearing, in NAD  HEAD/NECK: Normocephalic, atraumatic  RESP: no accessory muscle use, B/L air entry, CTAB   CARDIO: regular rate/rhythm  ABD: soft, NT, ND, +BS  PSYCH: A&Ox2  SKIN: warm, dry, in tact, no rashes  NEURO: does not follow commands well  EXT: no lower extremity edema, no cyanosis  VASC: good peripheral pulses      (03-13 @ 08:00)                      11.8  7.31 )-----------( 45                 35.8      03-13    135  |  90<L>  |  79<H>  ----------------------------<  117<H>  4.9   |  19<L>  |  4.54<H>    Ca    8.7      13 Mar 2021 08:00  Phos  7.5     03-13  Mg     2.7     03-13    TPro  6.1  /  Alb  3.1<L>  /  TBili  1.6<H>  /  DBili  x   /  AST  399<H>  /  ALT  801<H>  /  AlkPhos  324<H>  03-13    COVID-19 PCR: NotDetec (02 Mar 2021 07:37)  COVID-19 PCR: NotDetec (02 Feb 2021 15:22)  SARS-CoV-2: NotDetec (27 Jan 2021 04:37)  COVID-19 PCR: NotDetec (10 Dec 2020 16:54)  COVID-19 PCR: NotDetec (08 Dec 2020 14:53)  SARS-CoV-2: NotDetec (08 Dec 2020 09:13)    Lactate, Blood: 1.5 mmol/L (03-09-21 @ 08:40)  Lactate, Blood: 2.3 mmol/L (03-04-21 @ 16:19)    Prothrombin Time, Plasma: 32.4 sec (03-08-21 @ 11:20)    Activated Partial Thromboplastin Time: 46.3 sec (03-08-21 @ 11:20)    I&O's Summary    13 Mar 2021 06:01  -  14 Mar 2021 07:00  --------------------------------------------------------  IN: 400 mL / OUT: 900 mL / NET: -500 mL    14 Mar 2021 07:01  -  14 Mar 2021 13:04  --------------------------------------------------------  IN: 210 mL / OUT: 0 mL / NET: 210 mL      IMPROVE VTE Individual Risk Assessment          RISK                                                          Points  [  ] Previous VTE                                                3  [  ] Thrombophilia                                             2  [  ] Lower limb paralysis                                   2        (unable to hold up >15 seconds)    [  ] Current Cancer                                             2         (within 6 months)  [  ] Immobilization > 24 hrs                              1  [  ] ICU/CCU stay > 24 hours                             1  [  ] Age > 60                                                         1    IMPROVE VTE Score:         [         ]    Total Risk Factor Score:    0 - 1:   Consider IPC  >2 - 3:  Thromboprophylaxis required (enoxaparin or SQ heparin)        >4:   High Risk: Thromboprophylaxis required (enoxaparin or SQ heparin), optional add IPC  **If CONTRAINDICATION to enoxaparin or SQ heparin, USE IPCs**    Consultant(s) Notes Reviewed: Cardiology 3/14    MEDICATIONS  (STANDING):  atorvastatin 10 milliGRAM(s) Oral at bedtime  chlorhexidine 4% Liquid 1 Application(s) Topical daily  cinacalcet 90 milliGRAM(s) Oral daily  citalopram 20 milliGRAM(s) Oral daily  dextrose 10%. 1000 milliLiter(s) (30 mL/Hr) IV Continuous <Continuous>  dextrose 40% Gel 15 Gram(s) Oral once  dextrose 50% Injectable 25 Gram(s) IV Push once  dextrose 50% Injectable 12.5 Gram(s) IV Push once  dextrose 50% Injectable 25 Gram(s) IV Push once  glucagon  Injectable 1 milliGRAM(s) IntraMuscular once  lactobacillus acidophilus 1 Tablet(s) Oral daily  mupirocin 2% Ointment 1 Application(s) Both Nostrils two times a day  piperacillin/tazobactam IVPB.. 3.375 Gram(s) IV Intermittent every 12 hours  polyethylene glycol 3350 17 Gram(s) Oral daily  senna Syrup 10 milliLiter(s) Oral daily  sodium zirconium cyclosilicate 10 Gram(s) Oral <User Schedule>    MEDICATIONS  (PRN):  midodrine. 10 milliGRAM(s) Oral <User Schedule> PRN SBP < 90 on HD

## 2021-03-14 NOTE — PROGRESS NOTE ADULT - SUBJECTIVE AND OBJECTIVE BOX
INTERVAL HPI/OVERNIGHT EVENTS:  events noted  MEDICATIONS  (STANDING):  atorvastatin 10 milliGRAM(s) Oral at bedtime  chlorhexidine 4% Liquid 1 Application(s) Topical daily  cinacalcet 90 milliGRAM(s) Oral daily  citalopram 20 milliGRAM(s) Oral daily  dextrose 10%. 1000 milliLiter(s) (20 mL/Hr) IV Continuous <Continuous>  dextrose 40% Gel 15 Gram(s) Oral once  dextrose 50% Injectable 25 Gram(s) IV Push once  dextrose 50% Injectable 12.5 Gram(s) IV Push once  dextrose 50% Injectable 25 Gram(s) IV Push once  glucagon  Injectable 1 milliGRAM(s) IntraMuscular once  lactobacillus acidophilus 1 Tablet(s) Oral daily  mupirocin 2% Ointment 1 Application(s) Both Nostrils two times a day  piperacillin/tazobactam IVPB.. 3.375 Gram(s) IV Intermittent every 12 hours  polyethylene glycol 3350 17 Gram(s) Oral daily  senna Syrup 10 milliLiter(s) Oral daily  sodium zirconium cyclosilicate 10 Gram(s) Oral <User Schedule>    MEDICATIONS  (PRN):  midodrine. 10 milliGRAM(s) Oral <User Schedule> PRN SBP < 90 on HD      Allergies    No Known Allergies    Intolerances        Review of Systems:    General:  No wt loss, fevers, chills, night sweats,fatigue,   Eyes:  Good vision, no reported pain  ENT:  No sore throat, pain, runny nose, dysphagia  CV:  No pain, palpitatioins, hypo/hypertension  Resp:  No dyspnea, cough, tachypnea, wheezing  GI:  No pain, No nausea, No vomiting, No diarrhea, No constipatiion, No weight loss, No fever, No pruritis, No rectal bleeding, No tarry stools, No dysphagia,  :  No pain, bleeding, incontinence, nocturia  Muscle:  No pain, weakness  Neuro:  No weakness, tingling, memory problems  Psych:  No fatigue, insomnia, mood problems, depression  Endocrine:  No polyuria, polydypsia, cold/heat intolerance  Heme:  No petechiae, ecchymosis, easy bruisability  Skin:  No rash, tattoos, scars, edema      Vital Signs Last 24 Hrs  T(C): 36.1 (14 Mar 2021 17:14), Max: 36.2 (13 Mar 2021 21:49)  T(F): 97 (14 Mar 2021 17:14), Max: 97.2 (14 Mar 2021 05:15)  HR: 80 (14 Mar 2021 17:14) (75 - 81)  BP: 106/73 (14 Mar 2021 17:14) (99/62 - 107/73)  BP(mean): --  RR: 16 (14 Mar 2021 17:14) (16 - 17)  SpO2: 94% (14 Mar 2021 17:14) (93% - 98%)    PHYSICAL EXAM:    Constitutional: NAD, well-developed  HEENT: EOMI, throat clear  Neck: No LAD, supple  Respiratory: CTA and P  Cardiovascular: S1 and S2, RRR, no M  Gastrointestinal: BS+, soft, NT/ND, neg HSM,  Extremities: No peripheral edema, neg clubing, cyanosis  Vascular: 2+ peripheral pulses  Neurological: A/O x 3, no focal deficits  Psychiatric: Normal mood, normal affect  Skin: No rashes      LABS:                        11.1   6.51  )-----------( 31       ( 14 Mar 2021 13:09 )             34.1     03-14    133<L>  |  91<L>  |  62<H>  ----------------------------<  97  3.7   |  25  |  3.74<H>    Ca    8.6      14 Mar 2021 13:09  Phos  4.2     03-14  Mg     2.4     03-14    TPro  5.5<L>  /  Alb  2.6<L>  /  TBili  1.3<H>  /  DBili  x   /  AST  264<H>  /  ALT  653<H>  /  AlkPhos  283<H>  03-14          RADIOLOGY & ADDITIONAL TESTS:  Advanced care planning was discussed with patient and family.  Advanced care planning forms were reviewed and discussed.  Risks, benefits and alternatives of gastroenterologic procedures were discussed in detail and all questions were answered.    30 minutes spent.

## 2021-03-15 NOTE — PROGRESS NOTE ADULT - SUBJECTIVE AND OBJECTIVE BOX
INTERVAL HPI/OVERNIGHT EVENTS:    resting comfortably   has NGT in place    MEDICATIONS  (STANDING):  atorvastatin 10 milliGRAM(s) Oral at bedtime  chlorhexidine 4% Liquid 1 Application(s) Topical daily  cinacalcet 90 milliGRAM(s) Oral daily  citalopram 20 milliGRAM(s) Oral daily  dextrose 10%. 1000 milliLiter(s) (20 mL/Hr) IV Continuous <Continuous>  dextrose 40% Gel 15 Gram(s) Oral once  dextrose 50% Injectable 25 Gram(s) IV Push once  dextrose 50% Injectable 12.5 Gram(s) IV Push once  dextrose 50% Injectable 25 Gram(s) IV Push once  glucagon  Injectable 1 milliGRAM(s) IntraMuscular once  lactobacillus acidophilus 1 Tablet(s) Oral daily  mupirocin 2% Ointment 1 Application(s) Both Nostrils two times a day  polyethylene glycol 3350 17 Gram(s) Oral daily  senna Syrup 10 milliLiter(s) Oral daily  sodium zirconium cyclosilicate 10 Gram(s) Oral <User Schedule>    MEDICATIONS  (PRN):  midodrine. 10 milliGRAM(s) Oral <User Schedule> PRN SBP < 90 on HD      Allergies    No Known Allergies    Intolerances        Review of Systems: *confused  unable to obtain ROS         Vital Signs Last 24 Hrs  T(C): 36.8 (15 Mar 2021 09:00), Max: 36.8 (15 Mar 2021 09:00)  T(F): 98.2 (15 Mar 2021 09:00), Max: 98.2 (15 Mar 2021 09:00)  HR: 78 (15 Mar 2021 09:00) (78 - 85)  BP: 116/73 (15 Mar 2021 09:00) (100/68 - 116/73)  BP(mean): --  RR: 17 (15 Mar 2021 09:00) (16 - 18)  SpO2: 99% (15 Mar 2021 09:00) (94% - 99%)    PHYSICAL EXAM:    Constitutional: NAD  HEENT: EOMI, throat clear (+) NGT   Neck: No LAD, supple  Respiratory: CTA and P  Cardiovascular: S1 and S2, RRR, no M  Gastrointestinal: BS+, soft, NT/ND, neg HSM,  Extremities: No peripheral edema, neg clubbing, cyanosis  Vascular: 2+ peripheral pulses  Neurological: A/O x 1, no focal deficits  Psychiatric: Normal mood, normal affect  Skin: No rashes      LABS:                        11.9   5.84  )-----------( 32       ( 15 Mar 2021 06:39 )             36.2     03-15    128<L>  |  88<L>  |  70<H>  ----------------------------<  117<H>  TNP   |  22  |  4.06<H>    Ca    8.9      15 Mar 2021 06:39  Phos  TNP     03-15  Mg     2.7     03-15    TPro  x   /  Alb  x   /  TBili  1.2  /  DBili  x   /  AST  x   /  ALT  x   /  AlkPhos  x   03-15          RADIOLOGY & ADDITIONAL TESTS:

## 2021-03-15 NOTE — CHART NOTE - NSCHARTNOTEFT_GEN_A_CORE
Endocrine team called as patient again noted with hypoglycemia while on D10 drip.   Chart and endocrine consult note from 3/13 reviewed.   Likely etiology of hypoglycemia is multifactorial: poor po intake, sepsis, liver and renal dysfunction.  Patient is currently NPO and on D10 drip while awaiting peg tube placement.  Agree with increasing rate of D10 IV fluids to 50 cc/hr  Once able to resume tube feeds after peg tube placement, hypoglycemia should likely resolve.   Continue to monitor on D10 drip while NPO.  Discussed with Dr. Olivares.   Call endocrine if need further help.       Kaylee Ramos MD  Endocrine Fellow  Pager [phone] from 9 am to 5 pm Mon to Fri.  After hours and on weekends please call [phone].

## 2021-03-15 NOTE — PROGRESS NOTE ADULT - SUBJECTIVE AND OBJECTIVE BOX
[facility] Division of Hospital Medicine  Shireen Miranda MD  Pager [phone]    Patient is a 70y old  Male who presents with a chief complaint of AMS      SUBJECTIVE / OVERNIGHT EVENTS: more alert; denies complaints      MEDICATIONS  (STANDING):  atorvastatin 10 milliGRAM(s) Oral at bedtime  chlorhexidine 4% Liquid 1 Application(s) Topical daily  cinacalcet 90 milliGRAM(s) Oral daily  citalopram 20 milliGRAM(s) Oral daily  dextrose 10%. 1000 milliLiter(s) (20 mL/Hr) IV Continuous <Continuous>  dextrose 40% Gel 15 Gram(s) Oral once  dextrose 50% Injectable 25 Gram(s) IV Push once  dextrose 50% Injectable 12.5 Gram(s) IV Push once  dextrose 50% Injectable 25 Gram(s) IV Push once  glucagon  Injectable 1 milliGRAM(s) IntraMuscular once  lactobacillus acidophilus 1 Tablet(s) Oral daily  mupirocin 2% Ointment 1 Application(s) Both Nostrils two times a day  polyethylene glycol 3350 17 Gram(s) Oral daily  senna Syrup 10 milliLiter(s) Oral daily  sodium zirconium cyclosilicate 10 Gram(s) Oral <User Schedule>    MEDICATIONS  (PRN):  midodrine. 10 milliGRAM(s) Oral <User Schedule> PRN SBP < 90 on HD      CAPILLARY BLOOD GLUCOSE  POCT Blood Glucose.: 73 mg/dL (15 Mar 2021 14:11)  POCT Blood Glucose.: 53 mg/dL (15 Mar 2021 13:36)  POCT Blood Glucose.: 54 mg/dL (15 Mar 2021 13:33)  POCT Blood Glucose.: 90 mg/dL (15 Mar 2021 09:03)  POCT Blood Glucose.: 140 mg/dL (15 Mar 2021 04:59)  POCT Blood Glucose.: 120 mg/dL (15 Mar 2021 00:49)  POCT Blood Glucose.: 90 mg/dL (14 Mar 2021 21:02)  POCT Blood Glucose.: 93 mg/dL (14 Mar 2021 17:24)      PHYSICAL EXAM:  Vital Signs Last 24 Hrs  T(F): 98.2 (15 Mar 2021 09:00), Max: 98.2 (15 Mar 2021 09:00)  HR: 78 (15 Mar 2021 09:00) (78 - 85)  BP: 116/73 (15 Mar 2021 09:00) (100/68 - 116/73)  RR: 17 (15 Mar 2021 09:00) (16 - 18)  SpO2: 99% (15 Mar 2021 09:00) (94% - 99%)    CONSTITUTIONAL: NAD  HEENT: + NGT in place  RESPIRATORY: Normal respiratory effort; b/l AE  CARDIOVASCULAR: Regular rate and rhythm; No lower extremity edema;   ABDOMEN: Nontender to palpation, normoactive bowel sounds  MUSCULOSKELETAL:   no joint swelling or tenderness to palpation  PSYCH: more interactive  NEUROLOGY: follows simple commands      LABS:                        11.9   5.84  )-----------( 32       ( 15 Mar 2021 06:39 )             36.2     03-15    128<L>  |  88<L>  |  70<H>  ----------------------------<  117<H>  TNP   |  22  |  4.06<H>    Ca    8.9      15 Mar 2021 06:39  Phos  TNP     03-15  Mg     2.7     03-15    TPro  x   /  Alb  x   /  TBili  1.2  /  DBili  x   /  AST  x   /  ALT  x   /  AlkPhos  x   03-15                RADIOLOGY & ADDITIONAL TESTS:  Results Reviewed:   Imaging Personally Reviewed:  Electrocardiogram Personally Reviewed:    COORDINATION OF CARE:  Care Discussed with Consultants/Other Providers [Y/N]:  Prior or Outpatient Records Reviewed [Y/N]:

## 2021-03-15 NOTE — PROGRESS NOTE ADULT - PROBLEM SELECTOR PLAN 3
Advanced care planning was discussed with family.  Advanced care planning forms were reviewed and discussed.  Risks, benefits and alternatives of gastroenterologic procedures were discussed in detail and all questions were answered.  30 minutes spent. unknown

## 2021-03-15 NOTE — PROGRESS NOTE ADULT - ASSESSMENT
69 y/o M w/ hx ESRD on HD, anemia, hyperparathyroidism, CAD s/p stent, BPH w/ neurogenic bladder (last dialysis 2/27/21), cognitive impairment, that was brought in by EMS from [facility] for AMS. GI consulted for PEG eval 2/2 FTT

## 2021-03-15 NOTE — PROGRESS NOTE ADULT - SUBJECTIVE AND OBJECTIVE BOX
Overnight events noted   VITAL: T(C): , Max: 36.8 (03-15-21 @ 09:00) T(F): , Max: 98.2 (03-15-21 @ 09:00) HR: 78 (03-15-21 @ 09:00) BP: 116/73 (03-15-21 @ 09:00) BP(mean): -- RR: 17 (03-15-21 @ 09:00) SpO2: 99% (03-15-21 @ 09:00)   PHYSICAL EXAM: Constitutional: frail/cachectic; alert, NAD HEENT: DMM Neck: Supple, No JVD Respiratory: CTA-R; decreased BS L base Cardiovascular: RRR s1s2, no m/r/g Gastrointestinal: BS+, soft, NT/ND Extremities: No peripheral edema b/l Neurological: reduced generalized strength Back: no CVAT b/l Skin: No rashes, no nevi Access: LUE AVF (+)thrill  LABS:                      11.9  5.84  )-----------( 32       ( 15 Mar 2021 06:39 )            36.2   Na(128)/K(TNP)/Cl(88)/HCO3(22)/BUN(70)/Cr(4.06)Glu(117)/Ca(8.9)/Mg(2.7)/PO4(TNP)    03-15 @ 06:39 Na(133)/K(3.7)/Cl(91)/HCO3(25)/BUN(62)/Cr(3.74)Glu(97)/Ca(8.6)/Mg(2.4)/PO4(4.2)    03-14 @ 13:09 Na(135)/K(4.9)/Cl(90)/HCO3(19)/BUN(79)/Cr(4.54)Glu(117)/Ca(8.7)/Mg(2.7)/PO4(7.5)    03-13 @ 08:00 Na(137)/K(5.0)/Cl(92)/HCO3(20)/BUN(68)/Cr(4.10)Glu(74)/Ca(8.5)/Mg(2.6)/PO4(7.1)    03-12 @ 15:56   IMPRESSION: 70M w/ dementia, CAD, past epidural abscess, and ESRD, 3/2/21 a/w AMS  (1)Renal - ESRD - HD TTS - last dialyzed Saturday 3/13 - due for next HD tomorrow  (2)Hyponatremia - not requiring treatment for today - was the sodium affected by hemolysis of the specimen?  (3)CV - intermittent hypotension - on Midodrine with HD   RECOMMEND: (1)QIW [place] as ordered (2)D/C planning per primary team; next HD 3/13, inpatient versus outpatient - will attempt 0.5kg UF if remains at [facility] 3/16      Heath Huff MD [facility] Office: [phone] Cell: [phone]        minimally communicative at present   VITAL: T(C): , Max: 36.8 (03-15-21 @ 09:00) T(F): , Max: 98.2 (03-15-21 @ 09:00) HR: 78 (03-15-21 @ 09:00) BP: 116/73 (03-15-21 @ 09:00) BP(mean): -- RR: 17 (03-15-21 @ 09:00) SpO2: 99% (03-15-21 @ 09:00)   PHYSICAL EXAM: Constitutional: lethargic, frail, confused; (+)soft restraints HEENT: DMM, (+)NGT Neck: Supple, No JVD Respiratory: CTA-R; decreased BS L base Cardiovascular: RRR s1s2, no m/r/g Gastrointestinal: BS+, soft, NT/ND Extremities: No peripheral edema b/l Neurological: reduced generalized strength Back: no CVAT b/l Skin: No rashes, no nevi Access: LUE AVF (+)thrill  LABS:                      11.9  5.84  )-----------( 32       ( 15 Mar 2021 06:39 )            36.2   Na(128)/K(TNP)/Cl(88)/HCO3(22)/BUN(70)/Cr(4.06)Glu(117)/Ca(8.9)/Mg(2.7)/PO4(TNP)    03-15 @ 06:39 Na(133)/K(3.7)/Cl(91)/HCO3(25)/BUN(62)/Cr(3.74)Glu(97)/Ca(8.6)/Mg(2.4)/PO4(4.2)    03-14 @ 13:09 Na(135)/K(4.9)/Cl(90)/HCO3(19)/BUN(79)/Cr(4.54)Glu(117)/Ca(8.7)/Mg(2.7)/PO4(7.5)    03-13 @ 08:00 Na(137)/K(5.0)/Cl(92)/HCO3(20)/BUN(68)/Cr(4.10)Glu(74)/Ca(8.5)/Mg(2.6)/PO4(7.1)    03-12 @ 15:56   IMPRESSION: 70M w/ dementia, CAD, past epidural abscess, and ESRD, 3/2/21 a/w AMS  (1)Renal - ESRD - HD TTS - last dialyzed Saturday 3/13 - due for next HD tomorrow  (2)Hyponatremia - not requiring treatment for today - was the sodium affected by hemolysis of the specimen?  (3)CV - intermittent hypotension - on Midodrine with HD   RECOMMEND: (1)QIW [place] as ordered (2)D/C planning per primary team; next HD 3/13, inpatient versus outpatient - will attempt 0.5kg UF if remains at [facility] 3/16      Heath Huff MD [facility] Office: [phone] Cell: [phone]        minimally communicative at present   VITAL: T(C): , Max: 36.8 (03-15-21 @ 09:00) T(F): , Max: 98.2 (03-15-21 @ 09:00) HR: 78 (03-15-21 @ 09:00) BP: 116/73 (03-15-21 @ 09:00) BP(mean): -- RR: 17 (03-15-21 @ 09:00) SpO2: 99% (03-15-21 @ 09:00)   PHYSICAL EXAM: Constitutional: lethargic, frail, confused; (+)soft restraints HEENT: DMM, (+)NGT Neck: Supple, No JVD Respiratory: CTA-R; decreased BS L base Cardiovascular: RRR s1s2, no m/r/g Gastrointestinal: BS+, soft, NT/ND Extremities: No peripheral edema b/l Neurological: reduced generalized strength Back: no CVAT b/l Skin: No rashes, no nevi Access: LUE AVF (+)thrill  LABS:                      11.9  5.84  )-----------( 32       ( 15 Mar 2021 06:39 )            36.2   Na(128)/K(TNP)/Cl(88)/HCO3(22)/BUN(70)/Cr(4.06)Glu(117)/Ca(8.9)/Mg(2.7)/PO4(TNP)    03-15 @ 06:39 Na(133)/K(3.7)/Cl(91)/HCO3(25)/BUN(62)/Cr(3.74)Glu(97)/Ca(8.6)/Mg(2.4)/PO4(4.2)    03-14 @ 13:09 Na(135)/K(4.9)/Cl(90)/HCO3(19)/BUN(79)/Cr(4.54)Glu(117)/Ca(8.7)/Mg(2.7)/PO4(7.5)    03-13 @ 08:00 Na(137)/K(5.0)/Cl(92)/HCO3(20)/BUN(68)/Cr(4.10)Glu(74)/Ca(8.5)/Mg(2.6)/PO4(7.1)    03-12 @ 15:56   IMPRESSION: 70M w/ dementia, CAD, past epidural abscess, and ESRD, 3/2/21 a/w AMS  (1)Renal - ESRD - HD TTS - last dialyzed Saturday 3/13 - due for next HD tomorrow  (2)Hyponatremia - not requiring treatment for today - was the sodium affected by hemolysis of the specimen?  (3)CV - intermittent hypotension - on Midodrine with HD   RECOMMEND: (1)QIW [place] as ordered (2)Next HD tomorrow - 0.5kg UF as able      Heath Huff MD [facility] Office: [phone] Cell: [phone]

## 2021-03-15 NOTE — PROGRESS NOTE ADULT - SUBJECTIVE AND OBJECTIVE BOX
Alex Navarrete MD  Interventional Cardiology / Endovascular Specialist  [place] Office : [address] [place] [address] [place]. [address]  Tel:   [place] Office : [address] [facility] [place] [address]  Tel: [phone]  Cell : [phone]    Pt is lying in bed comfortable not in distress, non verbal  	  MEDICATIONS:  midodrine. 10 milliGRAM(s) Oral <User Schedule> PRN  citalopram 20 milliGRAM(s) Oral daily  polyethylene glycol 3350 17 Gram(s) Oral daily  senna Syrup 10 milliLiter(s) Oral daily  atorvastatin 10 milliGRAM(s) Oral at bedtime  cinacalcet 90 milliGRAM(s) Oral daily  dextrose 40% Gel 15 Gram(s) Oral once  dextrose 50% Injectable 12.5 Gram(s) IV Push once  dextrose 50% Injectable 25 Gram(s) IV Push once  dextrose 50% Injectable 12.5 Gram(s) IV Push once  dextrose 50% Injectable 25 Gram(s) IV Push once  glucagon  Injectable 1 milliGRAM(s) IntraMuscular once  chlorhexidine 4% Liquid 1 Application(s) Topical daily  dextrose 10%. 1000 milliLiter(s) IV Continuous <Continuous>  mupirocin 2% Ointment 1 Application(s) Both Nostrils two times a day      PAST MEDICAL/SURGICAL HISTORY  PAST MEDICAL & SURGICAL HISTORY:  Inguinal hernia    NSTEMI (non-ST elevated myocardial infarction)    HLD (hyperlipidemia)    Neurogenic bladder    Spinal abscess    Cavitary lung disease    CAD (coronary artery disease)    Abscess of sacrum    Anemia due to chronic renal failure treated with erythropoietin, stage 2 (mild)    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 device, implant or graft    HPTH (hyperparathyroidism)  Secondary    HTN (hypertension)    ESRD (end stage renal disease)    Cavitary lung disease    CAD in native artery    Hypertension    ESRD (end stage renal disease)    S/P primary angioplasty with coronary stent    Kidney abscess        SOCIAL HISTORY: Substance Use (street drugs): ( x ) never used  (  ) other:    FAMILY HISTORY:  No pertinent family history in first degree relatives    Family history of breast cancer (Sibling)        REVIEW OF SYSTEMS:  unable to obtain     PHYSICAL EXAM:  T(C): 36.8 (03-15-21 @ 09:00), Max: 36.8 (03-15-21 @ 09:00)  HR: 78 (03-15-21 @ 09:00) (78 - 85)  BP: 116/73 (03-15-21 @ 09:00) (100/68 - 116/73)  RR: 17 (03-15-21 @ 09:00) (16 - 18)  SpO2: 99% (03-15-21 @ 09:00) (94% - 99%)  Wt(kg): --  I&O's Summary    14 Mar 2021 07:01  -  15 Mar 2021 07:00  --------------------------------------------------------  IN: 1010 mL / OUT: 0 mL / NET: 1010 mL    15 Mar 2021 07:01  -  15 Mar 2021 15:57  --------------------------------------------------------  IN: 270 mL / OUT: 0 mL / NET: 270 mL         EYES: EOMI, PERRLA   ENMT: s/p NG tube  Cardiovascular: Normal S1 S2, No JVD, No murmurs, No edema  Respiratory: b/l rhonchi  Gastrointestinal:  Soft, Non-tender, + BS	  Extremities:no edema                                  11.9   5.84  )-----------( 32       ( 15 Mar 2021 06:39 )             36.2     03-15    128<L>  |  88<L>  |  70<H>  ----------------------------<  117<H>  TNP   |  22  |  4.06<H>    Ca    8.9      15 Mar 2021 06:39  Phos  TNP     03-15  Mg     2.7     03-15    TPro  x   /  Alb  x   /  TBili  1.2  /  DBili  x   /  AST  x   /  ALT  x   /  AlkPhos  x   03-15    proBNP:   Lipid Profile:   HgA1c:   TSH:     Consultant(s) Notes Reviewed:  [x ] YES  [ ] NO    Care Discussed with Consultants/Other Providers [ x] YES  [ ] NO    Imaging Personally Reviewed independently:  [x] YES  [ ] NO    All labs, radiologic studies, vitals, orders and medications list reviewed. Patient is seen and examined at bedside. Case discussed with medical team.

## 2021-03-16 NOTE — PROGRESS NOTE ADULT - PROBLEM SELECTOR PLAN 3
Advanced care planning was discussed with family.  Advanced care planning forms were reviewed and discussed.  Risks, benefits and alternatives of gastroenterologic procedures were discussed in detail and all questions were answered.  30 minutes spent.

## 2021-03-16 NOTE — PROGRESS NOTE ADULT - ASSESSMENT
69 y/o M w/ hx ESRD on HD, anemia, hyperparathyroidism, CAD s/p stent, BPH w/ neurogenic bladder (last dialysis 2/27/21), cognitive impairment, that was brought in by EMS from [facility] for AMS. GI consulted for PEG eval 2/2 FTT

## 2021-03-16 NOTE — PROVIDER CONTACT NOTE (CRITICAL VALUE NOTIFICATION) - ASSESSMENT
Patient is hypoglycemic and asymptomatic. Patient is NPO, just started on tube feedings till midnight. Patient has dextrose running, as per ordered. Patient is hypoglycemic and asymptomatic. Patient is NPO, just started on tube feedings till midnight. Patient has 10% dextrose running 50 ml, as per ordered.

## 2021-03-16 NOTE — PROVIDER CONTACT NOTE (CRITICAL VALUE NOTIFICATION) - RECOMMENDATIONS
Blood glucose checked 35.
follow hypoglycemia protocol and recheck FS every 15 minutes until over 100x2.
Hypoglycemic protocol activated
follow hypoglycemia protocol and recheck FS every 15 minutes until over 100x2.
Primary RN Alfredito notified, too

## 2021-03-16 NOTE — PROGRESS NOTE ADULT - SUBJECTIVE AND OBJECTIVE BOX
INTERVAL HPI/OVERNIGHT EVENTS:    resting comfortably   planned for EGD/PEG tomorrow    MEDICATIONS  (STANDING):  atorvastatin 10 milliGRAM(s) Oral at bedtime  chlorhexidine 4% Liquid 1 Application(s) Topical daily  cinacalcet 90 milliGRAM(s) Oral daily  citalopram 20 milliGRAM(s) Oral daily  dextrose 10%. 1000 milliLiter(s) (50 mL/Hr) IV Continuous <Continuous>  dextrose 40% Gel 15 Gram(s) Oral once  dextrose 50% Injectable 25 Gram(s) IV Push once  dextrose 50% Injectable 12.5 Gram(s) IV Push once  dextrose 50% Injectable 25 Gram(s) IV Push once  glucagon  Injectable 1 milliGRAM(s) IntraMuscular once  lactobacillus acidophilus 1 Tablet(s) Oral daily  mupirocin 2% Ointment 1 Application(s) Both Nostrils two times a day  polyethylene glycol 3350 17 Gram(s) Oral daily  senna Syrup 10 milliLiter(s) Oral daily  sodium zirconium cyclosilicate 10 Gram(s) Oral <User Schedule>    MEDICATIONS  (PRN):  midodrine. 10 milliGRAM(s) Oral <User Schedule> PRN SBP < 90 on HD      Allergies    No Known Allergies    Intolerances        Review of Systems: *confused, does not participate        Vital Signs Last 24 Hrs  T(C): 36.3 (16 Mar 2021 10:00), Max: 36.9 (16 Mar 2021 01:10)  T(F): 97.4 (16 Mar 2021 10:00), Max: 98.5 (16 Mar 2021 01:10)  HR: 75 (16 Mar 2021 10:00) (75 - 85)  BP: 101/72 (16 Mar 2021 10:00) (97/62 - 113/73)  BP(mean): --  RR: 17 (16 Mar 2021 10:00) (16 - 18)  SpO2: 98% (16 Mar 2021 10:00) (97% - 99%)    PHYSICAL EXAM:    Constitutional: NAD  HEENT: EOMI, throat clear  Neck: No LAD, supple  Respiratory: CTA and P  Cardiovascular: S1 and S2, RRR, no M  Gastrointestinal: BS+, soft, NT/ND, neg HSM,  Extremities: No peripheral edema, neg clubbing, cyanosis  Vascular: 2+ peripheral pulses  Neurological: A/O x 0  Psychiatric: weak  Skin: No rashes      LABS:                        11.9   5.84  )-----------( 32       ( 15 Mar 2021 06:39 )             36.2     03-16    131<L>  |  88<L>  |  81<H>  ----------------------------<  123<H>  3.6   |  26  |  4.63<H>    Ca    9.1      16 Mar 2021 06:17  Phos  TNP     03-15  Mg     2.5     03-16    TPro  x   /  Alb  x   /  TBili  1.2  /  DBili  x   /  AST  x   /  ALT  x   /  AlkPhos  x   03-15    PT/INR - ( 16 Mar 2021 06:17 )   PT: 18.7 sec;   INR: 1.67 ratio         PTT - ( 16 Mar 2021 06:17 )  PTT:44.4 sec      RADIOLOGY & ADDITIONAL TESTS:

## 2021-03-16 NOTE — PROGRESS NOTE ADULT - SUBJECTIVE AND OBJECTIVE BOX
[facility] Division of Hospital Medicine  Shireen Miranda MD  Pager [phone]    Patient is a 70y old Male who presents with a chief complaint of AMS       SUBJECTIVE / OVERNIGHT EVENTS: pulled NGT over night; alert, taking some PO, but not consistently. still with episodes of hypoglycemia      MEDICATIONS  (STANDING):  atorvastatin 10 milliGRAM(s) Oral at bedtime  chlorhexidine 4% Liquid 1 Application(s) Topical daily  cinacalcet 90 milliGRAM(s) Oral daily  citalopram 20 milliGRAM(s) Oral daily  dextrose 10%. 1000 milliLiter(s) (50 mL/Hr) IV Continuous <Continuous>  dextrose 40% Gel 15 Gram(s) Oral once  dextrose 50% Injectable 25 Gram(s) IV Push once  dextrose 50% Injectable 12.5 Gram(s) IV Push once  dextrose 50% Injectable 25 Gram(s) IV Push once  glucagon  Injectable 1 milliGRAM(s) IntraMuscular once  lactobacillus acidophilus 1 Tablet(s) Oral daily  mupirocin 2% Ointment 1 Application(s) Both Nostrils two times a day  polyethylene glycol 3350 17 Gram(s) Oral daily  senna Syrup 10 milliLiter(s) Oral daily  sodium zirconium cyclosilicate 10 Gram(s) Oral <User Schedule>    MEDICATIONS  (PRN):  midodrine. 10 milliGRAM(s) Oral <User Schedule> PRN SBP < 90 on HD      CAPILLARY BLOOD GLUCOSE  POCT Blood Glucose.: 27 mg/dL (16 Mar 2021 13:41)  POCT Blood Glucose.: <25 mg/dL (16 Mar 2021 13:32)  POCT Blood Glucose.: 109 mg/dL (16 Mar 2021 09:51)  POCT Blood Glucose.: 126 mg/dL (16 Mar 2021 03:08)  POCT Blood Glucose.: 124 mg/dL (15 Mar 2021 23:18)  POCT Blood Glucose.: 117 mg/dL (15 Mar 2021 19:20)  POCT Blood Glucose.: 112 mg/dL (15 Mar 2021 15:19)    PHYSICAL EXAM:  Vital Signs Last 24 Hrs  T(F): 97.4 (16 Mar 2021 15:00), Max: 98.5 (16 Mar 2021 01:10)  HR: 81 (16 Mar 2021 15:00) (75 - 85)  BP: 129/69 (16 Mar 2021 15:00) (97/62 - 129/69)  RR: 17 (16 Mar 2021 10:00) (16 - 18)  SpO2: 98% (16 Mar 2021 10:00) (97% - 99%)    CONSTITUTIONAL: NAD  RESPIRATORY: Normal respiratory effort; b/l AE  CARDIOVASCULAR: Regular rate and rhythm; No lower extremity edema;   ABDOMEN: Nontender to palpation, normoactive bowel sounds  MUSCULOSKELETAL: no joint swelling or tenderness to palpation  PSYCH: more awake  NEUROLOGY: moves all ext      LABS:                        11.9   5.84  )-----------( 32       ( 15 Mar 2021 06:39 )             36.2     03-16    131<L>  |  88<L>  |  81<H>  ----------------------------<  123<H>  3.6   |  26  |  4.63<H>    Ca    9.1      16 Mar 2021 06:17  Mg     2.5     03-16      PT/INR - ( 16 Mar 2021 06:17 )   PT: 18.7 sec;   INR: 1.67 ratio    PTT - ( 16 Mar 2021 06:17 )  PTT:44.4 sec

## 2021-03-16 NOTE — PROGRESS NOTE ADULT - SUBJECTIVE AND OBJECTIVE BOX
Alex Navarrete MD  Interventional Cardiology / Endovascular Specialist  [place] Office : [address] [place] [address] [place] [address]  Tel:   [place] Office : [address] [facility] [place] [address]  Tel: [phone]  Cell : [phone]    Subjective/Overnight events: Patient lying in bed comfortably. No acute distress.  	  MEDICATIONS:  midodrine. 10 milliGRAM(s) Oral <User Schedule> PRN        citalopram 20 milliGRAM(s) Oral daily    polyethylene glycol 3350 17 Gram(s) Oral daily  senna Syrup 10 milliLiter(s) Oral daily    atorvastatin 10 milliGRAM(s) Oral at bedtime  cinacalcet 90 milliGRAM(s) Oral daily  dextrose 40% Gel 15 Gram(s) Oral once  dextrose 50% Injectable 25 Gram(s) IV Push once  dextrose 50% Injectable 12.5 Gram(s) IV Push once  dextrose 50% Injectable 25 Gram(s) IV Push once  glucagon  Injectable 1 milliGRAM(s) IntraMuscular once    chlorhexidine 4% Liquid 1 Application(s) Topical daily  dextrose 10%. 1000 milliLiter(s) IV Continuous <Continuous>  mupirocin 2% Ointment 1 Application(s) Both Nostrils two times a day      PAST MEDICAL/SURGICAL HISTORY  PAST MEDICAL & SURGICAL HISTORY:  Inguinal hernia    NSTEMI (non-ST elevated myocardial infarction)    HLD (hyperlipidemia)    Neurogenic bladder    Spinal abscess    Cavitary lung disease    CAD (coronary artery disease)    Abscess of sacrum    Anemia due to chronic renal failure treated with erythropoietin, stage 2 (mild)    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 device, implant or graft    HPTH (hyperparathyroidism)  Secondary    HTN (hypertension)    ESRD (end stage renal disease)    Cavitary lung disease    CAD in native artery    Hypertension    ESRD (end stage renal disease)    S/P primary angioplasty with coronary stent    Kidney abscess        SOCIAL HISTORY: Substance Use (street drugs): ( x ) never used  (  ) other:    FAMILY HISTORY:  No pertinent family history in first degree relatives    Family history of breast cancer (Sibling)        REVIEW OF SYSTEMS:  unable to obtain    PHYSICAL EXAM:  T(C): 36.3 (03-16-21 @ 10:00), Max: 36.9 (03-16-21 @ 01:10)  HR: 75 (03-16-21 @ 10:00) (75 - 85)  BP: 101/72 (03-16-21 @ 10:00) (97/62 - 113/73)  RR: 17 (03-16-21 @ 10:00) (16 - 18)  SpO2: 98% (03-16-21 @ 10:00) (97% - 99%)  Wt(kg): --  I&O's Summary    15 Mar 2021 07:01  -  16 Mar 2021 07:00  --------------------------------------------------------  IN: 1208 mL / OUT: 0 mL / NET: 1208 mL    16 Mar 2021 07:01  -  16 Mar 2021 13:02  --------------------------------------------------------  IN: 400 mL / OUT: 900 mL / NET: -500 mL        EYES: EOMI, PERRLA   Cardiovascular: Normal S1 S2, No JVD, No murmurs, No edema  Respiratory: b/l rhonchi  Gastrointestinal:  Soft, Non-tender, + BS	  Extremities:no edema                                  11.9   5.84  )-----------( 32       ( 15 Mar 2021 06:39 )             36.2     03-16    131<L>  |  88<L>  |  81<H>  ----------------------------<  123<H>  3.6   |  26  |  4.63<H>    Ca    9.1      16 Mar 2021 06:17  Phos  TNP     03-15  Mg     2.5     03-16    TPro  x   /  Alb  x   /  TBili  1.2  /  DBili  x   /  AST  x   /  ALT  x   /  AlkPhos  x   03-15    proBNP:   Lipid Profile:   HgA1c:   TSH:     Consultant(s) Notes Reviewed:  [x ] YES  [ ] NO    Care Discussed with Consultants/Other Providers [ x] YES  [ ] NO    Imaging Personally Reviewed independently:  [x] YES  [ ] NO    All labs, radiologic studies, vitals, orders and medications list reviewed. Patient is seen and examined at bedside. Case discussed with medical team.

## 2021-03-16 NOTE — PROGRESS NOTE ADULT - SUBJECTIVE AND OBJECTIVE BOX
Overnight events noted    VITAL: T(C): , Max: 36.9 (03-16-21 @ 01:10) T(F): , Max: 98.5 (03-16-21 @ 01:10) HR: 75 (03-16-21 @ 10:00) BP: 101/72 (03-16-21 @ 10:00) BP(mean): -- RR: 17 (03-16-21 @ 10:00) SpO2: 98% (03-16-21 @ 10:00)   PHYSICAL EXAM: Constitutional: lethargic, frail, confused; (+)soft restraints HEENT: DMM, (+)NGT Neck: Supple, No JVD Respiratory: CTA-R; decreased BS L base Cardiovascular: RRR s1s2, no m/r/g Gastrointestinal: BS+, soft, NT/ND Extremities: No peripheral edema b/l Neurological: reduced generalized strength Back: no CVAT b/l Skin: No rashes, no nevi Access: LUE AVF (+)thrill  LABS:                      11.9  5.84  )-----------( 32       ( 15 Mar 2021 06:39 )            36.2   Na(131)/K(3.6)/Cl(88)/HCO3(26)/BUN(81)/Cr(4.63)Glu(123)/Ca(9.1)/Mg(2.5)/PO4(--)    03-16 @ 06:17 Na(128)/K(TNP)/Cl(88)/HCO3(22)/BUN(70)/Cr(4.06)Glu(117)/Ca(8.9)/Mg(2.7)/PO4(TNP)    03-15 @ 06:39 Na(133)/K(3.7)/Cl(91)/HCO3(25)/BUN(62)/Cr(3.74)Glu(97)/Ca(8.6)/Mg(2.4)/PO4(4.2)    03-14 @ 13:09   IMPRESSION: 70M w/ dementia, CAD, past epidural abscess, and ESRD, 3/2/21 a/w AMS  (1)Renal - ESRD - HD TTS - last dialyzed this a.m; tolerated 0.5kg UF  (2)Hyponatremia - improved relative to yesterday  (3)Hypokalemia - borderline - we can cut back on Lokelma  (4)CV - tenuous hemodynamics - on Midodrine with HD   RECOMMEND: (1)Reduce Lokelma to BIW on Wed and Sun (3)Next HD 3/18 - 0.5kg UF as able      Heath Huff MD [facility] Office: [phone] Cell: [phone]        No pain, no sob   VITAL: T(C): , Max: 36.9 (03-16-21 @ 01:10) T(F): , Max: 98.5 (03-16-21 @ 01:10) HR: 75 (03-16-21 @ 10:00) BP: 101/72 (03-16-21 @ 10:00) BP(mean): -- RR: 17 (03-16-21 @ 10:00) SpO2: 98% (03-16-21 @ 10:00)   PHYSICAL EXAM: Constitutional: lethargic but alert; frail HEENT: DMM Neck: Supple, No JVD Respiratory: CTA-R; decreased BS L base Cardiovascular: RRR s1s2, no m/r/g Gastrointestinal: BS+, soft, NT/ND Extremities: No peripheral edema b/l Neurological: reduced generalized strength Back: no CVAT b/l Skin: No rashes, no nevi Access: LUE AVF (+)thrill  LABS:                      11.9  5.84  )-----------( 32       ( 15 Mar 2021 06:39 )            36.2   Na(131)/K(3.6)/Cl(88)/HCO3(26)/BUN(81)/Cr(4.63)Glu(123)/Ca(9.1)/Mg(2.5)/PO4(--)    03-16 @ 06:17 Na(128)/K(TNP)/Cl(88)/HCO3(22)/BUN(70)/Cr(4.06)Glu(117)/Ca(8.9)/Mg(2.7)/PO4(TNP)    03-15 @ 06:39 Na(133)/K(3.7)/Cl(91)/HCO3(25)/BUN(62)/Cr(3.74)Glu(97)/Ca(8.6)/Mg(2.4)/PO4(4.2)    03-14 @ 13:09   IMPRESSION: 70M w/ dementia, CAD, past epidural abscess, and ESRD, 3/2/21 a/w AMS  (1)Renal - ESRD - HD TTS - last dialyzed this a.m; tolerated 0.5kg UF  (2)Hyponatremia - improved relative to yesterday  (3)Hypokalemia - borderline - we can cut back on Lokelma  (4)CV - tenuous hemodynamics - on Midodrine with HD   RECOMMEND: (1)Reduce Lokelma to BIW on Wed and Sun (3)Next HD 3/18 - 0.5kg UF as able      Heath Huff MD [facility] Office: [phone] Cell: [phone]

## 2021-03-16 NOTE — PROVIDER CONTACT NOTE (CRITICAL VALUE NOTIFICATION) - ACTION/TREATMENT ORDERED:
TreveneACP stated to give full amp, 25g dextrose. Patient already has a standing order of dextrose running. Repeat in 15 minutes to get above 100 blood glucose. Blood glucose was repeated and above 100 as in flowsheets. TreveneACP stated to give full amp, 25g dextrose. Patient already has a standing order of 10% dextrose running 50 ML. Repeat in 15 minutes to get above 100 blood glucose. Blood glucose was repeated and above 100 as in flowsheets. TreveneACP stated to give full amp, 25g dextrose. Patient already has a standing order of 10% dextrose running 50 ML. Repeat in 15 minutes to get above 100 blood glucose x2. Blood glucose was repeated and above 100 as in flowsheets.

## 2021-03-16 NOTE — PROVIDER CONTACT NOTE (CRITICAL VALUE NOTIFICATION) - SITUATION
Patient is hypoglycemic Patient is resting in bed, and blood sugar was checked and he is hypoglycemic

## 2021-03-16 NOTE — PROGRESS NOTE ADULT - ASSESSMENT
Assessment and Plan    69 y/o M w/ hx ESRD on HD, anemia, hyperparathyroidism, CAD s/p stent, BPH w/ neurogenic bladder (last dialysis 2/27/21), cognitive impairment, that was brought in by EMS from [facility] for AMS.    EKG: NSR LVH   Echo 2/21 - Severe systolic dysfunction       1) Chronic severe systolic dysfunction  - on midodrine PRN for hypotension,  -hold metoprolol  -not on OMT secondary to BP    2) PEG placement   - pt is non verbal, EKG ok , moderate risk for PEG placement     3) Thrombocytopenia   - will hold asa    4) ESRD  -on HD  -f/u renal

## 2021-03-17 NOTE — PROGRESS NOTE ADULT - SUBJECTIVE AND OBJECTIVE BOX
Alex Navarrete MD  Interventional Cardiology / Endovascular Specialist  [place] Office : [address] [place] [address]. [address]  Tel:   [place] Office : [address] [facility] [place] [address]  Tel: [phone]  Cell : [phone]    Subjective/Overnight events: Patient lying in bed comfortably. No acute distress.  	  MEDICATIONS:  midodrine. 10 milliGRAM(s) Oral <User Schedule> PRN        citalopram 20 milliGRAM(s) Oral daily    polyethylene glycol 3350 17 Gram(s) Oral daily  senna Syrup 10 milliLiter(s) Oral daily    atorvastatin 10 milliGRAM(s) Oral at bedtime  cinacalcet 90 milliGRAM(s) Oral daily  dextrose 40% Gel 15 Gram(s) Oral once  dextrose 50% Injectable 25 Gram(s) IV Push once  dextrose 50% Injectable 12.5 Gram(s) IV Push once  dextrose 50% Injectable 25 Gram(s) IV Push once  glucagon  Injectable 1 milliGRAM(s) IntraMuscular once    chlorhexidine 4% Liquid 1 Application(s) Topical daily  dextrose 10%. 1000 milliLiter(s) IV Continuous <Continuous>  mupirocin 2% Ointment 1 Application(s) Both Nostrils two times a day      PAST MEDICAL/SURGICAL HISTORY  PAST MEDICAL & SURGICAL HISTORY:  Inguinal hernia    NSTEMI (non-ST elevated myocardial infarction)    HLD (hyperlipidemia)    Neurogenic bladder    Spinal abscess    Cavitary lung disease    CAD (coronary artery disease)    Abscess of sacrum    Anemia due to chronic renal failure treated with erythropoietin, stage 2 (mild)    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 device, implant or graft    HPTH (hyperparathyroidism)  Secondary    HTN (hypertension)    ESRD (end stage renal disease)    Cavitary lung disease    CAD in native artery    Hypertension    ESRD (end stage renal disease)    S/P primary angioplasty with coronary stent    Kidney abscess        SOCIAL HISTORY: Substance Use (street drugs): ( x ) never used  (  ) other:    FAMILY HISTORY:  No pertinent family history in first degree relatives    Family history of breast cancer (Sibling)        REVIEW OF SYSTEMS:  unable to obtain    PHYSICAL EXAM:  T(C): 36.9 (03-17-21 @ 12:30), Max: 36.9 (03-17-21 @ 11:05)  HR: 84 (03-17-21 @ 12:30) (77 - 90)  BP: 96/69 (03-17-21 @ 12:30) (90/56 - 140/68)  RR: 18 (03-17-21 @ 12:30) (18 - 18)  SpO2: 97% (03-17-21 @ 12:30) (96% - 98%)  Wt(kg): --  I&O's Summary    16 Mar 2021 07:01  -  17 Mar 2021 07:00  --------------------------------------------------------  IN: 1018 mL / OUT: 900 mL / NET: 118 mL          EYES: EOMI, PERRLA   Cardiovascular: Normal S1 S2, No JVD, No murmurs, No edema  Respiratory: b/l rhonchi  Gastrointestinal:  Soft, Non-tender, + BS	  Extremities:no edema                                    11.0   4.96  )-----------( 28       ( 17 Mar 2021 08:12 )             34.4     03-17    133<L>  |  91<L>  |  54<H>  ----------------------------<  136<H>  3.9   |  27  |  3.80<H>    Ca    9.4      17 Mar 2021 08:12  Phos  3.3     03-17  Mg     2.5     03-17      proBNP:   Lipid Profile:   HgA1c:   TSH:     Consultant(s) Notes Reviewed:  [x ] YES  [ ] NO    Care Discussed with Consultants/Other Providers [ x] YES  [ ] NO    Imaging Personally Reviewed independently:  [x] YES  [ ] NO    All labs, radiologic studies, vitals, orders and medications list reviewed. Patient is seen and examined at bedside. Case discussed with medical team.

## 2021-03-17 NOTE — PROVIDER CONTACT NOTE (OTHER) - ASSESSMENT
Patient A&Ox 1, Left AVF noted, PEG tube. Patient laying in bed comfortable. Post 25 grams of D5. Patient A&Ox 1, Left AVF noted, PEG tube. Patient laying in bed comfortable. Post 25 grams of Dextrose 5% injectable.

## 2021-03-17 NOTE — PROGRESS NOTE ADULT - SUBJECTIVE AND OBJECTIVE BOX
[facility] Division of Hospital Medicine  Shireen Miranda MD  Pager [phone]    Patient is a 70y old  Male who presents with a chief complaint of AMS       SUBJECTIVE / OVERNIGHT EVENTS: for PEG today      MEDICATIONS  (STANDING):  atorvastatin 10 milliGRAM(s) Oral at bedtime  chlorhexidine 4% Liquid 1 Application(s) Topical daily  cinacalcet 90 milliGRAM(s) Oral daily  citalopram 20 milliGRAM(s) Oral daily  dextrose 10%. 1000 milliLiter(s) (50 mL/Hr) IV Continuous <Continuous>  dextrose 40% Gel 15 Gram(s) Oral once  dextrose 50% Injectable 25 Gram(s) IV Push once  dextrose 50% Injectable 12.5 Gram(s) IV Push once  dextrose 50% Injectable 25 Gram(s) IV Push once  glucagon  Injectable 1 milliGRAM(s) IntraMuscular once  lactobacillus acidophilus 1 Tablet(s) Oral daily  mupirocin 2% Ointment 1 Application(s) Both Nostrils two times a day  polyethylene glycol 3350 17 Gram(s) Oral daily  senna Syrup 10 milliLiter(s) Oral daily  sodium zirconium cyclosilicate 10 Gram(s) Oral <User Schedule>    MEDICATIONS  (PRN):  midodrine. 10 milliGRAM(s) Oral <User Schedule> PRN SBP < 90 on HD      CAPILLARY BLOOD GLUCOSE  POCT Blood Glucose.: 94 mg/dL (17 Mar 2021 12:14)  POCT Blood Glucose.: 123 mg/dL (17 Mar 2021 08:44)  POCT Blood Glucose.: 79 mg/dL (17 Mar 2021 04:03)  POCT Blood Glucose.: 106 mg/dL (16 Mar 2021 23:27)  POCT Blood Glucose.: 165 mg/dL (16 Mar 2021 22:31)  POCT Blood Glucose.: 49 mg/dL (16 Mar 2021 21:58)  POCT Blood Glucose.: 80 mg/dL (16 Mar 2021 21:53)  POCT Blood Glucose.: 55 mg/dL (16 Mar 2021 21:51)  POCT Blood Glucose.: 140 mg/dL (16 Mar 2021 18:53)      PHYSICAL EXAM:  Vital Signs Last 24 Hrs  T(F): 98.4 (17 Mar 2021 12:30), Max: 98.5 (17 Mar 2021 11:05)  HR: 84 (17 Mar 2021 12:30) (77 - 90)  BP: 96/69 (17 Mar 2021 12:30) (90/56 - 140/68)  RR: 18 (17 Mar 2021 12:30) (18 - 18)  SpO2: 97% (17 Mar 2021 12:30) (96% - 98%)    CONSTITUTIONAL: NAD  RESPIRATORY: Normal respiratory effort;  CARDIOVASCULAR: Regular rate and rhythm; No lower extremity edema;   ABDOMEN: Nontender to palpation, normoactive bowel sounds  MUSCULOSKELETAL:  no joint swelling or tenderness to palpation  PSYCH: awake, follows simple commands  NEUROLOGY: moves all ext      LABS:                        11.0   4.96  )-----------( 28       ( 17 Mar 2021 08:12 )             34.4     03-17    133<L>  |  91<L>  |  54<H>  ----------------------------<  136<H>  3.9   |  27  |  3.80<H>    Ca    9.4      17 Mar 2021 08:12  Phos  3.3     03-17  Mg     2.5     03-17

## 2021-03-17 NOTE — PROGRESS NOTE ADULT - SUBJECTIVE AND OBJECTIVE BOX
Overnight events noted   VITAL: T(C): , Max: 36.9 (03-17-21 @ 11:05) T(F): , Max: 98.5 (03-17-21 @ 11:05) HR: 84 (03-17-21 @ 12:30) BP: 96/69 (03-17-21 @ 12:30) BP(mean): -- RR: 18 (03-17-21 @ 12:30) SpO2: 97% (03-17-21 @ 12:30)   PHYSICAL EXAM: Constitutional: lethargic but alert; frail HEENT: DMM Neck: Supple, No JVD Respiratory: CTA-R; decreased BS L base Cardiovascular: RRR s1s2, no m/r/g Gastrointestinal: BS+, soft, NT/ND Extremities: No peripheral edema b/l Neurological: reduced generalized strength Back: no CVAT b/l Skin: No rashes, no nevi Access: LUE AVF (+)thrill   LABS:                      11.0  4.96  )-----------( 28       ( 17 Mar 2021 08:12 )            34.4   Na(133)/K(3.9)/Cl(91)/HCO3(27)/BUN(54)/Cr(3.80)Glu(136)/Ca(9.4)/Mg(2.5)/PO4(3.3)    03-17 @ 08:12 Na(136)/K(3.3)/Cl(92)/HCO3(30)/BUN(44)/Cr(3.11)Glu(166)/Ca(9.1)/Mg(--)/PO4(--)    03-16 @ 14:22 Na(131)/K(3.6)/Cl(88)/HCO3(26)/BUN(81)/Cr(4.63)Glu(123)/Ca(9.1)/Mg(2.5)/PO4(--)    03-16 @ 06:17 Na(128)/K(TNP)/Cl(88)/HCO3(22)/BUN(70)/Cr(4.06)Glu(117)/Ca(8.9)/Mg(2.7)/PO4(TNP)    03-15 @ 06:39   IMPRESSION: 70M w/ dementia, CAD, past epidural abscess, and ESRD, 3/2/21 a/w AMS  (1)Renal - ESRD - HD TTS - due for next HD tomorrow  (2)Lytes - acceptable  (3)CV - tenuous hemodynamics - on Midodrine with HD   RECOMMEND: (1)Meds as ordered (2)Next HD 3/18 - 0.5kg UF as able       Heath Huff MD [facility] Office: [phone] Cell: [phone]        Overnight events noted   VITAL: T(C): , Max: 36.9 (03-17-21 @ 11:05) T(F): , Max: 98.5 (03-17-21 @ 11:05) HR: 84 (03-17-21 @ 12:30) BP: 96/69 (03-17-21 @ 12:30) BP(mean): -- RR: 18 (03-17-21 @ 12:30) SpO2: 97% (03-17-21 @ 12:30)   PHYSICAL EXAM: Constitutional: lethargic but alert; frail HEENT: DMM Neck: Supple, No JVD Respiratory: CTA-R; decreased BS L base Cardiovascular: RRR s1s2, no m/r/g Gastrointestinal: BS+, soft, NT/ND Extremities: No peripheral edema b/l Neurological: reduced generalized strength Back: no CVAT b/l Skin: No rashes, no nevi Access: LUE AVF (+)thrill   LABS:                      11.0  4.96  )-----------( 28       ( 17 Mar 2021 08:12 )            34.4   Na(133)/K(3.9)/Cl(91)/HCO3(27)/BUN(54)/Cr(3.80)Glu(136)/Ca(9.4)/Mg(2.5)/PO4(3.3)    03-17 @ 08:12 Na(136)/K(3.3)/Cl(92)/HCO3(30)/BUN(44)/Cr(3.11)Glu(166)/Ca(9.1)/Mg(--)/PO4(--)    03-16 @ 14:22 Na(131)/K(3.6)/Cl(88)/HCO3(26)/BUN(81)/Cr(4.63)Glu(123)/Ca(9.1)/Mg(2.5)/PO4(--)    03-16 @ 06:17 Na(128)/K(TNP)/Cl(88)/HCO3(22)/BUN(70)/Cr(4.06)Glu(117)/Ca(8.9)/Mg(2.7)/PO4(TNP)    03-15 @ 06:39   IMPRESSION: 70M w/ dementia, CAD, past epidural abscess, and ESRD, 3/2/21 a/w AMS  (1)Renal - ESRD - HD TTS - due for next HD tomorrow  (2)Lytes - acceptable  (3)CV - tenuous hemodynamics - on Midodrine with HD  (4)GI - for PEG today   RECOMMEND: (1)Meds as ordered (2)Next HD 3/18 - 0.5kg UF as able (3)No renal objection to PEG placement today      Heath Huff MD [facility] Office: [phone] Cell: [phone]

## 2021-03-17 NOTE — PROGRESS NOTE ADULT - ASSESSMENT
Assessment and Plan    69 y/o M w/ hx ESRD on HD, anemia, hyperparathyroidism, CAD s/p stent, BPH w/ neurogenic bladder (last dialysis 2/27/21), cognitive impairment, that was brought in by EMS from [facility] for AMS.    EKG: NSR LVH   Echo 2/21 - Severe systolic dysfunction       1) Chronic severe systolic dysfunction  - on midodrine PRN for hypotension,  -hold metoprolol  -not on OMT secondary to BP    2) PEG placement   - pt is non verbal, EKG ok , moderate risk for PEG placement     3) Thrombocytopenia   - will hold asa    4) ESRD  -on HD  -f/u renal

## 2021-03-18 NOTE — CHART NOTE - NSCHARTNOTEFT_GEN_A_CORE
Source: Patient [ ]    Family [ ]     other [x ] RN, chart review     Diet, NPO with Tube Feed:   Tube Feeding Modality: Gastrostomy  Nepro with Carb Steady (NEPRORTH)  Total Volume for 24 Hours (mL): 960  Continuous  Starting Tube Feed Rate {mL per Hour}: 20  Increase Tube Feed Rate by (mL): 10     Every 4 hours  Until Goal Tube Feed Rate (mL per Hour): 40  Tube Feed Duration (in Hours): 24  Tube Feed Start Time: 09:10 (03-18-21 @ 09:05)    Current Weight: Weight (kg): 54.5 (03-03)  (3/9) 55.6 kg, (3/14) 59.1 kg, (3/16) 58 kg, (3/18) 60 kg      Pertinent Labs:   03-18 Na 128 mmol/L<L> Glu 114 mg/dL<H> K+ 3.9 mmol/L Cr 4.15 mg/dL<H> BUN 58 mg/dL<H> Phos 4.5 mg/dL  03-18 @ 15:48 POCT <25 mg/dL  03-18 @ 15:47 POCT <25 mg/dL  03-18 @ 14:07 POCT <25 mg/dL  03-18 @ 14:05 POCT <25 mg/dL  03-18 @ 08:50 POCT 106 mg/dL  03-18 @ 05:59 POCT 97 mg/dL  03-18 @ 02:10 POCT 149 mg/dL  03-18 @ 02:02 POCT 54 mg/dL  03-18 @ 01:58 POCT 59 mg/dL  03-17 @ 21:55 POCT 206 mg/dL  03-17 @ 21:51 POCT <25 mg/dL  03-17 @ 20:41 POCT 75 mg/dL  03-17 @ 16:49 POCT 97 mg/dL    MEDICATIONS  (STANDING):  atorvastatin 10 milliGRAM(s) Oral at bedtime  chlorhexidine 4% Liquid 1 Application(s) Topical daily  cinacalcet 90 milliGRAM(s) Oral daily  citalopram 20 milliGRAM(s) Oral daily  dextrose 10%. 1000 milliLiter(s) (50 mL/Hr) IV Continuous <Continuous>  dextrose 40% Gel 15 Gram(s) Oral once  dextrose 50% Injectable 25 Gram(s) IV Push once  dextrose 50% Injectable 12.5 Gram(s) IV Push once  dextrose 50% Injectable 25 Gram(s) IV Push once  glucagon  Injectable 1 milliGRAM(s) IntraMuscular once  lactobacillus acidophilus 1 Tablet(s) Oral daily  mupirocin 2% Ointment 1 Application(s) Both Nostrils two times a day  polyethylene glycol 3350 17 Gram(s) Oral daily  senna Syrup 10 milliLiter(s) Oral daily  sodium zirconium cyclosilicate 10 Gram(s) Oral <User Schedule>        Nutrition Interval Events: Patient receiving enteral feeding via PEG of Nepro with Carb Steady @ 40 mL/hr x 24 hrs ( 960 mL, 1728 ankita, 78 gm pro). TF being tolerated well. Seen by Wound Care RN who indicated that pt with Stage 2 pressure injury of L lateral buttock/trochanter. Enteral feeding at current rate is meeting high end of estimated protein need for wound healing. Will monitor skin integrity for possible rate increase or protein modular addition. Wts slightly increased although pt with 1+ generalized edema - will follow fluid trend. Continue with nutrition care plan as ordered. Consult as needed. RDN services to remain available.        Edema: 1+ generalized  Pressure Injuries: Stage 2 L lateral buttock/trochanter      Estimated Needs:   [ x] no change since previous assessment  [ ] recalculated:       Previous Nutrition Diagnosis: severe protein calorie malnutrition     Nutrition Diagnosis is [x ] ongoing  [ ] resolved [ ] not applicable       Recommendations:  1. Continue enteral feeding as ordered.  2. Rate adjustment as per tolerance; possible need for protein supplementation.  3. Bowel regimen PRN.       Monitoring and Evaluation:      [x ] weights [x ] follow up per protocol

## 2021-03-18 NOTE — PROVIDER CONTACT NOTE (OTHER) - ACTION/TREATMENT ORDERED:
Continue to monitor Patient. No further interventions needed at this time. Will recheck FS in 4 hours as ordered.

## 2021-03-18 NOTE — PROGRESS NOTE ADULT - ASSESSMENT
71 y/o M w/ hx ESRD on HD, anemia, hyperparathyroidism, CAD s/p stent, BPH w/ neurogenic bladder (last dialysis 2/27/21), cognitive impairment, that was brought in by EMS from [facility] for AMS. GI consulted for PEG eval 2/2 FTT

## 2021-03-18 NOTE — PROVIDER CONTACT NOTE (OTHER) - SITUATION
COPD/PN Week 2 Survey      Responses   [facility] patient discharged from?  [facility]   Radha Hammer RN   Patient FS was 59, repeat 54. FS checked on Patient earlobe found to be 149 Patient FS was 59, repeat 54. FS rechecked on different finger, found to be 149

## 2021-03-18 NOTE — PROVIDER CONTACT NOTE (OTHER) - ASSESSMENT
Vital signs in chart. BP 93/61 HR 75 NSR. RN cannot find an O2 % reading on patient. Fingers are edematous and cold, toes cannot be assessed due to dry flakey and hard. Patient on 2L NC

## 2021-03-18 NOTE — ADVANCED PRACTICE NURSE CONSULT - RECOMMEDATIONS
Recommend urology consult.  Recommend dietary consult.    Right gluteal fold and sacral fold: Cleanse with skin cleanser, pat dry. Apply Critic-aid clear twice a day and PRN.    Right buttock: Cleanse with NS, pat dry. Apply Liquid barrier film to periwound skin. Apply foam with border. Change daily.    Penile: Cleanse with NS, pat dry. Apply Critic-aid clear twice a day and PRN.    Continue low air loss bed therapy, continue heel elevation, continue to turn & reposition q2h, soft pillow between bony prominences, continue moisture management with barrier creams & single breathable pad, continue measures to decrease friction/shear/pressure. Continue with nutritional support as per dietary/orders.     Please call wound care service line is further assistance is needed ([phone]).  Recommend urology consult.  Recommend dietary consult.    Left gluteal fold and sacral fold: Cleanse with skin cleanser, pat dry. Apply Critic-aid clear twice a day and PRN.    Left buttock: Cleanse with NS, pat dry. Apply Liquid barrier film to periwound skin. Apply foam with border. Change daily.    Penile: Cleanse with NS, pat dry. Apply Critic-aid clear twice a day and PRN.    Continue low air loss bed therapy, continue heel elevation, continue to turn & reposition q2h, soft pillow between bony prominences, continue moisture management with barrier creams & single breathable pad, continue measures to decrease friction/shear/pressure. Continue with nutritional support as per dietary/orders.     Please call wound care service line is further assistance is needed ([phone]).

## 2021-03-18 NOTE — PROGRESS NOTE ADULT - SUBJECTIVE AND OBJECTIVE BOX
Alex Navarrete MD  Interventional Cardiology / Endovascular Specialist  [place] Office : [phone] [place] [address] [place]. [address]  Tel:   [place] Office : [phone] [facility] [place] [address]  Tel: [phone]  Cell : [phone]    Subjective/Overnight events: Patient lying in bed comfortably. No acute distress. s/p PEG  	  MEDICATIONS:  midodrine. 10 milliGRAM(s) Oral <User Schedule> PRN        citalopram 20 milliGRAM(s) Oral daily    polyethylene glycol 3350 17 Gram(s) Oral daily  senna Syrup 10 milliLiter(s) Oral daily    atorvastatin 10 milliGRAM(s) Oral at bedtime  cinacalcet 90 milliGRAM(s) Oral daily  dextrose 40% Gel 15 Gram(s) Oral once  dextrose 50% Injectable 25 Gram(s) IV Push once  dextrose 50% Injectable 12.5 Gram(s) IV Push once  dextrose 50% Injectable 25 Gram(s) IV Push once  glucagon  Injectable 1 milliGRAM(s) IntraMuscular once    chlorhexidine 4% Liquid 1 Application(s) Topical daily  dextrose 10%. 1000 milliLiter(s) IV Continuous <Continuous>  mupirocin 2% Ointment 1 Application(s) Both Nostrils two times a day      PAST MEDICAL/SURGICAL HISTORY  PAST MEDICAL & SURGICAL HISTORY:  Inguinal hernia    NSTEMI (non-ST elevated myocardial infarction)    HLD (hyperlipidemia)    Neurogenic bladder    Spinal abscess    Cavitary lung disease    CAD (coronary artery disease)    Abscess of sacrum    Anemia due to chronic renal failure treated with erythropoietin, stage 2 (mild)    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 device, implant or graft    HPTH (hyperparathyroidism)  Secondary    HTN (hypertension)    ESRD (end stage renal disease)    Cavitary lung disease    CAD in native artery    Hypertension    ESRD (end stage renal disease)    S/P primary angioplasty with coronary stent    Kidney abscess        SOCIAL HISTORY: Substance Use (street drugs): ( x ) never used  (  ) other:    FAMILY HISTORY:  No pertinent family history in first degree relatives    Family history of breast cancer (Sibling)        REVIEW OF SYSTEMS:  unable to obtain    PHYSICAL EXAM:  T(C): 36.2 (03-18-21 @ 11:00), Max: 36.9 (03-17-21 @ 12:30)  HR: 75 (03-18-21 @ 11:00) (62 - 91)  BP: 93/61 (03-18-21 @ 11:00) (90/53 - 114/62)  RR: 17 (03-18-21 @ 11:00) (12 - 18)  SpO2: 99% (03-18-21 @ 09:40) (97% - 100%)  Wt(kg): --  I&O's Summary    17 Mar 2021 07:01  -  18 Mar 2021 07:00  --------------------------------------------------------  IN: 0 mL / OUT: 0 mL / NET: 0 mL    18 Mar 2021 07:01  -  18 Mar 2021 11:56  --------------------------------------------------------  IN: 600 mL / OUT: 220 mL / NET: 380 mL      Height (cm): 167.6 (03-17 @ 15:35)  Weight (kg): 54.5 (03-17 @ 15:35)  BMI (kg/m2): 19.4 (03-17 @ 15:35)  BSA (m2): 1.61 (03-17 @ 15:35)      EYES: EOMI, PERRLA   Cardiovascular: Normal S1 S2, No JVD, No murmurs, No edema  Respiratory: b/l rhonchi  Gastrointestinal:  Soft, s/p PEG	  Extremities:no edema                                    8.7    6.37  )-----------( 65       ( 18 Mar 2021 07:30 )             26.3     03-18    128<L>  |  90<L>  |  58<H>  ----------------------------<  114<H>  3.9   |  25  |  4.15<H>    Ca    9.1      18 Mar 2021 07:30  Phos  4.5     03-18  Mg     2.5     03-18    TPro  5.5<L>  /  Alb  2.4<L>  /  TBili  1.4<H>  /  DBili  0.8<H>  /  AST  105<H>  /  ALT  295<H>  /  AlkPhos  199<H>  03-18    proBNP:   Lipid Profile:   HgA1c:   TSH:     Consultant(s) Notes Reviewed:  [x ] YES  [ ] NO    Care Discussed with Consultants/Other Providers [ x] YES  [ ] NO    Imaging Personally Reviewed independently:  [x] YES  [ ] NO    All labs, radiologic studies, vitals, orders and medications list reviewed. Patient is seen and examined at bedside. Case discussed with medical team.

## 2021-03-18 NOTE — ADVANCED PRACTICE NURSE CONSULT - REASON FOR CONSULT
Patient seen on skin care rounds after wound care referral received for assessment of skin impairment and recommendations of topical management. Chart reviewed: Serum WBC 6.37, noted to have leukocytosis during hospitalization to 13.66, thrombocytopenic to 32 (most current 65 required platelet transfusion), Juan range 9-14, most current 11 and was 9 on admission, hypotensive to 88/58, hypothermic to 93.7, BMI 19.4 kg/m2. 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 Sepsis, due to unspecified organism, unspecified whether acute organ dysfunction present, completed 5 days abx. Hypoglycemia.: Had another episode yesterday associated with decreased responsiveness  However on D10 mental status improves. Poor PO intake noted, patient now with PEG tube. Poor mental status unclear etiology, MRI no stroke, overall failing. Pleural effusion.  Plan: Pulmonary consult appreciated - no intervention.  Chronic systolic heart failure- fluid management with HD. On midodrine for hemodynamic stability. ESRD (end stage renal disease) c/w HD. Transaminitis.  Plan: Continues to rise  U/S abdomen on 3/8 showed a normal liver, May be 2/2 septic state previously.

## 2021-03-18 NOTE — PROGRESS NOTE ADULT - SUBJECTIVE AND OBJECTIVE BOX
Overnight events noted   VITAL: T(C): , Max: 36.6 (03-17-21 @ 16:45) T(F): , Max: 97.9 (03-17-21 @ 16:45) HR: 75 (03-18-21 @ 11:00) BP: 93/61 (03-18-21 @ 11:00) BP(mean): -- RR: 17 (03-18-21 @ 11:00) SpO2: 99% (03-18-21 @ 09:40)   PHYSICAL EXAM: Constitutional: lethargic but alert; frail HEENT: DMM Neck: Supple, No JVD Respiratory: CTA-R; decreased BS L base Cardiovascular: RRR s1s2, no m/r/g Gastrointestinal: BS+, soft, NT/ND Extremities: No peripheral edema b/l Neurological: reduced generalized strength Back: no CVAT b/l Skin: No rashes, no nevi Access: LUE AVF (+)thrill  LABS:                      8.7   6.37  )-----------( 65       ( 18 Mar 2021 07:30 )            26.3   Na(128)/K(3.9)/Cl(90)/HCO3(25)/BUN(58)/Cr(4.15)Glu(114)/Ca(9.1)/Mg(2.5)/PO4(4.5)    03-18 @ 07:30 Na(133)/K(3.9)/Cl(91)/HCO3(27)/BUN(54)/Cr(3.80)Glu(136)/Ca(9.4)/Mg(2.5)/PO4(3.3)    03-17 @ 08:12 Na(136)/K(3.3)/Cl(92)/HCO3(30)/BUN(44)/Cr(3.11)Glu(166)/Ca(9.1)/Mg(--)/PO4(--)    03-16 @ 14:22 Na(131)/K(3.6)/Cl(88)/HCO3(26)/BUN(81)/Cr(4.63)Glu(123)/Ca(9.1)/Mg(2.5)/PO4(--)    03-16 @ 06:17   IMPRESSION: 70M w/ dementia, CAD, past epidural abscess, and ESRD, 3/2/21 a/w AMS  (1)Renal - ESRD - HD TTS - s/p HD today  (2)Hyponatremia - should now be improved, s/p HD today  (3)CV - tenuous hemodynamics - on Midodrine with HD  (4)GI - now with PEG in place   RECOMMEND: (1)Nepro for PEG feeds (2)Next HD 3/20 - inpatient versus outpatient - 0.5kg UF as able       Heath Huff MD [facility] Office: [phone] Cell: [phone]        No complaints   VITAL: T(C): , Max: 36.6 (03-17-21 @ 16:45) T(F): , Max: 97.9 (03-17-21 @ 16:45) HR: 75 (03-18-21 @ 11:00) BP: 93/61 (03-18-21 @ 11:00) BP(mean): -- RR: 17 (03-18-21 @ 11:00) SpO2: 99% (03-18-21 @ 09:40)   PHYSICAL EXAM: Constitutional: lethargic but alert; frail HEENT: DMM Neck: Supple, No JVD Respiratory: CTA-R; decreased BS L base Cardiovascular: RRR s1s2, no m/r/g Gastrointestinal: BS+, soft, NT/ND Extremities: No peripheral edema b/l Neurological: reduced generalized strength Back: no CVAT b/l Skin: No rashes, no nevi Access: LUE AVF (+)thrill  LABS:                      8.7   6.37  )-----------( 65       ( 18 Mar 2021 07:30 )            26.3   Na(128)/K(3.9)/Cl(90)/HCO3(25)/BUN(58)/Cr(4.15)Glu(114)/Ca(9.1)/Mg(2.5)/PO4(4.5)    03-18 @ 07:30 Na(133)/K(3.9)/Cl(91)/HCO3(27)/BUN(54)/Cr(3.80)Glu(136)/Ca(9.4)/Mg(2.5)/PO4(3.3)    03-17 @ 08:12 Na(136)/K(3.3)/Cl(92)/HCO3(30)/BUN(44)/Cr(3.11)Glu(166)/Ca(9.1)/Mg(--)/PO4(--)    03-16 @ 14:22 Na(131)/K(3.6)/Cl(88)/HCO3(26)/BUN(81)/Cr(4.63)Glu(123)/Ca(9.1)/Mg(2.5)/PO4(--)    03-16 @ 06:17   IMPRESSION: 70M w/ dementia, CAD, past epidural abscess, and ESRD, 3/2/21 a/w AMS  (1)Renal - ESRD - HD TTS - s/p HD today  (2)Hyponatremia - should now be improved, s/p HD today  (3)CV - tenuous hemodynamics - on Midodrine with HD  (4)GI - now with PEG in place   RECOMMEND: (1)Nepro for PEG feeds (2)Next HD 3/20 - inpatient versus outpatient - 0.5kg UF as able       Heath Huff MD [facility] Office: [phone] Cell: [phone]

## 2021-03-18 NOTE — PROGRESS NOTE ADULT - SUBJECTIVE AND OBJECTIVE BOX
INTERVAL HPI/OVERNIGHT EVENTS:    resting comfortably   tolerated egd/peg placement well yesterday   abd binder in place (requested by family)    MEDICATIONS  (STANDING):  atorvastatin 10 milliGRAM(s) Oral at bedtime  chlorhexidine 4% Liquid 1 Application(s) Topical daily  cinacalcet 90 milliGRAM(s) Oral daily  citalopram 20 milliGRAM(s) Oral daily  dextrose 10%. 1000 milliLiter(s) (50 mL/Hr) IV Continuous <Continuous>  dextrose 40% Gel 15 Gram(s) Oral once  dextrose 50% Injectable 25 Gram(s) IV Push once  dextrose 50% Injectable 12.5 Gram(s) IV Push once  dextrose 50% Injectable 25 Gram(s) IV Push once  glucagon  Injectable 1 milliGRAM(s) IntraMuscular once  lactobacillus acidophilus 1 Tablet(s) Oral daily  mupirocin 2% Ointment 1 Application(s) Both Nostrils two times a day  polyethylene glycol 3350 17 Gram(s) Oral daily  senna Syrup 10 milliLiter(s) Oral daily  sodium zirconium cyclosilicate 10 Gram(s) Oral <User Schedule>    MEDICATIONS  (PRN):  midodrine. 10 milliGRAM(s) Oral <User Schedule> PRN SBP < 90 on HD      Allergies    No Known Allergies    Intolerances        Review of Systems: *confused, does not participate        Vital Signs Last 24 Hrs  T(C): 36.2 (18 Mar 2021 11:00), Max: 36.9 (17 Mar 2021 12:30)  T(F): 97.1 (18 Mar 2021 11:00), Max: 98.4 (17 Mar 2021 12:30)  HR: 75 (18 Mar 2021 11:00) (62 - 91)  BP: 93/61 (18 Mar 2021 11:00) (90/53 - 114/62)  BP(mean): --  RR: 17 (18 Mar 2021 11:00) (12 - 18)  SpO2: 99% (18 Mar 2021 09:40) (97% - 100%)    PHYSICAL EXAM:    Constitutional: NAD  HEENT: EOMI, throat clear  Neck: No LAD, supple  Respiratory: CTA and P  Cardiovascular: S1 and S2, RRR, no M  Gastrointestinal: BS+, soft, NT/ND, neg HSM, +peg c/d/i with abd binder in place  Extremities: No peripheral edema, neg clubbing, cyanosis  Vascular: 2+ peripheral pulses  Neurological: A/O x1  Psychiatric: Normal mood, normal affect  Skin: No rashes      LABS:                        8.7    6.37  )-----------( 65       ( 18 Mar 2021 07:30 )             26.3     03-18    128<L>  |  90<L>  |  58<H>  ----------------------------<  114<H>  3.9   |  25  |  4.15<H>    Ca    9.1      18 Mar 2021 07:30  Phos  4.5     03-18  Mg     2.5     03-18    TPro  5.5<L>  /  Alb  2.4<L>  /  TBili  1.4<H>  /  DBili  0.8<H>  /  AST  105<H>  /  ALT  295<H>  /  AlkPhos  199<H>  03-18          RADIOLOGY & ADDITIONAL TESTS:  < from: Upper Endoscopy (03.17.21 @ 15:04) >    [facility]  _______________________________________________________________________________  Patient Name: Erwin Beaver               Procedure Date: 3/17/2021 3:04 PM  MRN: [id number]                     Account Number: [id number]  YOB: 1950              Admit Type: Inpatient  Room: [facility] 03                         Gender: Male  Attending MD: Yaakov Dowling MD      _______________________________________________________________________________     Procedure:           Upper GI endoscopy  Indications:         Place PEG due to dysphagia  Providers:           Yaakov Dowling MD  Medicines:           Monitored Anesthesia Care  Complications:       No immediate complications.  Procedure:           After obtaining informed consent, the endoscope was                        passed under direct vision. Throughout the procedure,                        the patient's blood pressure, pulse, and oxygen                        saturations were monitored continuously. The Endoscope                        was introduced through the mouth, and advanced to the                        third part of duodenum. The upper GI endoscopy was                        accomplished without difficulty. The patient tolerated                     the procedure well.                                                                                   Findings:       The examined esophagus was normal.       The entire examined stomach was normal. The patient was placed in the      supine position for PEG placement. The stomach was insufflated to appose        gastric and abdominal walls. A site was located in the body of the        stomach with excellent transillumination and manual external pressure        for placement. The abdominal wall was marked and prepped in a sterile        manner. The area was anesthetized with 3 mL of 0.5% lidocaine. The        trocar needle was introduced through the abdominal wall and into the        stomach under direct endoscopic view. A snare was introduced through the        endoscope and opened in the gastric lumen. The guide wire was passed        through the trocar and into the open snare. The snare was closed around        the guide wire. The endoscope and snare were removed, pulling the wire        out through the mouth. A skin incision was made at the site of needle        insertion. The endoscopically removable 20 Fr EndoVive Safety        gastrostomy tube was lubricated. The G-tube was tied to the guide wire        and pulled through the mouth and into the stomach. The trocar needle was        removed, and the gastrostomy tube was pulled out from the stomach        through the skin. The external bumper was attached to the gastrostomy        tube, and the tube was cutto remove the guide wire. The final position        of the gastrostomy tube was confirmed by relook endoscopy, and skin        marking noted to be 2.5 cm at the external bumper. The final tension and        compression of the abdominal wall by the PEG tube and external bumper        were checked and revealed that the bumper was loose and lightly touching        the skin. The feeding tube was capped, and the tube site cleaned and        dressed.       The examined duodenum was normal.                                                                       Impression:          - Normal esophagus.                       - Normal stomach.                       - Normal examined duodenum.                       - An endoscopically removable PEG placement was                        successfully completed.                       - No specimens collected.  Recommendation:      - Return patient to hospital [facility] for ongoing care.                       - Please follow the post-PEG recommendations including:                        may use PEG today for meds and water, may use PEG                        tomorrow for feedings, antibiotic ointment to site                                                                                   Attending Participation:       I personally performed the entire procedure.                                                                                     Yaakov Dowling  ___________________  Yaakov Dowling MD  3/17/2021 4:33:09 PM  This report has been signed electronically.  Number of Addenda: 0    Note Initiated On: 3/17/2021 3:04 PM    < end of copied text >

## 2021-03-18 NOTE — ADVANCED PRACTICE NURSE CONSULT - ASSESSMENT
A&Ox1, able to state name, bedbound, anuric, incontinent of stool. Skin warm, dry with increased moisture in intertriginous folds, adequate skin turgor. Generalized dry/flaky skin. Thickened yellow toenails.    Penile meatus wound of unknown etiology, R/O atypical of calciphylaxis: painful lesion that is circumferential around meatus with 75% slough and 25% brown/soft eschar. Scant serous drainage noted. No increased warmth, no erythema, no induration. Goal of care: wound may not improve due to etiology and overall health status, recommend Urology.    Right gluteal fold with moisture associated dermatitis measures 1cmx [id number], borders are irregular and when patient is in natural anatomical position the denuded epidermis is not visible.    Right lateral buttock, mixed etiology of incontinence dermatitis, pressure injury and skin failure secondary to overall poor nutrition (severe protein-calorie malnutrition as per dietary note) and health status: measures 4.9inh4ddz6.2cm. Tissue type is 25% fibrin film, and 75% dermis. Borders are irregular. Periwound skin, no induration, no increased warmth, no erythema. Goal of care: autolytic removal of fibrin film, decrease friction/shear, protect periwound skin. A&Ox1, able to state name, bedbound, anuric, incontinent of stool. Skin warm, dry with increased moisture in intertriginous folds, adequate skin turgor. Generalized dry/flaky skin. Thickened yellow toenails.    Penile meatus wound of unknown etiology, R/O atypical of calciphylaxis: painful lesion that is circumferential around meatus with 75% slough and 25% brown/soft eschar. Scant serous drainage noted. No increased warmth, no erythema, no induration. Goal of care: wound may not improve due to etiology and overall health status, recommend Urology.    Left gluteal fold with moisture associated dermatitis measures 1cmx 2byn4ol, borders are irregular and when patient is in natural anatomical position the denuded epidermis is not visible.    Left lateral buttock, mixed etiology of incontinence dermatitis, pressure injury and skin failure secondary to overall poor nutrition (severe protein-calorie malnutrition as per dietary note) and health status: measures 4.4pos1kvl1.2cm. Tissue type is 25% fibrin film, and 75% dermis. Borders are irregular. Periwound skin, no induration, no increased warmth, no erythema. Goal of care: autolytic removal of fibrin film, decrease friction/shear, protect periwound skin.

## 2021-03-18 NOTE — PROGRESS NOTE ADULT - ASSESSMENT
Assessment and Plan    69 y/o M w/ hx ESRD on HD, anemia, hyperparathyroidism, CAD s/p stent, BPH w/ neurogenic bladder (last dialysis 2/27/21), cognitive impairment, that was brought in by EMS from [facility] for AMS.    EKG: NSR LVH   Echo 2/21 - Severe systolic dysfunction   Tele: NSR, 6-8 bts NSVT      1) Chronic severe systolic dysfunction  - on midodrine PRN for hypotension,  -hold metoprolol  -not on OMT secondary to BP    2) PEG placement   - pt is non verbal, EKG ok , moderate risk for PEG placement   -s/p PEG doing well     3) Thrombocytopenia   - will hold asa    4) ESRD  -on HD  -f/u renal

## 2021-03-18 NOTE — PROVIDER CONTACT NOTE (OTHER) - ASSESSMENT
Patient A&Ox1-2, Dextrose 10% running at 50 mL. PEG tube placed 3/17 with abdominal binder. Patient has generalized edema.

## 2021-03-18 NOTE — PROGRESS NOTE ADULT - SUBJECTIVE AND OBJECTIVE BOX
[facility] Division of Hospital Medicine  Shireen Miranda MD  Pager [phone]    Patient is a 70y old  Male who presents with a chief complaint of AMS      SUBJECTIVE / OVERNIGHT EVENTS: s/p PEG      MEDICATIONS  (STANDING):  atorvastatin 10 milliGRAM(s) Oral at bedtime  chlorhexidine 4% Liquid 1 Application(s) Topical daily  cinacalcet 90 milliGRAM(s) Oral daily  citalopram 20 milliGRAM(s) Oral daily  dextrose 10%. 1000 milliLiter(s) (50 mL/Hr) IV Continuous <Continuous>  dextrose 40% Gel 15 Gram(s) Oral once  dextrose 50% Injectable 25 Gram(s) IV Push once  dextrose 50% Injectable 12.5 Gram(s) IV Push once  dextrose 50% Injectable 25 Gram(s) IV Push once  glucagon  Injectable 1 milliGRAM(s) IntraMuscular once  lactobacillus acidophilus 1 Tablet(s) Oral daily  mupirocin 2% Ointment 1 Application(s) Both Nostrils two times a day  polyethylene glycol 3350 17 Gram(s) Oral daily  senna Syrup 10 milliLiter(s) Oral daily  sodium zirconium cyclosilicate 10 Gram(s) Oral <User Schedule>    MEDICATIONS  (PRN):  midodrine. 10 milliGRAM(s) Oral <User Schedule> PRN SBP < 90 on HD      CAPILLARY BLOOD GLUCOSE  POCT Blood Glucose.: 106 mg/dL (18 Mar 2021 08:50)  POCT Blood Glucose.: 97 mg/dL (18 Mar 2021 05:59)  POCT Blood Glucose.: 149 mg/dL (18 Mar 2021 02:10)  POCT Blood Glucose.: 54 mg/dL (18 Mar 2021 02:02)  POCT Blood Glucose.: 59 mg/dL (18 Mar 2021 01:58)  POCT Blood Glucose.: 206 mg/dL (17 Mar 2021 21:55)  POCT Blood Glucose.: <25 mg/dL (17 Mar 2021 21:51)  POCT Blood Glucose.: 75 mg/dL (17 Mar 2021 20:41)  POCT Blood Glucose.: 97 mg/dL (17 Mar 2021 16:49)  POCT Blood Glucose.: 77 mg/dL (17 Mar 2021 15:49)  POCT Blood Glucose.: 86 mg/dL (17 Mar 2021 15:46)  POCT Blood Glucose.: 68 mg/dL (17 Mar 2021 15:41)      PHYSICAL EXAM:  Vital Signs Last 24 Hrs  T(F): 97.1 (18 Mar 2021 11:00), Max: 97.9 (17 Mar 2021 16:45)  HR: 75 (18 Mar 2021 11:00) (62 - 91)  BP: 93/61 (18 Mar 2021 11:00) (90/53 - 114/62)  RR: 17 (18 Mar 2021 11:00) (12 - 17)  SpO2: 99% (18 Mar 2021 09:40) (97% - 100%)    CONSTITUTIONAL: NAD  RESPIRATORY: Normal respiratory effort; lungs are clear to auscultation bilaterally  CARDIOVASCULAR: Regular rate and rhythm; No lower extremity edema;   ABDOMEN: Nontender to palpation, normoactive bowel sounds +PEG site clean  MUSCULOSKELETAL: no joint swelling or tenderness to palpation  PSYCH: affect appropriate  NEUROLOGY: no gross sensory deficits       LABS:                        8.7    6.37  )-----------( 65       ( 18 Mar 2021 07:30 )             26.3     03-18    128<L>  |  90<L>  |  58<H>  ----------------------------<  114<H>  3.9   |  25  |  4.15<H>    Ca    9.1      18 Mar 2021 07:30  Phos  4.5     03-18  Mg     2.5     03-18    TPro  5.5<L>  /  Alb  2.4<L>  /  TBili  1.4<H>  /  DBili  0.8<H>  /  AST  105<H>  /  ALT  295<H>  /  AlkPhos  199<H>  03-18

## 2021-03-18 NOTE — PROVIDER CONTACT NOTE (OTHER) - ASSESSMENT
Patient A&Ox1-2, PEG tube placed 3/17 with abdominal binder in place. Patient has history of low FS. Patient has generalized edema. Dextrose 10% running at 50 mL.

## 2021-03-19 NOTE — DISCHARGE NOTE PROVIDER - DETAILS OF MALNUTRITION DIAGNOSIS/DIAGNOSES
This patient has been assessed with a concern for Malnutrition and was treated during this hospitalization for the following Nutrition diagnosis/diagnoses:     -  03/08/2021: Severe protein-calorie malnutrition   -  03/08/2021: Underweight (BMI < 19)   This patient has been assessed with a concern for Malnutrition and was treated during this hospitalization for the following Nutrition diagnosis/diagnoses:     -  03/08/2021: Severe protein-calorie malnutrition   -  03/08/2021: Underweight (BMI < 19)    This patient has been assessed with a concern for Malnutrition and was treated during this hospitalization for the following Nutrition diagnosis/diagnoses:     -  03/08/2021: Severe protein-calorie malnutrition   -  03/08/2021: Underweight (BMI < 19)

## 2021-03-19 NOTE — DISCHARGE NOTE PROVIDER - NSDCMRMEDTOKEN_GEN_ALL_CORE_FT
Acidophilus oral tablet: 1 tab(s) orally once a day  Aquaphor topical ointment: Apply topically to affected area 4 times a day  aspirin 81 mg oral tablet: 1 tab(s) orally once a day  citalopram 20 mg oral tablet: 1 tab(s) orally once a day  Claritin 5 mg oral tablet, chewable: 1 tab(s) orally every 12 hours  famotidine 20 mg oral tablet: 1 tab(s) orally every other day (at bedtime)  Lipitor 10 mg oral tablet: 1 tab(s) orally once a day  metoprolol: 12.5 milligram(s) orally 2 times a day MDD:25 mg  midodrine 10 mg oral tablet: 1 tab(s) orally 3 times a day  MiraLax oral powder for reconstitution:   Senna 8.6 mg oral tablet: 2 tab(s) orally once a day (at bedtime)  tamsulosin 0.4 mg oral capsule: 1 cap(s) orally once a day  Vibramycin 100 mg oral capsule: 1 cap(s) orally a day

## 2021-03-19 NOTE — PROGRESS NOTE ADULT - SUBJECTIVE AND OBJECTIVE BOX
Alex Navarrete MD  Interventional Cardiology / Endovascular Specialist  [place] Office : [phone] [place] [address]. [address]  Tel:   [place] Office : [address] [facility] [place] [address]  Tel: [phone]  Cell : [phone]    Subjective/Overnight events: Patient lying in bed comfortably. No acute distress. s/p PEG  	  MEDICATIONS:  midodrine. 10 milliGRAM(s) Oral <User Schedule> PRN  citalopram 20 milliGRAM(s) Oral daily  polyethylene glycol 3350 17 Gram(s) Oral daily  senna Syrup 10 milliLiter(s) Oral daily  atorvastatin 10 milliGRAM(s) Oral at bedtime  cinacalcet 90 milliGRAM(s) Oral daily  dextrose 40% Gel 15 Gram(s) Oral once  dextrose 50% Injectable 25 Gram(s) IV Push once  dextrose 50% Injectable 12.5 Gram(s) IV Push once  dextrose 50% Injectable 25 Gram(s) IV Push once  glucagon  Injectable 1 milliGRAM(s) IntraMuscular once    chlorhexidine 4% Liquid 1 Application(s) Topical daily  mupirocin 2% Ointment 1 Application(s) Both Nostrils two times a day      PAST MEDICAL/SURGICAL HISTORY  PAST MEDICAL & SURGICAL HISTORY:  Inguinal hernia    NSTEMI (non-ST elevated myocardial infarction)    HLD (hyperlipidemia)    Neurogenic bladder    Spinal abscess    Cavitary lung disease    CAD (coronary artery disease)    Abscess of sacrum    Anemia due to chronic renal failure treated with erythropoietin, stage 2 (mild)    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 device, implant or graft    HPTH (hyperparathyroidism)  Secondary    HTN (hypertension)    ESRD (end stage renal disease)    Cavitary lung disease    CAD in native artery    Hypertension    ESRD (end stage renal disease)    S/P primary angioplasty with coronary stent    Kidney abscess        SOCIAL HISTORY: Substance Use (street drugs): ( x ) never used  (  ) other:    FAMILY HISTORY:  No pertinent family history in first degree relatives    Family history of breast cancer (Sibling)        REVIEW OF SYSTEMS:  unable to obtain   PHYSICAL EXAM:  T(C): 36.4 (03-19-21 @ 11:36), Max: 36.4 (03-19-21 @ 11:36)  HR: 83 (03-19-21 @ 11:36) (77 - 84)  BP: 112/75 (03-19-21 @ 11:36) (90/58 - 112/75)  RR: 17 (03-19-21 @ 11:36) (16 - 17)  SpO2: 100% (03-19-21 @ 11:36) (100% - 100%)  Wt(kg): --  I&O's Summary    18 Mar 2021 07:01  -  19 Mar 2021 07:00  --------------------------------------------------------  IN: 600 mL / OUT: 220 mL / NET: 380 mL      EYES: EOMI, PERRLA   Cardiovascular: Normal S1 S2, No JVD, No murmurs, No edema  Respiratory: b/l rhonchi  Gastrointestinal:  Soft, s/p PEG	  Extremities:no edema                                8.6    5.56  )-----------( 55       ( 19 Mar 2021 08:05 )             26.8     03-19    130<L>  |  91<L>  |  39<H>  ----------------------------<  114<H>  3.7   |  29  |  3.23<H>    Ca    9.1      19 Mar 2021 08:14  Phos  3.6     03-19  Mg     2.3     03-19    TPro  5.6<L>  /  Alb  2.6<L>  /  TBili  1.1  /  DBili  x   /  AST  126<H>  /  ALT  206<H>  /  AlkPhos  221<H>  03-19    proBNP:   Lipid Profile:   HgA1c:   TSH:     Consultant(s) Notes Reviewed:  [x ] YES  [ ] NO    Care Discussed with Consultants/Other Providers [ x] YES  [ ] NO    Imaging Personally Reviewed independently:  [x] YES  [ ] NO    All labs, radiologic studies, vitals, orders and medications list reviewed. Patient is seen and examined at bedside. Case discussed with medical team.

## 2021-03-19 NOTE — PROGRESS NOTE ADULT - SUBJECTIVE AND OBJECTIVE BOX
[facility] Division of Hospital Medicine  Shireen Miranda MD  Pager [phone]    Patient is a 70y old  Male who presents with a chief complaint of AMS       SUBJECTIVE / OVERNIGHT EVENTS: s/p PEG, feeds at goal; will trial off D10      MEDICATIONS  (STANDING):  atorvastatin 10 milliGRAM(s) Oral at bedtime  chlorhexidine 4% Liquid 1 Application(s) Topical daily  cinacalcet 90 milliGRAM(s) Oral daily  citalopram 20 milliGRAM(s) Oral daily  dextrose 40% Gel 15 Gram(s) Oral once  dextrose 50% Injectable 25 Gram(s) IV Push once  dextrose 50% Injectable 12.5 Gram(s) IV Push once  dextrose 50% Injectable 25 Gram(s) IV Push once  glucagon  Injectable 1 milliGRAM(s) IntraMuscular once  lactobacillus acidophilus 1 Tablet(s) Oral daily  mupirocin 2% Ointment 1 Application(s) Both Nostrils two times a day  polyethylene glycol 3350 17 Gram(s) Oral daily  senna Syrup 10 milliLiter(s) Oral daily  sodium zirconium cyclosilicate 10 Gram(s) Oral <User Schedule>    MEDICATIONS  (PRN):  midodrine. 10 milliGRAM(s) Oral <User Schedule> PRN SBP < 90 on HD      CAPILLARY BLOOD GLUCOSE  POCT Blood Glucose.: 130 mg/dL (19 Mar 2021 08:12)  POCT Blood Glucose.: 133 mg/dL (19 Mar 2021 03:49)  POCT Blood Glucose.: 103 mg/dL (18 Mar 2021 23:49)  POCT Blood Glucose.: 123 mg/dL (18 Mar 2021 20:04)  POCT Blood Glucose.: 106 mg/dL (18 Mar 2021 15:57)  POCT Blood Glucose.: <25 mg/dL (18 Mar 2021 15:48)  POCT Blood Glucose.: <25 mg/dL (18 Mar 2021 15:47)  POCT Blood Glucose.: <25 mg/dL (18 Mar 2021 14:07)  POCT Blood Glucose.: <25 mg/dL (18 Mar 2021 14:05)        PHYSICAL EXAM:  Vital Signs Last 24 Hrs  T(F): 97.6 (19 Mar 2021 11:36), Max: 97.6 (19 Mar 2021 11:36)  HR: 83 (19 Mar 2021 11:36) (77 - 88)  BP: 112/75 (19 Mar 2021 11:36) (90/58 - 112/75)  RR: 17 (19 Mar 2021 11:36) (16 - 17)  SpO2: 100% (19 Mar 2021 11:36) (100% - 100%)    CONSTITUTIONAL: NAD  RESPIRATORY: Normal respiratory effort; lungs are clear to auscultation ant  CARDIOVASCULAR: Regular rate and rhythm; No lower extremity edema;   ABDOMEN: Nontender to palpation, normoactive bowel sounds, +PEG, binder in place  MUSCULOSKELETAL: no joint swelling or tenderness to palpation; atrophic muscles  PSYCH: answers simple questions  NEUROLOGY: moves all ext      LABS:                        8.6    5.56  )-----------( 55       ( 19 Mar 2021 08:05 )             26.8     03-19    130<L>  |  91<L>  |  39<H>  ----------------------------<  114<H>  3.7   |  29  |  3.23<H>    Ca    9.1      19 Mar 2021 08:14  Phos  3.6     03-19  Mg     2.3     03-19    TPro  5.6<L>  /  Alb  2.6<L>  /  TBili  1.1  /  DBili  x   /  AST  126<H>  /  ALT  206<H>  /  AlkPhos  221<H>  03-19

## 2021-03-19 NOTE — PROGRESS NOTE ADULT - ASSESSMENT
71 y/o M w/ hx ESRD on HD, anemia, hyperparathyroidism, CAD s/p stent, BPH w/ neurogenic bladder (last dialysis 2/27/21), cognitive impairment, that was brought in by EMS from [facility] for AMS.    EKG: NSR LVH   Echo 2/21 - Severe systolic dysfunction   Tele: NSR, 6-8 bts NSVT      1) Chronic severe systolic dysfunction  - on midodrine PRN for hypotension,  -hold metoprolol  -not on OMT secondary to BP    2) PEG placement   - pt is non verbal, EKG ok , moderate risk for PEG placement   -s/p PEG doing well     3) Thrombocytopenia   - will hold asa    4) ESRD  -on HD  -f/u renal

## 2021-03-19 NOTE — PROGRESS NOTE ADULT - SUBJECTIVE AND OBJECTIVE BOX
Overnight events noted   VITAL: T(C): , Max: 36.4 (03-19-21 @ 11:36) T(F): , Max: 97.6 (03-19-21 @ 11:36) HR: 83 (03-19-21 @ 11:36) BP: 112/75 (03-19-21 @ 11:36) BP(mean): -- RR: 17 (03-19-21 @ 11:36) SpO2: 100% (03-19-21 @ 11:36)   PHYSICAL EXAM: Constitutional: lethargic but alert; frail HEENT: DMM Neck: Supple, No JVD Respiratory: CTA-R; decreased BS L base Cardiovascular: RRR s1s2, no m/r/g Gastrointestinal: BS+, soft, NT/ND Extremities: No peripheral edema b/l Neurological: reduced generalized strength Back: no CVAT b/l Skin: No rashes, no nevi Access: LUE AVF (+)thrill  LABS:                      8.6   5.56  )-----------( 55       ( 19 Mar 2021 08:05 )            26.8   Na(130)/K(3.7)/Cl(91)/HCO3(29)/BUN(39)/Cr(3.23)Glu(114)/Ca(9.1)/Mg(2.3)/PO4(3.6)    03-19 @ 08:14 Na(--)/K(--)/Cl(--)/HCO3(--)/BUN(--)/Cr(--)Glu(92)/Ca(--)/Mg(--)/PO4(--)    03-18 @ 17:24 Na(128)/K(3.9)/Cl(90)/HCO3(25)/BUN(58)/Cr(4.15)Glu(114)/Ca(9.1)/Mg(2.5)/PO4(4.5)    03-18 @ 07:30 Na(133)/K(3.9)/Cl(91)/HCO3(27)/BUN(54)/Cr(3.80)Glu(136)/Ca(9.4)/Mg(2.5)/PO4(3.3)    03-17 @ 08:12 Na(136)/K(3.3)/Cl(92)/HCO3(30)/BUN(44)/Cr(3.11)Glu(166)/Ca(9.1)/Mg(--)/PO4(--)    03-16 @ 14:22   IMPRESSION: 70M w/ dementia, CAD, past epidural abscess, and ESRD, 3/2/21 a/w AMS  (1)Renal - ESRD - HD TTS - due for next HD tomorrow  (2)Hyponatremia - improved, s/p HD yesterday  (3)CV - tenuous hemodynamics - on Midodrine with HD  (4)GI - now with PEG in place   RECOMMEND: (1)Next HD tomorrow - inpatient versus outpatient - 0.5kg UF as able      Heath Huff MD [facility] Office: [phone] Cell: [phone]        No pain, no sob   VITAL: T(C): , Max: 36.4 (03-19-21 @ 11:36) T(F): , Max: 97.6 (03-19-21 @ 11:36) HR: 83 (03-19-21 @ 11:36) BP: 112/75 (03-19-21 @ 11:36) RR: 17 (03-19-21 @ 11:36) SpO2: 100% (03-19-21 @ 11:36)   PHYSICAL EXAM: Constitutional: lethargic but alert; frail HEENT: DMM Neck: Supple, No JVD Respiratory: CTA-R; decreased BS L base Cardiovascular: RRR s1s2, no m/r/g Gastrointestinal: BS+, soft, NT/ND; (+)PEG Extremities: No peripheral edema b/l Neurological: reduced generalized strength Back: no CVAT b/l Skin: No rashes, no nevi Access: LUE AVF (+)thrill  LABS:                      8.6   5.56  )-----------( 55       ( 19 Mar 2021 08:05 )            26.8   Na(130)/K(3.7)/Cl(91)/HCO3(29)/BUN(39)/Cr(3.23)Glu(114)/Ca(9.1)/Mg(2.3)/PO4(3.6)    03-19 @ 08:14 Na(--)/K(--)/Cl(--)/HCO3(--)/BUN(--)/Cr(--)Glu(92)/Ca(--)/Mg(--)/PO4(--)    03-18 @ 17:24 Na(128)/K(3.9)/Cl(90)/HCO3(25)/BUN(58)/Cr(4.15)Glu(114)/Ca(9.1)/Mg(2.5)/PO4(4.5)    03-18 @ 07:30 Na(133)/K(3.9)/Cl(91)/HCO3(27)/BUN(54)/Cr(3.80)Glu(136)/Ca(9.4)/Mg(2.5)/PO4(3.3)    03-17 @ 08:12 Na(136)/K(3.3)/Cl(92)/HCO3(30)/BUN(44)/Cr(3.11)Glu(166)/Ca(9.1)/Mg(--)/PO4(--)    03-16 @ 14:22   IMPRESSION: 70M w/ dementia, CAD, past epidural abscess, and ESRD, 3/2/21 a/w AMS  (1)Renal - ESRD - HD TTS - due for next HD tomorrow  (2)Hyponatremia - improved, s/p HD yesterday  (3)CV - tenuous hemodynamics - on Midodrine with HD  (4)GI - now with PEG in place   RECOMMEND: (1)Next HD tomorrow - inpatient versus outpatient - 0.5kg UF as able      [facility]ath [facility]ff [facility] Office: [phone] Cell: [phone]

## 2021-03-19 NOTE — DISCHARGE NOTE PROVIDER - HOSPITAL COURSE
70M CAD/stent, sCHF LVEF 12%, BPH, neurogenic bladder, ESRD on HD w/ chronic anemia, MRSA on vibramycin p/w sepsis POA from UTI with acute metabolic encephalopathy, hypotension, c/b hypoglycemia and hypothermia  Sepsis- completed zosyn, unclear source, cx neg   Hypoglycemia-cortisol level adequate, Poor PO s/p PEG placement  Altered mental status, known Dementia, overall worse, MRI no stroke, unclear etiology, overall failing.   Pleural effusion, Pulmonary consult appreciated - no intervention.   Chronic systolic heart failure. Continue lopressor, midodrine, lipitor, ASA.   ESRD c/w HD,   Transaminitis-U/S abdomen on 3/8 showed a normal liver, hepatitis serologies negative  Hypothermia- Resolved

## 2021-03-19 NOTE — PROGRESS NOTE ADULT - SUBJECTIVE AND OBJECTIVE BOX
INTERVAL HPI/OVERNIGHT EVENTS:    resting comfortable   tolerating peg feeds  denied any abd pain    MEDICATIONS  (STANDING):  atorvastatin 10 milliGRAM(s) Oral at bedtime  chlorhexidine 4% Liquid 1 Application(s) Topical daily  cinacalcet 90 milliGRAM(s) Oral daily  citalopram 20 milliGRAM(s) Oral daily  dextrose 40% Gel 15 Gram(s) Oral once  dextrose 50% Injectable 25 Gram(s) IV Push once  dextrose 50% Injectable 12.5 Gram(s) IV Push once  dextrose 50% Injectable 25 Gram(s) IV Push once  glucagon  Injectable 1 milliGRAM(s) IntraMuscular once  lactobacillus acidophilus 1 Tablet(s) Oral daily  mupirocin 2% Ointment 1 Application(s) Both Nostrils two times a day  polyethylene glycol 3350 17 Gram(s) Oral daily  senna Syrup 10 milliLiter(s) Oral daily  sodium zirconium cyclosilicate 10 Gram(s) Oral <User Schedule>    MEDICATIONS  (PRN):  midodrine. 10 milliGRAM(s) Oral <User Schedule> PRN SBP < 90 on HD      Allergies    No Known Allergies    Intolerances        Review of Systems: *confused, answers simple questions    General:  No wt loss, fevers, chills, night sweats, fatigue   Eyes:  Good vision, no reported pain  ENT:  No sore throat, pain, runny nose, dysphagia  CV:  No pain, palpitations, hypo/hypertension  Resp:  No dyspnea, cough, tachypnea, wheezing  GI:  No pain, No nausea, No vomiting, No diarrhea, No constipation, No weight loss, No fever, No pruritis, No rectal bleeding, No melena, No dysphagia  :  No pain, bleeding, incontinence, nocturia  Muscle:  No pain, weakness  Neuro:  No weakness, tingling, memory problems  Psych:  No fatigue, insomnia, mood problems, depression  Endocrine:  No polyuria, polydypsia, cold/heat intolerance  Heme:  No petechiae, ecchymosis, easy bruisability  Skin:  No rash, tattoos, scars, edema      Vital Signs Last 24 Hrs  T(C): 36.3 (19 Mar 2021 05:27), Max: 36.3 (19 Mar 2021 05:27)  T(F): 97.3 (19 Mar 2021 05:27), Max: 97.3 (19 Mar 2021 05:27)  HR: 84 (19 Mar 2021 05:27) (75 - 88)  BP: 104/72 (19 Mar 2021 05:27) (90/58 - 104/72)  BP(mean): --  RR: 16 (19 Mar 2021 05:27) (16 - 17)  SpO2: 100% (19 Mar 2021 05:27) (100% - 100%)    PHYSICAL EXAM:    Constitutional: NAD  HEENT: EOMI, throat clear  Neck: No LAD, supple  Respiratory: CTA and P  Cardiovascular: S1 and S2, RRR, no M  Gastrointestinal: BS+, soft, NT/ND, neg HSM, +peg  Extremities: No peripheral edema, neg clubbing, cyanosis  Vascular: 2+ peripheral pulses  Neurological: A/O x 1-2, no focal deficits  Psychiatric: Normal mood, normal affect  Skin: No rashes      LABS:                        8.6    5.56  )-----------( 55       ( 19 Mar 2021 08:05 )             26.8     03-19    130<L>  |  91<L>  |  39<H>  ----------------------------<  114<H>  3.7   |  29  |  3.23<H>    Ca    9.1      19 Mar 2021 08:14  Phos  3.6     03-19  Mg     2.3     03-19    TPro  5.6<L>  /  Alb  2.6<L>  /  TBili  1.1  /  DBili  x   /  AST  126<H>  /  ALT  206<H>  /  AlkPhos  221<H>  03-19          RADIOLOGY & ADDITIONAL TESTS:

## 2021-03-19 NOTE — DISCHARGE NOTE PROVIDER - INSTRUCTIONS
Gastrostomy: Nepro with Carb Steady   Total Volume for 24 Hours (mL): 960  Continuous  Starting Tube Feed Rate - Goal Tube Feed Rate (mL per Hour): 40  Tube Feed Duration (in Hours): 24

## 2021-03-19 NOTE — DISCHARGE NOTE PROVIDER - NSDCCPCAREPLAN_GEN_ALL_CORE_FT
PRINCIPAL DISCHARGE DIAGNOSIS  Diagnosis: Altered mental status, unspecified altered mental status type  Assessment and Plan of Treatment:       SECONDARY DISCHARGE DIAGNOSES  Diagnosis: Chronic systolic heart failure  Assessment and Plan of Treatment: Continue lopressor, midodrine, lipitor, ASA.    Diagnosis: Failure to thrive in adult  Assessment and Plan of Treatment: s/p Peg tube    Diagnosis: Fever, unspecified fever cause  Assessment and Plan of Treatment: completed Zosyn course     PRINCIPAL DISCHARGE DIAGNOSIS  Diagnosis: Altered mental status, unspecified altered mental status type  Assessment and Plan of Treatment:       SECONDARY DISCHARGE DIAGNOSES  Diagnosis: Encounter for skin care  Assessment and Plan of Treatment: Left gluteal fold and sacral fold: Cleanse with skin cleanser, pat dry. Apply Critic-aid clear twice a day and PRN.  Left buttock: Cleanse with NS, pat dry. Apply Liquid barrier film to periwound skin. Apply foam with border. Change daily.  Penile: Cleanse with NS, pat dry. Apply Critic-aid clear twice a day and PRN.      Diagnosis: Chronic systolic heart failure  Assessment and Plan of Treatment: Continue lopressor, midodrine, lipitor, ASA.    Diagnosis: Failure to thrive in adult  Assessment and Plan of Treatment: s/p Peg tube    Diagnosis: Fever, unspecified fever cause  Assessment and Plan of Treatment: completed Zosyn course

## 2021-03-20 NOTE — PROGRESS NOTE ADULT - SUBJECTIVE AND OBJECTIVE BOX
INTERVAL HPI/OVERNIGHT EVENTS:  no reported events as per nursing staff  mahesh peg feeds no residuals  in HD         MEDICATIONS  (STANDING):  atorvastatin 10 milliGRAM(s) Oral at bedtime  chlorhexidine 4% Liquid 1 Application(s) Topical daily  cinacalcet 90 milliGRAM(s) Oral daily  citalopram 20 milliGRAM(s) Oral daily  dextrose 40% Gel 15 Gram(s) Oral once  dextrose 50% Injectable 25 Gram(s) IV Push once  dextrose 50% Injectable 12.5 Gram(s) IV Push once  dextrose 50% Injectable 25 Gram(s) IV Push once  glucagon  Injectable 1 milliGRAM(s) IntraMuscular once  lactobacillus acidophilus 1 Tablet(s) Oral daily  mupirocin 2% Ointment 1 Application(s) Both Nostrils two times a day  polyethylene glycol 3350 17 Gram(s) Oral daily  senna Syrup 10 milliLiter(s) Oral daily  sodium zirconium cyclosilicate 10 Gram(s) Oral <User Schedule>    MEDICATIONS  (PRN):  midodrine. 10 milliGRAM(s) Oral <User Schedule> PRN SBP < 90 on HD      Allergies    No Known Allergies    Intolerances            Review of Systems: *confused, answers simple questions    General:  No wt loss, fevers, chills, night sweats, fatigue   Eyes:  Good vision, no reported pain  ENT:  No sore throat, pain, runny nose, dysphagia  CV:  No pain, palpitations, hypo/hypertension  Resp:  No dyspnea, cough, tachypnea, wheezing  GI:  No pain, No nausea, No vomiting, No diarrhea, No constipation, No weight loss, No fever, No pruritis, No rectal bleeding, No melena, No dysphagia  :  No pain, bleeding, incontinence, nocturia  Muscle:  No pain, weakness  Neuro:  No weakness, tingling, memory problems  Psych:  No fatigue, insomnia, mood problems, depression  Endocrine:  No polyuria, polydypsia, cold/heat intolerance  Heme:  No petechiae, ecchymosis, easy bruisability  Skin:  No rash, tattoos, scars, edema    Vital Signs Last 24 Hrs  T(C): 36.6 (20 Mar 2021 10:35), Max: 37.1 (20 Mar 2021 06:30)  T(F): 97.8 (20 Mar 2021 10:35), Max: 98.7 (20 Mar 2021 06:30)  HR: 88 (20 Mar 2021 10:35) (83 - 97)  BP: 102/62 (20 Mar 2021 10:35) (102/62 - 131/79)  BP(mean): --  RR: 17 (20 Mar 2021 10:35) (17 - 18)  SpO2: 100% (20 Mar 2021 10:35) (98% - 100%)      PHYSICAL EXAM:    Constitutional: NAD  HEENT: EOMI, throat clear  Neck: No LAD, supple  Respiratory: CTA and P  Cardiovascular: S1 and S2, RRR, no M  Gastrointestinal: BS+, soft, NT/ND, neg HSM, +peg  Extremities: No peripheral edema, neg clubbing, cyanosis  Vascular: 2+ peripheral pulses  Neurological: A/O x 1-2, no focal deficits  Psychiatric: Normal mood, normal affect  Skin: No rashes      LABS:                        9.4    6.67  )-----------( 74       ( 20 Mar 2021 07:41 )             29.1     03-20    129<L>  |  89<L>  |  54<H>  ----------------------------<  143<H>  3.8   |  27  |  3.78<H>    Ca    9.7      20 Mar 2021 07:41  Phos  3.6     03-19  Mg     2.3     03-19    TPro  6.1  /  Alb  2.8<L>  /  TBili  1.0  /  DBili  x   /  AST  131<H>  /  ALT  127<H>  /  AlkPhos  248<H>  03-20 03-19-21 @ 07:01 - 03-20-21 @ 07:00  --------------------------------------------------------  IN: 0 mL / OUT: 0 mL / NET: 0 mL    03-20-21 @ 07:01  - 03-20-21 @ 10:41  --------------------------------------------------------  IN: 900 mL / OUT: 400 mL / NET: 500 mL                  RADIOLOGY & ADDITIONAL TESTS:

## 2021-03-20 NOTE — CHART NOTE - NSCHARTNOTEFT_GEN_A_CORE
Nutrition Follow-Up Note   Reason for follow-up: Nutrition consult for tube feeding.    Clinical Course history: Patient with medical history of CAD/stent, sCHF LVEF 12%, BPH, neurogenic bladder, ESRD on HD w/ chronic anemia, MRSA on vibramycin p/w sepsis POA from UTI with acute metabolic encephalopathy, hypotension, c/b hypoglycemia and hypothermia. S/p PEG.    Diet Order: Diet, NPO with Tube Feed:   Tube Feeding Modality: Gastrostomy  Nepro with Carb Steady (NEPRORTH)  Total Volume for 24 Hours (mL): 960  Continuous  Starting Tube Feed Rate {mL per Hour}: 20  Increase Tube Feed Rate by (mL): 10     Every 4 hours  Until Goal Tube Feed Rate (mL per Hour): 40  Tube Feed Duration (in Hours): 24  Tube Feed Start Time: 09:10 (03-18-21 @ 09:05)    CURRENT EN ORDER PROVIDES:  Total Volume: 960mL/day  Energy: 1728Kcal/day  Protein: 78g protein/day  Free Water: 698mL/day    Nutrition Related Information: - Patient receiving tube feeds of Nepro at current goal rate or 40mL/hr. However, patient with ongoing episodes of hypoglycemia. Patient was on D5W and Finger sticks improved. D5W d/c and blood glucose levels decreased again. Glucose as low as <25 on 3/18. Most recent Finger sticks today: 89, 75.   - RN confirms that feeds have been running continuously. Patient not on insulin.   - Patient resumed on D5W at this time.   - Patient has been on Nepro 2/2 renal dysfunction and previous hyperkalemia and hyperphosphatemia.   - Patient continues on dialysis.  K+ and phosphorous  have been trending within desirable limits.   - Current feeding regimen provides ~154gm of carbohydrates daily.   - Given persistent hypoglycemia, will transition to formula with higher carbohydrate content (TwoCal HN). At goal volume patient will receive ~199gm carbohydrates per day. Will need to monitor Phosphorous and K+ levels to ensure they remain normal. TwoCal is a concentrated formula (2Cal/mL) so will need to consider provision of additional free water as appropriate.     GI: - RN reports no BM x 2 days. Recovering Senna and Miralax.   - No vomiting or abdominal distention reported.    Anthropometric Measurements:   Height (cm): 167.6 (03-17-21)  Weight (kg): 63.5 (3/20, post-HD), 60.4 (3/18, post-HD), 54.5 (3/17, dosing weight), 57.5 (3/16, post-HD), 59.1 (3/14), 60 (3/06, post-HD)  **noted weight change, possibly due to fluid shifts given dialysis.  BMI (kg/m2): 19.4 (3/17)  IBW: 64.5kg +/-10%    Medications: MEDICATIONS  (STANDING):  atorvastatin 10 milliGRAM(s) Oral at bedtime  cinacalcet 90 milliGRAM(s) Oral daily  citalopram 20 milliGRAM(s) Oral daily  dextrose 5%. 1000 milliLiter(s) (30 mL/Hr) IV Continuous <Continuous>  lactobacillus acidophilus 1 Tablet(s) Oral daily  polyethylene glycol 3350 17 Gram(s) Oral daily  senna Syrup 10 milliLiter(s) Oral daily  sodium zirconium cyclosilicate 10 Gram(s) Oral <User Schedule>    MEDICATIONS  (PRN):  midodrine. 10 milliGRAM(s) Oral <User Schedule> PRN SBP < 90 on HD    Labs: 03-20 @ 07:41: Sodium 129<L>, Potassium 3.8, Calcium 9.7, BUN 54<H>, Creatinine 3.78<H>, Glucose 143<H>, Alk Phos 248<H>, ALT/SGPT 127<H>, AST/SGOT 131<H>, Albumin 2.8<L>, Total Bilirubin 1.0, Hemoglobin 9.4<L>, Hematocrit 29.1<L>    POCT Blood Glucose.: 89 mg/dL (03-20-21 @ 13:37)  POCT Blood Glucose.: 75 mg/dL (03-20-21 @ 12:20)  POCT Blood Glucose.: 98 mg/dL (03-20-21 @ 09:05)  POCT Blood Glucose.: 113 mg/dL (03-20-21 @ 04:55)  POCT Blood Glucose.: 91 mg/dL (03-20-21 @ 00:40)  POCT Blood Glucose.: 83 mg/dL (03-19-21 @ 20:43)  POCT Blood Glucose.: 94 mg/dL (03-19-21 @ 16:23)    Skin: left lateral buttock stage 2 pressure injury  Edema: 1+ generalized, 2+ left/right hand    Estimated Nutrition Needs: calculated using most recent post-HD weight 63.5kg   Estimated Energy Needs (25-30KCal/kg): 1587-1905Kcal/day  Estimated Protein Needs (1.5-2gm/kg): 95-127gm protein/day    Previous Nutrition Diagnosis: Malnutrition, severe  Diagnosis is ongoing    Nutrition Interventions/Recommendations:   1. Recommend TwoCal HN via PEG @ 38mL/hr x24 hours + No Carb Prosource (1pkg = 15 gm protein) 2x daily [provides 912mL total volume, 1824Kcal, 106gm protein and 638mL free water daily].  2. Additional free water per physician discretion.   3. Consider Nephro-robert daily for micronutrient coverage.  4. Bowel regimen as needed.   5. Trend glucose levels and electrolytes.  6. Further adjustments to rate/volume/duration/free water provision of enteral feeds will be determined in accordance with long term patient tolerance, needs and weight trends.     Monitoring and Evaluation:   Monitor nutrition provision, tolerance to diet, weights, labs, skin integrity and GI function.   RD remains available, please consult as needed. Will follow up per protocol.  Debra Fraser, MS, RD, CDN, CNSC Pager #[phone]

## 2021-03-20 NOTE — PROGRESS NOTE ADULT - ASSESSMENT
No pain, no shortness of breath    Review of systems: As per HPI, otherwise ROS unremarkable.     atorvastatin 10 milliGRAM(s) Oral at bedtime  chlorhexidine 4% Liquid 1 Application(s) Topical daily  cinacalcet 90 milliGRAM(s) Oral daily  citalopram 20 milliGRAM(s) Oral daily  dextrose 40% Gel 15 Gram(s) Oral once  dextrose 50% Injectable 25 Gram(s) IV Push once  dextrose 50% Injectable 12.5 Gram(s) IV Push once  dextrose 50% Injectable 25 Gram(s) IV Push once  glucagon  Injectable 1 milliGRAM(s) IntraMuscular once  lactobacillus acidophilus 1 Tablet(s) Oral daily  midodrine. 10 milliGRAM(s) Oral <User Schedule> PRN  mupirocin 2% Ointment 1 Application(s) Both Nostrils two times a day  polyethylene glycol 3350 17 Gram(s) Oral daily  senna Syrup 10 milliLiter(s) Oral daily  sodium zirconium cyclosilicate 10 Gram(s) Oral <User Schedule>      VITAL:  T(C): , Max: 37.1 (03-20-21 @ 06:30)  T(F): , Max: 98.7 (03-20-21 @ 06:30)  HR: 97 (03-20-21 @ 06:30)  BP: 112/66 (03-20-21 @ 06:30)  BP(mean): --  RR: 18 (03-20-21 @ 06:30)  SpO2: 99% (03-20-21 @ 04:59)  Wt(kg): --    03-19-21 @ 07:01  -  03-20-21 @ 07:00  --------------------------------------------------------  IN: 0 mL / OUT: 0 mL / NET: 0 mL        PHYSICAL EXAM:  Constitutional: lethargic but alert; frail  HEENT: DMM  Neck: Supple, No JVD  Respiratory: CTA-R; decreased BS L base  Cardiovascular: RRR s1s2, no m/r/g  Gastrointestinal: BS+, soft, NT/ND; (+)PEG  Extremities: No peripheral edema b/l  Neurological: reduced generalized strength  Back: no CVAT b/l  Skin: No rashes, no nevi  Access: LUE AVF (+)thrill    LABS:                          9.4    6.67  )-----------( 74       ( 20 Mar 2021 07:41 )             29.1     Na(129)/K(3.8)/Cl(89)/HCO3(27)/BUN(54)/Cr(3.78)Glu(143)/Ca(9.7)/Mg(--)/PO4(--)    03-20 @ 07:41  Na(130)/K(3.7)/Cl(91)/HCO3(29)/BUN(39)/Cr(3.23)Glu(114)/Ca(9.1)/Mg(2.3)/PO4(3.6)    03-19 @ 08:14  Na(--)/K(--)/Cl(--)/HCO3(--)/BUN(--)/Cr(--)Glu(92)/Ca(--)/Mg(--)/PO4(--)    03-18 @ 17:24  Na(128)/K(3.9)/Cl(90)/HCO3(25)/BUN(58)/Cr(4.15)Glu(114)/Ca(9.1)/Mg(2.5)/PO4(4.5)    03-18 @ 07:30            ASSESSMENT/PLAN    IMPRESSION: 70M w/ dementia, CAD, past epidural abscess, and ESRD, 3/2/21 a/w AMS    (1)Renal - ESRD - HD TTS - due for next HD tomorrow    (2)Hyponatremia - improved, s/p HD yesterday    (3)CV - tenuous hemodynamics - on Midodrine with HD, BP currently acceptable    (4)Anemia-hgb improving    (4)GI - now with PEG in place      RECOMMEND:  (1)Next HD tomorrow - inpatient versus outpatient - 0.5kg UF as able      Dc Henriquez NP-BC  [facility]  [phone]   Seen s/p HD this AM    500ml fluid removed.  He is stable and states  he is fine.    atorvastatin 10 milliGRAM(s) Oral at bedtime  chlorhexidine 4% Liquid 1 Application(s) Topical daily  cinacalcet 90 milliGRAM(s) Oral daily  citalopram 20 milliGRAM(s) Oral daily  dextrose 40% Gel 15 Gram(s) Oral once  dextrose 50% Injectable 25 Gram(s) IV Push once  dextrose 50% Injectable 12.5 Gram(s) IV Push once  dextrose 50% Injectable 25 Gram(s) IV Push once  glucagon  Injectable 1 milliGRAM(s) IntraMuscular once  lactobacillus acidophilus 1 Tablet(s) Oral daily  midodrine. 10 milliGRAM(s) Oral <User Schedule> PRN  mupirocin 2% Ointment 1 Application(s) Both Nostrils two times a day  polyethylene glycol 3350 17 Gram(s) Oral daily  senna Syrup 10 milliLiter(s) Oral daily  sodium zirconium cyclosilicate 10 Gram(s) Oral <User Schedule>      VITAL:  T(C): , Max: 37.1 (03-20-21 @ 06:30)  T(F): , Max: 98.7 (03-20-21 @ 06:30)  HR: 97 (03-20-21 @ 06:30)  BP: 112/66 (03-20-21 @ 06:30)  BP(mean): --  RR: 18 (03-20-21 @ 06:30)  SpO2: 99% (03-20-21 @ 04:59)  Wt(kg): --    03-19-21 @ 07:01  -  03-20-21 @ 07:00  --------------------------------------------------------  IN: 0 mL / OUT: 0 mL / NET: 0 mL        PHYSICAL EXAM:  Constitutional: lethargic but alert; frail  HEENT: DMM  Neck: Supple, No JVD  Respiratory: CTA-R; decreased BS L base  Cardiovascular: RRR s1s2, no m/r/g  Gastrointestinal: BS+, soft, NT/ND; (+)PEG  Extremities: No peripheral edema b/l  Neurological: reduced generalized strength  Back: no CVAT b/l  Skin: No rashes, no nevi  Access: LUE AVF (+)thrill    LABS:                          9.4    6.67  )-----------( 74       ( 20 Mar 2021 07:41 )             29.1     Na(129)/K(3.8)/Cl(89)/HCO3(27)/BUN(54)/Cr(3.78)Glu(143)/Ca(9.7)/Mg(--)/PO4(--)    03-20 @ 07:41  Na(130)/K(3.7)/Cl(91)/HCO3(29)/BUN(39)/Cr(3.23)Glu(114)/Ca(9.1)/Mg(2.3)/PO4(3.6)    03-19 @ 08:14  Na(--)/K(--)/Cl(--)/HCO3(--)/BUN(--)/Cr(--)Glu(92)/Ca(--)/Mg(--)/PO4(--)    03-18 @ 17:24  Na(128)/K(3.9)/Cl(90)/HCO3(25)/BUN(58)/Cr(4.15)Glu(114)/Ca(9.1)/Mg(2.5)/PO4(4.5)    03-18 @ 07:30        ASSESSMENT/PLAN    IMPRESSION: 70M w/ dementia, CAD, past epidural abscess, and ESRD, 3/2/21 a/w AMS    (1)Renal - ESRD - HD TTS - s/p HD today.  Next HD Tuesday    (2)Hyponatremia - should improve with HD today    (3)CV - tenuous hemodynamics - on Midodrine with HD, BP currently acceptable    (4)Anemia-hgb improving    (5)GI - tube feeds via  PEG       RECOMMEND:  (1)Next HD  as prescribed        Dc Henriquez NP-BC  [employer]  [phone]

## 2021-03-20 NOTE — PROGRESS NOTE ADULT - SUBJECTIVE AND OBJECTIVE BOX
[facility] - Department of Medicine   Katie Carpio    Pager: [phone]  Cell: [phone]    SUBJECTIVE / OVERNIGHT EVENTS: HD in the AM with low diastolic pressure 22 post dialysis but improvement to the 60s. Patient unreliable historian but awake and responds with very few words at times. Does not appear in acute distress.     MEDICATIONS  (STANDING):  atorvastatin 10 milliGRAM(s) Oral at bedtime  chlorhexidine 4% Liquid 1 Application(s) Topical daily  cinacalcet 90 milliGRAM(s) Oral daily  citalopram 20 milliGRAM(s) Oral daily  dextrose 40% Gel 15 Gram(s) Oral once  dextrose 5%. 1000 milliLiter(s) (30 mL/Hr) IV Continuous <Continuous>  dextrose 50% Injectable 25 Gram(s) IV Push once  dextrose 50% Injectable 12.5 Gram(s) IV Push once  dextrose 50% Injectable 25 Gram(s) IV Push once  glucagon  Injectable 1 milliGRAM(s) IntraMuscular once  lactobacillus acidophilus 1 Tablet(s) Oral daily  mupirocin 2% Ointment 1 Application(s) Both Nostrils two times a day  polyethylene glycol 3350 17 Gram(s) Oral daily  senna Syrup 10 milliLiter(s) Oral daily  sodium zirconium cyclosilicate 10 Gram(s) Oral <User Schedule>    MEDICATIONS  (PRN):  midodrine. 10 milliGRAM(s) Oral <User Schedule> PRN SBP < 90 on HD      Vital Signs Last 24 Hrs  T(C): 36.4 (20 Mar 2021 20:54), Max: 37.1 (20 Mar 2021 06:30)  T(F): 97.5 (20 Mar 2021 20:54), Max: 98.7 (20 Mar 2021 06:30)  HR: 97 (20 Mar 2021 20:54) (88 - 97)  BP: 105/72 (20 Mar 2021 20:54) (102/62 - 131/79)  BP(mean): --  RR: 18 (20 Mar 2021 20:54) (17 - 18)  SpO2: 99% (20 Mar 2021 20:54) (99% - 100%)  CAPILLARY BLOOD GLUCOSE      POCT Blood Glucose.: 98 mg/dL (20 Mar 2021 21:05)  POCT Blood Glucose.: 94 mg/dL (20 Mar 2021 18:37)  POCT Blood Glucose.: 73 mg/dL (20 Mar 2021 17:42)  POCT Blood Glucose.: 89 mg/dL (20 Mar 2021 13:37)  POCT Blood Glucose.: 75 mg/dL (20 Mar 2021 12:20)  POCT Blood Glucose.: 98 mg/dL (20 Mar 2021 09:05)  POCT Blood Glucose.: 113 mg/dL (20 Mar 2021 04:55)  POCT Blood Glucose.: 91 mg/dL (20 Mar 2021 00:40)      PHYSICAL EXAM:  GENERAL: NAD, thin, malnourished   HEAD:  Atraumatic, Normocephalic  EYES: EOMI, conjunctiva and sclera clear  NECK: Supple, No JVD  CHEST/LUNG: Clear to auscultation bilaterally; No wheeze  HEART: Regular rate and rhythm; No murmurs, rubs, or gallops  ABDOMEN: PEG tube with abdominal binder   EXTREMITIES: Moves extremities, No clubbing or cyanosis   PSYCH: AAOx3  NEUROLOGY: non-focal  SKIN: Penile skin breakdown noticed, sacral ulcer     LABS:                        9.4    6.67  )-----------( 74       ( 20 Mar 2021 07:41 )             29.1     03-20    129<L>  |  89<L>  |  54<H>  ----------------------------<  143<H>  3.8   |  27  |  3.78<H>    Ca    9.7      20 Mar 2021 07:41  Phos  3.6     03-19  Mg     2.3     03-19    TPro  6.1  /  Alb  2.8<L>  /  TBili  1.0  /  DBili  x   /  AST  131<H>  /  ALT  127<H>  /  AlkPhos  248<H>  03-20              RADIOLOGY & ADDITIONAL TESTS:

## 2021-03-20 NOTE — PROGRESS NOTE ADULT - ASSESSMENT
71 y/o M w/ hx ESRD on HD, anemia, hyperparathyroidism, CAD s/p stent, BPH w/ neurogenic bladder (last dialysis 2/27/21), cognitive impairment, that was brought in by EMS from [facility] for AMS.    EKG: NSR LVH   Echo 2/21 - Severe systolic dysfunction   Tele: NSR, 6-8 bts NSVT      1) Chronic severe systolic dysfunction  - on midodrine PRN for hypotension,  -hold metoprolol  -not on OMT secondary to BP    2) PEG placement   - pt is non verbal, EKG ok , moderate risk for PEG placement   -s/p PEG doing well     3) Thrombocytopenia   - will hold asa    4) ESRD  -on HD  -f/u renal

## 2021-03-20 NOTE — PROGRESS NOTE ADULT - SUBJECTIVE AND OBJECTIVE BOX
Alex Navarrete MD  Interventional Cardiology / Endovascular Specialist  [place] Office : [phone] [place] [address] [place]. [address]  Tel:   [place] Office : [address] [facility] [place] [address]  Tel: [phone]  Cell : [phone]    Subjective/Overnight events: Patient lying in bed comfortably. No acute distress. s/p PEG  	  MEDICATIONS:  midodrine. 10 milliGRAM(s) Oral <User Schedule> PRN        citalopram 20 milliGRAM(s) Oral daily    polyethylene glycol 3350 17 Gram(s) Oral daily  senna Syrup 10 milliLiter(s) Oral daily    atorvastatin 10 milliGRAM(s) Oral at bedtime  cinacalcet 90 milliGRAM(s) Oral daily  dextrose 40% Gel 15 Gram(s) Oral once  dextrose 50% Injectable 25 Gram(s) IV Push once  dextrose 50% Injectable 12.5 Gram(s) IV Push once  dextrose 50% Injectable 25 Gram(s) IV Push once  glucagon  Injectable 1 milliGRAM(s) IntraMuscular once    chlorhexidine 4% Liquid 1 Application(s) Topical daily  dextrose 5%. 1000 milliLiter(s) IV Continuous <Continuous>  mupirocin 2% Ointment 1 Application(s) Both Nostrils two times a day      PAST MEDICAL/SURGICAL HISTORY  PAST MEDICAL & SURGICAL HISTORY:  Inguinal hernia    NSTEMI (non-ST elevated myocardial infarction)    HLD (hyperlipidemia)    Neurogenic bladder    Spinal abscess    Cavitary lung disease    CAD (coronary artery disease)    Abscess of sacrum    Anemia due to chronic renal failure treated with erythropoietin, stage 2 (mild)    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 device, implant or graft    HPTH (hyperparathyroidism)  Secondary    HTN (hypertension)    ESRD (end stage renal disease)    Cavitary lung disease    CAD in native artery    Hypertension    ESRD (end stage renal disease)    S/P primary angioplasty with coronary stent    Kidney abscess        SOCIAL HISTORY: Substance Use (street drugs): ( x ) never used  (  ) other:    FAMILY HISTORY:  No pertinent family history in first degree relatives    Family history of breast cancer (Sibling)        REVIEW OF SYSTEMS:  unable to accurately obtained     PHYSICAL EXAM:  T(C): 36.4 (03-20-21 @ 20:54), Max: 37.1 (03-20-21 @ 06:30)  HR: 97 (03-20-21 @ 20:54) (88 - 97)  BP: 105/72 (03-20-21 @ 20:54) (102/62 - 131/79)  RR: 18 (03-20-21 @ 20:54) (17 - 18)  SpO2: 99% (03-20-21 @ 20:54) (99% - 100%)  Wt(kg): --  I&O's Summary    19 Mar 2021 07:01  -  20 Mar 2021 07:00  --------------------------------------------------------  IN: 0 mL / OUT: 0 mL / NET: 0 mL    20 Mar 2021 07:01  -  20 Mar 2021 22:39  --------------------------------------------------------  IN: 900 mL / OUT: 400 mL / NET: 500 mL      EYES: EOMI, PERRLA   Cardiovascular: Normal S1 S2, No JVD, No murmurs, No edema  Respiratory: b/l rhonchi  Gastrointestinal:  Soft, s/p PEG	  Extremities:no edema                                9.4    6.67  )-----------( 74       ( 20 Mar 2021 07:41 )             29.1     03-20    129<L>  |  89<L>  |  54<H>  ----------------------------<  143<H>  3.8   |  27  |  3.78<H>    Ca    9.7      20 Mar 2021 07:41  Phos  3.6     03-19  Mg     2.3     03-19    TPro  6.1  /  Alb  2.8<L>  /  TBili  1.0  /  DBili  x   /  AST  131<H>  /  ALT  127<H>  /  AlkPhos  248<H>  03-20    proBNP:   Lipid Profile:   HgA1c:   TSH:     Consultant(s) Notes Reviewed:  [x ] YES  [ ] NO    Care Discussed with Consultants/Other Providers [ x] YES  [ ] NO    Imaging Personally Reviewed independently:  [x] YES  [ ] NO    All labs, radiologic studies, vitals, orders and medications list reviewed. Patient is seen and examined at bedside. Case discussed with medical team.

## 2021-03-20 NOTE — PROVIDER CONTACT NOTE (OTHER) - ACTION/TREATMENT ORDERED:
ACP Hope called and made aware. Per ACP, patient will start dextrose 5@ 30cc/hr, awaiting order, will re-check fs.

## 2021-03-20 NOTE — PROGRESS NOTE ADULT - ASSESSMENT
71 y/o M w/ hx ESRD on HD, anemia, hyperparathyroidism, CAD s/p stent, BPH w/ neurogenic bladder (last dialysis 2/27/21), cognitive impairment, that was brought in by EMS from [facility] for AMS. GI consulted for PEG eval 2/2 FTT

## 2021-03-21 NOTE — PROGRESS NOTE ADULT - PROBLEM/PLAN-1
As per findings on CT scan.  The patient is on IV Flagyl for this.  She has not had diarrhea since admission.      
DISPLAY PLAN FREE TEXT

## 2021-03-21 NOTE — PROGRESS NOTE ADULT - PROBLEM SELECTOR PLAN 6
Careful fluid management with HD  Continue lopressor, midodrine, lipitor, ASA.
Careful fluid management with HD  Continue lopressor, midodrine, lipitor, ASA.
Continue sensipar, celexa.
Careful fluid management with HD  Continue lopressor, midodrine, lipitor, ASA.
c/w HD, had session this AM  Appreciate renal recs
Careful fluid management with HD  Continue lopressor, midodrine, lipitor, ASA.
c/w HD, had session this AM  Appreciate renal recs
Careful fluid management with HD  Continue lopressor, midodrine, lipitor, ASA.
Careful fluid management with HD  Continue lopressor, midodrine, lipitor, ASA.
c/w HD, had session this AM  Appreciate renal recs
Careful fluid management with HD  Continue lopressor, midodrine, lipitor, ASA.
Dr. Huff for HD management.  Difficulty with HD session due to hypotension - will attempt to give some IVF hydration in hopes of getting him through HD.
c/w HD, had session this AM  Appreciate renal recs
c/w HD, had session this AM  Appreciate renal recs
Careful fluid management with HD  Continue lopressor, midodrine, lipitor, ASA.
c/w HD, had session this AM  Appreciate renal recs
Careful fluid management with HD  Continue lopressor, midodrine, lipitor, ASA.

## 2021-03-21 NOTE — PROGRESS NOTE ADULT - PROBLEM SELECTOR PLAN 5
Pulmonary consult appreciated - no intervention.
Pulmonary consult appreciated - no intervention.
Careful fluid management with HD  Continue lopressor, midodrine, lipitor, ASA.
Careful fluid management with HD  Continue lopressor, midodrine, lipitor, ASA.
Pulmonary consult appreciated - no intervention.
Pulmonary consult appreciated - no intervention.
Careful fluid management with HD  Continue lopressor, midodrine, lipitor, ASA.
Careful fluid management with HD  Continue lopressor, midodrine, lipitor, ASA.
Pulmonary consult appreciated - no intervention.
Careful fluid management with HD  Continue lopressor, midodrine, lipitor, ASA.
Dr. Huff for HD management.
Pulmonary consult appreciated - no intervention.
Careful fluid management with HD  Continue lopressor, midodrine, lipitor, ASA.
Careful fluid management with HD  Continue lopressor, midodrine, lipitor, ASA.
Pulmonary consult appreciated - no intervention.
Pulmonary consult appreciated - no intervention.
Careful fluid management with HD  Continue lopressor, midodrine, lipitor, ASA.
Pulmonary consult appreciated - no intervention.
Careful fluid management with HD  Continue lopressor, midodrine, lipitor, ASA.

## 2021-03-21 NOTE — PROGRESS NOTE ADULT - ASSESSMENT
69 y/o M w/ hx ESRD on HD, anemia, hyperparathyroidism, CAD s/p stent, BPH w/ neurogenic bladder (last dialysis 2/27/21), cognitive impairment, that was brought in by EMS from [facility] for AMS.    EKG: NSR LVH   Echo 2/21 - Severe systolic dysfunction   Tele: NSR, 6-8 bts NSVT      1) Chronic severe systolic dysfunction  - on midodrine PRN for hypotension,  -hold metoprolol  -not on OMT secondary to BP    2) PEG placement    -s/p PEG doing well     3) Thrombocytopenia   - will hold asa    4) ESRD  -on HD  -f/u renal 69 y/o M w/ hx ESRD on HD, anemia, hyperparathyroidism, CAD s/p stent, BPH w/ neurogenic bladder (last dialysis 2/27/21), cognitive impairment, that was brought in by EMS from [facility] for AMS.    EKG: NSR LVH   Echo 2/21 - Severe systolic dysfunction   Tele: NSR, 6-8 bts NSVT      1) Chronic severe systolic dysfunction  - on midodrine PRN for hypotension  - not on OMT secondary to BP  - 36 beats of VT restart metoprolol 25 mg po BiD    2) PEG placement    -s/p PEG doing well     3) Thrombocytopenia   - will hold asa    4) ESRD  -on HD  -f/u renal

## 2021-03-21 NOTE — PROGRESS NOTE ADULT - PROBLEM SELECTOR PROBLEM 3
ACP (advance care planning)
Altered mental status, unspecified altered mental status type
Hypoglycemia
Hypoglycemia
Altered mental status, unspecified altered mental status type
Pleural effusion
Altered mental status, unspecified altered mental status type
Hypoglycemia
ACP (advance care planning)
ACP (advance care planning)
Hypoglycemia
Altered mental status, unspecified altered mental status type
Altered mental status, unspecified altered mental status type
Hypoglycemia
Altered mental status, unspecified altered mental status type
Altered mental status, unspecified altered mental status type
Hypoglycemia
Altered mental status, unspecified altered mental status type
Hypoglycemia
Hypoglycemia
Altered mental status, unspecified altered mental status type
Hypoglycemia

## 2021-03-21 NOTE — PROGRESS NOTE ADULT - PROBLEM SELECTOR PROBLEM 2
Hypoglycemia
Urinary tract infection without hematuria, site unspecified
Urinary tract infection without hematuria, site unspecified
Hypoglycemia
Urinary tract infection without hematuria, site unspecified
Hypoglycemia
Hypoglycemia
Urinary tract infection without hematuria, site unspecified
Hypoglycemia
Urinary tract infection without hematuria, site unspecified
Hypoglycemia
Urinary tract infection without hematuria, site unspecified
Hypoglycemia
Hypoglycemia
Urinary tract infection without hematuria, site unspecified
Hypoglycemia

## 2021-03-21 NOTE — PROGRESS NOTE ADULT - PROBLEM SELECTOR PROBLEM 1
Failure to thrive in adult
Sepsis, due to unspecified organism, unspecified whether acute organ dysfunction present
Failure to thrive in adult
Sepsis, due to unspecified organism, unspecified whether acute organ dysfunction present
Sepsis, due to unspecified organism, unspecified whether acute organ dysfunction present
Failure to thrive in adult
Sepsis, due to unspecified organism, unspecified whether acute organ dysfunction present
Altered mental status, unspecified altered mental status type
Sepsis, due to unspecified organism, unspecified whether acute organ dysfunction present

## 2021-03-21 NOTE — PROGRESS NOTE ADULT - PROBLEM SELECTOR PROBLEM 8
Transaminitis
Dementia without behavioral disturbance, unspecified dementia type
Dementia without behavioral disturbance, unspecified dementia type
Hyperkalemia
Transaminitis
Dementia without behavioral disturbance, unspecified dementia type
Transaminitis
Transaminitis
Dementia without behavioral disturbance, unspecified dementia type
Transaminitis
Dementia without behavioral disturbance, unspecified dementia type
Dementia without behavioral disturbance, unspecified dementia type
Transaminitis
Dementia without behavioral disturbance, unspecified dementia type
Transaminitis
Dementia without behavioral disturbance, unspecified dementia type
Transaminitis
Transaminitis
Dementia without behavioral disturbance, unspecified dementia type

## 2021-03-21 NOTE — PROGRESS NOTE ADULT - PROBLEM SELECTOR PLAN 1
-2/2 AMS; passed swallow eval but with low PO intake  -PEG placed 3/17; please keep abdominal binder in place to prevent pulling at it  -aspiration precautions with peg feeds
completed 5 days abx  unclear source  cx neg  completed an additional 7 days zosyn
completed 5 days abx  unclear source  cx neg  completed zosyn
completed 5 days abx  unclear source  cx neg  resume abx for now zosyn vanco by dose
completed 5 days abx  unclear source  cx neg  resume abx for now zosyn, vanco by dose, will give for 7 days total
-2/2 AMS; passed swallow eval but with low PO intake  -PEG placed 3/17; please keep abdominal binder in place to prevent pulling at it  -aspiration precautions with peg feeds
IVF  ppi   aspiration precautions  egd/peg on Tues
POA from suspected UTI  Continue zosyn/vanco IV  Despite being ESRD, given bolus of IVF.  Monitor CBC
completed 5 days abx  unclear source  cx neg  resume abx for now zosyn, vanco by dose, will give for 7 days total
concern for infectious etiology - UTI  improved with treatment.
-2/2 AMS; passed swallow eval but with low PO intake  -PEG placed 3/17; please keep abdominal binder in place to prevent pulling at it  -aspiration precautions with peg feeds
completed 5 days abx  unclear source  cx neg  completed zosyn    #VTACH on tele:   - resumed metoprolol 25 BID  - cardiology following
completed 5 days abx  unclear source  cx neg  resume abx for now zosyn, vanco by dose, will give for 7 days total
-2/2 AMS; passed swallow eval but with low PO intake  -PEG placed 3/17; please keep abdominal binder in place to prevent pulling at it  -aspiration precautions with peg feeds
-2/2 AMS; passed swallow eval but with low PO intake  -PEG tube for supplemental nutrition to prevent hypoglycemic events 2/2 low PO   -aspiration precautions   -NPO p MN for EGD/PEG tomorrow; please optimize electrolytes   -cardiology clearance appreciated
completed 5 days abx  unclear source  cx neg  completed an additional 7 days zosyn
completed 5 days abx  unclear source  cx neg  completed an additional 7 days zosyn
-2/2 AMS; passed swallow eval but with low PO intake  -PEG tube for supplemental nutrition to prevent hypoglycemic events 2/2 low PO   -aspiration precautions   -NPO p MN for EGD/PEG tomorrow; please optimize electrolytes   -cardiology clearance appreciated
POA from suspected UTI  Continue zosyn/vanco IV complete 5 days today  Despite being ESRD, given bolus of IVF.  Monitor CBC
completed 5 days abx  unclear source  cx neg  resume abx for now zosyn vanco by dose
completed 5 days abx  unclear source  cx neg  completed an additional 7 days zosyn
completed 5 days abx  unclear source  cx neg  resume abx for now zosyn, vanco by dose, will give for 7 days total
completed 5 days abx  unclear source  cx neg  completed an additional 7 days zosyn
completed 5 days abx  unclear source  cx neg  resume abx for now zosyn vanco by dose
POA from suspected UTI  Continue zosyn/vanco IV  Despite being ESRD, given bolus of IVF.  Monitor CBC
completed 5 days abx  unclear source  cx neg

## 2021-03-21 NOTE — CHART NOTE - NSCHARTNOTEFT_GEN_A_CORE
Patient s/p 5 mg Midodrine and 250 cc bolus of NS over 1 hour. Patient's blood pressure 80/50. Discussed situation with Hospitalist Dr. Pennington who recommended giving an additional 5 mg of Midodrine. Midodrine ordered. Will recheck blood pressure in 1 hour.

## 2021-03-21 NOTE — PROGRESS NOTE ADULT - PROBLEM SELECTOR PROBLEM 5
Chronic systolic heart failure
Pleural effusion
Pleural effusion
Chronic systolic heart failure
ESRD (end stage renal disease)
Pleural effusion
Chronic systolic heart failure
Pleural effusion
Chronic systolic heart failure
Chronic systolic heart failure
Pleural effusion
Pleural effusion

## 2021-03-21 NOTE — PROGRESS NOTE ADULT - PROBLEM SELECTOR PROBLEM 9
Hypothermia, subsequent encounter
Transaminitis
Hypothermia, subsequent encounter
Hypothermia, subsequent encounter
Transaminitis
Hypothermia, subsequent encounter
Transaminitis
Hypothermia, subsequent encounter
Transaminitis

## 2021-03-21 NOTE — PROGRESS NOTE ADULT - PROBLEM SELECTOR PLAN 10
poss related to above  warming blanket as needed
stable, low throughout stay  s/p PLT for PEG  no bleeding
poss related to above  warming blanket as needed
stable, low throughout stay  s/p PLT for PEG  no bleeding  no DVT ppx due to thrombocytopenia
poss related to above  warming blanket as needed
poss related to above  await cortisol  warming blanket as needed
stable, low throughout stay  s/p PLT for PEG  no bleeding
poss related to above  warming blanket as needed
stable, low throughout stay  s/p PLT for PEG  no bleeding  no DVT ppx due to thrombocytopenia

## 2021-03-21 NOTE — PROGRESS NOTE ADULT - PROBLEM SELECTOR PLAN 7
Continue sensipar, celexa.
Continue celexa.
Dr. Huff for HD management.  cont sensipar
Continue celexa.
c/w HD, had session this AM  Appreciate renal recs
Continue celexa.
Dr. Huff for HD management.  cont sensipar
Dr. Huff for HD management.
Dr. Huff for HD management.
Dr. Huff for HD management.  cont sensipar
Likely due to acute infection and low flow state.  Avoid hepatictoxic agents.
Continue celexa.
Dr. Huff for HD management.  cont sensipar

## 2021-03-21 NOTE — PROGRESS NOTE ADULT - PROBLEM SELECTOR PROBLEM 4
Chronic systolic heart failure
Pleural effusion
Altered mental status, unspecified altered mental status type
Pleural effusion
Altered mental status, unspecified altered mental status type
Altered mental status, unspecified altered mental status type
Pleural effusion
Pleural effusion
Altered mental status, unspecified altered mental status type
Pleural effusion
Altered mental status, unspecified altered mental status type
Pleural effusion
Altered mental status, unspecified altered mental status type
Pleural effusion
Altered mental status, unspecified altered mental status type

## 2021-03-21 NOTE — PROGRESS NOTE ADULT - PROBLEM SELECTOR PLAN 8
Continue sensipar, celexa.
Continue sensipar, celexa.
Continues to rise  U/S abdomen on 3/8 showed a normal liver  hepatitis serologies negative  consider DILI, though zosyn not known to cause liver dysfx  May be 2/2 septic state previously  trend daily and avoid hepatotoxins
Resolved.
Continue celexa.
Continue celexa.
Continues to rise  U/S abdomen on 3/8 showed a normal liver  hepatitis serologies negative  consider DILI, though zosyn not known to cause liver dysfx  May be 2/2 septic state previously  trend daily and avoid hepatotoxins
Likely due to acute infection and low flow state.  Avoid hepatictoxic agents.
Continue celexa.
Continues to rise  U/S abdomen on 3/8 showed a normal liver  hepatitis serologies negative  consider DILI, though zosyn not known to cause liver dysfx  May be 2/2 septic state previously  trend daily and avoid hepatotoxins
Continues to rise  U/S abdomen on 3/8 showed a normal liver  hepatitis serologies negative  consider DILI, though zosyn not known to cause liver dysfx  May be 2/2 septic state previously  trend daily and avoid hepatotoxins
Continue celexa.
Continue celexa.
Continues to rise  U/S abdomen on 3/8 showed a normal liver  hepatitis serologies negative  consider DILI, though zosyn not known to cause liver dysfx  May be 2/2 septic state previously  trend daily and avoid hepatotoxins
Continues to rise  U/S abdomen on 3/8 showed a normal liver  hepatitis serologies negative  consider DILI, though zosyn not known to cause liver dysfx  May be 2/2 septic state previously  trend daily and avoid hepatotoxins
Continue celexa.
Continue celexa.
Continues to rise  U/S abdomen on 3/8 showed a normal liver  hepatitis serologies negative  consider DILI, though zosyn not known to cause liver dysfx  May be 2/2 septic state previously  trend daily and avoid hepatotoxins
Continues to rise  U/S abdomen on 3/8 showed a normal liver  hepatitis serologies negative  consider DILI, though zosyn not known to cause liver dysfx  May be 2/2 septic state previously  trend daily and avoid hepatotoxins

## 2021-03-21 NOTE — PROGRESS NOTE ADULT - SUBJECTIVE AND OBJECTIVE BOX
[facility] - Department of Medicine   Katie Carpio    Pager: [phone]  Cell: [phone]    SUBJECTIVE / OVERNIGHT EVENTS: Earlier today - patient with 36 beats of VTACH- spoke to cardiology plan to resume metoprolol. Patient is seen and examine in the morning, awake, appears comfortable, minimal interaction.     MEDICATIONS  (STANDING):  atorvastatin 10 milliGRAM(s) Oral at bedtime  chlorhexidine 4% Liquid 1 Application(s) Topical daily  cinacalcet 90 milliGRAM(s) Oral daily  citalopram 20 milliGRAM(s) Oral daily  dextrose 40% Gel 15 Gram(s) Oral once  dextrose 50% Injectable 25 Gram(s) IV Push once  dextrose 50% Injectable 12.5 Gram(s) IV Push once  dextrose 50% Injectable 25 Gram(s) IV Push once  glucagon  Injectable 1 milliGRAM(s) IntraMuscular once  lactobacillus acidophilus 1 Tablet(s) Oral daily  metoprolol tartrate 25 milliGRAM(s) Oral two times a day  mupirocin 2% Ointment 1 Application(s) Both Nostrils two times a day  polyethylene glycol 3350 17 Gram(s) Oral daily  senna Syrup 10 milliLiter(s) Oral daily  sodium zirconium cyclosilicate 10 Gram(s) Oral <User Schedule>    MEDICATIONS  (PRN):  midodrine. 10 milliGRAM(s) Oral <User Schedule> PRN SBP < 90 on HD      Vital Signs Last 24 Hrs  T(C): 36.6 (21 Mar 2021 11:05), Max: 36.6 (21 Mar 2021 09:41)  T(F): 97.8 (21 Mar 2021 11:05), Max: 97.8 (21 Mar 2021 09:41)  HR: 97 (21 Mar 2021 11:05) (97 - 103)  BP: 131/79 (21 Mar 2021 11:05) (103/63 - 131/79)  BP(mean): --  RR: 18 (21 Mar 2021 11:05) (17 - 18)  SpO2: 98% (21 Mar 2021 11:05) (97% - 99%)  CAPILLARY BLOOD GLUCOSE      POCT Blood Glucose.: 143 mg/dL (21 Mar 2021 16:30)  POCT Blood Glucose.: 117 mg/dL (21 Mar 2021 12:36)  POCT Blood Glucose.: 141 mg/dL (21 Mar 2021 08:58)  POCT Blood Glucose.: 145 mg/dL (21 Mar 2021 05:09)  POCT Blood Glucose.: 139 mg/dL (21 Mar 2021 01:07)  POCT Blood Glucose.: 98 mg/dL (20 Mar 2021 21:05)      PHYSICAL EXAM:  GENERAL: NAD, thin, malnourished   HEAD:  Atraumatic, Normocephalic  EYES: EOMI, conjunctiva and sclera clear  NECK: Supple, No JVD  CHEST/LUNG: Clear to auscultation bilaterally; No wheeze  HEART: Regular rate and rhythm; No murmurs, rubs, or gallops  ABDOMEN: PEG tube with abdominal binder   EXTREMITIES: Moves extremities, edema at the ankles, LUE fistula, No clubbing or cyanosis   PSYCH: AAOx1  NEUROLOGY: non-focal  SKIN: Penile skin breakdown noticed, sacral ulcer (noted by nurse)    LABS:                        9.6    7.98  )-----------( 78       ( 21 Mar 2021 09:16 )             30.6     03-21    133<L>  |  93<L>  |  38<H>  ----------------------------<  108<H>  4.4   |  23  |  2.82<H>    Ca    9.9      21 Mar 2021 07:00  Phos  2.5     03-21  Mg     2.5     03-21    TPro  6.3  /  Alb  2.8<L>  /  TBili  1.2  /  DBili  x   /  AST  118<H>  /  ALT  87<H>  /  AlkPhos  263<H>  03-21              RADIOLOGY & ADDITIONAL TESTS:

## 2021-03-21 NOTE — PROGRESS NOTE ADULT - PROBLEM/PLAN-4
DISPLAY PLAN FREE TEXT
No
DISPLAY PLAN FREE TEXT

## 2021-03-21 NOTE — PROGRESS NOTE ADULT - PROBLEM SELECTOR PLAN 2
-care per medicine and endocrinology appreciated
-care per medicine and endocrinology appreciated
unclear source  unable to collect another specimen
Had another episode yesterday associated with decreased responsiveness  However on D10 mental status improves  cortisol level adequate  NGT in place  Poor PO is contributing  awaiting PEG placement  c/w D10 gtt until PEG placement  Appreciate endocrine recs  PEG today
-care per medicine and endocrinology appreciated
improved with D10, PEG  d5 at 30cc, will trial off tomorrow   will discuss with nutrition adjustment of feeds
unclear source  unable to collect another specimen
Previous history with MRSA colonization.  Continue Zosyn/Vanco IV  awaiting repeat UA  F/U Bcx results.  Hypotension and hypothermia - suspected related to active infection - check cortisol level.
Had another episode yesterday associated with decreased responsiveness  However on D10 mental status improves  cortisol level adequate  NGT placed for nutritional support, but pt removed  Poor PO is contributing  awaiting PEG placement (needs cards clearance)  c/w D10 gtt as long as needed.  Appreciate endocrine recs
-care per medicine and endocrinology appreciated
improved with D10, PEG  d5 at 30cc, bG improvement, dc d5  Nutrition adjustment of feeds at goal
Previous history with MRSA colonization.  Continue Zosyn/Vanco IV  awaiting repeat UA if possible  Bcx neg  Hypotension and hypothermia , now resolved - F/U cortisol level.
Previous history with MRSA colonization.  Continue Zosyn/Vanco IV  awaiting repeat UA if possible  F/U Bcx results.  Hypotension and hypothermia - suspected related to active infection - F/U cortisol level.
Had another episode yesterday associated with decreased responsiveness  However on D10 mental status improves  cortisol level adequate  NGT in place  Poor PO is contributing  awaiting PEG placement  c/w D10 gtt until PEG placement  Appreciate endocrine recs
Previous history with MRSA colonization.  Continue Zosyn/Vanco IV  awaiting repeat UA  F/U Bcx results.
Had another episode yesterday associated with decreased responsiveness  However on D10 mental status improves  cortisol level adequate  NGT in place  Poor PO is contributing  awaiting PEG placement  c/w D10 gtt until PEG placement  Appreciate endocrine recs  PEG today
Had another episode yesterday associated with decreased responsiveness  However on D10 mental status improves  cortisol level adequate  NGT in place  Poor PO is contributing  awaiting PEG placement  c/w D10 gtt until PEG placement  Appreciate endocrine recs
improved with D10, PEG  will trial off D10
unclear source  unable to collect another specimen

## 2021-03-21 NOTE — CHART NOTE - NSCHARTNOTEFT_GEN_A_CORE
Informed by nurse that patient's blood pressure was 96/56 and Metoprolol was being held. Blood pressure was rechecked and was found to have SBP of 85. Manual blood pressure was taken at that time was 102/58. Nurse instructed to recheck blood pressure at 10 PM. Blood pressure at 10 PM was 80/50. 250 cc bolus over 1 hour ordered and 5 mg midodrine ordered.  Patient seen at bedside. Patient denies any complaints.  Lungs: CTAB  Cardio: S1,S2 heard, no murmurs, rubs or gallops.    Will continue to monitor closely and will recheck blood pressure in 1 hour.

## 2021-03-21 NOTE — PROVIDER CONTACT NOTE (OTHER) - RECOMMENDATIONS
Continue to monitor
Continue to monitor Patient
Fluids
as per hypoglycemia protocol
continue warming blanket, give midodrine now
forward to AM team that blood was unable to be drawn
warming blanket increased temp, will give midodrine early prior to HD
will continue to monitor, give 250 bolus over 1 hour
Metoprolol held
continue to monitor, just gave 20mg of AM midodrine
Continue to monitor patients heart rate and rhythm with cardiac monitor
Cont to monitor.
D50, may call rapid response.
Patient is a&ox2. Patient glucose is 79, trending down. Patient possible amp to give to increase glucose.
continue to monitor
place hypothermic blanket on patient, cover in hot packs, continue to monitor
BMP send.
continue with warming blanket
ACP Hope called and made aware.
ACP Za Urrutia made aware, EKG strip printed and in chart
Push D50 again
RN will reassess O2 saturation when wound team leaves.
Repeat labs 2 hours post HD
continue to monitor
hold betablocker, give midodrine now
Continue to monitor vital signs every 4 hours
None
Push D50
Repeat blood pressure manually
cont to monitor FS
continue to monitor
continue to monitor with vsq4

## 2021-03-21 NOTE — PROGRESS NOTE ADULT - PROBLEM SELECTOR PROBLEM 7
Dementia without behavioral disturbance, unspecified dementia type
ESRD (end stage renal disease)
ESRD (end stage renal disease)
Dementia without behavioral disturbance, unspecified dementia type
Transaminitis
Dementia without behavioral disturbance, unspecified dementia type
Dementia without behavioral disturbance, unspecified dementia type
ESRD (end stage renal disease)
Dementia without behavioral disturbance, unspecified dementia type
ESRD (end stage renal disease)
Dementia without behavioral disturbance, unspecified dementia type
Dementia without behavioral disturbance, unspecified dementia type
ESRD (end stage renal disease)
Dementia without behavioral disturbance, unspecified dementia type
Dementia without behavioral disturbance, unspecified dementia type
ESRD (end stage renal disease)

## 2021-03-21 NOTE — PROGRESS NOTE ADULT - PROBLEM SELECTOR PROBLEM 6
ESRD (end stage renal disease)
Chronic systolic heart failure
Chronic systolic heart failure
ESRD (end stage renal disease)
Chronic systolic heart failure
ESRD (end stage renal disease)
Chronic systolic heart failure
ESRD (end stage renal disease)
ESRD (end stage renal disease)
Chronic systolic heart failure
Dementia without behavioral disturbance, unspecified dementia type
ESRD (end stage renal disease)
ESRD (end stage renal disease)

## 2021-03-21 NOTE — PROGRESS NOTE ADULT - PROBLEM SELECTOR PROBLEM 10
Hypothermia, subsequent encounter
Thrombocytopenia
Hypothermia, subsequent encounter
Thrombocytopenia
Hypothermia, subsequent encounter
Hypothermia, subsequent encounter
Thrombocytopenia
Hypothermia, subsequent encounter
Thrombocytopenia

## 2021-03-21 NOTE — CHART NOTE - NSCHARTNOTEFT_GEN_A_CORE
Pt with 36 beats of V-tach on telemetry. Pt was asymptomatic. Discussed with Dr. Navarrete, will resume metoprolol. Electrolytes WNL. Will continue to monitor.

## 2021-03-21 NOTE — PROGRESS NOTE ADULT - SUBJECTIVE AND OBJECTIVE BOX
INTERVAL HPI/OVERNIGHT EVENTS:  seen and examined, resting comfortably   PEG site benign, no reported events as per nursing staff  mahesh peg feeds no residuals  No BM documented in chart          MEDICATIONS  (STANDING):  atorvastatin 10 milliGRAM(s) Oral at bedtime  chlorhexidine 4% Liquid 1 Application(s) Topical daily  cinacalcet 90 milliGRAM(s) Oral daily  citalopram 20 milliGRAM(s) Oral daily  dextrose 40% Gel 15 Gram(s) Oral once  dextrose 50% Injectable 25 Gram(s) IV Push once  dextrose 50% Injectable 12.5 Gram(s) IV Push once  dextrose 50% Injectable 25 Gram(s) IV Push once  glucagon  Injectable 1 milliGRAM(s) IntraMuscular once  lactobacillus acidophilus 1 Tablet(s) Oral daily  mupirocin 2% Ointment 1 Application(s) Both Nostrils two times a day  polyethylene glycol 3350 17 Gram(s) Oral daily  senna Syrup 10 milliLiter(s) Oral daily  sodium zirconium cyclosilicate 10 Gram(s) Oral <User Schedule>    MEDICATIONS  (PRN):  midodrine. 10 milliGRAM(s) Oral <User Schedule> PRN SBP < 90 on HD      Allergies    No Known Allergies    Intolerances              Review of Systems: *confused, answers simple questions    General:  No wt loss, fevers, chills, night sweats, fatigue   Eyes:  Good vision, no reported pain  ENT:  No sore throat, pain, runny nose, dysphagia  CV:  No pain, palpitations, hypo/hypertension  Resp:  No dyspnea, cough, tachypnea, wheezing  GI:  No pain, No nausea, No vomiting, No diarrhea, No constipation, No weight loss, No fever, No pruritis, No rectal bleeding, No melena, No dysphagia  :  No pain, bleeding, incontinence, nocturia  Muscle:  No pain, weakness  Neuro:  No weakness, tingling, memory problems  Psych:  No fatigue, insomnia, mood problems, depression  Endocrine:  No polyuria, polydypsia, cold/heat intolerance  Heme:  No petechiae, ecchymosis, easy bruisability  Skin:  No rash, tattoos, scars, edema      Vital Signs Last 24 Hrs  T(C): 36.6 (21 Mar 2021 11:05), Max: 36.6 (21 Mar 2021 09:41)  T(F): 97.8 (21 Mar 2021 11:05), Max: 97.8 (21 Mar 2021 09:41)  HR: 97 (21 Mar 2021 11:05) (97 - 103)  BP: 131/79 (21 Mar 2021 11:05) (103/63 - 131/79)  BP(mean): --  RR: 18 (21 Mar 2021 11:05) (17 - 18)  SpO2: 98% (21 Mar 2021 11:05) (97% - 99%)        PHYSICAL EXAM:    Constitutional: NAD  HEENT: EOMI, throat clear  Neck: No LAD, supple  Respiratory: CTA and P  Cardiovascular: S1 and S2, RRR, no M  Gastrointestinal: BS+, soft, NT/ND, neg HSM, +peg  Extremities: No peripheral edema, neg clubbing, cyanosis  Vascular: 2+ peripheral pulses  Neurological: A/O x 1-2, no focal deficits  Psychiatric: Normal mood, normal affect  Skin: No rashes    LABS:                        9.6    7.98  )-----------( 78       ( 21 Mar 2021 09:16 )             30.6     03-21    133<L>  |  93<L>  |  38<H>  ----------------------------<  108<H>  4.4   |  23  |  2.82<H>    Ca    9.9      21 Mar 2021 07:00  Phos  2.5     03-21  Mg     2.5     03-21    TPro  6.3  /  Alb  2.8<L>  /  TBili  1.2  /  DBili  x   /  AST  118<H>  /  ALT  87<H>  /  AlkPhos  263<H>  03-21 03-20-21 @ 07:01  -  03-21-21 @ 07:00  --------------------------------------------------------  IN: 900 mL / OUT: 400 mL / NET: 500 mL                    RADIOLOGY & ADDITIONAL TESTS:

## 2021-03-21 NOTE — PROGRESS NOTE ADULT - PROBLEM SELECTOR PLAN 9
Resolved  warming blanket as needed
acute hep panel P, previous neg  rising LFT  dropping PLT, rising coag  acute liver failure of unclear etiology
Resolved  warming blanket as needed
Resolved  warming blanket as needed
acute hep panel P, previous neg  rising LFT  dropping PLT, rising coag  acute liver failure of unclear etiology
poss due to hypotension  slowly improving
Continues to rise  U/S abdomen on 3/8 showed a normal liver  hepatitis serologies negative  consider DILI, though zosyn not known to cause liver dysfx  May be 2/2 septic state previously  trend daily and avoid hepatotoxins
Resolved  warming blanket as needed
Resolved  warming blanket as needed
Likely due to acute infection and low flow state.  Avoid hepatotoxic agents.
Resolved  warming blanket as needed
Likely due to acute infection and low flow state.  Avoid hepatotoxic agents.
acute hep panel P, previous neg  rising LFT  dropping PLT, rising coag  acute liver failure of unclear etiology
unclear etiology  sono unremarkable  check acute hep panel, GGT
poss due to hypotension  slowly improving

## 2021-03-21 NOTE — PROGRESS NOTE ADULT - ASSESSMENT
71 y/o M w/ hx ESRD on HD, anemia, hyperparathyroidism, CAD s/p stent, BPH w/ neurogenic bladder (last dialysis 2/27/21), cognitive impairment, that was brought in by EMS from [facility] for AMS. GI consulted for PEG eval 2/2 FTT

## 2021-03-21 NOTE — PROGRESS NOTE ADULT - PROBLEM SELECTOR PLAN 4
Pulmonary consult appreciated - no intervention.
overall worse  MRI no stroke  unclear etiology, overall failing
Pulmonary consult appreciated - no intervention.
overall worse  MRI P   unclear etiology, overall failing
Pulmonary consult appreciated - no intervention.
overall worse  MRI no stroke  unclear etiology, overall failing
Compared to late january CXR - interval increase?  Pulmonary consult appreciated
Pulmonary consult appreciated - no intervention.
Pulmonary consult appreciated - no intervention.
overall worse  MRI P   unclear etiology, overall failing
concern for infectious etiology - UTI  improved with treatment.
concern for infectious etiology - UTI  improved with treatment.
overall worse  MRI P   unclear etiology, overall failing
Careful fluid management with HD  Continue lopressor, midodrine, lipitor, ASA.
Pulmonary consult appreciated - no intervention.
Pulmonary consult appreciated - no intervention.
overall worse  MRI P but pt not stable to leave the floor  unclear etiology, overall failing
somewhat more alert today  swallow worse  cautious feeding, if will swallow  check MRI to r/o poss CVA
Pulmonary consult appreciated - no intervention.

## 2021-03-22 NOTE — CHART NOTE - NSCHARTNOTEFT_GEN_A_CORE
Had a GOC with HCP and cousin Deana. Informed her that Mr. Beaver is severely hypotensive on Levo gtt at 0.5. His Hgb dropped his prognosis is poor, he is not a candidate for any interventions at this time. She expressed that comfort was priority for patient. Shes does not want to prolong his suffering and is okay with patient being given analgesics for his comfort even if it may reduce his BP further. Deana expressed she understood that Mr. Beaver is dying. She herself is 89 years old and cannot come visit patient but will see if cousin in [place] can come to see him. In the meantime she expressed interest in Facetiming patient on cell phone [phone] which will be arranged by nursing staff.       Elena Medel, PGY-1

## 2021-03-22 NOTE — PROGRESS NOTE ADULT - SUBJECTIVE AND OBJECTIVE BOX
minimally communicative at present; NAD   VITAL: T(C): , Max: 36.6 (03-21-21 @ 20:30) T(F): , Max: 97.9 (03-21-21 @ 20:30) HR: 77 (03-22-21 @ 10:49) BP: 54/34 (03-22-21 @ 10:49) BP(mean): 42 (03-22-21 @ 10:49) RR: 16 (03-22-21 @ 10:49) SpO2: 100% (03-22-21 @ 10:00)   PHYSICAL EXAM: Constitutional: minimally communicative HEENT: DMM Neck: Supple, No JVD Respiratory: coarse BS B/L Cardiovascular: RRR s1s2, no m/r/g Gastrointestinal: soft, NT/ND; (+)PEG Extremities: (+)b/l edema Neurological: tone WNL Back: no CVAT b/l Skin: No rashes, no nevi Access: LUE AVF (+)thrill  LABS:                      4.4   5.99  )-----------( 56       ( 22 Mar 2021 03:10 )            14.0   Na(135)/K(4.4)/Cl(97)/HCO3(27)/BUN(68)/Cr(3.33)Glu(118)/Ca(9.1)/Mg(2.3)/PO4(2.5)    03-22 @ 03:10 Na(133)/K(4.4)/Cl(93)/HCO3(23)/BUN(38)/Cr(2.82)Glu(108)/Ca(9.9)/Mg(2.5)/PO4(2.5)    03-21 @ 07:00 Na(129)/K(3.8)/Cl(89)/HCO3(27)/BUN(54)/Cr(3.78)Glu(143)/Ca(9.7)/Mg(--)/PO4(--)    03-20 @ 07:41    IMPRESSION: 70M w/ dementia, CAD, past epidural abscess, and ESRD, 3/2/21 a/w AMS  (1)Renal - ESRD - HD TTS   (2)Anemia - severe  (3)CV - shock  (4)Goals of care - comfort as primary goal at this point    RECOMMEND: (1)Will not plan for further HD      Heath Huff MD [facility] Office: [phone] Cell: [phone]

## 2021-03-22 NOTE — PROVIDER CONTACT NOTE (OTHER) - BACKGROUND
(Admit Diagnosis) Altered mental status  (PMH) Inguinal hernia  (PMH) ESRD (end stage renal disease)  (PMH) HPTH (hyperparathyroidism)
(Admit Diagnosis) Altered mental status  (PMH) Inguinal hernia  (PMH) HTN (hypertension)  (PMH) 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 device, implant or graft
(Admit Diagnosis) Altered mental status  (PMH) Inguinal hernia  (PMH) HTN (hypertension)  (PMH) 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 device, implant or graft
Admitted for AMS.
Patient admitted for altered mental status
Patient admitted for altered mental status. PMH of DM, hypertension, ESRD.
Patient admitted for altered mental status. PMH of hypertension, ESRD, CAD, DM.
admit with AMS
(Admit Diagnosis) Altered mental status  (PMH) Inguinal hernia  (PMH) HTN (hypertension)  (PMH) 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 device, implant or graft
ESRD on HD, regular HD today, unable to draw labs in the morning due to hard stick, labs drawn pre HD  Patient w/ stable VS, on room air, on telemonitor, not in any distress
PAtient admitted for AMS
Patient admitted for altered mental status. PMH of hypertension, ESRD, CAD, DM.
RN notified by tele tech pt with 36 beats of vtach
admit with AMS
patient was admitted due to altered mental status
patient was admitted due to altered mental status, currently on IV abx
Patient admitted for altered mental status. PMH of DM, ESRD, hypertension.
Pt BP and temps have been running low
Pt esrd patient. hx of HTN, cad. patient on 3lpm NC and getting abx through iv.
patient was admitted due to altered mental status, currently on IV abx
(Admit Diagnosis) Altered mental status  (PMH) Inguinal hernia  (PMH) HTN (hypertension)  (PMH) 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 device, implant or graft  (PMH) ESRD (end stage renal disease)
Admitted for altered mental status
Pt esrd patient. hx of HTN, cad. patient on 3lpm NC and getting abx through iv.
Pt has been having low BP and low temps
Tube feeding and Dextrose 10% at 20 ml/hr ongoing. Pt is on dialysis with poor circulation to fingers.
admit with AMS
admitted for AMS
patient was admitted due to altered mental status
patient was admitted due to altered mental status, currently on IV abx
(Admit Diagnosis) Altered mental status  (PMH) Inguinal hernia  (PMH) HTN (hypertension)  (PMH) 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 device, implant or graft
Pt. doesn't tolerate PO intake well, has had hypoglycemic episodes in the past.
admit with AMS
admitted for AMS- patient has been hypotensive, on midodrine
Patient admitted for altered mental status. PMH of hypertension, ESRD, CAD, DM.
Pt esrd patient. hx of HTN, cad. patient on 3lpm NC and getting abx through iv.
(Admit Diagnosis) Altered mental status  (PMH) Inguinal hernia  (PMH) HTN (hypertension)  (PMH) Anemia due to chronic renal failure treated with erythropoietin, stage 2 (mild)
(Admit Diagnosis) Altered mental status  (PMH) Inguinal hernia  (PMH) HTN (hypertension)  (PMH) ESRD (end stage renal disease)
Admitted for AMS.
Pt. admitted for sepsis
patient has hx of htn, nstemi, esrd, cad and anemia.
patient was admitted due to altered mental status, currently on IV abx and hypothermia blanket in place
pt admitted with AMS and UTI
patient was admitted due to altered mental status, currently on IV abx

## 2021-03-22 NOTE — PROVIDER CONTACT NOTE (OTHER) - SITUATION
RRT for hypotension. Hemoglobin of 2.3  Sanguineous liquid from PEG tube. 1 unit of blood ordered. MICU consult.

## 2021-03-22 NOTE — PROGRESS NOTE ADULT - ASSESSMENT
71 y/o M w/ hx ESRD on HD, anemia, hyperparathyroidism, CAD s/p stent, BPH w/ neurogenic bladder (last dialysis 2/27/21), cognitive impairment, that was brought in by EMS from [facility] for AMS.    EKG: NSR LVH   Echo 2/21 - Severe systolic dysfunction   Tele: NSR, 6-8 bts NSVT      1) Chronic severe systolic dysfunction  - 36 beats of VT yesterday started on lopressor but in shock now on LEVO    2) PEG placement    -s/p PEG doing well     3) ESRD  -will be off HD, comfort care

## 2021-03-22 NOTE — PROGRESS NOTE ADULT - ASSESSMENT
ASSESSMENT AND PLAN:   70M CAD/stent, sCHF LVEF 12%, BPH, neurogenic bladder, ESRD on HD w/ chronic anemia, MRSA on vibramycin p/w sepsis POA from UTI with acute metabolic encephalopathy, hypotension, c/b hypoglycemia and hypothermia, now presenting with hypovolemic shock 2/2 GI bleed on NE     #Neuro  AAOx1   Hx of dementia  AMS likely 2/2 hypotension and progression of disease  CTM    #Cardiovascular    Hypotension-   s/p midodrine, 1L IVF, and 1L pRBC  Likely 2/2 GI bleed  Patient s/p PEG placement 3/17/21  - continue NE    HF  - EF 12 % from TTE from Feb 3 2021  - on Midodrine and Atorvastatin  - continue pressors and midodrine     #Respiratory  - on room air saturating well  - known L pleural effusion on CXR- no interveiton per pulm     #GI/Nutrition  LFTs downtrending  s/p PEG tube placement 3/17/21  Drop in Hgb 9-->4 in 24 h with melena  Lactate 2.4  s/p 1u prbc, 1L IVF  - PPI gtt  - monitor CBC   - transfuse as tolerated   - consult GI, patient unlikely candidate for interventions    #/Renal  ESRD on HD  Lactate 2.4 s/p 1L IVF   - HD as needed for volume status given poor cardiac function    #Skin  - sacral ulcer  - continue wound care     #ID  s/p 7 d tx with Zosyn for sepsis, finished on 3/15  Recent PEG tube placement 3/17  Afebrile, no leukocytosis  - blood cx pending   - CTM     #Endocrine  Incidences of hypoglycemia during hospital stay 2/2 poor oral intake  now s/p PEG 3/17/21  - resume tube feeds as tolerated  - fingersticks q6h    #Hematologic/DVT ppx    Anemia  - precipitous fall in Hgb from 9-->4.4 on 3/22/21  - Baseline Hgb ~ 11  s/p 1 u pRBC  Likely 2/2 GI bleed   - GI consulted  - Transfuse as tolerated    Thrombocytopenia   s/p 1 u plt 3/17/21  Downtrending plts, currently 56  Transfuse as tolerated    #Ethics  - per GOC discussions on 3/8/21, patient DNR/DNI and HCPDeana stated that Mr Beaver would not want to be supported by artifical means should his body stop working on it's own.  She wishes that he is allowed to die naturally, should event turn that direction.

## 2021-03-22 NOTE — CHART NOTE - NSCHARTNOTEFT_GEN_A_CORE
Nurse reporting dark colored fluid coming from PEG tube and with melena. Patient seen at bedside. Patient without complaints and with dark colored fluid in PEG tube. Blood pressure taken again with SBP in 60s. Rapid response called for hypotension and possible GI bleed. During rapid patient received total of 750 cc ml of NS with  improvement in blood pressure to 135/105.Blood pressure continued to drop. Family called to confirm code status and determine if pressors were wanted. Situation was explained to cousin and use of pressors to increase blood pressure as well as possibility of difficulty weaning patient off of it was explained. RRT team made aware. Patient received midodrine 30 mg and was started on levophed. Levophed was down titrated to 0.1. MICU was consulted. Attempted to contact family again to update them on patient's condition and to discuss that patient would require more invasive monitoring in MICU but was unable to reach cousin after several attempts. Labwork done showed hemoglobin of 4.4. Patient was started on protonix drip. Patient received 1/2 unit of blood with another half unit ordered. Patient accepted by MICU. Patient awaiting MICU bed. Nurse reporting dark colored fluid coming from PEG tube and with melena. Patient seen at bedside. Patient without complaints and with dark colored fluid in PEG tube. Blood pressure taken again with SBP in 60s. Rapid response called for hypotension and possible GI bleed. During rapid patient received total of 750 cc ml of NS with  improvement in blood pressure to 135/105.Blood pressure continued to drop. Family called to confirm code status and determine if pressors were wanted. Situation was explained to cousin and use of pressors to increase blood pressure as well as possibility of difficulty weaning patient off of it was explained. Family requesting pressors. RRT team made aware. Patient received midodrine 30 mg and was started on levophed. Levophed was down titrated to 0.1. MICU was consulted. Attempted to contact family again to update them on patient's condition and to discuss that patient would require more invasive monitoring in MICU but was unable to reach cousin after several attempts. Labwork done showed hemoglobin of 4.4. Patient was started on protonix drip. Patient received 1/2 unit of blood with another half unit ordered. Patient accepted by MICU. Patient awaiting MICU bed.

## 2021-03-22 NOTE — PROGRESS NOTE ADULT - SUBJECTIVE AND OBJECTIVE BOX
MICU PROGRESS NOTE    INTERVAL HPI/OVERNIGHT EVENTS:  transferred from floors to MICU o/n    SUBJECTIVE: unable to obtain    OBJECTIVE:    VITAL SIGNS:  ICU Vital Signs Last 24 Hrs  T(C): 35.6 (22 Mar 2021 06:00), Max: 36.6 (21 Mar 2021 20:30)  T(F): 96 (22 Mar 2021 06:00), Max: 97.9 (21 Mar 2021 20:30)  HR: 77 (22 Mar 2021 10:49) (77 - 88)  BP: 54/34 (22 Mar 2021 10:49) (43/23 - 96/56)  BP(mean): 42 (22 Mar 2021 10:49) (24 - 60)  ABP: --  ABP(mean): --  RR: 16 (22 Mar 2021 10:49) (12 - 22)  SpO2: 100% (22 Mar 2021 10:00) (98% - 100%)      VENTS:      I&O:    03-22 @ 07:01  -  03-22 @ 12:23  --------------------------------------------------------  IN: 183.3 mL / OUT: 300 mL / NET: -116.7 mL        PHYSICAL EXAM:  GENERAL: NAD  CHEST/LUNG: Clear to auscultation bilaterally; No crackles, rhonchi, wheezing, or rubs  HEART: Regular rate and rhythm; No murmurs, rubs, or gallops  ABDOMEN: Soft, Nontender, Nondistended; Bowel sounds present  +ng tube to suction with bloody output  EXTREMITIES:  2+ Peripheral Pulses, No clubbing, cyanosis, or edema  SKIN: No rashes or lesions  NERVOUS SYSTEM:  Alert & Oriented, Good concentration      LINES:                                       MEDICATIONS:  MEDICATIONS  (STANDING):  chlorhexidine 4% Liquid 1 Application(s) Topical daily  dextrose 40% Gel 15 Gram(s) Oral once  dextrose 50% Injectable 25 Gram(s) IV Push once  dextrose 50% Injectable 12.5 Gram(s) IV Push once  dextrose 50% Injectable 25 Gram(s) IV Push once  glucagon  Injectable 1 milliGRAM(s) IntraMuscular once  mupirocin 2% Ointment 1 Application(s) Both Nostrils two times a day  norepinephrine Infusion 0.1 MICROgram(s)/kG/Min (10.2 mL/Hr) IV Continuous <Continuous>  pantoprazole Infusion 8 mG/Hr (10 mL/Hr) IV Continuous <Continuous>  pantoprazole Infusion 8 mG/Hr (10 mL/Hr) IV Continuous <Continuous>  polyethylene glycol 3350 17 Gram(s) Oral daily  sodium zirconium cyclosilicate 10 Gram(s) Oral <User Schedule>    MEDICATIONS  (PRN):  HYDROmorphone  Injectable 1 milliGRAM(s) IV Push every 4 hours PRN discomfort  midodrine. 10 milliGRAM(s) Oral <User Schedule> PRN SBP < 90 on HD      ALLERGIES:  Allergies    No Known Allergies    Intolerances        LABS:                        4.4    5.99  )-----------( 56       ( 22 Mar 2021 03:10 )             14.0     03-22    135  |  97<L>  |  68<H>  ----------------------------<  118<H>  4.4   |  27  |  3.33<H>    Ca    9.1      22 Mar 2021 03:10  Phos  2.5     03-22  Mg     2.3     03-22    TPro  4.2<L>  /  Alb  1.8<L>  /  TBili  0.8  /  DBili  x   /  AST  53<H>  /  ALT  37  /  AlkPhos  156<H>  03-22    PT/INR - ( 22 Mar 2021 03:10 )   PT: 21.5 sec;   INR: 1.95 ratio         PTT - ( 22 Mar 2021 03:10 )  PTT:45.8 sec      CAPILLARY BLOOD GLUCOSE      POCT Blood Glucose.: 134 mg/dL (22 Mar 2021 00:37)      RADIOLOGY & ADDITIONAL TESTS: Reviewed.

## 2021-03-22 NOTE — PROVIDER CONTACT NOTE (OTHER) - DATE AND TIME:
03-Mar-2021 11:45
04-Mar-2021 05:45
10-Mar-2021 04:00
12-Mar-2021 09:16
12-Mar-2021 10:55
15-Mar-2021 13:50
17-Mar-2021 04:04
17-Mar-2021 06:00
18-Mar-2021 06:05
04-Mar-2021 22:00
07-Mar-2021 14:28
10-Mar-2021 02:00
11-Mar-2021 06:00
12-Mar-2021 07:29
12-Mar-2021 09:02
12-Mar-2021 18:00
18-Mar-2021 02:05
03-Mar-2021 04:50
03-Mar-2021 17:15
03-Mar-2021 19:50
06-Mar-2021 22:28
16-Mar-2021 13:42
17-Mar-2021 20:45
21-Mar-2021 11:09
21-Mar-2021 23:35
22-Mar-2021 03:00
03-Mar-2021 21:35
05-Mar-2021 01:45
09-Mar-2021 02:00
11-Mar-2021 18:25
21-Mar-2021 20:35
03-Mar-2021 07:16
04-Mar-2021 01:00
04-Mar-2021 02:05
07-Mar-2021 17:35
08-Mar-2021 05:08
17-Mar-2021 22:10
18-Mar-2021 11:03
20-Mar-2021 12:20
04-Mar-2021 12:28
09-Mar-2021 22:00
18-Mar-2021 23:54
21-Mar-2021 22:14
04-Mar-2021 10:50
04-Mar-2021 22:00
04-Mar-2021 09:47
06-Mar-2021 12:25

## 2021-03-22 NOTE — PROVIDER CONTACT NOTE (CRITICAL VALUE NOTIFICATION) - TEST AND RESULT REPORTED:
Vancomycin Random  =28.6
hemoglobin 4.4  hematocrit 14
FS/ 55, 80, 49
FS 33, 34, then 82 then 61 then 68
Serum glucose 49
FS , 34, then 82 then 61 then 68

## 2021-03-22 NOTE — PROGRESS NOTE ADULT - SUBJECTIVE AND OBJECTIVE BOX
Alex Navarrete MD  Interventional Cardiology / Advance Heart Failure and Cardiac Transplant Specialist  [place] Office : [phone] [place] [address] [facility] [address]  Tel:   [place] Office : [phone] [facility] [place] [address]  Tel: [phone]  Cell : [phone]    Pt is lying in bed comfortable not in distress  	  MEDICATIONS:  norepinephrine Infusion 0.1 MICROgram(s)/kG/Min IV Continuous <Continuous>  HYDROmorphone  Injectable 1 milliGRAM(s) IV Push every 4 hours PRN  pantoprazole Infusion 8 mG/Hr IV Continuous <Continuous>  chlorhexidine 4% Liquid 1 Application(s) Topical daily  mupirocin 2% Ointment 1 Application(s) Both Nostrils two times a day      PAST MEDICAL/SURGICAL HISTORY  PAST MEDICAL & SURGICAL HISTORY:  Inguinal hernia    NSTEMI (non-ST elevated myocardial infarction)    HLD (hyperlipidemia)    Neurogenic bladder    Spinal abscess    Cavitary lung disease    CAD (coronary artery disease)    Abscess of sacrum    Anemia due to chronic renal failure treated with erythropoietin, stage 2 (mild)    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 device, implant or graft    HPTH (hyperparathyroidism)  Secondary    HTN (hypertension)    ESRD (end stage renal disease)    Cavitary lung disease    CAD in native artery    Hypertension    ESRD (end stage renal disease)    S/P primary angioplasty with coronary stent    Kidney abscess        SOCIAL HISTORY: Substance Use (street drugs): ( x ) never used  (  ) other:    FAMILY HISTORY:  No pertinent family history in first degree relatives    Family history of breast cancer (Sibling)         PHYSICAL EXAM:  T(C): 35.6 (03-22-21 @ 12:00), Max: 36.6 (03-21-21 @ 20:30)  HR: 77 (03-22-21 @ 16:00) (74 - 88)  BP: 39/21 (03-22-21 @ 16:00) (39/21 - 96/56)  RR: 14 (03-22-21 @ 16:00) (12 - 22)  SpO2: 100% (03-22-21 @ 16:00) (98% - 100%)  Wt(kg): --  I&O's Summary    22 Mar 2021 07:01  -  22 Mar 2021 16:23  --------------------------------------------------------  IN: 427.7 mL / OUT: 300 mL / NET: 127.7 mL             EYES: EOMI, PERRLA, conjunctiva and sclera clear  ENMT: No tonsillar erythema, exudates, or enlargement; Moist mucous membranes, Good dentition, No lesions  Cardiovascular: Normal S1 S2, No JVD, No murmurs, No edema  Respiratory: b/l rhonchi  Gastrointestinal:  Soft, Non-tender, + BS	  Extremities: no edema                                    4.4    5.99  )-----------( 56       ( 22 Mar 2021 03:10 )             14.0     03-22    135  |  97<L>  |  68<H>  ----------------------------<  118<H>  4.4   |  27  |  3.33<H>    Ca    9.1      22 Mar 2021 03:10  Phos  2.5     03-22  Mg     2.3     03-22    TPro  4.2<L>  /  Alb  1.8<L>  /  TBili  0.8  /  DBili  x   /  AST  53<H>  /  ALT  37  /  AlkPhos  156<H>  03-22    proBNP:   Lipid Profile:   HgA1c:   TSH:     Consultant(s) Notes Reviewed:  [x ] YES  [ ] NO    Care Discussed with Consultants/Other Providers [ x] YES  [ ] NO    Imaging Personally Reviewed independently:  [x] YES  [ ] NO    All labs, radiologic studies, vitals, orders and medications list reviewed. Patient is seen and examined at bedside. Case discussed with medical team.

## 2021-03-22 NOTE — CHART NOTE - NSCHARTNOTEFT_GEN_A_CORE
MICU Accept Note    CHIEF COMPLAINT: hypotension     HPI / INTERVAL HISTORY:        PAST MEDICAL & SURGICAL HISTORY:  Inguinal hernia    NSTEMI (non-ST elevated myocardial infarction)    HLD (hyperlipidemia)    Neurogenic bladder    Spinal abscess    Cavitary lung disease    CAD (coronary artery disease)    Abscess of sacrum    Anemia due to chronic renal failure treated with erythropoietin, stage 2 (mild)    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 device, implant or graft    HPTH (hyperparathyroidism)  Secondary    HTN (hypertension)    ESRD (end stage renal disease)    Cavitary lung disease    CAD in native artery    Hypertension    ESRD (end stage renal disease)    S/P primary angioplasty with coronary stent    Kidney abscess        FAMILY HISTORY:  No pertinent family history in first degree relatives    Family history of breast cancer (Sibling)        SOCIAL HISTORY:  Smoking:   Substance Use:   EtOH Use:   Marital Status:   Sexual History:   Occupation:  Recent Travel:  Country of Birth:   Advance Directives:     HOME MEDICATIONS:      Allergies    No Known Allergies    Intolerances          REVIEW OF SYSTEMS:  Constitutional: No fevers, chills, weight loss, weight gain  HEENT: No vision problems, eye pain, nasal congestion, rhinorrhea, sore throat, dysphagia  CV: No chest pain, orthopnea, palpitations  Resp: No cough, dyspnea, wheezing, hemoptysis  GI: No nausea, vomiting, diarrhea, constipation, abdominal pain  : [ ] dysuria [ ] nocturia [ ] hematuria [ ] increased urinary frequency  Musculoskeletal: [ ] back pain [ ] myalgias [ ] arthralgias [ ] fracture  Skin: [ ] rash [ ] itch  Neurological: [ ] headache [ ] dizziness [ ] syncope [ ] weakness [ ] numbness  Psychiatric: [ ] anxiety [ ] depression  Endocrine: [ ] diabetes [ ] thyroid problem  Hematologic/Lymphatic: [ ] anemia [ ] bleeding problem  Allergic/Immunologic: [ ] itchy eyes [ ] nasal discharge [ ] hives [ ] angioedema  [ ] All other systems negative  [ ] Unable to assess ROS because ________    OBJECTIVE:  ICU Vital Signs Last 24 Hrs  T(C): 36.6 (21 Mar 2021 20:30), Max: 36.6 (21 Mar 2021 09:41)  T(F): 97.9 (21 Mar 2021 20:30), Max: 97.9 (21 Mar 2021 20:30)  HR: 84 (21 Mar 2021 23:28) (84 - 103)  BP: 80/50 (21 Mar 2021 23:32) (79/45 - 131/79)  BP(mean): --  ABP: --  ABP(mean): --  RR: 18 (21 Mar 2021 20:30) (17 - 18)  SpO2: 98% (21 Mar 2021 20:30) (97% - 98%)        03-20 @ 07:01  -  03-21 @ 07:00  --------------------------------------------------------  IN: 900 mL / OUT: 400 mL / NET: 500 mL      CAPILLARY BLOOD GLUCOSE      POCT Blood Glucose.: 134 mg/dL (22 Mar 2021 00:37)      PHYSICAL EXAM:  General:   HEENT:   Neck:   Chest/Lungs:  Heart:  Abdomen:   Extremities:   Skin:   Neuro:   Psych:     LINES:     HOSPITAL MEDICATIONS:  MEDICATIONS  (STANDING):  atorvastatin 10 milliGRAM(s) Oral at bedtime  chlorhexidine 4% Liquid 1 Application(s) Topical daily  cinacalcet 90 milliGRAM(s) Oral daily  citalopram 20 milliGRAM(s) Oral daily  dextrose 40% Gel 15 Gram(s) Oral once  dextrose 50% Injectable 25 Gram(s) IV Push once  dextrose 50% Injectable 12.5 Gram(s) IV Push once  dextrose 50% Injectable 25 Gram(s) IV Push once  glucagon  Injectable 1 milliGRAM(s) IntraMuscular once  lactobacillus acidophilus 1 Tablet(s) Oral daily  midodrine. 10 milliGRAM(s) Oral once  mupirocin 2% Ointment 1 Application(s) Both Nostrils two times a day  pantoprazole Infusion 8 mG/Hr (10 mL/Hr) IV Continuous <Continuous>  polyethylene glycol 3350 17 Gram(s) Oral daily  senna Syrup 10 milliLiter(s) Oral daily  sodium chloride 0.9%. 500 milliLiter(s) (150 mL/Hr) IV Continuous <Continuous>  sodium zirconium cyclosilicate 10 Gram(s) Oral <User Schedule>    MEDICATIONS  (PRN):  midodrine. 10 milliGRAM(s) Oral <User Schedule> PRN SBP < 90 on HD      LABS:                        4.4    5.99  )-----------( 56       ( 22 Mar 2021 03:10 )             14.0     Hgb Trend: 4.4<--, 2.3<--, 9.6<--, 9.4<--, 8.6<--  03-21    133<L>  |  93<L>  |  38<H>  ----------------------------<  108<H>  4.4   |  23  |  2.82<H>    Ca    9.9      21 Mar 2021 07:00  Phos  2.5     03-21  Mg     2.5     03-21    TPro  6.3  /  Alb  2.8<L>  /  TBili  1.2  /  DBili  x   /  AST  118<H>  /  ALT  87<H>  /  AlkPhos  263<H>  03-21    Creatinine Trend: 2.82<--, 3.78<--, 3.23<--, 4.15<--, 3.80<--, 3.11<--  PT/INR - ( 22 Mar 2021 03:10 )   PT: 21.5 sec;   INR: 1.95 ratio         PTT - ( 22 Mar 2021 03:10 )  PTT:45.8 sec    Arterial Blood Gas:  03-22 @ 03:10  7.53/34/360/29/99.9/5.4  ABG lactate: --  Arterial Blood Gas:  03-22 @ 01:08  7.37/24/46/15/78.7/-10.4  ABG lactate: --        MICROBIOLOGY:     RADIOLOGY & ADDITIONAL TESTS:      ASSESSMENT AND PLAN:  Mr./Mrs./Ms. is a ___    #Neuro    #Cardiovascular    #Respiratory    #GI/Nutrition    #/Renal    #Skin    #ID    #Endocrine    #Hematologic/DVT ppx    #Ethics      Elena Medel MD  PGY-1 MICU Accept Note    CHIEF COMPLAINT: hypotension     HPI / INTERVAL HISTORY:   70M CAD/stent, sCHF LVEF 12%, BPH, neurogenic bladder, ESRD on HD w/ chronic anemia, MRSA on vibramycin p/w sepsis POA from UTI with acute metabolic encephalopathy, hypotension, c/b hypoglycemia and hypothermia  Sepsis- completed zosyn, unclear source, cx neg   Hypoglycemia-cortisol level adequate, Poor PO s/p PEG placement  Altered mental status, known Dementia, overall worse, MRI no stroke, unclear etiology, overall failing.   Pleural effusion, Pulmonary consult appreciated - no intervention.   Chronic systolic heart failure. Continue lopressor, midodrine, lipitor, ASA.   ESRD c/w HD,   Transaminitis-U/S abdomen on 3/8 showed a normal liver, hepatitis serologies negative  Hypothermia- Resolved      PAST MEDICAL & SURGICAL HISTORY:  Inguinal hernia    NSTEMI (non-ST elevated myocardial infarction)    HLD (hyperlipidemia)    Neurogenic bladder    Spinal abscess    Cavitary lung disease    CAD (coronary artery disease)    Abscess of sacrum    Anemia due to chronic renal failure treated with erythropoietin, stage 2 (mild)    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 device, implant or graft    HPTH (hyperparathyroidism)  Secondary    HTN (hypertension)    ESRD (end stage renal disease)    Cavitary lung disease    CAD in native artery    Hypertension    ESRD (end stage renal disease)    S/P primary angioplasty with coronary stent    Kidney abscess        FAMILY HISTORY:  No pertinent family history in first degree relatives    Family history of breast cancer (Sibling)        SOCIAL HISTORY:  Smoking:   Substance Use:   EtOH Use:   Marital Status:   Sexual History:   Occupation:  Recent Travel:  Country of Birth:   Advance Directives:     HOME MEDICATIONS:      Allergies    No Known Allergies    Intolerances          REVIEW OF SYSTEMS:  Constitutional: No fevers, chills, weight loss, weight gain  HEENT: No vision problems, eye pain, nasal congestion, rhinorrhea, sore throat, dysphagia  CV: No chest pain, orthopnea, palpitations  Resp: No cough, dyspnea, wheezing, hemoptysis  GI: No nausea, vomiting, diarrhea, constipation, abdominal pain  : [ ] dysuria [ ] nocturia [ ] hematuria [ ] increased urinary frequency  Musculoskeletal: [ ] back pain [ ] myalgias [ ] arthralgias [ ] fracture  Skin: [ ] rash [ ] itch  Neurological: [ ] headache [ ] dizziness [ ] syncope [ ] weakness [ ] numbness  Psychiatric: [ ] anxiety [ ] depression  Endocrine: [ ] diabetes [ ] thyroid problem  Hematologic/Lymphatic: [ ] anemia [ ] bleeding problem  Allergic/Immunologic: [ ] itchy eyes [ ] nasal discharge [ ] hives [ ] angioedema  [ ] All other systems negative  [ ] Unable to assess ROS because ________    OBJECTIVE:  ICU Vital Signs Last 24 Hrs  T(C): 36.6 (21 Mar 2021 20:30), Max: 36.6 (21 Mar 2021 09:41)  T(F): 97.9 (21 Mar 2021 20:30), Max: 97.9 (21 Mar 2021 20:30)  HR: 84 (21 Mar 2021 23:28) (84 - 103)  BP: 80/50 (21 Mar 2021 23:32) (79/45 - 131/79)  BP(mean): --  ABP: --  ABP(mean): --  RR: 18 (21 Mar 2021 20:30) (17 - 18)  SpO2: 98% (21 Mar 2021 20:30) (97% - 98%)        03-20 @ 07:01  -  03-21 @ 07:00  --------------------------------------------------------  IN: 900 mL / OUT: 400 mL / NET: 500 mL      CAPILLARY BLOOD GLUCOSE      POCT Blood Glucose.: 134 mg/dL (22 Mar 2021 00:37)      PHYSICAL EXAM:  General:   HEENT:   Neck:   Chest/Lungs:  Heart:  Abdomen:   Extremities:   Skin:   Neuro:   Psych:     LINES:     HOSPITAL MEDICATIONS:  MEDICATIONS  (STANDING):  atorvastatin 10 milliGRAM(s) Oral at bedtime  chlorhexidine 4% Liquid 1 Application(s) Topical daily  cinacalcet 90 milliGRAM(s) Oral daily  citalopram 20 milliGRAM(s) Oral daily  dextrose 40% Gel 15 Gram(s) Oral once  dextrose 50% Injectable 25 Gram(s) IV Push once  dextrose 50% Injectable 12.5 Gram(s) IV Push once  dextrose 50% Injectable 25 Gram(s) IV Push once  glucagon  Injectable 1 milliGRAM(s) IntraMuscular once  lactobacillus acidophilus 1 Tablet(s) Oral daily  midodrine. 10 milliGRAM(s) Oral once  mupirocin 2% Ointment 1 Application(s) Both Nostrils two times a day  pantoprazole Infusion 8 mG/Hr (10 mL/Hr) IV Continuous <Continuous>  polyethylene glycol 3350 17 Gram(s) Oral daily  senna Syrup 10 milliLiter(s) Oral daily  sodium chloride 0.9%. 500 milliLiter(s) (150 mL/Hr) IV Continuous <Continuous>  sodium zirconium cyclosilicate 10 Gram(s) Oral <User Schedule>    MEDICATIONS  (PRN):  midodrine. 10 milliGRAM(s) Oral <User Schedule> PRN SBP < 90 on HD      LABS:                        4.4    5.99  )-----------( 56       ( 22 Mar 2021 03:10 )             14.0     Hgb Trend: 4.4<--, 2.3<--, 9.6<--, 9.4<--, 8.6<--  03-21    133<L>  |  93<L>  |  38<H>  ----------------------------<  108<H>  4.4   |  23  |  2.82<H>    Ca    9.9      21 Mar 2021 07:00  Phos  2.5     03-21  Mg     2.5     03-21    TPro  6.3  /  Alb  2.8<L>  /  TBili  1.2  /  DBili  x   /  AST  118<H>  /  ALT  87<H>  /  AlkPhos  263<H>  03-21    Creatinine Trend: 2.82<--, 3.78<--, 3.23<--, 4.15<--, 3.80<--, 3.11<--  PT/INR - ( 22 Mar 2021 03:10 )   PT: 21.5 sec;   INR: 1.95 ratio         PTT - ( 22 Mar 2021 03:10 )  PTT:45.8 sec    Arterial Blood Gas:  03-22 @ 03:10  7.53/34/360/29/99.9/5.4  ABG lactate: --  Arterial Blood Gas:  03-22 @ 01:08  7.37/24/46/15/78.7/-10.4  ABG lactate: --        MICROBIOLOGY:     RADIOLOGY & ADDITIONAL TESTS:      ASSESSMENT AND PLAN:  Mr././Ms. is a ___    #Neuro    #Cardiovascular    #Respiratory    #GI/Nutrition    #/Renal    #Skin    #ID    #Endocrine    #Hematologic/DVT ppx    #Ethics      Elena Medel MD  PGY-1 MICU Accept Note    CHIEF COMPLAINT: hypotension     HPI / INTERVAL HISTORY:   70M CAD/stent, sCHF LVEF 12%, BPH, neurogenic bladder, ESRD on HD w/ chronic anemia, MRSA on vibramycin p/w sepsis POA from UTI with acute metabolic encephalopathy, hypotension, c/b hypoglycemia and hypothermia  Sepsis- completed zosyn, unclear source, cx neg   Hypoglycemia-cortisol level adequate, Poor PO s/p PEG placement 3/17/21  Altered mental status, known Dementia, overall worse, MRI no stroke, unclear etiology, overall failing.   Pleural effusion, Pulmonary consult appreciated - no intervention.   Chronic systolic heart failure. Continue lopressor, midodrine, lipitor, ASA.   ESRD c/w HD,   Transaminitis-U/S abdomen on 3/8 showed a normal liver, hepatitis serologies negative  Hypothermia- Resolved    Interval Events:     Today (3/22/21) RRT was called for hypotension. On arrival, VS: BP: 47/16 HR: 80 RR 15 Temp: 97.3   Pt initially treated w/ 500cc bolus NS; pressure improved to 135/105.; however BP continued to drop. Midodrine 30mg PO x1 was given. BP continued to drop to 59/40. Norepinephrine was started. Pt noted to have melena by nursing staff, initial blood work was inaccurate as it was contaminated by IVF.  Repeat lab work sent confirmed acute blood loss anemia to Hgb ~4; FOBT +. Pt treated initially w/ 0.5U of PRBCx1. Pt started on protonix gtt. Repeat 0.5 prbc unit ordered. Also given 1L fluid bolus.               PAST MEDICAL & SURGICAL HISTORY:  Inguinal hernia    NSTEMI (non-ST elevated myocardial infarction)    HLD (hyperlipidemia)    Neurogenic bladder    Spinal abscess    Cavitary lung disease    CAD (coronary artery disease)    Abscess of sacrum    Anemia due to chronic renal failure treated with erythropoietin, stage 2 (mild)    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 device, implant or graft    HPTH (hyperparathyroidism)  Secondary    HTN (hypertension)    ESRD (end stage renal disease)    Cavitary lung disease    CAD in native artery    Hypertension    ESRD (end stage renal disease)    S/P primary angioplasty with coronary stent    Kidney abscess        FAMILY HISTORY:  No pertinent family history in first degree relatives    Family history of breast cancer (Sibling)        SOCIAL HISTORY:  Smoking:   Substance Use:   EtOH Use:   Marital Status:   Sexual History:   Occupation:  Recent Travel:  Country of Birth:   Advance Directives:     HOME MEDICATIONS:      Allergies    No Known Allergies    Intolerances          REVIEW OF SYSTEMS:  Constitutional: No fevers, chills, weight loss, weight gain  HEENT: No vision problems, eye pain, nasal congestion, rhinorrhea, sore throat, dysphagia  CV: No chest pain, orthopnea, palpitations  Resp: No cough, dyspnea, wheezing, hemoptysis  GI: No nausea, vomiting, diarrhea, constipation, abdominal pain  : [ ] dysuria [ ] nocturia [ ] hematuria [ ] increased urinary frequency  Musculoskeletal: [ ] back pain [ ] myalgias [ ] arthralgias [ ] fracture  Skin: [ ] rash [ ] itch  Neurological: [ ] headache [ ] dizziness [ ] syncope [ ] weakness [ ] numbness  Psychiatric: [ ] anxiety [ ] depression  Endocrine: [ ] diabetes [ ] thyroid problem  Hematologic/Lymphatic: [ ] anemia [ ] bleeding problem  Allergic/Immunologic: [ ] itchy eyes [ ] nasal discharge [ ] hives [ ] angioedema  [ ] All other systems negative  [ ] Unable to assess ROS because ________    OBJECTIVE:  ICU Vital Signs Last 24 Hrs  T(C): 36.6 (21 Mar 2021 20:30), Max: 36.6 (21 Mar 2021 09:41)  T(F): 97.9 (21 Mar 2021 20:30), Max: 97.9 (21 Mar 2021 20:30)  HR: 84 (21 Mar 2021 23:28) (84 - 103)  BP: 80/50 (21 Mar 2021 23:32) (79/45 - 131/79)  BP(mean): --  ABP: --  ABP(mean): --  RR: 18 (21 Mar 2021 20:30) (17 - 18)  SpO2: 98% (21 Mar 2021 20:30) (97% - 98%)        03-20 @ 07:01  -  03-21 @ 07:00  --------------------------------------------------------  IN: 900 mL / OUT: 400 mL / NET: 500 mL      CAPILLARY BLOOD GLUCOSE      POCT Blood Glucose.: 134 mg/dL (22 Mar 2021 00:37)      PHYSICAL EXAM:  General:   HEENT:   Neck:   Chest/Lungs:  Heart:  Abdomen:   Extremities:   Skin:   Neuro:   Psych:     LINES:     HOSPITAL MEDICATIONS:  MEDICATIONS  (STANDING):  atorvastatin 10 milliGRAM(s) Oral at bedtime  chlorhexidine 4% Liquid 1 Application(s) Topical daily  cinacalcet 90 milliGRAM(s) Oral daily  citalopram 20 milliGRAM(s) Oral daily  dextrose 40% Gel 15 Gram(s) Oral once  dextrose 50% Injectable 25 Gram(s) IV Push once  dextrose 50% Injectable 12.5 Gram(s) IV Push once  dextrose 50% Injectable 25 Gram(s) IV Push once  glucagon  Injectable 1 milliGRAM(s) IntraMuscular once  lactobacillus acidophilus 1 Tablet(s) Oral daily  midodrine. 10 milliGRAM(s) Oral once  mupirocin 2% Ointment 1 Application(s) Both Nostrils two times a day  pantoprazole Infusion 8 mG/Hr (10 mL/Hr) IV Continuous <Continuous>  polyethylene glycol 3350 17 Gram(s) Oral daily  senna Syrup 10 milliLiter(s) Oral daily  sodium chloride 0.9%. 500 milliLiter(s) (150 mL/Hr) IV Continuous <Continuous>  sodium zirconium cyclosilicate 10 Gram(s) Oral <User Schedule>    MEDICATIONS  (PRN):  midodrine. 10 milliGRAM(s) Oral <User Schedule> PRN SBP < 90 on HD      LABS:                        4.4    5.99  )-----------( 56       ( 22 Mar 2021 03:10 )             14.0     Hgb Trend: 4.4<--, 2.3<--, 9.6<--, 9.4<--, 8.6<--  03-21    133<L>  |  93<L>  |  38<H>  ----------------------------<  108<H>  4.4   |  23  |  2.82<H>    Ca    9.9      21 Mar 2021 07:00  Phos  2.5     03-21  Mg     2.5     03-21    TPro  6.3  /  Alb  2.8<L>  /  TBili  1.2  /  DBili  x   /  AST  118<H>  /  ALT  87<H>  /  AlkPhos  263<H>  03-21    Creatinine Trend: 2.82<--, 3.78<--, 3.23<--, 4.15<--, 3.80<--, 3.11<--  PT/INR - ( 22 Mar 2021 03:10 )   PT: 21.5 sec;   INR: 1.95 ratio         PTT - ( 22 Mar 2021 03:10 )  PTT:45.8 sec    Arterial Blood Gas:  03-22 @ 03:10  7.53/34/360/29/99.9/5.4  ABG lactate: --  Arterial Blood Gas:  03-22 @ 01:08  7.37/24/46/15/78.7/-10.4  ABG lactate: --        MICROBIOLOGY:     RADIOLOGY & ADDITIONAL TESTS:      ASSESSMENT AND PLAN:  Mr./Mrs./Ms. is a ___    #Neuro    #Cardiovascular    #Respiratory    #GI/Nutrition    #/Renal    #Skin    #ID    #Endocrine    #Hematologic/DVT ppx    #Ethics      Elena Medel MD  PGY-1 MICU Accept Note    CHIEF COMPLAINT: hypotension     HPI / INTERVAL HISTORY:   70M CAD/stent, sCHF LVEF 12%, BPH, neurogenic bladder, ESRD on HD w/ chronic anemia, MRSA on vibramycin p/w sepsis POA from UTI with acute metabolic encephalopathy, hypotension, c/b hypoglycemia and hypothermia. Patient was treated with Zosyn from for 7 days, finished 3/15/21, blood cx negative.    During hospital course patient also became Hypoglycemic-cortisol level adequate, likely 2.2 Poor PO s/p PEG placement 3/17/21  Patient received MRI for his AMS Altered mental status although known Dementia, overall worse, MRI no stroke, unclear etiology, overall failing.   Patient has large R sided Pleural effusion, Pulmonary consult appreciated - no intervention.   Chronic systolic heart failure. LVEF 12 % from TTE in Feb 3 2021. Patient on lopressor, midodrine, lipitor, ASA.   Patient gets HD for ESRD   Transaminitis-U/S abdomen on 3/8 showed a normal liver, hepatitis serologies negative, transaminitis has since resolved as has hypothermia.   Hypothermia- Resolved    Interval Events:     Today (3/22/21) RRT was called for hypotension. On arrival, VS: BP: 47/16 HR: 80 RR 15 Temp: 97.3 . Melena on PE.   Pt initially treated w/ 500cc bolus NS; pressure improved to 135/105.; however BP continued to drop. Midodrine 30mg PO x1 was given. BP continued to drop to 59/40. Norepinephrine was started. Pt noted to have melena by nursing staff, initial blood work was inaccurate as it was contaminated by IVF.  Repeat lab work sent confirmed acute blood loss anemia to Hgb ~4; FOBT +. Pt treated initially w/ 0.5U of PRBCx1. Pt started on protonix gtt. Repeat 0.5 prbc unit ordered. Also given 1L fluid bolus.     Per chart review, GOC with cousin Deana (HCP) described the overall decline in pt over the last few days; Denaa stated that Mr Beaver would not want to be supported by artifical means should his body stop working on it's own.  She wishes that he is allowed to die naturally, should event turn that direction.      During RRT, cousin Deana was initially however notified however pressor requirements and ICU admission were not discussed. MICU team was not able to reach [facility] overnight; patient was capped on 0.1 NE and transferred to MICU.           PAST MEDICAL & SURGICAL HISTORY:  Inguinal hernia    NSTEMI (non-ST elevated myocardial infarction)    HLD (hyperlipidemia)    Neurogenic bladder    Spinal abscess    Cavitary lung disease    CAD (coronary artery disease)    Abscess of sacrum    Anemia due to chronic renal failure treated with erythropoietin, stage 2 (mild)    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 device, implant or graft    HPTH (hyperparathyroidism)  Secondary    HTN (hypertension)    ESRD (end stage renal disease)    Cavitary lung disease    CAD in native artery    Hypertension    ESRD (end stage renal disease)    S/P primary angioplasty with coronary stent    Kidney abscess        FAMILY HISTORY:  No pertinent family history in first degree relatives    Family history of breast cancer (Sibling)        SOCIAL HISTORY:  Unable to obtain due to mental status   Advance Directives: DNR/DNI    HOME MEDICATIONS:      Allergies    No Known Allergies    Intolerances          REVIEW OF SYSTEMS:  Constitutional: No fevers, chills, weight loss, weight gain  HEENT: No vision problems, eye pain, nasal congestion, rhinorrhea, sore throat, dysphagia  CV: No chest pain, orthopnea, palpitations  Resp: No cough, dyspnea, wheezing, hemoptysis  GI: No nausea, vomiting, diarrhea, constipation, abdominal pain  : [ ] dysuria [ ] nocturia [ ] hematuria [ ] increased urinary frequency  Musculoskeletal: [ ] back pain [ ] myalgias [ ] arthralgias [ ] fracture  Skin: [ ] rash [ ] itch  Neurological: [ ] headache [ ] dizziness [ ] syncope [ ] weakness [ ] numbness  Psychiatric: [ ] anxiety [ ] depression  Endocrine: [ ] diabetes [ ] thyroid problem  Hematologic/Lymphatic: [ ] anemia [ ] bleeding problem  Allergic/Immunologic: [ ] itchy eyes [ ] nasal discharge [ ] hives [ ] angioedema  [ ] All other systems negative  [x ] Unable to assess ROS because impaired mental status     OBJECTIVE:  ICU Vital Signs Last 24 Hrs  T(C): 36.6 (21 Mar 2021 20:30), Max: 36.6 (21 Mar 2021 09:41)  T(F): 97.9 (21 Mar 2021 20:30), Max: 97.9 (21 Mar 2021 20:30)  HR: 84 (21 Mar 2021 23:28) (84 - 103)  BP: 80/50 (21 Mar 2021 23:32) (79/45 - 131/79)  BP(mean): --  ABP: --  ABP(mean): --  RR: 18 (21 Mar 2021 20:30) (17 - 18)  SpO2: 98% (21 Mar 2021 20:30) (97% - 98%)        03-20 @ 07:01  -  03-21 @ 07:00  --------------------------------------------------------  IN: 900 mL / OUT: 400 mL / NET: 500 mL      CAPILLARY BLOOD GLUCOSE      POCT Blood Glucose.: 134 mg/dL (22 Mar 2021 00:37)      PHYSICAL EXAM:  GENERAL: NAD, thin, malnourished   HEAD:  Atraumatic, Normocephalic  EYES: EOMI, conjunctiva and sclera clear  CHEST/LUNG:   HEART: Regular rate and rhythm; No murmurs, rubs, or gallops  ABDOMEN: PEG tube with abdominal binder   EXTREMITIES: Moves extremities, edema at the ankles, LUE fistula, No clubbing or cyanosis   PSYCH: AAOx1  NEUROLOGY: non-focal  SKIN: Penile skin breakdown noticed, sacral ulcer (noted by nurse)    LINES:     HOSPITAL MEDICATIONS:  MEDICATIONS  (STANDING):  atorvastatin 10 milliGRAM(s) Oral at bedtime  chlorhexidine 4% Liquid 1 Application(s) Topical daily  cinacalcet 90 milliGRAM(s) Oral daily  citalopram 20 milliGRAM(s) Oral daily  dextrose 40% Gel 15 Gram(s) Oral once  dextrose 50% Injectable 25 Gram(s) IV Push once  dextrose 50% Injectable 12.5 Gram(s) IV Push once  dextrose 50% Injectable 25 Gram(s) IV Push once  glucagon  Injectable 1 milliGRAM(s) IntraMuscular once  lactobacillus acidophilus 1 Tablet(s) Oral daily  midodrine. 10 milliGRAM(s) Oral once  mupirocin 2% Ointment 1 Application(s) Both Nostrils two times a day  pantoprazole Infusion 8 mG/Hr (10 mL/Hr) IV Continuous <Continuous>  polyethylene glycol 3350 17 Gram(s) Oral daily  senna Syrup 10 milliLiter(s) Oral daily  sodium chloride 0.9%. 500 milliLiter(s) (150 mL/Hr) IV Continuous <Continuous>  sodium zirconium cyclosilicate 10 Gram(s) Oral <User Schedule>    MEDICATIONS  (PRN):  midodrine. 10 milliGRAM(s) Oral <User Schedule> PRN SBP < 90 on HD      LABS:                        4.4    5.99  )-----------( 56       ( 22 Mar 2021 03:10 )             14.0     Hgb Trend: 4.4<--, 2.3<--, 9.6<--, 9.4<--, 8.6<--  03-21    133<L>  |  93<L>  |  38<H>  ----------------------------<  108<H>  4.4   |  23  |  2.82<H>    Ca    9.9      21 Mar 2021 07:00  Phos  2.5     03-21  Mg     2.5     03-21    TPro  6.3  /  Alb  2.8<L>  /  TBili  1.2  /  DBili  x   /  AST  118<H>  /  ALT  87<H>  /  AlkPhos  263<H>  03-21    Creatinine Trend: 2.82<--, 3.78<--, 3.23<--, 4.15<--, 3.80<--, 3.11<--  PT/INR - ( 22 Mar 2021 03:10 )   PT: 21.5 sec;   INR: 1.95 ratio         PTT - ( 22 Mar 2021 03:10 )  PTT:45.8 sec    Arterial Blood Gas:  03-22 @ 03:10  7.53/34/360/29/99.9/5.4  ABG lactate: --  Arterial Blood Gas:  03-22 @ 01:08  7.37/24/46/15/78.7/-10.4  ABG lactate: --        MICROBIOLOGY:     RADIOLOGY & ADDITIONAL TESTS:      ASSESSMENT AND PLAN:   70M CAD/stent, sCHF LVEF 12%, BPH, neurogenic bladder, ESRD on HD w/ chronic anemia, MRSA on vibramycin p/w sepsis POA from UTI with acute metabolic encephalopathy, hypotension, c/b hypoglycemia and hypothermia, now presenting with hypovolemic shock 2/2 GI bleed on NE     #Neuro  AAOx1   Hx of dementia  AMS likely 2/2 hypotension and progression of disease  CTM    #Cardiovascular    Hypotension-   s/p midodrine, 1L IVF, and 1L pRBC  Likely 2/2 GI bleed  Patient s/p PEG placement 3/17/21  - continue NE    HF  - EF 12 % from TTE from Feb 3 2021  - on Midodrine and Atorvastatin  - continue pressors and midodrine     #Respiratory  - on room air saturating well  - known L pleural effusion on CXR- no interveiton per pulm     #GI/Nutrition  LFTs downtrending  s/p PEG tube placement 3/17/21  Drop in Hgb 9-->4 in 24 h with melena  Lactate 2.4  s/p 1u prbc, 1L IVF  - PPI gtt  - monitor CBC   - transfuse as tolerated   - consult GI, patient unlikely candidate for interventions    #/Renal  ESRD on HD  - HD as needed for volume status given poor cardiac function    #Skin  - sacral ulcer  - continue wound care     #ID  s/p 7 d tx with Zosyn for sepsis, finished on 3/15  Recent PEG tube placement 3/17  Afebrile, no leukocytosis  - blood cx pending   - CTM     #Endocrine  Incidences of hypoglycemia during hospital stay 2/2 poor oral intake  now s/p PEG 3/17/21  - resume tube feeds as tolerated  - fingersticks q6h    #Hematologic/DVT ppx    Anemia  - precipitous fall in Hgb from 9-->4.4 on 3/22/21  - Baseline Hgb ~ 11  s/p 1 u pRBC  Likely 2/2 GI bleed   - GI consulted  - Transfuse as tolerated    Thrombocytopenia   s/p 1 u plt 3/17/21  Downtrending plts, currently 56  Transfuse as tolerated    #Ethics  - per GOC discussions on 3/8/21, patient DNR/DNI and HCPDeana stated that Mr Beaver would not want to be supported by artifical means should his body stop working on it's own.  She wishes that he is allowed to die naturally, should event turn that direction.      Elena Medel MD  PGY-1 MICU Accept Note    CHIEF COMPLAINT: hypotension     HPI / INTERVAL HISTORY:   70M CAD/stent, sCHF LVEF 12%, BPH, neurogenic bladder, ESRD on HD w/ chronic anemia, MRSA on vibramycin p/w sepsis POA from UTI with acute metabolic encephalopathy, hypotension, c/b hypoglycemia and hypothermia. Patient was treated with Zosyn from for 7 days, finished 3/15/21, blood cx negative.    During hospital course patient also became Hypoglycemic-cortisol level adequate, likely 2.2 Poor PO s/p PEG placement 3/17/21  Patient received MRI for his AMS Altered mental status although known Dementia, overall worse, MRI no stroke, unclear etiology, overall failing.   Patient has large R sided Pleural effusion, Pulmonary consult appreciated - no intervention.   Chronic systolic heart failure. LVEF 12 % from TTE in Feb 3 2021. Patient on lopressor, midodrine, lipitor, ASA.   Patient gets HD for ESRD   Transaminitis-U/S abdomen on 3/8 showed a normal liver, hepatitis serologies negative, transaminitis has since resolved as has hypothermia.   Hypothermia- Resolved    Interval Events:     Today (3/22/21) RRT was called for hypotension. On arrival, VS: BP: 47/16 HR: 80 RR 15 Temp: 97.3 . Melena on PE.   Pt initially treated w/ 500cc bolus NS; pressure improved to 135/105.; however BP continued to drop. Midodrine 30mg PO x1 was given. BP continued to drop to 59/40. Norepinephrine was started. Pt noted to have melena by nursing staff, initial blood work was inaccurate as it was contaminated by IVF.  Repeat lab work sent confirmed acute blood loss anemia to Hgb ~4; FOBT +. Pt treated initially w/ 0.5U of PRBCx1. Pt started on protonix gtt. Repeat 0.5 prbc unit ordered. Also given 1L fluid bolus.     Per chart review, GOC with cousin Deana (HCP) described the overall decline in pt over the last few days; Deana stated that Mr Beaver would not want to be supported by artifical means should his body stop working on it's own.  She wishes that he is allowed to die naturally, should event turn that direction.      During RRT, cousin Deana was initially however notified however pressor requirements and ICU admission were not discussed. MICU team was not able to reach [facility] overnight; patient was capped on 0.1 NE and transferred to MICU.           PAST MEDICAL & SURGICAL HISTORY:  Inguinal hernia    NSTEMI (non-ST elevated myocardial infarction)    HLD (hyperlipidemia)    Neurogenic bladder    Spinal abscess    Cavitary lung disease    CAD (coronary artery disease)    Abscess of sacrum    Anemia due to chronic renal failure treated with erythropoietin, stage 2 (mild)    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 device, implant or graft    HPTH (hyperparathyroidism)  Secondary    HTN (hypertension)    ESRD (end stage renal disease)    Cavitary lung disease    CAD in native artery    Hypertension    ESRD (end stage renal disease)    S/P primary angioplasty with coronary stent    Kidney abscess        FAMILY HISTORY:  No pertinent family history in first degree relatives    Family history of breast cancer (Sibling)        SOCIAL HISTORY:  Unable to obtain due to mental status   Advance Directives: DNR/DNI    HOME MEDICATIONS:      Allergies    No Known Allergies    Intolerances          REVIEW OF SYSTEMS:  Constitutional: No fevers, chills, weight loss, weight gain  HEENT: No vision problems, eye pain, nasal congestion, rhinorrhea, sore throat, dysphagia  CV: No chest pain, orthopnea, palpitations  Resp: No cough, dyspnea, wheezing, hemoptysis  GI: No nausea, vomiting, diarrhea, constipation, abdominal pain  : [ ] dysuria [ ] nocturia [ ] hematuria [ ] increased urinary frequency  Musculoskeletal: [ ] back pain [ ] myalgias [ ] arthralgias [ ] fracture  Skin: [ ] rash [ ] itch  Neurological: [ ] headache [ ] dizziness [ ] syncope [ ] weakness [ ] numbness  Psychiatric: [ ] anxiety [ ] depression  Endocrine: [ ] diabetes [ ] thyroid problem  Hematologic/Lymphatic: [ ] anemia [ ] bleeding problem  Allergic/Immunologic: [ ] itchy eyes [ ] nasal discharge [ ] hives [ ] angioedema  [ ] All other systems negative  [x ] Unable to assess ROS because impaired mental status     OBJECTIVE:  ICU Vital Signs Last 24 Hrs  T(C): 36.6 (21 Mar 2021 20:30), Max: 36.6 (21 Mar 2021 09:41)  T(F): 97.9 (21 Mar 2021 20:30), Max: 97.9 (21 Mar 2021 20:30)  HR: 84 (21 Mar 2021 23:28) (84 - 103)  BP: 80/50 (21 Mar 2021 23:32) (79/45 - 131/79)  BP(mean): --  ABP: --  ABP(mean): --  RR: 18 (21 Mar 2021 20:30) (17 - 18)  SpO2: 98% (21 Mar 2021 20:30) (97% - 98%)        03-20 @ 07:01  -  03-21 @ 07:00  --------------------------------------------------------  IN: 900 mL / OUT: 400 mL / NET: 500 mL      CAPILLARY BLOOD GLUCOSE      POCT Blood Glucose.: 134 mg/dL (22 Mar 2021 00:37)      PHYSICAL EXAM:  GENERAL: NAD, thin, malnourished   HEAD:  Atraumatic, Normocephalic  EYES: EOMI, conjunctiva and sclera clear  CHEST/LUNG:   HEART: Regular rate and rhythm; No murmurs, rubs, or gallops  ABDOMEN: PEG tube with abdominal binder   EXTREMITIES: Moves extremities, edema at the ankles, LUE fistula, No clubbing or cyanosis   PSYCH: AAOx1  NEUROLOGY: non-focal  SKIN: Penile skin breakdown noticed, sacral ulcer (noted by nurse)    LINES:     HOSPITAL MEDICATIONS:  MEDICATIONS  (STANDING):  atorvastatin 10 milliGRAM(s) Oral at bedtime  chlorhexidine 4% Liquid 1 Application(s) Topical daily  cinacalcet 90 milliGRAM(s) Oral daily  citalopram 20 milliGRAM(s) Oral daily  dextrose 40% Gel 15 Gram(s) Oral once  dextrose 50% Injectable 25 Gram(s) IV Push once  dextrose 50% Injectable 12.5 Gram(s) IV Push once  dextrose 50% Injectable 25 Gram(s) IV Push once  glucagon  Injectable 1 milliGRAM(s) IntraMuscular once  lactobacillus acidophilus 1 Tablet(s) Oral daily  midodrine. 10 milliGRAM(s) Oral once  mupirocin 2% Ointment 1 Application(s) Both Nostrils two times a day  pantoprazole Infusion 8 mG/Hr (10 mL/Hr) IV Continuous <Continuous>  polyethylene glycol 3350 17 Gram(s) Oral daily  senna Syrup 10 milliLiter(s) Oral daily  sodium chloride 0.9%. 500 milliLiter(s) (150 mL/Hr) IV Continuous <Continuous>  sodium zirconium cyclosilicate 10 Gram(s) Oral <User Schedule>    MEDICATIONS  (PRN):  midodrine. 10 milliGRAM(s) Oral <User Schedule> PRN SBP < 90 on HD      LABS:                        4.4    5.99  )-----------( 56       ( 22 Mar 2021 03:10 )             14.0     Hgb Trend: 4.4<--, 2.3<--, 9.6<--, 9.4<--, 8.6<--  03-21    133<L>  |  93<L>  |  38<H>  ----------------------------<  108<H>  4.4   |  23  |  2.82<H>    Ca    9.9      21 Mar 2021 07:00  Phos  2.5     03-21  Mg     2.5     03-21    TPro  6.3  /  Alb  2.8<L>  /  TBili  1.2  /  DBili  x   /  AST  118<H>  /  ALT  87<H>  /  AlkPhos  263<H>  03-21    Creatinine Trend: 2.82<--, 3.78<--, 3.23<--, 4.15<--, 3.80<--, 3.11<--  PT/INR - ( 22 Mar 2021 03:10 )   PT: 21.5 sec;   INR: 1.95 ratio         PTT - ( 22 Mar 2021 03:10 )  PTT:45.8 sec    Arterial Blood Gas:  03-22 @ 03:10  7.53/34/360/29/99.9/5.4  ABG lactate: --  Arterial Blood Gas:  03-22 @ 01:08  7.37/24/46/15/78.7/-10.4  ABG lactate: --        MICROBIOLOGY:     RADIOLOGY & ADDITIONAL TESTS:      ASSESSMENT AND PLAN:   70M CAD/stent, sCHF LVEF 12%, BPH, neurogenic bladder, ESRD on HD w/ chronic anemia, MRSA on vibramycin p/w sepsis POA from UTI with acute metabolic encephalopathy, hypotension, c/b hypoglycemia and hypothermia, now presenting with hypovolemic shock 2/2 GI bleed on NE     #Neuro  AAOx1   Hx of dementia  AMS likely 2/2 hypotension and progression of disease  CTM    #Cardiovascular    Hypotension-   s/p midodrine, 1L IVF, and 1L pRBC  Likely 2/2 GI bleed  Patient s/p PEG placement 3/17/21  - continue NE    HF  - EF 12 % from TTE from Feb 3 2021  - on Midodrine and Atorvastatin  - continue pressors and midodrine     #Respiratory  - on room air saturating well  - known L pleural effusion on CXR- no interveiton per pulm     #GI/Nutrition  LFTs downtrending  s/p PEG tube placement 3/17/21  Drop in Hgb 9-->4 in 24 h with melena  Lactate 2.4  s/p 1u prbc, 1L IVF  - PPI gtt  - monitor CBC   - transfuse as tolerated   - consult GI, patient unlikely candidate for interventions    #/Renal  ESRD on HD  Lactate 2.4 s/p 1L IVF   - HD as needed for volume status given poor cardiac function    #Skin  - sacral ulcer  - continue wound care     #ID  s/p 7 d tx with Zosyn for sepsis, finished on 3/15  Recent PEG tube placement 3/17  Afebrile, no leukocytosis  - blood cx pending   - CTM     #Endocrine  Incidences of hypoglycemia during hospital stay 2/2 poor oral intake  now s/p PEG 3/17/21  - resume tube feeds as tolerated  - fingersticks q6h    #Hematologic/DVT ppx    Anemia  - precipitous fall in Hgb from 9-->4.4 on 3/22/21  - Baseline Hgb ~ 11  s/p 1 u pRBC  Likely 2/2 GI bleed   - GI consulted  - Transfuse as tolerated    Thrombocytopenia   s/p 1 u plt 3/17/21  Downtrending plts, currently 56  Transfuse as tolerated    #Ethics  - per GOC discussions on 3/8/21, patient DNR/DNI and HCPDeana stated that Mr Beaver would not want to be supported by artifical means should his body stop working on it's own.  She wishes that he is allowed to die naturally, should event turn that direction.      Elena Medel MD  PGY-1 MICU Accept Note    CHIEF COMPLAINT: hypotension     HPI / INTERVAL HISTORY:   70M CAD/stent, sCHF LVEF 12%, BPH, neurogenic bladder, ESRD on HD w/ chronic anemia, MRSA on vibramycin p/w sepsis POA from UTI with acute metabolic encephalopathy, hypotension, c/b hypoglycemia and hypothermia. Patient was treated with Zosyn from for 7 days, finished 3/15/21, blood cx negative.    During hospital course patient also became Hypoglycemic-cortisol level adequate, likely 2.2 Poor PO s/p PEG placement 3/17/21  Patient received MRI for his AMS Altered mental status although known Dementia, overall worse, MRI no stroke, unclear etiology, overall failing.   Patient has large R sided Pleural effusion, Pulmonary consult appreciated - no intervention.   Chronic systolic heart failure. LVEF 12 % from TTE in Feb 3 2021. Patient on lopressor, midodrine, lipitor, ASA.   Patient gets HD for ESRD   Transaminitis-U/S abdomen on 3/8 showed a normal liver, hepatitis serologies negative, transaminitis has since resolved as has hypothermia.   Hypothermia- Resolved    Interval Events:     Today (3/22/21) RRT was called for hypotension. On arrival, VS: BP: 47/16 HR: 80 RR 15 Temp: 97.3 . Melena on PE.   Pt initially treated w/ 500cc bolus NS; pressure improved to 135/105.; however BP continued to drop. Midodrine 30mg PO x1 was given. BP continued to drop to 59/40. Norepinephrine was started. Pt noted to have melena by nursing staff, initial blood work was inaccurate as it was contaminated by IVF.  Repeat lab work sent confirmed acute blood loss anemia to Hgb ~4; FOBT +. Pt treated initially w/ 0.5U of PRBCx1. Pt started on protonix gtt. Repeat 0.5 prbc unit ordered. Also given 1L fluid bolus.     Per chart review, GOC with cousin Deana (HCP) described the overall decline in pt over the last few days; Deana stated that Mr Beaver would not want to be supported by artifical means should his body stop working on it's own.  She wishes that he is allowed to die naturally, should event turn that direction.      During RRT, cousin Deana was initially however notified however pressor requirements and ICU admission were not discussed. MICU team was not able to reach [facility] overnight; patient was capped on 0.1 NE and transferred to MICU.           PAST MEDICAL & SURGICAL HISTORY:  Inguinal hernia    NSTEMI (non-ST elevated myocardial infarction)    HLD (hyperlipidemia)    Neurogenic bladder    Spinal abscess    Cavitary lung disease    CAD (coronary artery disease)    Abscess of sacrum    Anemia due to chronic renal failure treated with erythropoietin, stage 2 (mild)    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 device, implant or graft    HPTH (hyperparathyroidism)  Secondary    HTN (hypertension)    ESRD (end stage renal disease)    Cavitary lung disease    CAD in native artery    Hypertension    ESRD (end stage renal disease)    S/P primary angioplasty with coronary stent    Kidney abscess        FAMILY HISTORY:  No pertinent family history in first degree relatives    Family history of breast cancer (Sibling)        SOCIAL HISTORY:  Unable to obtain due to mental status   Advance Directives: DNR/DNI    HOME MEDICATIONS:      Allergies    No Known Allergies    Intolerances          REVIEW OF SYSTEMS:  Constitutional: No fevers, chills, weight loss, weight gain  HEENT: No vision problems, eye pain, nasal congestion, rhinorrhea, sore throat, dysphagia  CV: No chest pain, orthopnea, palpitations  Resp: No cough, dyspnea, wheezing, hemoptysis  GI: No nausea, vomiting, diarrhea, constipation, abdominal pain  : [ ] dysuria [ ] nocturia [ ] hematuria [ ] increased urinary frequency  Musculoskeletal: [ ] back pain [ ] myalgias [ ] arthralgias [ ] fracture  Skin: [ ] rash [ ] itch  Neurological: [ ] headache [ ] dizziness [ ] syncope [ ] weakness [ ] numbness  Psychiatric: [ ] anxiety [ ] depression  Endocrine: [ ] diabetes [ ] thyroid problem  Hematologic/Lymphatic: [ ] anemia [ ] bleeding problem  Allergic/Immunologic: [ ] itchy eyes [ ] nasal discharge [ ] hives [ ] angioedema  [ ] All other systems negative  [x ] Unable to assess ROS because impaired mental status     OBJECTIVE:  ICU Vital Signs Last 24 Hrs  T(C): 36.6 (21 Mar 2021 20:30), Max: 36.6 (21 Mar 2021 09:41)  T(F): 97.9 (21 Mar 2021 20:30), Max: 97.9 (21 Mar 2021 20:30)  HR: 84 (21 Mar 2021 23:28) (84 - 103)  BP: 80/50 (21 Mar 2021 23:32) (79/45 - 131/79)  BP(mean): --  ABP: --  ABP(mean): --  RR: 18 (21 Mar 2021 20:30) (17 - 18)  SpO2: 98% (21 Mar 2021 20:30) (97% - 98%)        03-20 @ 07:01  -  03-21 @ 07:00  --------------------------------------------------------  IN: 900 mL / OUT: 400 mL / NET: 500 mL      CAPILLARY BLOOD GLUCOSE      POCT Blood Glucose.: 134 mg/dL (22 Mar 2021 00:37)      PHYSICAL EXAM:  GENERAL: NAD, thin, malnourished   HEAD:  Atraumatic, Normocephalic  EYES: EOMI, conjunctiva and sclera clear  CHEST/LUNG:   HEART: Regular rate and rhythm; No murmurs, rubs, or gallops  ABDOMEN: PEG tube with abdominal binder   EXTREMITIES: Moves extremities, edema at the ankles, LUE fistula, No clubbing or cyanosis   PSYCH: AAOx1  NEUROLOGY: non-focal  SKIN: Penile skin breakdown noticed, sacral ulcer (noted by nurse)    LINES:     HOSPITAL MEDICATIONS:  MEDICATIONS  (STANDING):  atorvastatin 10 milliGRAM(s) Oral at bedtime  chlorhexidine 4% Liquid 1 Application(s) Topical daily  cinacalcet 90 milliGRAM(s) Oral daily  citalopram 20 milliGRAM(s) Oral daily  dextrose 40% Gel 15 Gram(s) Oral once  dextrose 50% Injectable 25 Gram(s) IV Push once  dextrose 50% Injectable 12.5 Gram(s) IV Push once  dextrose 50% Injectable 25 Gram(s) IV Push once  glucagon  Injectable 1 milliGRAM(s) IntraMuscular once  lactobacillus acidophilus 1 Tablet(s) Oral daily  midodrine. 10 milliGRAM(s) Oral once  mupirocin 2% Ointment 1 Application(s) Both Nostrils two times a day  pantoprazole Infusion 8 mG/Hr (10 mL/Hr) IV Continuous <Continuous>  polyethylene glycol 3350 17 Gram(s) Oral daily  senna Syrup 10 milliLiter(s) Oral daily  sodium chloride 0.9%. 500 milliLiter(s) (150 mL/Hr) IV Continuous <Continuous>  sodium zirconium cyclosilicate 10 Gram(s) Oral <User Schedule>    MEDICATIONS  (PRN):  midodrine. 10 milliGRAM(s) Oral <User Schedule> PRN SBP < 90 on HD      LABS:                        4.4    5.99  )-----------( 56       ( 22 Mar 2021 03:10 )             14.0     Hgb Trend: 4.4<--, 2.3<--, 9.6<--, 9.4<--, 8.6<--  03-21    133<L>  |  93<L>  |  38<H>  ----------------------------<  108<H>  4.4   |  23  |  2.82<H>    Ca    9.9      21 Mar 2021 07:00  Phos  2.5     03-21  Mg     2.5     03-21    TPro  6.3  /  Alb  2.8<L>  /  TBili  1.2  /  DBili  x   /  AST  118<H>  /  ALT  87<H>  /  AlkPhos  263<H>  03-21    Creatinine Trend: 2.82<--, 3.78<--, 3.23<--, 4.15<--, 3.80<--, 3.11<--  PT/INR - ( 22 Mar 2021 03:10 )   PT: 21.5 sec;   INR: 1.95 ratio         PTT - ( 22 Mar 2021 03:10 )  PTT:45.8 sec    Arterial Blood Gas:  03-22 @ 03:10  7.53/34/360/29/99.9/5.4  ABG lactate: --  Arterial Blood Gas:  03-22 @ 01:08  7.37/24/46/15/78.7/-10.4  ABG lactate: --        MICROBIOLOGY:     RADIOLOGY & ADDITIONAL TESTS:      ASSESSMENT AND PLAN:   70M CAD/stent, sCHF LVEF 12%, BPH, neurogenic bladder, ESRD on HD w/ chronic anemia, MRSA on vibramycin p/w sepsis POA from UTI with acute metabolic encephalopathy, hypotension, c/b hypoglycemia and hypothermia, now presenting with hypovolemic shock 2/2 GI bleed on NE     #Neuro  AAOx1   Hx of dementia  AMS likely 2/2 hypotension and progression of disease  CTM    #Cardiovascular    Hypotension-   s/p midodrine, 1L IVF, and 1L pRBC  Likely 2/2 GI bleed  Patient s/p PEG placement 3/17/21  - continue NE    HF  - EF 12 % from TTE from Feb 3 2021  - on Midodrine and Atorvastatin  - continue pressors and midodrine     #Respiratory  - on room air saturating well  - known L pleural effusion on CXR- no interveiton per pulm     #GI/Nutrition  LFTs downtrending  s/p PEG tube placement 3/17/21  Drop in Hgb 9-->4 in 24 h with melena  Lactate 2.4  s/p 1u prbc, 1L IVF  - PPI gtt  - monitor CBC   - transfuse as tolerated   - consult GI, patient unlikely candidate for interventions    #/Renal  ESRD on HD  Lactate 2.4 s/p 1L IVF   - HD as needed for volume status given poor cardiac function    #Skin  - sacral ulcer  - continue wound care     #ID  s/p 7 d tx with Zosyn for sepsis, finished on 3/15  Recent PEG tube placement 3/17  Afebrile, no leukocytosis  - blood cx pending   - CTM     #Endocrine  Incidences of hypoglycemia during hospital stay 2/2 poor oral intake  now s/p PEG 3/17/21  - resume tube feeds as tolerated  - fingersticks q6h    #Hematologic/DVT ppx    Anemia  - precipitous fall in Hgb from 9-->4.4 on 3/22/21  - Baseline Hgb ~ 11  s/p 1 u pRBC  Likely 2/2 GI bleed   - GI consulted  - Transfuse as tolerated    Thrombocytopenia   s/p 1 u plt 3/17/21  Downtrending plts, currently 56  Transfuse as tolerated    #Ethics  - per GOC discussions on 3/8/21, patient DNR/DNI and HCPDeana stated that Mr Beaver would not want to be supported by artifical means should his body stop working on it's own.  She wishes that he is allowed to die naturally, should event turn that direction.      Elena Medel MD  PGY-1    Attending Attestation  71 yo with multiple medical problems including CAD and severe systolic HR with EF 12%, ESRD on HD, h/o UTI and MRSA on vibramycin, hosp course has been complicated by acute and prolonged encephalopathy, hypotension, hypoglycemia and hypothermia.  PEG was placed 3/17  Pt now hypotensive and found to have Hgb drop from 9 -> 4 in under 24 hours; also worsening chronic thrombocytopenia to 56. He is awake and appears in some abd discomfort although pt not able to verbalize.  He has had brown stool which is FOB+   GOC desires expressed by pt's sister (HCP - Deana) on 3/8 stating that aggressive measures were not to be pursued and emphasized pt comfort over aggressive treatments; he was made DNR/DNI.  Currently the pt is on norepinephrine at 0.1mcg/k/min and s/p midodrine 30mg.  He has received 1L crystalloid and 1/2unit PRBC so far. Attempts by MICU to reach family members have been unsuccessful.  Given this patients multi organ dysfunction/failure, severe protein calorie malnutrition, severe systolic HF and renal failure, poor overall level of function, and his surrogate's desire to limit aggressive procedures/treatment in favor of pt comfort, we will continue to try to contact family but for the time being will continue current vasopressor dose without increase, contact GI to r/o readily reversible cause of bleed and eval recent PEG, transfuse 2U PRBC and platelets, continue protonix gtt. Heart failure and renal failure limit resuscitative measures. Prognosis is grim  CC time 45 min

## 2021-03-22 NOTE — PROVIDER CONTACT NOTE (CRITICAL VALUE NOTIFICATION) - PERSON GIVING RESULT:
Lv, C
BISHNU Grover
CHIO Melvin to ANNIKA Alexander
Magalie Starr
CHIO Melvin to ANNIKA Alexander
Jax RAPP

## 2021-03-22 NOTE — RAPID RESPONSE TEAM SUMMARY - NSSITUATIONBACKGROUNDRRT_GEN_ALL_CORE
70M CAD/stent, sCHF LVEF 12%, BPH, neurogenic bladder, ESRD on HD w/ chronic anemia, MRSA on vibramycin p/w sepsis POA from UTI with acute metabolic encephalopathy, hypotension, c/b hypoglycemia and hypothermia    RRT called for hypotension.   On arrival, VS: BP: 47/16 HR: 80 RR 15 Temp: 97.3   Pt initially treated w/ 500cc bolus NS; pressure improved to 135/105.; however BP continued to drop. Midodrine 30mg PO x1 was given. BP continued to drop to 59/40. Norepinephrine was started. Pt noted to have melena by nursing staff, initial blood work was inaccurate as it was contaminated by IVF.  Repeat lab work sent confirmed acute blood loss anemia to Hgb ~4; FOBT +. Pt treated initially w/ 0.5U of PRBCx1. Pt started on protonix gtt. Repeat 0.5 prbc unit ordered. MICU was consulted and accepted patient; Will be transferred to ICU for further management.

## 2021-03-23 NOTE — PROGRESS NOTE ADULT - ASSESSMENT
69 y/o M w/ hx ESRD on HD, anemia, hyperparathyroidism, CAD s/p stent, BPH w/ neurogenic bladder (last dialysis 2/27/21), cognitive impairment, that was brought in by EMS from [facility] for AMS.    EKG: NSR LVH   Echo 2/21 - Severe systolic dysfunction   Tele: NSR, 6-8 bts NSVT      1) Chronic severe systolic dysfunction  - patient now on comfort care measures, care withdrawn    2) PEG placement    -s/p PEG     3) ESRD  -will be off HD, comfort care

## 2021-03-23 NOTE — PROGRESS NOTE ADULT - SUBJECTIVE AND OBJECTIVE BOX
Alex Navarrete MD  Interventional Cardiology / Advance Heart Failure and Cardiac Transplant Specialist  [place] Office : [phone] [place] [address] [address] [address]  Tel:   [place] Office : [address] [facility] [place] [address]  Tel: [phone]  Cell : [phone]    Subjective/Overnight events: Pt is lying in bed lethargic, on comfort care measures  	  MEDICATIONS:        HYDROmorphone  Injectable 1 milliGRAM(s) IV Push every 4 hours PRN        chlorhexidine 4% Liquid 1 Application(s) Topical daily      PAST MEDICAL/SURGICAL HISTORY  PAST MEDICAL & SURGICAL HISTORY:  Inguinal hernia    NSTEMI (non-ST elevated myocardial infarction)    HLD (hyperlipidemia)    Neurogenic bladder    Spinal abscess    Cavitary lung disease    CAD (coronary artery disease)    Abscess of sacrum    Anemia due to chronic renal failure treated with erythropoietin, stage 2 (mild)    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 device, implant or graft    HPTH (hyperparathyroidism)  Secondary    HTN (hypertension)    ESRD (end stage renal disease)    Cavitary lung disease    CAD in native artery    Hypertension    ESRD (end stage renal disease)    S/P primary angioplasty with coronary stent    Kidney abscess        SOCIAL HISTORY: Substance Use (street drugs): ( x ) never used  (  ) other:    FAMILY HISTORY:  No pertinent family history in first degree relatives    Family history of breast cancer (Sibling)        REVIEW OF SYSTEMS:  unable to obtain    PHYSICAL EXAM:  T(C): 35.2 (03-23-21 @ 12:00), Max: 35.4 (03-22-21 @ 16:00)  HR: 65 (03-23-21 @ 12:00) (61 - 81)  BP: 73/36 (03-23-21 @ 12:00) (37/26 - 92/77)  RR: 22 (03-23-21 @ 12:00) (11 - 22)  SpO2: 99% (03-23-21 @ 07:00) (59% - 100%)  Wt(kg): --  I&O's Summary    22 Mar 2021 07:01  -  23 Mar 2021 07:00  --------------------------------------------------------  IN: 693.2 mL / OUT: 600 mL / NET: 93.2 mL          EYES:conjunctiva and sclera clear  ENMT: No tonsillar erythema, exudates, or enlargement;  Cardiovascular: Normal S1 S2, No JVD, No murmurs, No edema  Respiratory: b/l rhonchi  Gastrointestinal:  Soft,  Extremities: no edema                                    4.4    5.99  )-----------( 56       ( 22 Mar 2021 03:10 )             14.0     03-22    135  |  97<L>  |  68<H>  ----------------------------<  118<H>  4.4   |  27  |  3.33<H>    Ca    9.1      22 Mar 2021 03:10  Phos  2.5     03-22  Mg     2.3     03-22    TPro  4.2<L>  /  Alb  1.8<L>  /  TBili  0.8  /  DBili  x   /  AST  53<H>  /  ALT  37  /  AlkPhos  156<H>  03-22    proBNP:   Lipid Profile:   HgA1c:   TSH:     Consultant(s) Notes Reviewed:  [x ] YES  [ ] NO    Care Discussed with Consultants/Other Providers [ x] YES  [ ] NO    Imaging Personally Reviewed independently:  [x] YES  [ ] NO    All labs, radiologic studies, vitals, orders and medications list reviewed. Patient is seen and examined at bedside. Case discussed with medical team.

## 2021-03-23 NOTE — CHART NOTE - NSCHARTNOTEFT_GEN_A_CORE
HPI: 3/2/21    71 y/o M w/ hx ESRD on HD, anemia, hyperparathyroidism, CAD s/p stent, BPH w/ neurogenic bladder (last dialysis 2/27/21), cognitive impairment, that was brought in by EMS from [facility] for AMS. Due to patients mental status history obtained from ED provider note.  Per chart, pt usually verbal but upon check up at NH pt non verbal. Pt was admitted to this hospital for similar issues in past and was found to have infection (PNA). Pt not able to provide further history. Chart shows pt is dependent on all ADLs.  Unable to attain review of systems at this time.     FLOOR COURSE:  3/2 -3/21   70M CAD/stent, sCHF LVEF 12%, BPH, neurogenic bladder, ESRD on HD w/ chronic anemia, MRSA on vibramycin p/w sepsis from UTI with acute metabolic encephalopathy, hypotension, c/b hypoglycemia and hypothermia. Patient was treated with Zosyn from for 7 days, finished 3/15/21, blood cx negative.   During hospital course patient also became hypoglycemic-cortisol level adequate, likely 2.2 Poor PO s/p PEG placement 3/17/21.  Pt also with overall AMS and received MRI which showed chronic microvascular ischemic disease, but no hemorrhage, no stroke, or intracranial mass, overall unclear etiology. Patient had large R sided Pleural effusion, Pulmonary consult appreciated - no intervention.   Chronic systolic heart failure. LVEF 12 % from TTE in Feb 3 2021. Patient on lopressor, midodrine, lipitor, ASA.   Patient gets HD for ESRD   Transaminitis-U/S abdomen on 3/8 showed a normal liver, hepatitis serologies negative, transaminitis has since resolved as has hypothermia.   Hypothermia- Resolved    O/n 3/22 RRT for hypotension associated with dark colored fluid from PEG tube and melena.    MICU COURSE  Transferred to MICU on 3/22 for hypotension 2/2 GI bleed. Pt started on vasopressors. Pt on transfer to MICU noted to have bloody output from suction from PEG tube. Hgb low at 4.4 and received 1/2 U of blood and started on PPI drip. With extensive GOC, patient made DNR/ DNI and comfort care. Pressors discontinued, blood draws discontinued.      FOLLOW UP:    [ ] comfort care  [ ] updates families

## 2021-03-23 NOTE — PROGRESS NOTE ADULT - ATTENDING COMMENTS
spoke with Deana Beaver today, update given  still unclear etiology   still guarded prognosis
agree with plan above
70 M with CHF, ESRD on HD, CAD here with acute encephalopathy now with hemorraghic shock and hypoglycemia.  Care discussed with patient's HCP - she does not want aggressive measures and understands he is dying.   Cap pressors, no escalation of care.  Focus on comfort.
70 M with CHF, ESRD on HD, CAD here with acute encephalopathy now with hemorraghic shock and hypoglycemia.  Care discussed with patient's HCP, focus is on patient comfort.    Off pressors.   Focus on comfort.    f/u palliative reccs     Plan discussed with resident/fellow/nurse.
MRI with b/l mastoiditis  cont vanco by dose, zosyn
Patient seen and examined with the liver team. I agree with the plan as above.  Ischemic hepatopathy which is improving. I explained the condition to the patient.  Please call us back with any questins
unclear infection source  mildly altered but hypoglycemic when I eval pt  unclear baseline  gentle D5, cautious bc of HD  encourage PO, may need calorie count; not ideal candidate for PEG
Abrahan Chase (HCP) on 3/3. Answered all the question.
Abrahan Chase (HCP) on 3/3. Answered all the question.
EGD cancelled today as pt had HD this AM  rescheduled for 3/17
s/p PEG, once at goal will attempt to stop D10 and monitor gluc  if gluc stable off D10 can return to [facility]
plan for return to [place] early next week

## 2021-03-23 NOTE — PROGRESS NOTE ADULT - ASSESSMENT
ASSESSMENT AND PLAN:   70M CAD/stent, sCHF LVEF 12%, BPH, neurogenic bladder, ESRD on HD w/ chronic anemia, MRSA on vibramycin p/w sepsis POA from UTI with acute metabolic encephalopathy, hypotension, c/b hypoglycemia and hypothermia, now presenting with hypovolemic shock 2/2 GI bleed on NE     #Neuro  AAOx1   Hx of dementia  AMS likely 2/2 hypotension and progression of disease  CTM    #Cardiovascular    Hypotension-   s/p midodrine, 1L IVF, and 1L pRBC  Likely 2/2 GI bleed  Patient s/p PEG placement 3/17/21  - continue NE    HF  - EF 12 % from TTE from Feb 3 2021  - on Midodrine and Atorvastatin  - continue pressors and midodrine     #Respiratory  - on room air saturating well  - known L pleural effusion on CXR- no interveiton per pulm     #GI/Nutrition  LFTs downtrending  s/p PEG tube placement 3/17/21  Drop in Hgb 9-->4 in 24 h with melena  Lactate 2.4  s/p 1u prbc, 1L IVF  - PPI gtt  - monitor CBC   - transfuse as tolerated   - consult GI, patient unlikely candidate for interventions    #/Renal  ESRD on HD  Lactate 2.4 s/p 1L IVF   - HD as needed for volume status given poor cardiac function    #Skin  - sacral ulcer  - continue wound care     #ID  s/p 7 d tx with Zosyn for sepsis, finished on 3/15  Recent PEG tube placement 3/17  Afebrile, no leukocytosis  - blood cx pending   - CTM     #Endocrine  Incidences of hypoglycemia during hospital stay 2/2 poor oral intake  now s/p PEG 3/17/21  - resume tube feeds as tolerated  - fingersticks q6h    #Hematologic/DVT ppx    Anemia  - precipitous fall in Hgb from 9-->4.4 on 3/22/21  - Baseline Hgb ~ 11  s/p 1 u pRBC  Likely 2/2 GI bleed   - GI consulted  - Transfuse as tolerated    Thrombocytopenia   s/p 1 u plt 3/17/21  Downtrending plts, currently 56  Transfuse as tolerated    #Ethics  - per GOC discussions on 3/8/21, patient DNR/DNI and HCPDeana stated that Mr Beaver would not want to be supported by artifical means should his body stop working on it's own.  She wishes that he is allowed to die naturally, should event turn that direction.       ASSESSMENT AND PLAN:   70M CAD/stent, sCHF LVEF 12%, BPH, neurogenic bladder, ESRD on HD w/ chronic anemia, MRSA on vibramycin p/w sepsis POA from UTI with acute metabolic encephalopathy, hypotension, c/b hypoglycemia and hypothermia, now presenting with hypovolemic shock 2/2 GI bleed on NE initially, after GOC discussion pt now DNR/DNI with comfort measures, off of pressors.    #Neuro  AAOx0  Hx of dementia  AMS likely 2/2 hypotension and progression of disease  CTM    #Cardiovascular    Hypotension-   s/p midodrine, 1L IVF, and 1L pRBC  Likely 2/2 GI bleed  Patient s/p PEG placement 3/17/21  Dc'ed NE on 3/22 - as pt comfort care      HF  - EF 12 % from TTE from Feb 3 2021  off midodrine and atorvastatin     #Respiratory  - on room air saturating well  - known L pleural effusion on CXR- no interveiton per pulm     #GI/Nutrition  LFTs downtrending  s/p PEG tube placement 3/17/21  Drop in Hgb 9-->4 in 24 h with melena  Lactate 2.4  s/p 1u prbc, 1L IVF  - PPI gtt discontinued  -comfort care    #/Renal  ESRD on HD  Lactate 2.4 s/p 1L IVF   no more HD    #Skin  - sacral ulcer  - continue wound care     #ID  s/p 7 d tx with Zosyn for sepsis, finished on 3/15  Recent PEG tube placement 3/17  Afebrile, no leukocytosis  - blood cx pending   - CTM     #Endocrine  Incidences of hypoglycemia during hospital stay 2/2 poor oral intake  now s/p PEG 3/17/21  -no PEG feeds now as pt as recently as 3/22 had bloody output from suction     #Hematologic/DVT ppx    Anemia  - precipitous fall in Hgb from 9-->4.4 on 3/22/21  - Baseline Hgb ~ 11  s/p 1 u pRBC  Likely 2/2 GI bleed   - GI consulted  -comfort measures, not checking blood    Thrombocytopenia   s/p 1 u plt 3/17/21  Downtrending plts, currently 56  -comfort measures, not checking blood    #Ethics  - per GOC discussions on 3/8/21, patient DNR/DNI and HCPDeana stated that Mr Beaver would not want to be supported by artifical means should his body stop working on it's own.  She wishes that he is allowed to die naturally, should event turn that direction.    -3/22/21 ; Dr. Medel discussed with family regarding wishes and pt made comfort care. Face time with family  -3/23/21: updated [facility] with DNR/DNI and comfort care in chart

## 2021-03-23 NOTE — PROGRESS NOTE ADULT - PROVIDER SPECIALTY LIST ADULT
Cardiology
Nephrology
Gastroenterology
Hepatology
Nephrology
Cardiology
Gastroenterology
MICU
Nephrology
Cardiology
Gastroenterology
Hospitalist
Hospitalist
MICU
Cardiology
Hospitalist
Cardiology
Gastroenterology
Gastroenterology
Hospitalist
Gastroenterology
Hospitalist
Gastroenterology
Internal Medicine
Hospitalist
Internal Medicine
Hospitalist

## 2021-03-23 NOTE — PHARMACOTHERAPY INTERVENTION NOTE - COMMENTS
As discussed with MICU team, patient with positive nasal PCR 3/3/21 for MSSA/MRSA colonization.  Patient currently receiving therapy with mupirocin 2% ointment twice daily.    Plan:  1.  Recommend discontinue mupirocin 2% ointment, therapy complete.      Randell Tarango, PharmD, [facility]  Clinical Pharmacy Coordinator  Medical Intensive Care Unit - [facility]   Spectra [id number]

## 2021-03-23 NOTE — PROGRESS NOTE ADULT - REASON FOR ADMISSION
AMS

## 2021-03-23 NOTE — PROGRESS NOTE ADULT - SUBJECTIVE AND OBJECTIVE BOX
Overnight events noted   VITAL: T(C): , Max: 35.6 (03-22-21 @ 12:00) T(F): , Max: 96.1 (03-22-21 @ 12:00) HR: 63 (03-23-21 @ 08:00) BP: 48/38 (03-23-21 @ 08:00) BP(mean): 44 (03-23-21 @ 08:00) RR: 17 (03-23-21 @ 08:00) SpO2: 99% (03-23-21 @ 07:00)   PHYSICAL EXAM: Constitutional: minimally communicative HEENT: DMM Neck: Supple, No JVD Respiratory: coarse BS B/L Cardiovascular: RRR s1s2, no m/r/g Gastrointestinal: soft, NT/ND; (+)PEG Extremities: (+)b/l edema Neurological: tone WNL Back: no CVAT b/l Skin: No rashes, no nevi Access: LUE AVF (+)thrill  LABS:                      4.4   5.99  )-----------( 56       ( 22 Mar 2021 03:10 )            14.0   Na(135)/K(4.4)/Cl(97)/HCO3(27)/BUN(68)/Cr(3.33)Glu(118)/Ca(9.1)/Mg(2.3)/PO4(2.5)    03-22 @ 03:10 Na(133)/K(4.4)/Cl(93)/HCO3(23)/BUN(38)/Cr(2.82)Glu(108)/Ca(9.9)/Mg(2.5)/PO4(2.5)    03-21 @ 07:00   IMPRESSION: 70M w/ dementia, CAD, past epidural abscess, and ESRD, 3/2/21 a/w AMS  (1)Renal - ESRD - HD TTS   (2)Anemia - severe  (3)CV - shock  (4)Goals of care - comfort as primary goal at this point    RECOMMEND: (1)Will not plan for further HD      Heath Huff MD [facility] Office: [phone] Cell: [phone]        Overnight events noted   VITAL: T(C): , Max: 35.6 (03-22-21 @ 12:00) T(F): , Max: 96.1 (03-22-21 @ 12:00) HR: 63 (03-23-21 @ 08:00) BP: 48/38 (03-23-21 @ 08:00) BP(mean): 44 (03-23-21 @ 08:00) RR: 17 (03-23-21 @ 08:00) SpO2: 99% (03-23-21 @ 07:00)   PHYSICAL EXAM: Constitutional: minimally communicative HEENT: DMM Neck: Supple, No JVD Respiratory: coarse BS B/L Cardiovascular: RRR s1s2, no m/r/g Gastrointestinal: soft, NT/ND; (+)PEG Extremities: (+)b/l edema Neurological: tone WNL Back: no CVAT b/l Skin: No rashes, no nevi Access: LUE AVF (+)thrill  LABS:                      4.4   5.99  )-----------( 56       ( 22 Mar 2021 03:10 )            14.0   Na(135)/K(4.4)/Cl(97)/HCO3(27)/BUN(68)/Cr(3.33)Glu(118)/Ca(9.1)/Mg(2.3)/PO4(2.5)    03-22 @ 03:10 Na(133)/K(4.4)/Cl(93)/HCO3(23)/BUN(38)/Cr(2.82)Glu(108)/Ca(9.9)/Mg(2.5)/PO4(2.5)    03-21 @ 07:00   IMPRESSION: 70M w/ dementia, CAD, past epidural abscess, and ESRD, 3/2/21 a/w AMS  (1)Renal - ESRD - HD TTS   (2)Anemia - severe as of yesterday; no longer undergoing labwork  (3)CV - shock; surprising that he is still alive, with an SBP persistently in the 40s  (4)Goals of care - comfort as primary goal at this point    RECOMMEND: (1)Will not plan for further HD      Heath Huff MD [facility] Office: [phone] Cell: [phone]        minimally communicative   VITAL: T(C): , Max: 35.6 (03-22-21 @ 12:00) T(F): , Max: 96.1 (03-22-21 @ 12:00) HR: 63 (03-23-21 @ 08:00) BP: 48/38 (03-23-21 @ 08:00) BP(mean): 44 (03-23-21 @ 08:00) RR: 17 (03-23-21 @ 08:00) SpO2: 99% (03-23-21 @ 07:00)   PHYSICAL EXAM: Constitutional: minimally communicative HEENT: DMM Neck: Supple, No JVD Respiratory: coarse BS B/L Cardiovascular: RRR s1s2, no m/r/g Gastrointestinal: soft, NT/ND; (+)PEG Extremities: (+)b/l edema Neurological: tone WNL Back: no CVAT b/l Skin: No rashes, no nevi Access: LUE AVF (+)thrill  LABS:                      4.4   5.99  )-----------( 56       ( 22 Mar 2021 03:10 )            14.0   Na(135)/K(4.4)/Cl(97)/HCO3(27)/BUN(68)/Cr(3.33)Glu(118)/Ca(9.1)/Mg(2.3)/PO4(2.5)    03-22 @ 03:10 Na(133)/K(4.4)/Cl(93)/HCO3(23)/BUN(38)/Cr(2.82)Glu(108)/Ca(9.9)/Mg(2.5)/PO4(2.5)    03-21 @ 07:00   IMPRESSION: 70M w/ dementia, CAD, past epidural abscess, and ESRD, 3/2/21 a/w AMS  (1)Renal - ESRD - HD TTS   (2)Anemia - severe as of yesterday; no longer undergoing labwork  (3)CV - shock; surprising that he is still alive, with an SBP persistently in the 40s  (4)Goals of care - comfort as primary goal at this point    RECOMMEND: (1)Will not plan for further HD      Heath Huff MD [facility] Office: [phone] Cell: [phone]

## 2021-03-23 NOTE — PROGRESS NOTE ADULT - NUTRITIONAL ASSESSMENT
This patient has been assessed with a concern for Malnutrition and has been determined to have a diagnosis/diagnoses of Severe protein-calorie malnutrition and Underweight (BMI < 19).    This patient is being managed with:   Diet Dysphagia 1 Pureed-Honey Consistency Fluid-  Supplement Feeding Modality:  Oral  Nepro Cans or Servings Per Day:  3       Frequency:  Three Times a day  Ensure Pudding Cans or Servings Per Day:  3       Frequency:  Three Times a day  Entered: Mar 11 2021  8:38AM    
This patient has been assessed with a concern for Malnutrition and has been determined to have a diagnosis/diagnoses of Severe protein-calorie malnutrition and Underweight (BMI < 19).    This patient is being managed with:   Diet Dysphagia 1 Pureed-Honey Consistency Fluid-  Entered: Mar  9 2021 12:19PM    
This patient has been assessed with a concern for Malnutrition and has been determined to have a diagnosis/diagnoses of Severe protein-calorie malnutrition and Underweight (BMI < 19).      
This patient has been assessed with a concern for Malnutrition and has been determined to have a diagnosis/diagnoses of Severe protein-calorie malnutrition and Underweight (BMI < 19).    This patient is being managed with:   Diet NPO with Tube Feed-  Tube Feeding Modality: Nasogastric  TwoCal HN (TWOCALHNRTH)  Total Volume for 24 Hours (mL): 912  Continuous  Starting Tube Feed Rate {mL per Hour}: 38  Until Goal Tube Feed Rate (mL per Hour): 38  Tube Feed Duration (in Hours): 24  Tube Feed Start Time: 16:30  No Carb Prosource (1pkg = 15gms Protein)     Qty per Day:  2  Entered: Mar 20 2021  4:10PM    
This patient has been assessed with a concern for Malnutrition and has been determined to have a diagnosis/diagnoses of Severe protein-calorie malnutrition and Underweight (BMI < 19).    This patient is being managed with:   Diet NPO with Tube Feed-  Tube Feeding Modality: Gastrostomy  Nepro with Carb Steady (NEPRORTH)  Total Volume for 24 Hours (mL): 960  Continuous  Starting Tube Feed Rate {mL per Hour}: 20  Increase Tube Feed Rate by (mL): 10     Every 4 hours  Until Goal Tube Feed Rate (mL per Hour): 40  Tube Feed Duration (in Hours): 24  Tube Feed Start Time: 09:10  Entered: Mar 18 2021  9:03AM    
This patient has been assessed with a concern for Malnutrition and has been determined to have a diagnosis/diagnoses of Severe protein-calorie malnutrition and Underweight (BMI < 19).    This patient is being managed with:   Diet NPO after Midnight-     NPO Start Date: 16-Mar-2021   NPO Start Time: 23:59  Entered: Mar 16 2021  9:42AM    Diet NPO with Tube Feed-  Tube Feeding Modality: Nasogastric  Nepro with Carb Steady (NEPRORTH)  Total Volume for 24 Hours (mL): 960  Continuous  Starting Tube Feed Rate {mL per Hour}: 20  Increase Tube Feed Rate by (mL): 10     Every 4 hours  Until Goal Tube Feed Rate (mL per Hour): 40  Tube Feed Duration (in Hours): 24  Tube Feed Start Time: 18:00  Entered: Mar 12 2021  5:58PM    
This patient has been assessed with a concern for Malnutrition and has been determined to have a diagnosis/diagnoses of Severe protein-calorie malnutrition and Underweight (BMI < 19).    This patient is being managed with:   Diet Dysphagia 1 Pureed-Honey Consistency Fluid-  Consistent Carbohydrate {No Snacks} (CSTCHO)  Supplement Feeding Modality:  Oral  Nepro Cans or Servings Per Day:  3       Frequency:  Three Times a day  Ensure Pudding Cans or Servings Per Day:  3       Frequency:  Three Times a day  Entered: Mar 11 2021  3:52PM    
This patient has been assessed with a concern for Malnutrition and has been determined to have a diagnosis/diagnoses of Severe protein-calorie malnutrition and Underweight (BMI < 19).    This patient is being managed with:   Diet NPO after Midnight-     NPO Start Date: 15-Mar-2021   NPO Start Time: 23:59  Entered: Mar 15 2021  8:28AM    Diet NPO with Tube Feed-  Tube Feeding Modality: Nasogastric  Nepro with Carb Steady (NEPRORTH)  Total Volume for 24 Hours (mL): 960  Continuous  Starting Tube Feed Rate {mL per Hour}: 20  Increase Tube Feed Rate by (mL): 10     Every 4 hours  Until Goal Tube Feed Rate (mL per Hour): 40  Tube Feed Duration (in Hours): 24  Tube Feed Start Time: 18:00  Entered: Mar 12 2021  5:58PM    
This patient has been assessed with a concern for Malnutrition and has been determined to have a diagnosis/diagnoses of Severe protein-calorie malnutrition and Underweight (BMI < 19).    This patient is being managed with:   Diet NPO with Tube Feed-  Tube Feeding Modality: Gastrostomy  Nepro with Carb Steady (NEPRORTH)  Total Volume for 24 Hours (mL): 960  Continuous  Starting Tube Feed Rate {mL per Hour}: 20  Increase Tube Feed Rate by (mL): 10     Every 4 hours  Until Goal Tube Feed Rate (mL per Hour): 40  Tube Feed Duration (in Hours): 24  Tube Feed Start Time: 09:10  Entered: Mar 18 2021  9:03AM    
This patient has been assessed with a concern for Malnutrition and has been determined to have a diagnosis/diagnoses of Severe protein-calorie malnutrition and Underweight (BMI < 19).    This patient is being managed with:   Diet NPO after Midnight-     NPO Start Date: 16-Mar-2021   NPO Start Time: 23:59  Entered: Mar 16 2021  9:42AM    
This patient has been assessed with a concern for Malnutrition and has been determined to have a diagnosis/diagnoses of Severe protein-calorie malnutrition and Underweight (BMI < 19).    This patient is being managed with:   Diet NPO with Tube Feed-  Tube Feeding Modality: Nasogastric  Nepro with Carb Steady (NEPRORTH)  Total Volume for 24 Hours (mL): 960  Continuous  Starting Tube Feed Rate {mL per Hour}: 20  Increase Tube Feed Rate by (mL): 10     Every 4 hours  Until Goal Tube Feed Rate (mL per Hour): 40  Tube Feed Duration (in Hours): 24  Tube Feed Start Time: 18:00  Entered: Mar 12 2021  5:58PM    
This patient has been assessed with a concern for Malnutrition and has been determined to have a diagnosis/diagnoses of Severe protein-calorie malnutrition and Underweight (BMI < 19).    This patient is being managed with:   Diet Dysphagia 1 Pureed-Honey Consistency Fluid-  Entered: Mar  9 2021 12:19PM    
This patient has been assessed with a concern for Malnutrition and has been determined to have a diagnosis/diagnoses of Severe protein-calorie malnutrition and Underweight (BMI < 19).    This patient is being managed with:   Diet NPO with Tube Feed-  Tube Feeding Modality: Gastrostomy  Nepro with Carb Steady (NEPRORTH)  Total Volume for 24 Hours (mL): 960  Continuous  Starting Tube Feed Rate {mL per Hour}: 20  Increase Tube Feed Rate by (mL): 10     Every 4 hours  Until Goal Tube Feed Rate (mL per Hour): 40  Tube Feed Duration (in Hours): 24  Tube Feed Start Time: 09:10  Entered: Mar 18 2021  9:03AM    
This patient has been assessed with a concern for Malnutrition and has been determined to have a diagnosis/diagnoses of Severe protein-calorie malnutrition and Underweight (BMI < 19).    This patient is being managed with:   Diet NPO with Tube Feed-  Tube Feeding Modality: Gastrostomy  Nepro with Carb Steady (NEPRORTH)  Total Volume for 24 Hours (mL): 960  Continuous  Starting Tube Feed Rate {mL per Hour}: 20  Increase Tube Feed Rate by (mL): 10     Every 4 hours  Until Goal Tube Feed Rate (mL per Hour): 40  Tube Feed Duration (in Hours): 24  Tube Feed Start Time: 09:10  Entered: Mar 18 2021  9:03AM    
This patient has been assessed with a concern for Malnutrition and has been determined to have a diagnosis/diagnoses of Severe protein-calorie malnutrition and Underweight (BMI < 19).    This patient is being managed with:   Diet NPO with Tube Feed-  Tube Feeding Modality: Nasogastric  Nepro with Carb Steady (NEPRORTH)  Total Volume for 24 Hours (mL): 960  Continuous  Starting Tube Feed Rate {mL per Hour}: 20  Increase Tube Feed Rate by (mL): 10     Every 4 hours  Until Goal Tube Feed Rate (mL per Hour): 40  Tube Feed Duration (in Hours): 24  Tube Feed Start Time: 18:00  Entered: Mar 12 2021  5:58PM    
This patient has been assessed with a concern for Malnutrition and has been determined to have a diagnosis/diagnoses of Severe protein-calorie malnutrition and Underweight (BMI < 19).    This patient is being managed with:   Diet NPO with Tube Feed-  Tube Feeding Modality: Nasogastric  Nepro with Carb Steady (NEPRORTH)  Total Volume for 24 Hours (mL): 960  Continuous  Starting Tube Feed Rate {mL per Hour}: 10  Increase Tube Feed Rate by (mL): 10     Every 4 hours  Until Goal Tube Feed Rate (mL per Hour): 40  Tube Feed Duration (in Hours): 24  Tube Feed Start Time: 18:00  Entered: Mar  7 2021  6:01PM

## 2021-03-23 NOTE — CHART NOTE - NSCHARTNOTEFT_GEN_A_CORE
pt actively dying.  comfort measures only.  Will sign off.  Please call if need any help with symptom management [phone]. Rhea Martinez DO

## 2021-03-23 NOTE — PROGRESS NOTE ADULT - SUBJECTIVE AND OBJECTIVE BOX
MICU PROGRESS NOTE    INTERVAL HPI/OVERNIGHT EVENTS:    SUBJECTIVE:   unable to obtain ros    OBJECTIVE:    VITAL SIGNS:  ICU Vital Signs Last 24 Hrs  T(C): 35.3 (23 Mar 2021 04:00), Max: 35.6 (22 Mar 2021 12:00)  T(F): 95.5 (23 Mar 2021 04:00), Max: 96.1 (22 Mar 2021 12:00)  HR: 63 (23 Mar 2021 08:00) (61 - 81)  BP: 48/38 (23 Mar 2021 08:00) (37/26 - 92/77)  BP(mean): 44 (23 Mar 2021 08:00) (20 - 84)  ABP: --  ABP(mean): --  RR: 17 (23 Mar 2021 08:00) (11 - 22)  SpO2: 99% (23 Mar 2021 07:00) (59% - 100%)      VENTS:      I&O:    03-22 @ 07:01  -  03-23 @ 07:00  --------------------------------------------------------  IN: 693.2 mL / OUT: 600 mL / NET: 93.2 mL        PHYSICAL EXAM:  GENERAL: NAD, conversant  CHEST/LUNG: Clear to auscultation bilaterally; No crackles, rhonchi, wheezing, or rubs  HEART: Regular rate and rhythm; No murmurs, rubs, or gallops  ABDOMEN: Soft, Nontender, Nondistended; Bowel sounds present  EXTREMITIES:  2+ Peripheral Pulses, No clubbing, cyanosis, or edema  SKIN: No rashes or lesions  NERVOUS SYSTEM:  Alert & Oriented, Good concentration      LINES:                                       MEDICATIONS:  MEDICATIONS  (STANDING):  chlorhexidine 4% Liquid 1 Application(s) Topical daily    MEDICATIONS  (PRN):  HYDROmorphone  Injectable 1 milliGRAM(s) IV Push every 4 hours PRN discomfort      ALLERGIES:  Allergies    No Known Allergies    Intolerances        LABS:                        4.4    5.99  )-----------( 56       ( 22 Mar 2021 03:10 )             14.0     03-22    135  |  97<L>  |  68<H>  ----------------------------<  118<H>  4.4   |  27  |  3.33<H>    Ca    9.1      22 Mar 2021 03:10  Phos  2.5     03-22  Mg     2.3     03-22    TPro  4.2<L>  /  Alb  1.8<L>  /  TBili  0.8  /  DBili  x   /  AST  53<H>  /  ALT  37  /  AlkPhos  156<H>  03-22    PT/INR - ( 22 Mar 2021 03:10 )   PT: 21.5 sec;   INR: 1.95 ratio         PTT - ( 22 Mar 2021 03:10 )  PTT:45.8 sec      CAPILLARY BLOOD GLUCOSE      POCT Blood Glucose.: 134 mg/dL (22 Mar 2021 00:37)      RADIOLOGY & ADDITIONAL TESTS: Reviewed. MICU PROGRESS NOTE    INTERVAL HPI/OVERNIGHT EVENTS:    SUBJECTIVE:   unable to obtain ros    OBJECTIVE:    VITAL SIGNS:  ICU Vital Signs Last 24 Hrs  T(C): 35.3 (23 Mar 2021 04:00), Max: 35.6 (22 Mar 2021 12:00)  T(F): 95.5 (23 Mar 2021 04:00), Max: 96.1 (22 Mar 2021 12:00)  HR: 63 (23 Mar 2021 08:00) (61 - 81)  BP: 48/38 (23 Mar 2021 08:00) (37/26 - 92/77)  BP(mean): 44 (23 Mar 2021 08:00) (20 - 84)  ABP: --  ABP(mean): --  RR: 17 (23 Mar 2021 08:00) (11 - 22)  SpO2: 99% (23 Mar 2021 07:00) (59% - 100%)      VENTS:      I&O:    03-22 @ 07:01  -  03-23 @ 07:00  --------------------------------------------------------  IN: 693.2 mL / OUT: 600 mL / NET: 93.2 mL        PHYSICAL EXAM:  GENERAL: NAD, sleeping, open eye intermittently  CHEST/LUNG: Clear to auscultation bilaterally; No crackles, rhonchi, wheezing, or rubs  HEART: Regular rate and rhythm; No murmurs, rubs, or gallops  ABDOMEN: Soft, Nontender, Nondistended; Bowel sounds present  EXTREMITIES:  2+ Peripheral Pulses, No clubbing, cyanosis, or edema  SKIN: No rashes or lesions  NERVOUS SYSTEM:  unable to obtain ROS as pt not speaking  LINES:                                       MEDICATIONS:  MEDICATIONS  (STANDING):  chlorhexidine 4% Liquid 1 Application(s) Topical daily    MEDICATIONS  (PRN):  HYDROmorphone  Injectable 1 milliGRAM(s) IV Push every 4 hours PRN discomfort      ALLERGIES:  Allergies    No Known Allergies    Intolerances        LABS:                        4.4    5.99  )-----------( 56       ( 22 Mar 2021 03:10 )             14.0     03-22    135  |  97<L>  |  68<H>  ----------------------------<  118<H>  4.4   |  27  |  3.33<H>    Ca    9.1      22 Mar 2021 03:10  Phos  2.5     03-22  Mg     2.3     03-22    TPro  4.2<L>  /  Alb  1.8<L>  /  TBili  0.8  /  DBili  x   /  AST  53<H>  /  ALT  37  /  AlkPhos  156<H>  03-22    PT/INR - ( 22 Mar 2021 03:10 )   PT: 21.5 sec;   INR: 1.95 ratio         PTT - ( 22 Mar 2021 03:10 )  PTT:45.8 sec      CAPILLARY BLOOD GLUCOSE      POCT Blood Glucose.: 134 mg/dL (22 Mar 2021 00:37)      RADIOLOGY & ADDITIONAL TESTS: Reviewed.

## 2021-03-24 NOTE — CHART NOTE - NSCHARTNOTESELECT_GEN_ALL_CORE
ACP/Event Note
Consult/Nutrition Services
Endocrinology/Event Note
Event Note
Event Note
POCUS Fellow/Event Note
ACP/Event Note
Death Note/Event Note
Event Note
GOC convo/Event Note
MICU accept note/Transfer Note
Nutrition Consult/ Follow-up/Nutrition Services
Nutrition Follow-Up/Malnutrition/Nutrition Services
Off Service Note
Transfer Note

## 2021-03-24 NOTE — DISCHARGE NOTE FOR THE EXPIRED PATIENT - HOSPITAL COURSE
10M CAD/stent, sCHF LVEF 12%, BPH, neurogenic bladder, ESRD on HD w/ chronic anemia, MRSA on vibramycin p/w sepsis POA from UTI with acute metabolic encephalopathy, hypotension, c/b hypoglycemia and hypothermia. Patient was treated with Zosyn from for 7 days, finished 3/15/21, blood cx negative.    During hospital course patient also became Hypoglycemic-cortisol level adequate, likely 2.2 Poor PO s/p PEG placement 3/17/21  Patient received MRI for his AMS Altered mental status although known Dementia, overall worse, MRI no stroke, unclear etiology, overall failing.   Patient has large R sided Pleural effusion, Pulmonary consult appreciated - no intervention.   Chronic systolic heart failure. LVEF 12 % from TTE in Feb 3 2021. Patient on lopressor, midodrine, lipitor, ASA.   Patient gets HD for ESRD   Transaminitis-U/S abdomen on 3/8 showed a normal liver, hepatitis serologies negative, transaminitis has since resolved as has hypothermia.   Hypothermia- Resolved    Interval Events:     Today (3/22/21) RRT was called for hypotension. On arrival, VS: BP: 47/16 HR: 80 RR 15 Temp: 97.3 . Melena on PE.   Pt initially treated w/ 500cc bolus NS; pressure improved to 135/105.; however BP continued to drop. Midodrine 30mg PO x1 was given. BP continued to drop to 59/40. Norepinephrine was started. Pt noted to have melena by nursing staff, initial blood work was inaccurate as it was contaminated by IVF.  Repeat lab work sent confirmed acute blood loss anemia to Hgb ~4; FOBT +. Pt treated initially w/ 0.5U of PRBCx1. Pt started on protonix gtt. Repeat 0.5 prbc unit ordered. Also given 1L fluid bolus.     Per chart review, [facility] with cousin Deana (HCP) described the overall decline in pt over the last few days; Deana stated that Mr Beaver would not want to be supported by artifical means should his body stop working on it's own.  She wishes that he is allowed to die naturally, should event turn that direction.      During RRT, joshua Leblanc was initially however notified however pressor requirements and ICU admission were not discussed. MICU team was not able to reach Deana overnight; patient was capped on 0.1 NE and transferred to MICU.     After GOC conversation, with HCP Deana, patient was put on comfort care. Patient  cardiopulmonary arrest likely 2/2 hypotensive shock, septic shock and ESRD

## 2021-03-24 NOTE — CHART NOTE - NSCHARTNOTEFT_GEN_A_CORE
DEATH NOTE    Called to bedside to evaluate the patient for asystole.     On physical exam, patient did not respond to verbal or noxious stimuli.  No spontaneous respirations.  Absent heart and breath sounds.  Absent radial and carotid pulses.   Pupils are fixed and dilated, no corneal reflex.  EKG rhythm strip shows asystole.   Patient pronounced dead at 1:58 AM.  Attending notified.

## 2021-03-24 NOTE — DISCHARGE NOTE FOR THE EXPIRED PATIENT - NS PRELIMINARY CAUSE OF DEATH_RESTRICTED
Cardiopulmonary Arrest/Diabetes/Heart Failure/Multi-organ Dysfunction Syndrome/Renal Failure/Respiratory Failure/Sepsis

## 2021-03-27 LAB
CULTURE RESULTS: SIGNIFICANT CHANGE UP
SPECIMEN SOURCE: SIGNIFICANT CHANGE UP

## 2021-04-19 NOTE — PROVIDER CONTACT NOTE (OTHER) - SITUATION
----- Message from Luana Wilde MD sent at 4/16/2021  4:51 PM EDT -----  Let patient know that renal panel and BMP are negative. No change at this time.   I believe she follows up with Dr. Da Nugent patient is hypotensive post being given a 250ml bolus

## 2021-06-29 NOTE — PROGRESS NOTE ADULT - SUBJECTIVE AND OBJECTIVE BOX
S/p HD today; tenuous hemodynamics on HD; no net UF.   VITAL: T(C): , Max: 36.4 (12-10-20 @ 05:25) T(F): , Max: 97.6 (12-10-20 @ 05:25) HR: 80 (12-10-20 @ 15:00) BP: 108/65 (12-10-20 @ 15:00)- RR: 16 (12-10-20 @ 15:00) SpO2: 99% (12-10-20 @ 11:54)   PHYSICAL EXAM: Constitutional: lethargic/confused HEENT: NCAT, DMM Neck: Supple, No JVD Respiratory: CTA-b/l Cardiovascular: reg s1s2, (+)1/6 GUZMAN Gastrointestinal: BS+, soft, NT/ND Extremities: No peripheral edema b/l Neurological: no focal deficits; strength grossly intact Back: no CVAT b/l Skin: No rashes, no nevi Access: LUE AVF (+)thrill   LABS:                      12.3  4.21  )-----------( 104      ( 10 Dec 2020 08:18 )            38.7   Na(139)/K(4.5)/Cl(97)/HCO3(25)/BUN(86)/Cr(7.18)Glu(82)/Ca(10.2)/Mg(2.4)/PO4(5.5)    12-10 @ 08:18 Na(140)/K(4.5)/Cl(96)/HCO3(29)/BUN(64)/Cr(6.13)Glu(83)/Ca(11.2)/Mg(2.3)/PO4(5.9)    12-09 @ 08:50 Na(145)/K(5.7)/Cl(96)/HCO3(30)/BUN(95)/Cr(8.16)Glu(121)/Ca(12.6)/Mg(--)/PO4(--)    12-08 @ 09:13   IMPRESSION: 70M w/ dementia, HTN, CAD, past spinal abscess, and ESRD-HD TTS, 12/8/20 a/w febrile illness  (1)Renal - ESRD - HD TTS - due for next HD tomorrow  (2)Hypercalcemia - appears to be improving with use of Sensipar...serum calcium level between 12/9 and 12/10, despite him not being dialyzed in the interim...  (3)ID - presumed urosepsis - on IV Rocephin   RECOMMEND: (1)Sensipar as ordered (2)Continue abx for GFR<10/HD (3)Next HD 12/11 - 0.5kg UF as able      Heath Huff MD [facility] Office: [phone] Cell: [phone]       S/p HD today; tenuous hemodynamics on HD; no net UF.   VITAL: T(C): , Max: 36.4 (12-10-20 @ 05:25) T(F): , Max: 97.6 (12-10-20 @ 05:25) HR: 80 (12-10-20 @ 15:00) BP: 108/65 (12-10-20 @ 15:00)- RR: 16 (12-10-20 @ 15:00) SpO2: 99% (12-10-20 @ 11:54)   PHYSICAL EXAM: Constitutional: more alert and conversive than the prior 2 days HEENT: NCAT, DMM Neck: Supple, No JVD Respiratory: CTA-b/l Cardiovascular: reg s1s2, (+)1/6 GUZMAN Gastrointestinal: BS+, soft, NT/ND Extremities: No peripheral edema b/l Neurological: no focal deficits; strength grossly intact Back: no CVAT b/l Skin: No rashes, no nevi Access: LUE AVF (+)thrill   LABS:                      12.3  4.21  )-----------( 104      ( 10 Dec 2020 08:18 )            38.7   Na(139)/K(4.5)/Cl(97)/HCO3(25)/BUN(86)/Cr(7.18)Glu(82)/Ca(10.2)/Mg(2.4)/PO4(5.5)    12-10 @ 08:18 Na(140)/K(4.5)/Cl(96)/HCO3(29)/BUN(64)/Cr(6.13)Glu(83)/Ca(11.2)/Mg(2.3)/PO4(5.9)    12-09 @ 08:50 Na(145)/K(5.7)/Cl(96)/HCO3(30)/BUN(95)/Cr(8.16)Glu(121)/Ca(12.6)/Mg(--)/PO4(--)    12-08 @ 09:13   IMPRESSION: 70M w/ dementia, HTN, CAD, past spinal abscess, and ESRD-HD TTS, 12/8/20 a/w febrile illness  (1)Renal - ESRD - HD TTS - due for next HD tomorrow  (2)Hypercalcemia - appears to be improving with use of Sensipar...serum calcium level between 12/9 and 12/10, despite him not being dialyzed in the interim...  (3)ID - presumed urosepsis - on IV Rocephin - clinically improving   RECOMMEND: (1)Sensipar as ordered (2)Continue abx for GFR<10/HD (3)Next HD 12/11 - 0.5kg UF as able      Heath Huff MD [facility] Office: [phone] Cell: [phone]    Previously Declined (within the last year)

## 2021-07-22 NOTE — PROVIDER CONTACT NOTE (OTHER) - SITUATION
Patient had 8 beats of VTach Odomzo Counseling- I discussed with the patient the risks of Odomzo including but not limited to nausea, vomiting, diarrhea, constipation, weight loss, changes in the sense of taste, decreased appetite, muscle spasms, and hair loss.  The patient verbalized understanding of the proper use and possible adverse effects of Odomzo.  All of the patient's questions and concerns were addressed.

## 2021-12-24 NOTE — ASU PREOP CHECKLIST - NSWEIGHTCALCTOOLDRUG_GEN_A_CORE
Rest, drink plenty of fluids - Gatorade is a good choice, and make sure you are eating regularly.  You do need to self quarantine.  Return for chest pain, difficulty breathing or any other new concerning symptoms.  It has been a pleasure serving you today!  Dr. ISABEL    
 used

## 2022-07-07 NOTE — PROGRESS NOTE ADULT - NSHPATTENDINGPLANDISCUSS_GEN_ALL_CORE
Pt's wife called in regarding the consent form  She is requesting a call back 
Team 3

## 2022-08-18 NOTE — CONSULT NOTE ADULT - PROBLEM/RECOMMENDATION-2
Spoke to Patient, confirmed no need for   It is personal choice if you  would like to have one  DISPLAY PLAN FREE TEXT

## 2023-03-21 NOTE — ED ADULT TRIAGE NOTE - AS TEMP SITE
forehead Xeljanz Counseling: I discussed with the patient the risks of Xeljanz therapy including increased risk of infection, liver issues, headache, diarrhea, or cold symptoms. Live vaccines should be avoided. They were instructed to call if they have any problems.

## 2023-03-21 NOTE — ASU PREOP CHECKLIST - 3.
"  Assessment & Plan     Benign essential hypertension    - Basic metabolic panel  (Ca, Cl, CO2, Creat, Gluc, K, Na, BUN); Future  - lisinopril (ZESTRIL) 10 MG tablet; Take 1 tablet (10 mg) by mouth daily             BMI:   Estimated body mass index is 28.07 kg/m  as calculated from the following:    Height as of this encounter: 1.956 m (6' 5\").    Weight as of this encounter: 107.4 kg (236 lb 11.2 oz).       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    Cristin Cartwright PA-C  [facility] KSENIA Hensley is a 40 year old, presenting for the following health issues:  Hypertension      History of Present Illness       Reason for visit:  Blood pressure  Symptom onset:  More than a month    He eats 2-3 servings of fruits and vegetables daily.He consumes 2 sweetened beverage(s) daily.      145-150/100-105 at home readings. 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 Diet is ok   He to discuss starting bp medications as PCP was not available     Review of Systems   Constitutional, HEENT, cardiovascular, pulmonary, gi and gu systems are negative, except as otherwise noted.      Objective    BP (!) 170/105   Pulse 88   Temp 98.2  F (36.8  C) (Tympanic)   Resp 16   Ht 1.956 m (6' 5\")   Wt 107.4 kg (236 lb 11.2 oz)   SpO2 99%   BMI 28.07 kg/m    Body mass index is 28.07 kg/m .  Physical Exam   GENERAL: healthy, alert and no distress  RESP: lungs clear to auscultation - no rales, rhonchi or wheezes  CV: regular rates and rhythm, normal S1 S2, no S3 or S4 and no peripheral edema  MS: no gross musculoskeletal defects noted, no edema                    " Prophylactic measure Pt examined and no skin breakdowns noted.

## 2023-06-13 NOTE — PROGRESS NOTE ADULT - PROBLEM SELECTOR PROBLEM 2
Metabolic encephalopathy Preparation Of Recipient Site - Flap: The eschar was removed surgically with sharp dissection to facilitate appropriate wound healing of the following adjacent tissue rearrangement.

## 2023-08-20 NOTE — ASU PREOP CHECKLIST - 2.
A/P: 38y Male with L femoral neck hip fracture s/p dipika on 8/15; L Radial head fx     Pain control  WBAT LLE  NWB LUE  DVT ppx: per primary   PT/OT/OOB  Patient on M/W/F HD schedule.   FU AM labs - Transfuse as needed.   Medical mgmt greatly appreciated   Dispo pending hospital course / PT recs  No further acute orthopaedic surgical intervention indicated, stable for dc when medically stable    Floridalma Pulido PA-C  Orthopedic Surgery  Team Pager: [phone]  Locker # [id number]

## 2023-10-03 NOTE — BRIEF OPERATIVE NOTE - PRE-OP DX
Inguinal hernia of right side without obstruction or gangrene  11/28/2018    Active  Genevieve Bey
Him/He

## 2023-12-03 NOTE — ED ADULT NURSE NOTE - NS ED NURSE RECORD ANOTHER HT AND WT
No PROVIDER:[TOKEN:[[id number]]] PROVIDER:[TOKEN:[[id number]]] PROVIDER:[TOKEN:[[id number]]]

## 2024-02-21 NOTE — DISCHARGE NOTE ADULT - DISCHARGE TO
Oleg fo patient as a reminder to go for blood work prior to next weeks follow up with .     
[facility]

## 2024-06-10 NOTE — GOALS OF CARE CONVERSATION - ADVANCED CARE PLANNING - CONVERSATION DETAILS
d/w Deana overall decline in pt over the last few days; pt currently minimally responsive, NGT in place bc of inability to swallow, hypothermia, rising LFTs unclear etiology of overall decline; States that Mr Beaver would not want to be supported by artifical means should his body stop working on it's own.  She wishes that he is allowed to naturally, should event turn that direction.  reassured that all medication and eval would continue to find a cause of current status but Deana understands that pt was not in "good shape to begin with" so she was appreciative for the update and discussion d/w Deana overall decline in pt over the last few days; pt currently minimally responsive, NGT in place bc of inability to swallow, hypothermia, rising LFTs unclear etiology of overall decline; States that Mr Beaver would not want to be supported by artifical means should his body stop working on it's own.  She wishes that he is allowed to die naturally, should event turn that direction.  reassured that all medication and eval would continue to find a cause of current status but Deana understands that pt was not in "good shape to begin with" so she was appreciative for the update and discussion Alert and oriented to person, place and time

## 2025-05-13 NOTE — H&P PST ADULT - NEGATIVE GASTROINTESTINAL SYMPTOMS
no diarrhea/no vomiting/no nausea/no abdominal pain/no constipation
